# Patient Record
Sex: FEMALE | Race: WHITE
[De-identification: names, ages, dates, MRNs, and addresses within clinical notes are randomized per-mention and may not be internally consistent; named-entity substitution may affect disease eponyms.]

---

## 2019-01-08 ENCOUNTER — HOSPITAL ENCOUNTER (INPATIENT)
Dept: HOSPITAL 41 - JD.ED | Age: 84
LOS: 7 days | Discharge: HOME | DRG: 194 | End: 2019-01-15
Attending: INTERNAL MEDICINE | Admitting: INTERNAL MEDICINE
Payer: MEDICARE

## 2019-01-08 DIAGNOSIS — Z91.19: ICD-10-CM

## 2019-01-08 DIAGNOSIS — N18.9: ICD-10-CM

## 2019-01-08 DIAGNOSIS — Z86.73: ICD-10-CM

## 2019-01-08 DIAGNOSIS — I11.0: ICD-10-CM

## 2019-01-08 DIAGNOSIS — Z79.4: ICD-10-CM

## 2019-01-08 DIAGNOSIS — E87.6: ICD-10-CM

## 2019-01-08 DIAGNOSIS — E86.0: ICD-10-CM

## 2019-01-08 DIAGNOSIS — J10.00: Primary | ICD-10-CM

## 2019-01-08 DIAGNOSIS — Z91.14: ICD-10-CM

## 2019-01-08 DIAGNOSIS — K59.09: ICD-10-CM

## 2019-01-08 DIAGNOSIS — R09.02: ICD-10-CM

## 2019-01-08 DIAGNOSIS — I13.0: ICD-10-CM

## 2019-01-08 DIAGNOSIS — E78.5: ICD-10-CM

## 2019-01-08 DIAGNOSIS — R79.1: ICD-10-CM

## 2019-01-08 DIAGNOSIS — I50.9: ICD-10-CM

## 2019-01-08 DIAGNOSIS — I49.9: ICD-10-CM

## 2019-01-08 DIAGNOSIS — Z79.82: ICD-10-CM

## 2019-01-08 DIAGNOSIS — R13.10: ICD-10-CM

## 2019-01-08 DIAGNOSIS — F32.9: ICD-10-CM

## 2019-01-08 DIAGNOSIS — R50.9: ICD-10-CM

## 2019-01-08 DIAGNOSIS — D64.9: ICD-10-CM

## 2019-01-08 DIAGNOSIS — Z66: ICD-10-CM

## 2019-01-08 DIAGNOSIS — Z79.02: ICD-10-CM

## 2019-01-08 DIAGNOSIS — Z88.1: ICD-10-CM

## 2019-01-08 DIAGNOSIS — Z79.899: ICD-10-CM

## 2019-01-08 DIAGNOSIS — E11.65: ICD-10-CM

## 2019-01-08 DIAGNOSIS — H54.7: ICD-10-CM

## 2019-01-08 DIAGNOSIS — Z90.49: ICD-10-CM

## 2019-01-08 DIAGNOSIS — M54.9: ICD-10-CM

## 2019-01-08 DIAGNOSIS — E11.22: ICD-10-CM

## 2019-01-08 DIAGNOSIS — N17.9: ICD-10-CM

## 2019-01-08 DIAGNOSIS — R53.1: ICD-10-CM

## 2019-01-08 DIAGNOSIS — I48.91: ICD-10-CM

## 2019-01-08 DIAGNOSIS — Z85.828: ICD-10-CM

## 2019-01-08 DIAGNOSIS — R33.9: ICD-10-CM

## 2019-01-08 DIAGNOSIS — E83.42: ICD-10-CM

## 2019-01-08 DIAGNOSIS — G89.29: ICD-10-CM

## 2019-01-08 PROCEDURE — 71045 X-RAY EXAM CHEST 1 VIEW: CPT

## 2019-01-08 PROCEDURE — 96365 THER/PROPH/DIAG IV INF INIT: CPT

## 2019-01-08 PROCEDURE — 87081 CULTURE SCREEN ONLY: CPT

## 2019-01-08 PROCEDURE — 85025 COMPLETE CBC W/AUTO DIFF WBC: CPT

## 2019-01-08 PROCEDURE — 83605 ASSAY OF LACTIC ACID: CPT

## 2019-01-08 PROCEDURE — 87804 INFLUENZA ASSAY W/OPTIC: CPT

## 2019-01-08 PROCEDURE — 96361 HYDRATE IV INFUSION ADD-ON: CPT

## 2019-01-08 PROCEDURE — 99285 EMERGENCY DEPT VISIT HI MDM: CPT

## 2019-01-08 PROCEDURE — 87040 BLOOD CULTURE FOR BACTERIA: CPT

## 2019-01-08 PROCEDURE — 81001 URINALYSIS AUTO W/SCOPE: CPT

## 2019-01-08 PROCEDURE — 87899 AGENT NOS ASSAY W/OPTIC: CPT

## 2019-01-08 PROCEDURE — 87430 STREP A AG IA: CPT

## 2019-01-08 PROCEDURE — 86738 MYCOPLASMA ANTIBODY: CPT

## 2019-01-08 PROCEDURE — 80053 COMPREHEN METABOLIC PANEL: CPT

## 2019-01-08 PROCEDURE — 36415 COLL VENOUS BLD VENIPUNCTURE: CPT

## 2019-01-08 PROCEDURE — 86140 C-REACTIVE PROTEIN: CPT

## 2019-01-08 RX ADMIN — POTASSIUM CHLORIDE SCH: 1.5 SOLUTION ORAL at 14:39

## 2019-01-08 RX ADMIN — Medication SCH: at 21:52

## 2019-01-08 RX ADMIN — INSULIN LISPRO SCH UNITS: 100 INJECTION, SOLUTION INTRAVENOUS; SUBCUTANEOUS at 17:34

## 2019-01-08 RX ADMIN — POTASSIUM CHLORIDE SCH: 1.5 SOLUTION ORAL at 14:41

## 2019-01-08 RX ADMIN — SODIUM CHLORIDE AND POTASSIUM CHLORIDE SCH MLS/HR: .9; .15 SOLUTION INTRAVENOUS at 15:16

## 2019-01-08 RX ADMIN — INSULIN LISPRO SCH UNITS: 100 INJECTION, SOLUTION INTRAVENOUS; SUBCUTANEOUS at 14:34

## 2019-01-08 RX ADMIN — HEPARIN SODIUM SCH: 5000 INJECTION, SOLUTION INTRAVENOUS; SUBCUTANEOUS at 21:58

## 2019-01-08 RX ADMIN — INSULIN LISPRO SCH UNITS: 100 INJECTION, SOLUTION INTRAVENOUS; SUBCUTANEOUS at 21:58

## 2019-01-08 NOTE — PCM.HP
H&P History of Present Illness





- General


Date of Service: 01/08/19


Admit Problem/Dx: 


 Admission Diagnosis/Problem





Admission Diagnosis/Problem      Pneumonia








Source of Information: Family, Provider


History Limitations: Reports: No Limitations





- History of Present Illness


Initial Comments - Free Text/Narative: 











87 year old diabetic resident of [facility] presented febrile with flu like 

symptoms.  She has had the Flu vaccine but unfortunately has tested positive 

for Influenza B.  The patient also has bilateral infiltrates;  Documented 

elevated temperature was 102.0F taken in the ambulance.  She will be admitted 

to [facility] with telemetry.  Code status is DNR.


Onset of Symptoms: Reports: Unknown/Unsure


Symptom Onset Date: 01/08/19


Duration of Symptoms: Reports: Hour(s):, Getting Worse


Location: Reports: Chest, Generalized


Severity: Moderate


Improves with: Reports: Medication


Worsens with: Reports: None


Associated Symptoms: Reports: Cough, Loss of Appetite, Malaise, Nausea/Vomiting

, Shortness of Breath, Weakness





- Related Data


Allergies/Adverse Reactions: 


 Allergies











Allergy/AdvReac Type Severity Reaction Status Date / Time


 


levofloxacin [From Levaquin] Allergy  Itching Verified 11/10/18 16:04











Home Medications: 


 Home Meds





Aspirin [Halfprin] 81 mg PO DAILY 11/25/15 [History]


Clopidogrel [Plavix] 75 mg PO DAILY 11/25/15 [History]


Cyanocobalamin (Vitamin B12) [Vitamin B12] 1,000 mcg PO DAILY 11/25/15 [History]


Gabapentin [Neurontin] 300 mg PO TID 11/25/15 [History]


Insulin Aspart [Novolog Flexpen] 9 unit SQ QPM 11/25/15 [History]


Insulin Aspart [Novolog Flexpen] 11 unit SQ QAM 11/25/15 [History]


Insulin Detemir [Levemir Flextouch] 30 unit SQ DAILY 11/25/15 [History]


Furosemide [Lasix] 40 mg PO DAILY 04/10/16 [History]


Sennosides/Docusate Sodium [Senna-Docusate Sodium] 1 tab PO DAILY PRN 04/10/16 [

History]


Acetaminophen 500 mg PO BEDTIME 08/17/16 [History]


Acetaminophen [Tylenol Extra Strength] 1,000 mg PO Q6HR PRN 08/17/16 [History]


Calcium Carbonate [Tums] 400 mg PO Q4H PRN 08/17/16 [History]


Vitamin E 400 unit PO DAILY 08/17/16 [History]


Loratadine [Claritin] 10 mg PO DAILY PRN 11/10/18 [History]











Past Medical History


HEENT History: Reports: Impaired Vision


Cardiovascular History: Reports: Arrhythmia, Heart Failure, Hypertension


Respiratory History: Reports: Other (See Below)


Other Respiratory History: hypoxemia


Gastrointestinal History: Reports: Chronic Constipation


Other Gastrointestinal History: dysphagia


Genitourinary History: Reports: Chronic Renal Insuffiency, Retention, Urinary


OB/GYN History: Reports: Pregnancy


Musculoskeletal History: Reports: Back Pain, Chronic


Other Musculoskeletal History: muscle weakness


Neurological History: Reports: TIA, Other (See Below)


Other Neuro History: cerebral infarct; neuralgia


Psychiatric History: Reports: Depression


Endocrine/Metabolic History: Reports: Diabetes, Type II


Hematologic History: Reports: Anemia


Oncologic (Cancer) History: Reports: Other (See Below)


Other Oncologic History: unknown skin cancer


Dermatologic History: Reports: Other (See Below)


Other Dermatologic History: previous supspected skin cancer





- Past Surgical History


GI Surgical History: Reports: Cholecystectomy





Social & Family History





- Family History


Family Medical History: Noncontributory





- Tobacco Use


Smoking Status *Q: Never Smoker





- Caffeine Use


Caffeine Use: Reports: None





- Recreational Drug Use


Recreational Drug Use: No





H&P Review of Systems





- Review of Systems:


Review Of Systems: See Below


General: Reports: Fever, Chills, Malaise, Weakness, Fatigue, Decreased Appetite


HEENT: Reports: No Symptoms


Pulmonary: Reports: Shortness of Breath, Cough


Gastrointestinal: Reports: Decreased Appetite, Nausea


Genitourinary: Reports: No Symptoms


Musculoskeletal: Reports: No Symptoms


Skin: Reports: No Symptoms


Psychiatric: Reports: No Symptoms


Neurological: Reports: No Symptoms


Hematologic/Lymphatic: Reports: No Symptoms


Immunologic: Reports: No Symptoms





Exam





- Exam


Exam: See Below





- Vital Signs


Vital Signs: 


 Last Vital Signs











Temp  36.8 C   01/08/19 07:36


 


Pulse  105 H  01/08/19 07:36


 


Resp  22 H  01/08/19 07:36


 


BP  108/47 L  01/08/19 07:36


 


Pulse Ox  92 L  01/08/19 08:02











Weight: 65.771 kg





- Exam


Quality Assessment: Supplemental Oxygen, DVT Prophylaxis


General: Alert, Oriented, Cooperative


HEENT: Conjunctiva Clear, EOMI, Nares Patent, Normal Nasal Septum, Pupils Equal

, Pupils Reactive, PERRLA


Neck: Trachea Midline


Lungs: Normal Respiratory Effort, Decreased Breath Sounds, Rhonchi


Cardiovascular: Regular Rate


GI/Abdominal Exam: Normal Bowel Sounds, Soft, Non-Tender, No Organomegaly, No 

Distention


 (Female) Exam: Deferred


Rectal (Female) Exam: Deferred


Back Exam: Normal Inspection


Extremities: Normal Inspection, Non-Tender, Slow Capillary Refill


Skin: Warm


Neurological: Cranial Nerves Intact


Neuro Extensive - Mental Status: Alert, Normal Mood/Affect, Normal Cognition, 

Memory Intact


Neuro Extensive - Motor, Sensory, Reflexes: CN II-XII Intact


Psychiatric: Alert





- Patient Data


Lab Results Last 24 hrs: 


 Laboratory Results - last 24 hr











  01/08/19 01/08/19 01/08/19 Range/Units





  07:52 07:52 07:52 


 


WBC  17.99 H    (3.98-10.04)  K/mm3


 


RBC  4.33    (3.98-5.22)  M/mm3


 


Hgb  12.6    (11.2-15.7)  gm/L


 


Hct  37.9    (34.1-44.9)  %


 


MCV  87.5    (79.4-94.8)  fl


 


MCH  29.1    (25.6-32.2)  pg


 


MCHC  33.2    (32.2-35.5)  g/dl


 


RDW Std Deviation  46.2    (36.4-46.3)  fL


 


Plt Count  241    (182-369)  K/mm3


 


MPV  10.0    (9.4-12.3)  fl


 


Neut % (Auto)  89.4 H    (34.0-71.1)  %


 


Lymph % (Auto)  5.8 L    (19.3-51.7)  %


 


Mono % (Auto)  4.3 L    (4.7-12.5)  %


 


Eos % (Auto)  0 L    (0.7-5.8)  


 


Baso % (Auto)  0.2    (0.1-1.2)  %


 


Neut # (Auto)  16.09 H    (1.56-6.13)  K/mm3


 


Lymph # (Auto)  1.04 L    (1.18-3.74)  K/mm3


 


Mono # (Auto)  0.78 H    (0.24-0.36)  K/mm3


 


Eos # (Auto)  0.00 L    (0.04-0.36)  K/mm3


 


Baso # (Auto)  0.03    (0.01-0.08)  K/mm3


 


Manual Slide Review  Abnormal smear    


 


Sodium   139   (136-145)  mEq/L


 


Potassium   2.7 L   (3.5-5.1)  mEq/L


 


Chloride   101   ()  mEq/L


 


Carbon Dioxide   26   (21-32)  mEq/L


 


Anion Gap   14.7   (5-15)  


 


BUN   11   (7-18)  mg/dL


 


Creatinine   1.2 H   (0.55-1.02)  mg/dL


 


Est Cr Clr Drug Dosing   23.72   mL/min


 


Estimated GFR (MDRD)   42   (>60)  mL/min


 


BUN/Creatinine Ratio   9.2 L   (14-18)  


 


Glucose   181 H   ()  mg/dL


 


Lactic Acid    0.9  (0.4-2.0)  mmol/L


 


Calcium   8.9   (8.5-10.1)  mg/dL


 


Total Bilirubin   0.9   (0.2-1.0)  mg/dL


 


AST   13 L   (15-37)  U/L


 


ALT   15   (14-59)  U/L


 


Alkaline Phosphatase   66   ()  U/L


 


C-Reactive Protein     (<1.0)  mg/dL


 


Total Protein   6.8   (6.4-8.2)  g/dl


 


Albumin   2.9 L   (3.4-5.0)  g/dl


 


Globulin   3.9   gm/dL


 


Albumin/Globulin Ratio   0.7 L   (1-2)  


 


Urine Color     (Yellow)  


 


Urine Appearance     (Clear)  


 


Urine pH     (5.0-8.0)  


 


Ur Specific Gravity     (1.005-1.030)  


 


Urine Protein     (Negative)  


 


Urine Glucose (UA)     (Negative)  


 


Urine Ketones     (Negative)  


 


Urine Occult Blood     (Negative)  


 


Urine Nitrite     (Negative)  


 


Urine Bilirubin     (Negative)  


 


Urine Urobilinogen     (0.2-1.0)  


 


Ur Leukocyte Esterase     (Negative)  


 


Urine RBC     (0-5)  /hpf


 


Urine WBC     (0-5)  /hpf


 


Ur Epithelial Cells     (0-5)  /hpf


 


Urine Bacteria     (FEW)  /hpf


 


Urine Mucus     (FEW)  /hpf


 


Mycoplasma pneumon IgM     (NEGATIVE)  














  01/08/19 01/08/19 Range/Units





  07:52 08:24 


 


WBC    (3.98-10.04)  K/mm3


 


RBC    (3.98-5.22)  M/mm3


 


Hgb    (11.2-15.7)  gm/L


 


Hct    (34.1-44.9)  %


 


MCV    (79.4-94.8)  fl


 


MCH    (25.6-32.2)  pg


 


MCHC    (32.2-35.5)  g/dl


 


RDW Std Deviation    (36.4-46.3)  fL


 


Plt Count    (182-369)  K/mm3


 


MPV    (9.4-12.3)  fl


 


Neut % (Auto)    (34.0-71.1)  %


 


Lymph % (Auto)    (19.3-51.7)  %


 


Mono % (Auto)    (4.7-12.5)  %


 


Eos % (Auto)    (0.7-5.8)  


 


Baso % (Auto)    (0.1-1.2)  %


 


Neut # (Auto)    (1.56-6.13)  K/mm3


 


Lymph # (Auto)    (1.18-3.74)  K/mm3


 


Mono # (Auto)    (0.24-0.36)  K/mm3


 


Eos # (Auto)    (0.04-0.36)  K/mm3


 


Baso # (Auto)    (0.01-0.08)  K/mm3


 


Manual Slide Review    


 


Sodium    (136-145)  mEq/L


 


Potassium    (3.5-5.1)  mEq/L


 


Chloride    ()  mEq/L


 


Carbon Dioxide    (21-32)  mEq/L


 


Anion Gap    (5-15)  


 


BUN    (7-18)  mg/dL


 


Creatinine    (0.55-1.02)  mg/dL


 


Est Cr Clr Drug Dosing    mL/min


 


Estimated GFR (MDRD)    (>60)  mL/min


 


BUN/Creatinine Ratio    (14-18)  


 


Glucose    ()  mg/dL


 


Lactic Acid    (0.4-2.0)  mmol/L


 


Calcium    (8.5-10.1)  mg/dL


 


Total Bilirubin    (0.2-1.0)  mg/dL


 


AST    (15-37)  U/L


 


ALT    (14-59)  U/L


 


Alkaline Phosphatase    ()  U/L


 


C-Reactive Protein  24.8 H*   (<1.0)  mg/dL


 


Total Protein    (6.4-8.2)  g/dl


 


Albumin    (3.4-5.0)  g/dl


 


Globulin    gm/dL


 


Albumin/Globulin Ratio    (1-2)  


 


Urine Color   Dark yellow  (Yellow)  


 


Urine Appearance   Clear  (Clear)  


 


Urine pH   6.5  (5.0-8.0)  


 


Ur Specific Gravity   1.020  (1.005-1.030)  


 


Urine Protein   1+ H  (Negative)  


 


Urine Glucose (UA)   Negative  (Negative)  


 


Urine Ketones   1+ H  (Negative)  


 


Urine Occult Blood   Negative  (Negative)  


 


Urine Nitrite   Negative  (Negative)  


 


Urine Bilirubin   Negative  (Negative)  


 


Urine Urobilinogen   0.2  (0.2-1.0)  


 


Ur Leukocyte Esterase   Negative  (Negative)  


 


Urine RBC   Not seen  (0-5)  /hpf


 


Urine WBC   0-5  (0-5)  /hpf


 


Ur Epithelial Cells   0-5  (0-5)  /hpf


 


Urine Bacteria   Not seen  (FEW)  /hpf


 


Urine Mucus   Not seen  (FEW)  /hpf


 


Mycoplasma pneumon IgM  Negative   (NEGATIVE)  











Result Diagrams: 


 01/08/19 07:52





 01/08/19 07:52


[facility] Results Last 24 hrs: 


 Microbiology











 01/08/19 09:30 Group A Streptococcus Rapid Screen - Final





 Throat    NEGATIVE STREP A SCREEN


 


 01/08/19 08:36 Influenza Type A Antigen Screen - Final





 Nasopharyngeal Swab    NEGATIVE INFLUENZA A VIRUS AG





 Influenza Type B Antigen Screen - Final





    Positive Influenza B Ag














- Problem List


(1) Hypokalemia


SNOMED Code(s): 66722323


   ICD Code: E87.6 - HYPOKALEMIA   Status: Acute   Current Visit: Yes   





(2) Influenza B


SNOMED Code(s): 64240495


   ICD Code: J10.1 - FLU DUE TO OTH IDENT INFLUENZA VIRUS W OTH RESP MANIFEST   

Status: Acute   Current Visit: Yes   





(3) Pneumonia


SNOMED Code(s): 854498543


   ICD Code: J18.9 - PNEUMONIA, UNSPECIFIED ORGANISM   Status: Acute   Current 

Visit: Yes   


Qualifiers: 


   Pneumonia type: due to unspecified organism   Laterality: left   Lung 

location: unspecified part of lung   Qualified Code(s): J18.9 - Pneumonia, 

unspecified organism   





(4) Renal insufficiency


SNOMED Code(s): 810071752, 028747233


   ICD Code: N28.9 - DISORDER OF KIDNEY AND URETER, UNSPECIFIED   Status: Acute

   Current Visit: Yes   


Problem List Initiated/Reviewed/Updated: Yes


Orders Last 24hrs: 


 Active Orders 24 hr











 Category Date Time Status


 


 Admission Status [Patient Status] [ADT] Routine ADT  01/08/19 09:59 Active


 


 Cardiac Monitoring [RC] .AS DIRECTED Care  01/08/19 08:02 Active


 


 Oxygen Therapy [RC] PRN Care  01/08/19 08:02 Active


 


 Peripheral IV Care [RC] .AS DIRECTED Care  01/08/19 08:03 Active


 


 CULTURE BLOOD [BC] Stat Lab  01/08/19 07:52 Received


 


 CULTURE BLOOD [BC] Stat Lab  01/08/19 08:00 Received


 


 CULTURE STREP A CONFIRMATION [] Stat Lab  01/08/19 09:30 Results


 


 STREP SCRN A RAPID W CULT CONF [] Stat Lab  01/08/19 09:30 Results


 


 Sodium Chloride 0.9% [Normal Saline] 1,000 ml Med  01/08/19 08:15 Active





 IV .BOLUS   


 


 Sodium Chloride 0.9% [Saline Flush] Med  01/08/19 08:02 Active





 10 ml FLUSH ASDIRECTED PRN   


 


 Blood Culture x2 Reflex Set [OM.PC] Stat Oth  01/08/19 08:04 Ordered


 


 Peripheral IV Insertion Adult [OM.PC] Stat Oth  01/08/19 08:02 Ordered








 Medication Orders





Sodium Chloride (Normal Saline)  1,000 mls @ 1,000 mls/hr IV .BOLUS RYLEY


   Last Admin: 01/08/19 08:15  Dose: 1,000 mls/hr


Sodium Chloride (Saline Flush)  10 ml FLUSH ASDIRECTED PRN


   PRN Reason: Keep Vein Open


   Last Admin: 01/08/19 08:15  Dose: 10 ml








Assessment/Plan Comment:: 











Impression:





Bilateral PNA with hypoxia





Influenza B positive, Hx of vaccine





Dehydration





ARF





Hypokalemia














Chronic


DM type 2


TIA/CVA


CKD


HF


HTN


HLD


Depression














Plan:





IVF


Replace electrolytes;  refusing oral KCl


Sepsis protocol


Droplet precautions


Complete resp screen;  check PCR


Home meds


Daily labs

## 2019-01-08 NOTE — CR
Chest: Frontal view of the chest was obtained.

 

Comparison: Prior chest x-ray of 11/14/18.

 

Patchy areas of increased density are identified within both sides of 

the chest.  Heart size is slightly enlarged.  Mild tortuosity of the 

thoracic aorta is seen.  Scoliosis noted within the spine with 

scattered degenerative change.  Surgical clips are seen from prior 

cholecystectomy.  Bony structures are osteopenic.

 

Impression:

1.  Patchy areas of increased density on both sides of the chest.  

Please correlate if patient has infectious symptoms for findings to 

represent multifocal pneumonia.  Continued follow-up is recommended to

 evaluate for improvement.

2.  Other incidental findings as noted above.

 

Diagnostic code #3

## 2019-01-08 NOTE — EDM.PDOC
ED HPI GENERAL MEDICAL PROBLEM





- General


Chief Complaint: Possible Sepsis


Stated Complaint: [facility]


Time Seen by Provider: 01/08/19 07:46


Source of Information: Reports: Patient, EMS, Nursing Home Records


History Limitations: Reports: No Limitations





- History of Present Illness


INITIAL COMMENTS - FREE TEXT/NARRATIVE: 





The patient presents from [facility] for a 

fever of 102.  She also has been needed oxygen to keep oxygen saturations about 

90%.  She denies having a cough.  She has just been weak lately.  She has no 

chest pain, shortness of breath, abdominal pain, nausea or vomiting.  She has 

no dysuria.  She has no skin sores.  She has not been around anyone who is sick.


Onset: Gradual


Duration: Day(s):


Severity: Moderate


Improves with: Reports: None


Worsens with: Reports: None


Associated Symptoms: Reports: Fever/Chills.  Denies: Chest Pain, Cough, 

Headaches, Nausea/Vomiting, Shortness of Breath





- Related Data


 Allergies











Allergy/AdvReac Type Severity Reaction Status Date / Time


 


levofloxacin [From Levaquin] Allergy  Itching Verified 11/10/18 16:04











Home Meds: 


 Home Meds





Aspirin [Halfprin] 81 mg PO DAILY 11/25/15 [History]


Clopidogrel [Plavix] 75 mg PO DAILY 11/25/15 [History]


Cyanocobalamin (Vitamin B12) [Vitamin B12] 1,000 mcg PO DAILY 11/25/15 [History]


Gabapentin [Neurontin] 300 mg PO TID 11/25/15 [History]


Insulin Aspart [Novolog Flexpen] 9 unit SQ QPM 11/25/15 [History]


Insulin Aspart [Novolog Flexpen] 11 unit SQ DAILY 11/25/15 [History]


Insulin Detemir [Levemir Flextouch] 30 unit SQ DAILY 11/25/15 [History]


Furosemide [Lasix] 40 mg PO DAILY 04/10/16 [History]


Sennosides/Docusate Sodium [Senna-Docusate Sodium] 1 tab PO DAILY PRN 04/10/16 [

History]


Acetaminophen 500 mg PO BEDTIME 08/17/16 [History]


Acetaminophen [Tylenol Extra Strength] 1,000 mg PO Q6HR PRN 08/17/16 [History]


Calcium Carbonate [Tums] 400 mg PO Q4H PRN 08/17/16 [History]


Vitamin E 400 unit PO DAILY 08/17/16 [History]


Loratadine [Claritin] 10 mg PO DAILY PRN 11/10/18 [History]


Saccharomyces Boulardii [Florastor] 250 mg PO BID #9 cap 11/14/18 [Rx]











Past Medical History


HEENT History: Reports: Impaired Vision


Cardiovascular History: Reports: Arrhythmia, Heart Failure, Hypertension


Respiratory History: Reports: Other (See Below)


Other Respiratory History: hypoxemia


Gastrointestinal History: Reports: Chronic Constipation


Other Gastrointestinal History: dysphagia


Genitourinary History: Reports: Chronic Renal Insuffiency, Retention, Urinary


OB/GYN History: Reports: Pregnancy


Musculoskeletal History: Reports: Back Pain, Chronic


Other Musculoskeletal History: muscle weakness


Neurological History: Reports: TIA, Other (See Below)


Other Neuro History: cerebral infarct; neuralgia


Psychiatric History: Reports: Depression


Endocrine/Metabolic History: Reports: Diabetes, Type II


Hematologic History: Reports: Anemia


Oncologic (Cancer) History: Reports: Other (See Below)


Other Oncologic History: unknown skin cancer


Dermatologic History: Reports: Other (See Below)


Other Dermatologic History: previous supspected skin cancer





- Past Surgical History


GI Surgical History: Reports: Cholecystectomy





Social & Family History





- Family History


Family Medical History: Noncontributory





- Tobacco Use


Smoking Status *Q: Never Smoker





- Caffeine Use


Caffeine Use: Reports: None





- Recreational Drug Use


Recreational Drug Use: No





ED ROS GENERAL





- Review of Systems


Review Of Systems: See Below


Constitutional: Reports: Fever, Chills, Malaise, Weakness, Fatigue


HEENT: Reports: No Symptoms


Respiratory: Reports: No Symptoms


Cardiovascular: Reports: No Symptoms


Endocrine: Reports: No Symptoms


GI/Abdominal: Reports: No Symptoms


: Reports: No Symptoms


Musculoskeletal: Reports: No Symptoms





ED EXAM, SEPSIS





- Physical Exam


Exam: See Below


Exam Limited By: No Limitations


General Appearance: Alert, No Apparent Distress


Ears: Normal External Exam


Nose: Normal Inspection


Head: Atraumatic, Normocephalic


Neck: Normal Inspection


Respiratory/Chest: No Respiratory Distress, Decreased Breath Sounds


Cardiovascular: Regular Rate, Rhythm, No Edema, No Murmur


GI/Abdominal Exam: Soft, Non-Tender, No Organomegaly, No Mass


Back: Normal Inspection


Extremities: Normal Inspection


Neurological: Alert, Oriented, No Motor/Sensory Deficits





Course





- Vital Signs


Last Recorded V/S: 


 Last Vital Signs











Temp  98.2 F   01/08/19 07:36


 


Pulse  105 H  01/08/19 07:36


 


Resp  22 H  01/08/19 07:36


 


BP  108/47 L  01/08/19 07:36


 


Pulse Ox  92 L  01/08/19 08:02














- Orders/Labs/Meds


Orders: 


 Active Orders 24 hr











 Category Date Time Status


 


 Cardiac Monitoring [RC] .AS DIRECTED Care  01/08/19 08:02 Active


 


 Oxygen Therapy [RC] PRN Care  01/08/19 08:02 Active


 


 Peripheral IV Care [RC] .AS DIRECTED Care  01/08/19 08:03 Active


 


 CRP [C-REACTIVE PROTEIN] [CHEM] Stat Lab  01/08/19 07:52 Received


 


 CULTURE BLOOD [BC] Stat Lab  01/08/19 07:52 Received


 


 CULTURE BLOOD [BC] Stat Lab  01/08/19 08:00 Received


 


 CULTURE STREP A CONFIRMATION [] Stat Lab  01/08/19 09:30 Results


 


 MYCOPLASMA PNEUMONIAE IGM AB [CHEM] Stat Lab  01/08/19 07:52 Received


 


 STREP SCRN A RAPID W CULT CONF [RM] Stat Lab  01/08/19 09:30 Results


 


 Sodium Chloride 0.9% [Normal Saline] 1,000 ml Med  01/08/19 08:15 Active





 IV .BOLUS   


 


 Sodium Chloride 0.9% [Saline Flush] Med  01/08/19 08:02 Active





 10 ml FLUSH ASDIRECTED PRN   


 


 Blood Culture x2 Reflex Set [OM.PC] Stat Oth  01/08/19 08:04 Ordered


 


 Peripheral IV Insertion Adult [OM.PC] Stat Oth  01/08/19 08:02 Ordered








 Medication Orders





Sodium Chloride (Normal Saline)  1,000 mls @ 1,000 mls/hr IV .BOLUS RYLEY


   Last Admin: 01/08/19 08:15  Dose: 1,000 mls/hr


Sodium Chloride (Saline Flush)  10 ml FLUSH ASDIRECTED PRN


   PRN Reason: Keep Vein Open


   Last Admin: 01/08/19 08:15  Dose: 10 ml








Labs: 


 Laboratory Tests











  01/08/19 01/08/19 01/08/19 Range/Units





  07:52 07:52 07:52 


 


WBC  17.99 H    (3.98-10.04)  K/mm3


 


RBC  4.33    (3.98-5.22)  M/mm3


 


Hgb  12.6    (11.2-15.7)  gm/L


 


Hct  37.9    (34.1-44.9)  %


 


MCV  87.5    (79.4-94.8)  fl


 


MCH  29.1    (25.6-32.2)  pg


 


MCHC  33.2    (32.2-35.5)  g/dl


 


RDW Std Deviation  46.2    (36.4-46.3)  fL


 


Plt Count  241    (182-369)  K/mm3


 


MPV  10.0    (9.4-12.3)  fl


 


Neut % (Auto)  89.4 H    (34.0-71.1)  %


 


Lymph % (Auto)  5.8 L    (19.3-51.7)  %


 


Mono % (Auto)  4.3 L    (4.7-12.5)  %


 


Eos % (Auto)  0 L    (0.7-5.8)  


 


Baso % (Auto)  0.2    (0.1-1.2)  %


 


Neut # (Auto)  16.09 H    (1.56-6.13)  K/mm3


 


Lymph # (Auto)  1.04 L    (1.18-3.74)  K/mm3


 


Mono # (Auto)  0.78 H    (0.24-0.36)  K/mm3


 


Eos # (Auto)  0.00 L    (0.04-0.36)  K/mm3


 


Baso # (Auto)  0.03    (0.01-0.08)  K/mm3


 


Manual Slide Review  Abnormal smear    


 


Sodium   139   (136-145)  mEq/L


 


Potassium   2.7 L   (3.5-5.1)  mEq/L


 


Chloride   101   ()  mEq/L


 


Carbon Dioxide   26   (21-32)  mEq/L


 


Anion Gap   14.7   (5-15)  


 


BUN   11   (7-18)  mg/dL


 


Creatinine   1.2 H   (0.55-1.02)  mg/dL


 


Est Cr Clr Drug Dosing   23.72   mL/min


 


Estimated GFR (MDRD)   42   (>60)  mL/min


 


BUN/Creatinine Ratio   9.2 L   (14-18)  


 


Glucose   181 H   ()  mg/dL


 


Lactic Acid    0.9  (0.4-2.0)  mmol/L


 


Calcium   8.9   (8.5-10.1)  mg/dL


 


Total Bilirubin   0.9   (0.2-1.0)  mg/dL


 


AST   13 L   (15-37)  U/L


 


ALT   15   (14-59)  U/L


 


Alkaline Phosphatase   66   ()  U/L


 


Total Protein   6.8   (6.4-8.2)  g/dl


 


Albumin   2.9 L   (3.4-5.0)  g/dl


 


Globulin   3.9   gm/dL


 


Albumin/Globulin Ratio   0.7 L   (1-2)  


 


Urine Color     (Yellow)  


 


Urine Appearance     (Clear)  


 


Urine pH     (5.0-8.0)  


 


Ur Specific Gravity     (1.005-1.030)  


 


Urine Protein     (Negative)  


 


Urine Glucose (UA)     (Negative)  


 


Urine Ketones     (Negative)  


 


Urine Occult Blood     (Negative)  


 


Urine Nitrite     (Negative)  


 


Urine Bilirubin     (Negative)  


 


Urine Urobilinogen     (0.2-1.0)  


 


Ur Leukocyte Esterase     (Negative)  


 


Urine RBC     (0-5)  /hpf


 


Urine WBC     (0-5)  /hpf


 


Ur Epithelial Cells     (0-5)  /hpf


 


Urine Bacteria     (FEW)  /hpf


 


Urine Mucus     (FEW)  /hpf














  01/08/19 Range/Units





  08:24 


 


WBC   (3.98-10.04)  K/mm3


 


RBC   (3.98-5.22)  M/mm3


 


Hgb   (11.2-15.7)  gm/L


 


Hct   (34.1-44.9)  %


 


MCV   (79.4-94.8)  fl


 


MCH   (25.6-32.2)  pg


 


MCHC   (32.2-35.5)  g/dl


 


RDW Std Deviation   (36.4-46.3)  fL


 


Plt Count   (182-369)  K/mm3


 


MPV   (9.4-12.3)  fl


 


Neut % (Auto)   (34.0-71.1)  %


 


Lymph % (Auto)   (19.3-51.7)  %


 


Mono % (Auto)   (4.7-12.5)  %


 


Eos % (Auto)   (0.7-5.8)  


 


Baso % (Auto)   (0.1-1.2)  %


 


Neut # (Auto)   (1.56-6.13)  K/mm3


 


Lymph # (Auto)   (1.18-3.74)  K/mm3


 


Mono # (Auto)   (0.24-0.36)  K/mm3


 


Eos # (Auto)   (0.04-0.36)  K/mm3


 


Baso # (Auto)   (0.01-0.08)  K/mm3


 


Manual Slide Review   


 


Sodium   (136-145)  mEq/L


 


Potassium   (3.5-5.1)  mEq/L


 


Chloride   ()  mEq/L


 


Carbon Dioxide   (21-32)  mEq/L


 


Anion Gap   (5-15)  


 


BUN   (7-18)  mg/dL


 


Creatinine   (0.55-1.02)  mg/dL


 


Est Cr Clr Drug Dosing   mL/min


 


Estimated GFR (MDRD)   (>60)  mL/min


 


BUN/Creatinine Ratio   (14-18)  


 


Glucose   ()  mg/dL


 


Lactic Acid   (0.4-2.0)  mmol/L


 


Calcium   (8.5-10.1)  mg/dL


 


Total Bilirubin   (0.2-1.0)  mg/dL


 


AST   (15-37)  U/L


 


ALT   (14-59)  U/L


 


Alkaline Phosphatase   ()  U/L


 


Total Protein   (6.4-8.2)  g/dl


 


Albumin   (3.4-5.0)  g/dl


 


Globulin   gm/dL


 


Albumin/Globulin Ratio   (1-2)  


 


Urine Color  Dark yellow  (Yellow)  


 


Urine Appearance  Clear  (Clear)  


 


Urine pH  6.5  (5.0-8.0)  


 


Ur Specific Gravity  1.020  (1.005-1.030)  


 


Urine Protein  1+ H  (Negative)  


 


Urine Glucose (UA)  Negative  (Negative)  


 


Urine Ketones  1+ H  (Negative)  


 


Urine Occult Blood  Negative  (Negative)  


 


Urine Nitrite  Negative  (Negative)  


 


Urine Bilirubin  Negative  (Negative)  


 


Urine Urobilinogen  0.2  (0.2-1.0)  


 


Ur Leukocyte Esterase  Negative  (Negative)  


 


Urine RBC  Not seen  (0-5)  /hpf


 


Urine WBC  0-5  (0-5)  /hpf


 


Ur Epithelial Cells  0-5  (0-5)  /hpf


 


Urine Bacteria  Not seen  (FEW)  /hpf


 


Urine Mucus  Not seen  (FEW)  /hpf











Meds: 


Medications











Generic Name Dose Route Start Last Admin





  Trade Name Freq  PRN Reason Stop Dose Admin


 


Sodium Chloride  1,000 mls @ 1,000 mls/hr  01/08/19 08:15  01/08/19 08:15





  Normal Saline  IV   1,000 mls/hr





  .BOLUS RYLEY   Administration





     





     





     





     


 


Sodium Chloride  10 ml  01/08/19 08:02  01/08/19 08:15





  Saline Flush  FLUSH   10 ml





  ASDIRECTED PRN   Administration





  Keep Vein Open   





     





     





     














Discontinued Medications














Generic Name Dose Route Start Last Admin





  Trade Name Freq  PRN Reason Stop Dose Admin


 


Ceftriaxone Sodium 2 gm/  100 mls @ 200 mls/hr  01/08/19 08:04  01/08/19 08:37





  Sodium Chloride  IV  01/08/19 08:33  200 mls/hr





  ONETIME ONE   Administration





     





     





     





     


 


Oseltamivir Phosphate  75 mg  01/08/19 09:06  01/08/19 09:15





  Tamiflu  PO  01/08/19 09:07  75 mg





  ONETIME ONE   Administration





     





     





     





     


 


Potassium Chloride  40 meq  01/08/19 09:17  01/08/19 09:26





  Klor-Con M20  PO  01/08/19 09:18  40 meq





  ONETIME ONE   Administration





     





     





     





     














- Re-Assessments/Exams


Free Text/Narrative Re-Assessment/Exam: 





01/08/19 08:32


I ordered oxygen, IV NS 1L bolus, labs, UA, blood cultures and rocephin 2 grams 

IV.


01/08/19 10:12


Her WBC was elevated at 17.99.  Her K was low at 2.7.  I gave her a dose of 

40meq by mouth.  Her creatinine was slightly elevated at 1.2.  Her glucose was 

181.  Her lactic acid was normal at 0.9.  Her UA shows no UTI.  Her influenza B 

was positive.  Her CXR shows patchy areas of increased density on both sides of 

the chest.  The patient has bilateral pneumonia and influenza B.  I ordered 

tamiflu 75mg PO.


01/08/19 10:15


I talked to Dr Tesfaye and she agreed to the admission.  She wanted a mycoplasma 

and strep.  The strep was negative.





Departure





- Departure


Time of Disposition: 10:15


Disposition: Admitted As Inpatient 66


Condition: Poor


Clinical Impression: 


 Hypokalemia, Influenza B, Renal insufficiency





Pneumonia


Qualifiers:


 Pneumonia type: due to unspecified organism Laterality: left Lung location: 

unspecified part of lung Qualified Code(s): J18.9 - Pneumonia, unspecified 

organism








- Discharge Information





- My Orders


Last 24 Hours: 


My Active Orders





01/08/19 07:52


CRP [C-REACTIVE PROTEIN] [CHEM] Stat 


CULTURE BLOOD [BC] Stat 


MYCOPLASMA PNEUMONIAE IGM AB [CHEM] Stat 





01/08/19 08:00


CULTURE BLOOD [BC] Stat 





01/08/19 08:02


Cardiac Monitoring [RC] .AS DIRECTED 


Oxygen Therapy [RC] PRN 


Sodium Chloride 0.9% [Saline Flush]   10 ml FLUSH ASDIRECTED PRN 


Peripheral IV Insertion Adult [OM.PC] Stat 





01/08/19 08:03


Peripheral IV Care [RC] .AS DIRECTED 





01/08/19 08:04


Blood Culture x2 Reflex Set [OM.PC] Stat 





01/08/19 08:15


Sodium Chloride 0.9% [Normal Saline] 1,000 ml IV .BOLUS 





01/08/19 09:30


CULTURE STREP A CONFIRMATION [RM] Stat 


STREP SCRN A RAPID W CULT CONF [RM] Stat 














- Assessment/Plan


Last 24 Hours: 


My Active Orders





01/08/19 07:52


CRP [C-REACTIVE PROTEIN] [CHEM] Stat 


CULTURE BLOOD [BC] Stat 


MYCOPLASMA PNEUMONIAE IGM AB [CHEM] Stat 





01/08/19 08:00


CULTURE BLOOD [BC] Stat 





01/08/19 08:02


Cardiac Monitoring [RC] .AS DIRECTED 


Oxygen Therapy [RC] PRN 


Sodium Chloride 0.9% [Saline Flush]   10 ml FLUSH ASDIRECTED PRN 


Peripheral IV Insertion Adult [OM.PC] Stat 





01/08/19 08:03


Peripheral IV Care [RC] .AS DIRECTED 





01/08/19 08:04


Blood Culture x2 Reflex Set [OM.PC] Stat 





01/08/19 08:15


Sodium Chloride 0.9% [Normal Saline] 1,000 ml IV .BOLUS 





01/08/19 09:30


CULTURE STREP A CONFIRMATION [RM] Stat 


STREP SCRN A RAPID W CULT CONF [RM] Stat

## 2019-01-09 RX ADMIN — GUAIFENESIN AND DEXTROMETHORPHAN SCH ML: 100; 10 SYRUP ORAL at 21:04

## 2019-01-09 RX ADMIN — Medication SCH MG: at 08:39

## 2019-01-09 RX ADMIN — SODIUM CHLORIDE AND POTASSIUM CHLORIDE SCH MLS/HR: .9; .15 SOLUTION INTRAVENOUS at 02:26

## 2019-01-09 RX ADMIN — INSULIN LISPRO SCH: 100 INJECTION, SOLUTION INTRAVENOUS; SUBCUTANEOUS at 17:04

## 2019-01-09 RX ADMIN — HEPARIN SODIUM SCH: 5000 INJECTION, SOLUTION INTRAVENOUS; SUBCUTANEOUS at 08:05

## 2019-01-09 RX ADMIN — HEPARIN SODIUM SCH UNITS: 5000 INJECTION, SOLUTION INTRAVENOUS; SUBCUTANEOUS at 12:55

## 2019-01-09 RX ADMIN — SODIUM CHLORIDE AND POTASSIUM CHLORIDE SCH MLS/HR: .9; .15 SOLUTION INTRAVENOUS at 12:59

## 2019-01-09 RX ADMIN — POTASSIUM CHLORIDE SCH: 1500 TABLET, EXTENDED RELEASE ORAL at 21:09

## 2019-01-09 RX ADMIN — INSULIN LISPRO SCH UNITS: 100 INJECTION, SOLUTION INTRAVENOUS; SUBCUTANEOUS at 08:41

## 2019-01-09 RX ADMIN — INSULIN LISPRO SCH UNITS: 100 INJECTION, SOLUTION INTRAVENOUS; SUBCUTANEOUS at 21:02

## 2019-01-09 RX ADMIN — GUAIFENESIN AND DEXTROMETHORPHAN SCH: 100; 10 SYRUP ORAL at 21:13

## 2019-01-09 RX ADMIN — HEPARIN SODIUM SCH: 5000 INJECTION, SOLUTION INTRAVENOUS; SUBCUTANEOUS at 21:08

## 2019-01-09 RX ADMIN — INSULIN LISPRO SCH UNITS: 100 INJECTION, SOLUTION INTRAVENOUS; SUBCUTANEOUS at 12:56

## 2019-01-09 RX ADMIN — Medication SCH: at 21:09

## 2019-01-09 NOTE — PCM.PN
- General Info


Date of Service: 01/09/19


Admission Dx/Problem (Free Text): 


 Admission Diagnosis/Problem





Admission Diagnosis/Problem      Pneumonia








Subjective Update: 


Follow up





Functional Status: Reports: Pain Controlled, Tolerating Diet, Urinating.  Denies

: New Symptoms





- Review of Systems


General: Reports: Fatigue, Malaise.  Denies: Fever, Chills


HEENT: Reports: No Symptoms


Pulmonary: Reports: Shortness of Breath, Cough


Cardiovascular: Denies: Chest Pain


Gastrointestinal: Denies: Abdominal Pain, Decreased Appetite, Nausea, Vomiting


Genitourinary: Reports: No Symptoms


Musculoskeletal: Reports: No Symptoms


Skin: Denies: Cyanosis, Pallor, Diaphoresis, Bruising, Pruritis


Neurological: Reports: Weakness, Gait Disturbance.  Denies: Confusion, 

Difficulty Walking


Psychiatric: Denies: Depression, Anxiety, Agitation, Hallucinations


Systems Review Comment:: 


No significant overnight or acute issues. She feels crappy but slept good last 

night. She is afebrile with WBC down to 12.40. her CRP is 32 w/ a Mg level of 

1.7. Her BS are not well controlled.








- Patient Data


Vitals - Most Recent: 


 Last Vital Signs











Temp  37.0 C   01/09/19 08:33


 


Pulse  95   01/09/19 08:33


 


Resp  16   01/09/19 08:33


 


BP  139/47 L  01/09/19 08:33


 


Pulse Ox  90 L  01/09/19 09:53











Weight - Most Recent: 67.857 kg


I&O - Last 24 Hours: 


 Intake & Output











 01/08/19 01/09/19 01/09/19





 22:59 06:59 14:59


 


Intake Total 621 2185 200


 


Output Total 2  


 


Balance 619 2185 200











Lab Results Last 24 Hours: 


 Laboratory Results - last 24 hr











  01/08/19 01/08/19 01/08/19 Range/Units





  12:41 12:47 17:21 


 


WBC     (3.98-10.04)  K/mm3


 


RBC     (3.98-5.22)  M/mm3


 


Hgb     (11.2-15.7)  gm/L


 


Hct     (34.1-44.9)  %


 


MCV     (79.4-94.8)  fl


 


MCH     (25.6-32.2)  pg


 


MCHC     (32.2-35.5)  g/dl


 


RDW Std Deviation     (36.4-46.3)  fL


 


Plt Count     (182-369)  K/mm3


 


MPV     (9.4-12.3)  fl


 


Neut % (Auto)     (34.0-71.1)  %


 


Lymph % (Auto)     (19.3-51.7)  %


 


Mono % (Auto)     (4.7-12.5)  %


 


Eos % (Auto)     (0.7-5.8)  


 


Baso % (Auto)     (0.1-1.2)  %


 


Neut # (Auto)     (1.56-6.13)  K/mm3


 


Lymph # (Auto)     (1.18-3.74)  K/mm3


 


Mono # (Auto)     (0.24-0.36)  K/mm3


 


Eos # (Auto)     (0.04-0.36)  K/mm3


 


Baso # (Auto)     (0.01-0.08)  K/mm3


 


Sodium     (136-145)  mEq/L


 


Potassium     (3.5-5.1)  mEq/L


 


Chloride     ()  mEq/L


 


Carbon Dioxide     (21-32)  mEq/L


 


Anion Gap     (5-15)  


 


BUN     (7-18)  mg/dL


 


Creatinine     (0.55-1.02)  mg/dL


 


Est Cr Clr Drug Dosing     mL/min


 


Estimated GFR (MDRD)     (>60)  mL/min


 


BUN/Creatinine Ratio     (14-18)  


 


Glucose     ()  mg/dL


 


POC Glucose  248 H   265 H  ()  mg/dL


 


Lactic Acid     (0.4-2.0)  mmol/L


 


Calcium     (8.5-10.1)  mg/dL


 


Magnesium     (1.8-2.4)  mg/dl


 


C-Reactive Protein     (<1.0)  mg/dL


 


C.difficile 027-NAP1-B1     


 


C. difficile Tox (PCR)     


 


MRSA (PCR)   Negative   














  01/08/19 01/09/19 01/09/19 Range/Units





  21:41 05:23 05:23 


 


WBC   12.40 H   (3.98-10.04)  K/mm3


 


RBC   3.48 L   (3.98-5.22)  M/mm3


 


Hgb   10.1 L   (11.2-15.7)  gm/L


 


Hct   31.4 L   (34.1-44.9)  %


 


MCV   90.2   (79.4-94.8)  fl


 


MCH   29.0   (25.6-32.2)  pg


 


MCHC   32.2   (32.2-35.5)  g/dl


 


RDW Std Deviation   47.6 H   (36.4-46.3)  fL


 


Plt Count   209   (182-369)  K/mm3


 


MPV   10.3   (9.4-12.3)  fl


 


Neut % (Auto)   76.8 H   (34.0-71.1)  %


 


Lymph % (Auto)   16.3 L   (19.3-51.7)  %


 


Mono % (Auto)   5.9   (4.7-12.5)  %


 


Eos % (Auto)   0.5 L   (0.7-5.8)  


 


Baso % (Auto)   0.2   (0.1-1.2)  %


 


Neut # (Auto)   9.53 H   (1.56-6.13)  K/mm3


 


Lymph # (Auto)   2.02   (1.18-3.74)  K/mm3


 


Mono # (Auto)   0.73 H   (0.24-0.36)  K/mm3


 


Eos # (Auto)   0.06   (0.04-0.36)  K/mm3


 


Baso # (Auto)   0.02   (0.01-0.08)  K/mm3


 


Sodium    140  (136-145)  mEq/L


 


Potassium    3.8  (3.5-5.1)  mEq/L


 


Chloride    108 H  ()  mEq/L


 


Carbon Dioxide    22  (21-32)  mEq/L


 


Anion Gap    13.8  (5-15)  


 


BUN    10  (7-18)  mg/dL


 


Creatinine    1.0  (0.55-1.02)  mg/dL


 


Est Cr Clr Drug Dosing    28.47  mL/min


 


Estimated GFR (MDRD)    52  (>60)  mL/min


 


BUN/Creatinine Ratio    10.0 L  (14-18)  


 


Glucose    183 H  ()  mg/dL


 


POC Glucose  287 H    ()  mg/dL


 


Lactic Acid     (0.4-2.0)  mmol/L


 


Calcium    8.0 L  (8.5-10.1)  mg/dL


 


Magnesium    1.7 L  (1.8-2.4)  mg/dl


 


C-Reactive Protein    32.6 H*  (<1.0)  mg/dL


 


C.difficile 027-NAP1-B1     


 


C. difficile Tox (PCR)     


 


MRSA (PCR)     














  01/09/19 01/09/19 01/09/19 Range/Units





  05:23 07:30 09:25 


 


WBC     (3.98-10.04)  K/mm3


 


RBC     (3.98-5.22)  M/mm3


 


Hgb     (11.2-15.7)  gm/L


 


Hct     (34.1-44.9)  %


 


MCV     (79.4-94.8)  fl


 


MCH     (25.6-32.2)  pg


 


MCHC     (32.2-35.5)  g/dl


 


RDW Std Deviation     (36.4-46.3)  fL


 


Plt Count     (182-369)  K/mm3


 


MPV     (9.4-12.3)  fl


 


Neut % (Auto)     (34.0-71.1)  %


 


Lymph % (Auto)     (19.3-51.7)  %


 


Mono % (Auto)     (4.7-12.5)  %


 


Eos % (Auto)     (0.7-5.8)  


 


Baso % (Auto)     (0.1-1.2)  %


 


Neut # (Auto)     (1.56-6.13)  K/mm3


 


Lymph # (Auto)     (1.18-3.74)  K/mm3


 


Mono # (Auto)     (0.24-0.36)  K/mm3


 


Eos # (Auto)     (0.04-0.36)  K/mm3


 


Baso # (Auto)     (0.01-0.08)  K/mm3


 


Sodium     (136-145)  mEq/L


 


Potassium     (3.5-5.1)  mEq/L


 


Chloride     ()  mEq/L


 


Carbon Dioxide     (21-32)  mEq/L


 


Anion Gap     (5-15)  


 


BUN     (7-18)  mg/dL


 


Creatinine     (0.55-1.02)  mg/dL


 


Est Cr Clr Drug Dosing     mL/min


 


Estimated GFR (MDRD)     (>60)  mL/min


 


BUN/Creatinine Ratio     (14-18)  


 


Glucose     ()  mg/dL


 


POC Glucose   235 H   ()  mg/dL


 


Lactic Acid  0.8    (0.4-2.0)  mmol/L


 


Calcium     (8.5-10.1)  mg/dL


 


Magnesium     (1.8-2.4)  mg/dl


 


C-Reactive Protein     (<1.0)  mg/dL


 


C.difficile 027-NAP1-B1    Presumptive negative  


 


C. difficile Tox (PCR)    Negative  


 


MRSA (PCR)     














  01/09/19 Range/Units





  11:30 


 


WBC   (3.98-10.04)  K/mm3


 


RBC   (3.98-5.22)  M/mm3


 


Hgb   (11.2-15.7)  gm/L


 


Hct   (34.1-44.9)  %


 


MCV   (79.4-94.8)  fl


 


MCH   (25.6-32.2)  pg


 


MCHC   (32.2-35.5)  g/dl


 


RDW Std Deviation   (36.4-46.3)  fL


 


Plt Count   (182-369)  K/mm3


 


MPV   (9.4-12.3)  fl


 


Neut % (Auto)   (34.0-71.1)  %


 


Lymph % (Auto)   (19.3-51.7)  %


 


Mono % (Auto)   (4.7-12.5)  %


 


Eos % (Auto)   (0.7-5.8)  


 


Baso % (Auto)   (0.1-1.2)  %


 


Neut # (Auto)   (1.56-6.13)  K/mm3


 


Lymph # (Auto)   (1.18-3.74)  K/mm3


 


Mono # (Auto)   (0.24-0.36)  K/mm3


 


Eos # (Auto)   (0.04-0.36)  K/mm3


 


Baso # (Auto)   (0.01-0.08)  K/mm3


 


Sodium   (136-145)  mEq/L


 


Potassium   (3.5-5.1)  mEq/L


 


Chloride   ()  mEq/L


 


Carbon Dioxide   (21-32)  mEq/L


 


Anion Gap   (5-15)  


 


BUN   (7-18)  mg/dL


 


Creatinine   (0.55-1.02)  mg/dL


 


Est Cr Clr Drug Dosing   mL/min


 


Estimated GFR (MDRD)   (>60)  mL/min


 


BUN/Creatinine Ratio   (14-18)  


 


Glucose   ()  mg/dL


 


POC Glucose  301 H  ()  mg/dL


 


Lactic Acid   (0.4-2.0)  mmol/L


 


Calcium   (8.5-10.1)  mg/dL


 


Magnesium   (1.8-2.4)  mg/dl


 


C-Reactive Protein   (<1.0)  mg/dL


 


C.difficile 027-NAP1-B1   


 


C. difficile Tox (PCR)   


 


MRSA (PCR)   











[place] Results Last 24 Hours: 


 Microbiology











 01/08/19 09:30 Quick Strep Confirmation Culture - Preliminary





 Throat Group A Streptococcus Rapid Screen - Final





    NEGATIVE STREP A SCREEN


 


 01/08/19 08:00 Aerobic Blood Culture - Preliminary





 Blood - Venous - Lab Draw    NO GROWTH AFTER 1 DAY





 Anaerobic Blood Culture - Preliminary





    NO GROWTH AFTER 1 DAY


 


 01/08/19 07:52 Aerobic Blood Culture - Preliminary





 Blood - Venous    NO GROWTH AFTER 1 DAY





 Anaerobic Blood Culture - Preliminary





    NO GROWTH AFTER 1 DAY


 


 01/08/19 08:36 Influenza Type A Antigen Screen - Final





 Nasopharyngeal Swab    NEGATIVE INFLUENZA A VIRUS AG





 Influenza Type B Antigen Screen - Final





    Positive Influenza B Ag











Med Orders - Current: 


 Current Medications





Acetaminophen (Tylenol)  650 mg PO Q6H PRN


   PRN Reason: Pain/Fever


Albuterol (Proventil Neb Soln)  2.5 mg NEB Q4H PRN


   PRN Reason: Shortness of Breath


Albuterol/Ipratropium (Duoneb 3.0-0.5 Mg/3 Ml)  3 ml NEB QIDRT [facility]


   Last Admin: 01/09/19 09:52 Dose:  3 ml


Aspirin (Halfprin)  81 mg PO DAILY [facility]


   Last Admin: 01/09/19 08:40 Dose:  81 mg


Calcium Carbonate/Glycine (Tums)  500 mg PO Q4H PRN


   PRN Reason: Heartburn


Clopidogrel Bisulfate (Plavix)  75 mg PO DAILY [facility]


   Last Admin: 01/09/19 08:40 Dose:  75 mg


Dextrose/Water (Dextrose 50% In Water)  50 ml IVPUSH ASDIRECTED PRN


   PRN Reason: Hypoglycemia


Gabapentin (Neurontin)  300 mg PO TID [facility]


   Last Admin: 01/09/19 08:40 Dose:  300 mg


Heparin Sodium (Porcine) (Heparin Sodium)  5,000 units SUBCUT Q8H [facility]


   Last Admin: 01/09/19 08:05 Dose:  Not Given


Azithromycin 500 mg/ Sodium (Chloride)  250 mls @ 250 mls/hr IV Q24H [facility]


   Last Admin: 01/08/19 13:27 Dose:  250 mls/hr


Ceftriaxone Sodium 2 gm/ (Sodium Chloride)  100 mls @ 200 mls/hr IV Q24H [facility]


   Last Admin: 01/09/19 08:36 Dose:  200 mls/hr


Potassium Chloride/Sodium Chloride (Normal Saline With 20 Meq Kcl)  1,000 mls @ 

100 mls/hr IV ASDIRECTED [facility]


   Last Admin: 01/09/19 02:26 Dose:  100 mls/hr


Insulin Glargine (Lantus)  30 unit SUBCUT DAILY [facility]


Insulin Human Lispro (Humalog)  0 unit SUBCUT QIDACANDBED [facility]; Protocol


   Last Admin: 01/09/19 08:41 Dose:  2 units


Loratadine (Claritin)  10 mg PO DAILY PRN


   PRN Reason: Allergies


Ondansetron HCl (Zofran)  4 mg IVPUSH Q8H PRN


   PRN Reason: Nausea/Vomiting


Oseltamivir Phosphate (Tamiflu)  30 mg PO DAILY [facility]


   Last Admin: 01/09/19 08:40 Dose:  30 mg


Saccharomyces Boulardii (Florastor)  250 mg PO BID [facility]


   Last Admin: 01/09/19 08:39 Dose:  250 mg


Senna/Docusate Sodium (Senna Plus)  1 tab PO DAILY PRN


   PRN Reason: Constipation


Sodium Chloride (Saline Flush)  10 ml FLUSH ASDIRECTED PRN


   PRN Reason: Keep Vein Open


   Last Admin: 01/08/19 08:15 Dose:  10 ml





Discontinued Medications





Albuterol/Ipratropium (Duoneb 3.0-0.5 Mg/3 Ml)  3 ml NEB QID [facility]


   Last Admin: 01/08/19 21:30 Dose:  3 ml


Ceftriaxone Sodium 2 gm/ (Sodium Chloride)  100 mls @ 200 mls/hr IV ONETIME ONE


   Stop: 01/08/19 08:33


   Last Admin: 01/08/19 08:37 Dose:  200 mls/hr


Sodium Chloride (Normal Saline)  1,000 mls @ 1,000 mls/hr IV .BOLUS [facility]


   Last Admin: 01/08/19 08:15 Dose:  1,000 mls/hr


Potassium Chloride 10 meq/ (Premix)  100 mls @ 100 mls/hr IV Q1H [facility]


   Stop: 01/08/19 19:29


   Last Admin: 01/08/19 21:50 Dose:  100 mls/hr


Potassium Chloride 10 meq/ (Premix)  100 mls @ 100 mls/hr IV Q1H [facility]


   Stop: 01/08/19 22:59


   Last Admin: 01/08/19 22:02 Dose:  Not Given


Oseltamivir Phosphate (Tamiflu)  75 mg PO ONETIME ONE


   Stop: 01/08/19 09:07


   Last Admin: 01/08/19 09:15 Dose:  75 mg


Potassium Chloride (Klor-Con M20)  40 meq PO ONETIME ONE


   Stop: 01/08/19 09:18


   Last Admin: 01/08/19 09:26 Dose:  40 meq


Potassium Chloride (Klor-Con M20)  40 meq PO TID [facility]


   Stop: 01/08/19 21:01


   Last Admin: 01/08/19 13:26 Dose:  40 meq


Potassium Chloride (Potassium Chloride Solution)  40 meq PO TID [facility]


   Stop: 01/09/19 09:01


   Last Admin: 01/08/19 14:41 Dose:  Not Given











- Exam


Quality Assessment: Supplemental Oxygen


General: Alert, Oriented, Cooperative, No Acute Distress


HEENT: Pupils Equal, Pupils Reactive, Mucous Membr. Moist/Pink


Neck: Supple


Lungs: Normal Respiratory Effort, Decreased Breath Sounds, Rhonchi


Cardiovascular: Regular Rate, Regular Rhythm


GI/Abdominal Exam: Normal Bowel Sounds, Soft, Non-Tender, No Organomegaly, No 

Distention, No Abnormal Bruit


 (Female) Exam: Deferred


Back Exam: Normal Inspection, Decreased Range of Motion


Extremities: Normal Inspection, Normal Range of Motion, Non-Tender, No Pedal 

Edema, Normal Capillary Refill


Peripheral Pulses: 2+: Dorsalis Pedis (L), Dorsalis Pedis (R)


Skin: Warm, Dry, Intact


Neurological: No New Focal Deficit


Psy/Mental Status: Alert, Normal Affect, Normal Mood





- Problem List Review


Problem List Initiated/Reviewed/Updated: Yes





- My Orders


Last 24 Hours: 


My Active Orders





01/09/19 09:54


Incentive Spirometry [RT Incentive Spirometry] [RC] ASDIRECTED 


RT Chest Physiotherapy [RC] ASDIRECTED 





01/09/19 10:23


OT Evaluation and Treatment [CONS] Routine 


PT Evaluation and Treatment [CONS] Routine 





01/10/19 09:00


Insulin Glarg,Human.Rec.Analog [LantUS]   30 unit SUBCUT DAILY 














- Plan


Plan:: 


Impression/Plan:


Acute:


Bilateral PNA w/ Improved Hypoxia


   - CXR report reads patchy areas of increased density within both sides of 

the chest


   - Ordered Sputum Cx, RVP, and Strep pneumonia Ag all pending


   - Scheduled Decongestant and Expectorant


   - Continue IV Azithromycin and Rocephin


   - Encouraged to use IS/FV as directed


   - CXR repeat in AM





Influenza B positive, Hx of vaccine


   - Continue Tamiflu





Hypomagnesemia


   - 2/2 inadequate intake


   - Replete and monitor





Hyperglycemia with DM2


   - BS in the 200-300s


   - Continue home dose insulin


   - Accu-check AC/HS and Medium dose ISS





Resolved:


S/p Dehydration


   - No ARF; she was at baseline


S/p Hypokalemia


   - 2.7 now 3.8





Chronic:


TIA/CVA


CKD


HF


HTN


HLD


Depression





Plan:


She looks fairly stable


Sepsis protocol


Routine AM Labs


Droplet precautions


RT consult for pulmonary toilet


Advance diet as tolerated


PT/OT consult for deconditioning


SW/CM for d/c planning


Code status: DNR

## 2019-01-10 RX ADMIN — POTASSIUM CHLORIDE SCH: 1500 TABLET, EXTENDED RELEASE ORAL at 00:26

## 2019-01-10 RX ADMIN — IPRATROPIUM BROMIDE SCH MG: 0.5 SOLUTION RESPIRATORY (INHALATION) at 20:22

## 2019-01-10 RX ADMIN — GUAIFENESIN AND DEXTROMETHORPHAN SCH: 100; 10 SYRUP ORAL at 06:07

## 2019-01-10 RX ADMIN — INSULIN LISPRO SCH UNITS: 100 INJECTION, SOLUTION INTRAVENOUS; SUBCUTANEOUS at 06:00

## 2019-01-10 RX ADMIN — GUAIFENESIN AND DEXTROMETHORPHAN SCH: 100; 10 SYRUP ORAL at 14:03

## 2019-01-10 RX ADMIN — INSULIN LISPRO SCH UNITS: 100 INJECTION, SOLUTION INTRAVENOUS; SUBCUTANEOUS at 12:06

## 2019-01-10 RX ADMIN — INSULIN LISPRO SCH: 100 INJECTION, SOLUTION INTRAVENOUS; SUBCUTANEOUS at 12:12

## 2019-01-10 RX ADMIN — HEPARIN SODIUM SCH: 5000 INJECTION, SOLUTION INTRAVENOUS; SUBCUTANEOUS at 05:57

## 2019-01-10 RX ADMIN — INSULIN GLARGINE SCH UNIT: 100 INJECTION, SOLUTION SUBCUTANEOUS at 08:53

## 2019-01-10 RX ADMIN — INSULIN LISPRO SCH UNITS: 100 INJECTION, SOLUTION INTRAVENOUS; SUBCUTANEOUS at 17:04

## 2019-01-10 RX ADMIN — GUAIFENESIN AND DEXTROMETHORPHAN SCH: 100; 10 SYRUP ORAL at 21:09

## 2019-01-10 RX ADMIN — Medication SCH MG: at 21:08

## 2019-01-10 RX ADMIN — IPRATROPIUM BROMIDE SCH MG: 0.5 SOLUTION RESPIRATORY (INHALATION) at 15:06

## 2019-01-10 RX ADMIN — INSULIN LISPRO SCH UNITS: 100 INJECTION, SOLUTION INTRAVENOUS; SUBCUTANEOUS at 21:06

## 2019-01-10 RX ADMIN — Medication SCH MG: at 08:48

## 2019-01-10 NOTE — CR
Chest: Frontal view of the chest was obtained utilizing portable 

technique.

 

Comparison: Prior chest x-ray of 01/08/19.

 

Worsening areas of parenchymal density are seen within both lungs as 

an interval change from previous study.  Heart is mildly enlarged.  

Upper mediastinum is normal.  Scoliosis and degenerative change is 

noted within the spine.

 

Impression:

1.  Worsening parenchymal change within both lungs.  Please correlate 

if this represents worsening pneumonia.

 

Diagnostic code #3

## 2019-01-10 NOTE — PCM.PN
- General Info


Date of Service: 01/10/19


Admission Dx/Problem (Free Text): 


 Admission Diagnosis/Problem





Admission Diagnosis/Problem      Pneumonia








Subjective Update: 


In to see Liliana. We had a very stacey discussion on her progress and refusing of 

multiple therapies. Nursing reports she has been refusing insulin, has been 

refusing IS/Acapella, has been refusing multiple medications, and has been 

refusing to work with therapies. Liliana states she would like to get better but 

when attempt is made to explain why we are doing/prescribing various 

interventions she states she is just too weak and cannot do anything. Patients 

daughter called and talked with nursing. She was advised of the situation and 

reports this is not uncommon with her mother and will try to have a family 

member contact her. The daughter is reportedly sick and will avoid coming up 

here. Repeat CXR is worse today and this was communicated to the patient with 

no change in behavior. Viral panel returns negative for everything, including 

infuenza. Discussion was had about this as her influenza screening was positive 

for B. Our infection preventionist is looking into this. Her HR and BP have 

been elevated. Will switch for albuterol to xopenex and give x1 dose of 

hydralizine now. Liliana reports she feels worse today and nursing reports she 

has been requiring more assistance. Discussed findings thus far with pharmacy 

and recommendation was to keep current treatment plan pending results tomorrow. 

Lactic acids have been WNL. 





Functional Status: Reports: Pain Controlled, Tolerating Diet, Ambulating, 

Urinating, Incentive Spirometry, Other (acapella ).  Denies: New Symptoms





- Review of Systems


General: Reports: Fever (overnight ), Weakness, Fatigue, Malaise.  Denies: 

Chills


HEENT: Reports: No Symptoms.  Denies: Headaches, Sore Throat, Rhinitis


Pulmonary: Reports: Shortness of Breath, Cough, Sputum.  Denies: Pleuritic 

Chest Pain, Wheezing


Cardiovascular: Reports: Dyspnea on Exertion.  Denies: Chest Pain, Palpitations

, Edema, Lightheadedness


Gastrointestinal: Reports: No Symptoms.  Denies: Abdominal Pain, Constipation, 

Diarrhea, Nausea, Vomiting


Genitourinary: Reports: No Symptoms


Musculoskeletal: Reports: No Symptoms


Skin: Reports: No Symptoms


Neurological: Reports: No Symptoms, Difficulty Walking, Weakness, Gait 

Disturbance.  Denies: Confusion, Headache, Numbness, Seizure, Tingling, Trouble 

Speaking


Psychiatric: Reports: No Symptoms





- Patient Data


Vitals - Most Recent: 


 Last Vital Signs











Temp  98.7 F   01/10/19 10:10


 


Pulse  104 H  01/10/19 07:39


 


Resp  16   01/10/19 07:39


 


BP  141/57 H  01/10/19 07:39


 


Pulse Ox  91 L  01/10/19 09:15











Weight - Most Recent: 149 lb 9.6 oz


I&O - Last 24 Hours: 


 Intake & Output











 01/09/19 01/10/19 01/10/19





 22:59 06:59 14:59


 


Intake Total 2190 600 


 


Output Total 300  


 


Balance 1890 600 











Lab Results Last 24 Hours: 


 Laboratory Results - last 24 hr











  01/08/19 01/09/19 01/09/19 Range/Units





  13:41 09:25 11:30 


 


WBC     (3.98-10.04)  K/mm3


 


RBC     (3.98-5.22)  M/mm3


 


Hgb     (11.2-15.7)  gm/L


 


Hct     (34.1-44.9)  %


 


MCV     (79.4-94.8)  fl


 


MCH     (25.6-32.2)  pg


 


MCHC     (32.2-35.5)  g/dl


 


RDW Std Deviation     (36.4-46.3)  fL


 


Plt Count     (182-369)  K/mm3


 


MPV     (9.4-12.3)  fl


 


Neut % (Auto)     (34.0-71.1)  %


 


Lymph % (Auto)     (19.3-51.7)  %


 


Mono % (Auto)     (4.7-12.5)  %


 


Eos % (Auto)     (0.7-5.8)  


 


Baso % (Auto)     (0.1-1.2)  %


 


Neut # (Auto)     (1.56-6.13)  K/mm3


 


Lymph # (Auto)     (1.18-3.74)  K/mm3


 


Mono # (Auto)     (0.24-0.36)  K/mm3


 


Eos # (Auto)     (0.04-0.36)  K/mm3


 


Baso # (Auto)     (0.01-0.08)  K/mm3


 


Sodium     (136-145)  mEq/L


 


Potassium     (3.5-5.1)  mEq/L


 


Chloride     ()  mEq/L


 


Carbon Dioxide     (21-32)  mEq/L


 


Anion Gap     (5-15)  


 


BUN     (7-18)  mg/dL


 


Creatinine     (0.55-1.02)  mg/dL


 


Est Cr Clr Drug Dosing     mL/min


 


Estimated GFR (MDRD)     (>60)  mL/min


 


BUN/Creatinine Ratio     (14-18)  


 


Glucose     ()  mg/dL


 


POC Glucose    301 H  ()  mg/dL


 


Lactic Acid     (0.4-2.0)  mmol/L


 


Calcium     (8.5-10.1)  mg/dL


 


Magnesium     (1.8-2.4)  mg/dl


 


C-Reactive Protein     (<1.0)  mg/dL


 


Adenovirus (PCR)  Not detected    (Not Detected)  


 


B. pertussis DNA (PCR)  Not detected    (Not Detected)  


 


B.parapertussis DNA PCR  Not detected    (Not Detected)  


 


C. pneumoniae DNA (PCR)  Not detected    (Not Detected)  


 


C.difficile 027-NAP1-B1   Presumptive negative   


 


C. difficile Tox (PCR)   Negative   


 


Coronavirus (PCR)  Not detected    (Not Detected)  


 


Human Metapneumovir PCR  Not detected    (Not Detected)  


 


Influenza A (RT-PCR)  Not detected    (Not Detected)  


 


Influenza B (RT-PCR)  Not detected    (Not Detected)  


 


M. pneumoniae (PCR)  Not detected    (Not Detected)  


 


Parainfluen 1,2,3,4 PCR  Not detected    (Not Detected)  


 


RSV (PCR)  Not detected    (Not Detected)  


 


Entero/Rhino (PCR)  Not detected    (Not Detected)  














  01/09/19 01/09/19 01/10/19 Range/Units





  16:53 20:44 05:34 


 


WBC     (3.98-10.04)  K/mm3


 


RBC     (3.98-5.22)  M/mm3


 


Hgb     (11.2-15.7)  gm/L


 


Hct     (34.1-44.9)  %


 


MCV     (79.4-94.8)  fl


 


MCH     (25.6-32.2)  pg


 


MCHC     (32.2-35.5)  g/dl


 


RDW Std Deviation     (36.4-46.3)  fL


 


Plt Count     (182-369)  K/mm3


 


MPV     (9.4-12.3)  fl


 


Neut % (Auto)     (34.0-71.1)  %


 


Lymph % (Auto)     (19.3-51.7)  %


 


Mono % (Auto)     (4.7-12.5)  %


 


Eos % (Auto)     (0.7-5.8)  


 


Baso % (Auto)     (0.1-1.2)  %


 


Neut # (Auto)     (1.56-6.13)  K/mm3


 


Lymph # (Auto)     (1.18-3.74)  K/mm3


 


Mono # (Auto)     (0.24-0.36)  K/mm3


 


Eos # (Auto)     (0.04-0.36)  K/mm3


 


Baso # (Auto)     (0.01-0.08)  K/mm3


 


Sodium     (136-145)  mEq/L


 


Potassium     (3.5-5.1)  mEq/L


 


Chloride     ()  mEq/L


 


Carbon Dioxide     (21-32)  mEq/L


 


Anion Gap     (5-15)  


 


BUN     (7-18)  mg/dL


 


Creatinine     (0.55-1.02)  mg/dL


 


Est Cr Clr Drug Dosing     mL/min


 


Estimated GFR (MDRD)     (>60)  mL/min


 


BUN/Creatinine Ratio     (14-18)  


 


Glucose     ()  mg/dL


 


POC Glucose  138 H  237 H  211 H  ()  mg/dL


 


Lactic Acid     (0.4-2.0)  mmol/L


 


Calcium     (8.5-10.1)  mg/dL


 


Magnesium     (1.8-2.4)  mg/dl


 


C-Reactive Protein     (<1.0)  mg/dL


 


Adenovirus (PCR)     (Not Detected)  


 


B. pertussis DNA (PCR)     (Not Detected)  


 


B.parapertussis DNA PCR     (Not Detected)  


 


C. pneumoniae DNA (PCR)     (Not Detected)  


 


C.difficile 027-NAP1-B1     


 


C. difficile Tox (PCR)     


 


Coronavirus (PCR)     (Not Detected)  


 


Human Metapneumovir PCR     (Not Detected)  


 


Influenza A (RT-PCR)     (Not Detected)  


 


Influenza B (RT-PCR)     (Not Detected)  


 


M. pneumoniae (PCR)     (Not Detected)  


 


Parainfluen 1,2,3,4 PCR     (Not Detected)  


 


RSV (PCR)     (Not Detected)  


 


Entero/Rhino (PCR)     (Not Detected)  














  01/10/19 01/10/19 01/10/19 Range/Units





  05:35 05:35 05:35 


 


WBC  13.76 H    (3.98-10.04)  K/mm3


 


RBC  3.63 L    (3.98-5.22)  M/mm3


 


Hgb  10.7 L    (11.2-15.7)  gm/L


 


Hct  33.0 L    (34.1-44.9)  %


 


MCV  90.9    (79.4-94.8)  fl


 


MCH  29.5    (25.6-32.2)  pg


 


MCHC  32.4    (32.2-35.5)  g/dl


 


RDW Std Deviation  48.7 H    (36.4-46.3)  fL


 


Plt Count  229    (182-369)  K/mm3


 


MPV  10.3    (9.4-12.3)  fl


 


Neut % (Auto)  80.5 H    (34.0-71.1)  %


 


Lymph % (Auto)  13.4 L    (19.3-51.7)  %


 


Mono % (Auto)  5.1    (4.7-12.5)  %


 


Eos % (Auto)  0.4 L    (0.7-5.8)  


 


Baso % (Auto)  0.2    (0.1-1.2)  %


 


Neut # (Auto)  11.08 H    (1.56-6.13)  K/mm3


 


Lymph # (Auto)  1.85    (1.18-3.74)  K/mm3


 


Mono # (Auto)  0.70 H    (0.24-0.36)  K/mm3


 


Eos # (Auto)  0.05    (0.04-0.36)  K/mm3


 


Baso # (Auto)  0.03    (0.01-0.08)  K/mm3


 


Sodium   141   (136-145)  mEq/L


 


Potassium   3.7   (3.5-5.1)  mEq/L


 


Chloride   107   ()  mEq/L


 


Carbon Dioxide   21   (21-32)  mEq/L


 


Anion Gap   16.7 H   (5-15)  


 


BUN   10   (7-18)  mg/dL


 


Creatinine   1.0   (0.55-1.02)  mg/dL


 


Est Cr Clr Drug Dosing   28.47   mL/min


 


Estimated GFR (MDRD)   52   (>60)  mL/min


 


BUN/Creatinine Ratio   10.0 L   (14-18)  


 


Glucose   183 H   ()  mg/dL


 


POC Glucose     ()  mg/dL


 


Lactic Acid    1.0  (0.4-2.0)  mmol/L


 


Calcium   8.5   (8.5-10.1)  mg/dL


 


Magnesium   1.9   (1.8-2.4)  mg/dl


 


C-Reactive Protein   23.6 H*   (<1.0)  mg/dL


 


Adenovirus (PCR)     (Not Detected)  


 


B. pertussis DNA (PCR)     (Not Detected)  


 


B.parapertussis DNA PCR     (Not Detected)  


 


C. pneumoniae DNA (PCR)     (Not Detected)  


 


C.difficile 027-NAP1-B1     


 


C. difficile Tox (PCR)     


 


Coronavirus (PCR)     (Not Detected)  


 


Human Metapneumovir PCR     (Not Detected)  


 


Influenza A (RT-PCR)     (Not Detected)  


 


Influenza B (RT-PCR)     (Not Detected)  


 


M. pneumoniae (PCR)     (Not Detected)  


 


Parainfluen 1,2,3,4 PCR     (Not Detected)  


 


RSV (PCR)     (Not Detected)  


 


Entero/Rhino (PCR)     (Not Detected)  











[place] Results Last 24 Hours: 


 Microbiology











 01/08/19 08:00 Aerobic Blood Culture - Preliminary





 Blood - Venous - Lab Draw    NO GROWTH AFTER 2 DAYS





 Anaerobic Blood Culture - Preliminary





    NO GROWTH AFTER 2 DAYS


 


 01/08/19 07:52 Aerobic Blood Culture - Preliminary





 Blood - Venous    NO GROWTH AFTER 2 DAYS





 Anaerobic Blood Culture - Preliminary





    NO GROWTH AFTER 2 DAYS


 


 01/08/19 08:50 Streptococcus pneumoniae Antigen (M - Final





 Urine 


 


 01/08/19 09:30 Quick Strep Confirmation Culture - Preliminary





 Throat Group A Streptococcus Rapid Screen - Final





    NEGATIVE STREP A SCREEN











Med Orders - Current: 


 Current Medications





Acetaminophen (Tylenol)  650 mg PO Q6H PRN


   PRN Reason: Pain/Fever


   Last Admin: 01/09/19 21:27 Dose:  650 mg


Albuterol (Proventil Neb Soln)  2.5 mg NEB Q4H PRN


   PRN Reason: Shortness of Breath


Albuterol/Ipratropium (Duoneb 3.0-0.5 Mg/3 Ml)  3 ml NEB QIDRT [facility]


   Last Admin: 01/10/19 09:14 Dose:  3 ml


Aspirin (Halfprin)  81 mg PO DAILY [facility]


   Last Admin: 01/10/19 08:49 Dose:  81 mg


Calcium Carbonate/Glycine (Tums)  500 mg PO Q4H PRN


   PRN Reason: Heartburn


Clopidogrel Bisulfate (Plavix)  75 mg PO DAILY [facility]


   Last Admin: 01/10/19 08:49 Dose:  75 mg


Dextrose/Water (Dextrose 50% In Water)  50 ml IVPUSH ASDIRECTED PRN


   PRN Reason: Hypoglycemia


Gabapentin (Neurontin)  300 mg PO TID [facility]


   Last Admin: 01/10/19 08:48 Dose:  300 mg


Guaifenesin/Phenylephrine HCl (Robitussin Dm)  5 ml PO TID@0700,1400,2100 [facility]


   Last Admin: 01/10/19 06:07 Dose:  Not Given


Heparin Sodium (Porcine) (Heparin Sodium)  5,000 units SUBCUT Q8H [facility]


   Last Admin: 01/10/19 05:57 Dose:  Not Given


Hydralazine HCl (Apresoline)  10 mg IVPUSH Q4H PRN


   PRN Reason: Hypertension


Azithromycin 500 mg/ Sodium (Chloride)  250 mls @ 250 mls/hr IV Q24H [facility]


   Last Admin: 01/09/19 12:55 Dose:  250 mls/hr


Ceftriaxone Sodium 2 gm/ (Sodium Chloride)  100 mls @ 200 mls/hr IV Q24H [facility]


   Last Admin: 01/10/19 08:46 Dose:  200 mls/hr


Insulin Glargine (Lantus)  30 unit SUBCUT DAILY [facility]


   Last Admin: 01/10/19 08:53 Dose:  30 unit


Insulin Human Lispro (Humalog)  0 unit SUBCUT QIDACANDBED [facility]; Protocol


   Last Admin: 01/10/19 06:00 Dose:  4 units


Loratadine (Claritin)  10 mg PO DAILY PRN


   PRN Reason: Allergies


Magnesium Sulfate (Pharmacy To Dose - Magnesium Replacement)  0 dose .XX 

ASDIRECTED PRN


   PRN Reason: RX TO WATCH MAG


Metoprolol Tartrate (Lopressor)  5 mg IVPUSH Q4H PRN


   PRN Reason: Tachycardia


Ondansetron HCl (Zofran)  4 mg IVPUSH Q8H PRN


   PRN Reason: Nausea/Vomiting


Oseltamivir Phosphate (Tamiflu)  30 mg PO DAILY [facility]


   Last Admin: 01/10/19 08:49 Dose:  30 mg


Potassium Chloride (Pharmacy To Dose - Potassium Replacement)  0 dose .XX 

ASDIRECTED PRN


   PRN Reason: RX TO WATCH K


Saccharomyces Boulardii (Florastor)  250 mg PO BID [facility]


   Last Admin: 01/10/19 08:48 Dose:  250 mg


Senna/Docusate Sodium (Senna Plus)  1 tab PO DAILY PRN


   PRN Reason: Constipation


Sodium Chloride (Saline Flush)  10 ml FLUSH ASDIRECTED PRN


   PRN Reason: Keep Vein Open


   Last Admin: 01/08/19 08:15 Dose:  10 ml





Discontinued Medications





Albuterol/Ipratropium (Duoneb 3.0-0.5 Mg/3 Ml)  3 ml NEB QID [facility]


   Last Admin: 01/08/19 21:30 Dose:  3 ml


Ceftriaxone Sodium 2 gm/ (Sodium Chloride)  100 mls @ 200 mls/hr IV ONETIME ONE


   Stop: 01/08/19 08:33


   Last Admin: 01/08/19 08:37 Dose:  200 mls/hr


Sodium Chloride (Normal Saline)  1,000 mls @ 1,000 mls/hr IV .BOLUS [facility]


   Last Admin: 01/08/19 08:15 Dose:  1,000 mls/hr


Potassium Chloride/Sodium Chloride (Normal Saline With 20 Meq Kcl)  1,000 mls @ 

100 mls/hr IV ASDIRECTED [facility]


   Last Admin: 01/09/19 12:59 Dose:  100 mls/hr


Potassium Chloride 10 meq/ (Premix)  100 mls @ 100 mls/hr IV Q1H [facility]


   Stop: 01/08/19 19:29


   Last Admin: 01/08/19 21:50 Dose:  100 mls/hr


Potassium Chloride 10 meq/ (Premix)  100 mls @ 100 mls/hr IV Q1H [facility]


   Stop: 01/08/19 22:59


   Last Admin: 01/08/19 22:02 Dose:  Not Given


Magnesium Oxide (Magnesium Oxide)  400 mg PO ONETIME ONE


   Stop: 01/09/19 20:31


   Last Admin: 01/09/19 21:09 Dose:  Not Given


Oseltamivir Phosphate (Tamiflu)  75 mg PO ONETIME ONE


   Stop: 01/08/19 09:07


   Last Admin: 01/08/19 09:15 Dose:  75 mg


Potassium Chloride (Klor-Con M20)  40 meq PO ONETIME ONE


   Stop: 01/08/19 09:18


   Last Admin: 01/08/19 09:26 Dose:  40 meq


Potassium Chloride (Klor-Con M20)  40 meq PO TID [facility]


   Stop: 01/08/19 21:01


   Last Admin: 01/08/19 13:26 Dose:  40 meq


Potassium Chloride (Potassium Chloride Solution)  40 meq PO TID [facility]


   Stop: 01/09/19 09:01


   Last Admin: 01/08/19 14:41 Dose:  Not Given


Potassium Chloride (Klor-Con M20)  40 meq PO Q4H [facility]


   Stop: 01/10/19 00:31


   Last Admin: 01/10/19 00:26 Dose:  Not Given











- Exam


Quality Assessment: Supplemental Oxygen, DVT Prophylaxis


General: Alert, Oriented, Cooperative, No Acute Distress


HEENT: Pupils Equal, Pupils Reactive, EOMI, Mucous Membr. Moist/Pink


Neck: Supple, Trachea Midline, No JVD


Lungs: Normal Respiratory Effort, Decreased Breath Sounds, Rhonchi


Cardiovascular: Regular Rate, Regular Rhythm


GI/Abdominal Exam: Normal Bowel Sounds, Soft, Non-Tender, No Distention, No 

Abnormal Bruit


 (Female) Exam: Deferred


Back Exam: Normal Inspection, Decreased Range of Motion


Extremities: Normal Inspection, Normal Range of Motion, Non-Tender, No Pedal 

Edema, Normal Capillary Refill


Peripheral Pulses: 2+: Radial (L), Radial (R), Dorsalis Pedis (L), Dorsalis 

Pedis (R)


Skin: Warm, Dry, Intact


Neurological: No New Focal Deficit


Psy/Mental Status: Alert, Normal Affect, Normal Mood





- Problem List & Annotations


(1) Influenza B


SNOMED Code(s): 35739486


   Code(s): J10.1 - FLU DUE TO OTH IDENT INFLUENZA VIRUS W OTH RESP MANIFEST   

Status: Acute   Priority: High   Current Visit: Yes   





(2) Pneumonia


SNOMED Code(s): 311414426


   Code(s): J18.9 - PNEUMONIA, UNSPECIFIED ORGANISM   Status: Acute   Priority: 

High   Current Visit: Yes   


Qualifiers: 


   Pneumonia type: due to unspecified organism   Laterality: left   Lung 

location: unspecified part of lung   Qualified Code(s): J18.9 - Pneumonia, 

unspecified organism   





(3) Generalized weakness


SNOMED Code(s): 97000205


   Code(s): R53.1 - WEAKNESS   Status: Acute   Priority: High   Current Visit: 

Yes   





- Problem List Review


Problem List Initiated/Reviewed/Updated: Yes





- Plan


Plan:: 


Impression/Plan:


Acute:


Bilateral PNA w/Hypoxia


   - CXR report reads patchy areas of increased density within both sides of 

the chest


   - Ordered Sputum Cx ordered


   - RVP and Strep pneumonia Ag - negative


   - Scheduled Decongestant and Expectorant


   - Continue IV Azithromycin and Rocephin 


   - Encouraged to use IS/FV as directed


   - CXR repeat today: Worsening parenchymal change within both lungs 


   - Has been non-compliant with our treatment plan 


   - O2 as needed


   - RT consult


   - Scheduled xopenex and ipratropium - switched from albuterol to xopenex due 

to tachycardia





Influenza B positive, Hx of vaccine


   - Continue Tamiflu


   - RVP negative for influenza but screening positive 


   - Infection prevention specialist researching this 





Hyperglycemia with DM2, improved 


   - BS in the 200-300s; Today 180-200s


   - Continue home dose insulin


   - Accu-check AC/HS and Medium dose ISS   


   - Has been refusing sliding scale insulin today





Resolved:


S/p Dehydration


   - No ARF; she was at baseline





S/p Hypokalemia


   - 2.7 now 3.8





S/P Hypomagnesemia


   - 2/2 inadequate intake


   - Replete and monitor





Chronic:


TIA/CVA


CKD


HF


HTN


HLD


Depression





Plan:


She looks somewhat worse today but has been refusing much of our treatment plan.


Sepsis protocol - lactic acids have been WNL


Routine AM Labs


Droplet precautions


RT consult for pulmonary toilet


Advance diet as tolerated


PT/OT consult for deconditioning


DVT/PE prophylaxis: Refusing Heparin so will order SCDs


SW/CM for d/c planning


Code status: DNR; PCP: Dr. Vaughan

## 2019-01-11 RX ADMIN — GUAIFENESIN AND DEXTROMETHORPHAN SCH: 100; 10 SYRUP ORAL at 13:39

## 2019-01-11 RX ADMIN — Medication SCH MG: at 09:15

## 2019-01-11 RX ADMIN — INSULIN LISPRO SCH UNITS: 100 INJECTION, SOLUTION INTRAVENOUS; SUBCUTANEOUS at 12:32

## 2019-01-11 RX ADMIN — IPRATROPIUM BROMIDE SCH MG: 0.5 SOLUTION RESPIRATORY (INHALATION) at 05:31

## 2019-01-11 RX ADMIN — INSULIN LISPRO SCH UNITS: 100 INJECTION, SOLUTION INTRAVENOUS; SUBCUTANEOUS at 17:43

## 2019-01-11 RX ADMIN — IPRATROPIUM BROMIDE SCH MG: 0.5 SOLUTION RESPIRATORY (INHALATION) at 16:27

## 2019-01-11 RX ADMIN — GUAIFENESIN AND DEXTROMETHORPHAN SCH: 100; 10 SYRUP ORAL at 06:22

## 2019-01-11 RX ADMIN — INSULIN LISPRO SCH: 100 INJECTION, SOLUTION INTRAVENOUS; SUBCUTANEOUS at 21:25

## 2019-01-11 RX ADMIN — INSULIN GLARGINE SCH UNIT: 100 INJECTION, SOLUTION SUBCUTANEOUS at 09:53

## 2019-01-11 RX ADMIN — IPRATROPIUM BROMIDE SCH MG: 0.5 SOLUTION RESPIRATORY (INHALATION) at 20:48

## 2019-01-11 RX ADMIN — INSULIN LISPRO SCH: 100 INJECTION, SOLUTION INTRAVENOUS; SUBCUTANEOUS at 06:22

## 2019-01-11 RX ADMIN — Medication SCH MG: at 20:13

## 2019-01-11 RX ADMIN — IPRATROPIUM BROMIDE SCH MG: 0.5 SOLUTION RESPIRATORY (INHALATION) at 11:43

## 2019-01-11 RX ADMIN — GUAIFENESIN AND DEXTROMETHORPHAN SCH: 100; 10 SYRUP ORAL at 20:12

## 2019-01-11 NOTE — PCM.PN
- General Info


Date of Service: 01/11/19


Admission Dx/Problem (Free Text): 


 Admission Diagnosis/Problem





Admission Diagnosis/Problem      Pneumonia








Subjective Update: 


In to see Liliana. This AM she developed into A-fib with RVR and was moved to the 

ICU. She converted with cardizem and will be started on 25mg BID metoprolol. 

Her WBC remained virtually the same and CRP worsened. She is still requiring 4L 

O2. After discussion with Dr. Peters and pharmacy antibiotics were switched. 

She is more agreeable to treatment today as her daughter and sister are in the 

room. Encouraged to use IS/Acapella more. D-dimer was noted to be elevated so 

will order CTA to r/o PE and further delineate PNA. 





Functional Status: Reports: Pain Controlled, Tolerating Diet, Ambulating, 

Urinating, New Symptoms (A-fib with RVR ), Incentive Spirometry, Other (

acapella )





- Review of Systems


General: Reports: Weakness (improved ), Fatigue, Malaise.  Denies: Fever, Chills


HEENT: Reports: No Symptoms.  Denies: Headaches, Post Nasal Drip, Sore Throat


Pulmonary: Reports: Shortness of Breath, Cough (improved ).  Denies: Pleuritic 

Chest Pain, Sputum


Cardiovascular: Reports: Dyspnea on Exertion, Edema (R>L chronic ).  Denies: 

Chest Pain, Palpitations, Lightheadedness


Gastrointestinal: Reports: No Symptoms.  Denies: Abdominal Pain, Constipation, 

Diarrhea, Nausea, Vomiting


Genitourinary: Reports: No Symptoms.  Denies: Pain


Musculoskeletal: Reports: No Symptoms


Skin: Reports: No Symptoms


Neurological: Reports: Difficulty Walking, Weakness, Gait Disturbance.  Denies: 

Confusion


Psychiatric: Reports: No Symptoms





- Patient Data


Vitals - Most Recent: 


 Last Vital Signs











Temp  98.4 F   01/11/19 05:52


 


Pulse  93   01/11/19 05:52


 


Resp  20   01/11/19 05:52


 


BP  138/56 L  01/11/19 05:52


 


Pulse Ox  92 L  01/11/19 05:52











Weight - Most Recent: 148 lb 9 oz


I&O - Last 24 Hours: 


 Intake & Output











 01/10/19 01/11/19 01/11/19





 22:59 06:59 14:59


 


Intake Total 260 150 


 


Balance 260 150 











Lab Results Last 24 Hours: 


 Laboratory Results - last 24 hr











  01/10/19 01/10/19 01/10/19 Range/Units





  10:37 17:03 21:06 


 


WBC     (3.98-10.04)  K/mm3


 


RBC     (3.98-5.22)  M/mm3


 


Hgb     (11.2-15.7)  gm/L


 


Hct     (34.1-44.9)  %


 


MCV     (79.4-94.8)  fl


 


MCH     (25.6-32.2)  pg


 


MCHC     (32.2-35.5)  g/dl


 


RDW Std Deviation     (36.4-46.3)  fL


 


Plt Count     (182-369)  K/mm3


 


MPV     (9.4-12.3)  fl


 


Neut % (Auto)     (34.0-71.1)  %


 


Lymph % (Auto)     (19.3-51.7)  %


 


Mono % (Auto)     (4.7-12.5)  %


 


Eos % (Auto)     (0.7-5.8)  


 


Baso % (Auto)     (0.1-1.2)  %


 


Neut # (Auto)     (1.56-6.13)  K/mm3


 


Lymph # (Auto)     (1.18-3.74)  K/mm3


 


Mono # (Auto)     (0.24-0.36)  K/mm3


 


Eos # (Auto)     (0.04-0.36)  K/mm3


 


Baso # (Auto)     (0.01-0.08)  K/mm3


 


Sodium     (136-145)  mEq/L


 


Potassium     (3.5-5.1)  mEq/L


 


Chloride     ()  mEq/L


 


Carbon Dioxide     (21-32)  mEq/L


 


Anion Gap     (5-15)  


 


BUN     (7-18)  mg/dL


 


Creatinine     (0.55-1.02)  mg/dL


 


Est Cr Clr Drug Dosing     mL/min


 


Estimated GFR (MDRD)     (>60)  mL/min


 


BUN/Creatinine Ratio     (14-18)  


 


Glucose     ()  mg/dL


 


POC Glucose  212 H  228 H  257 H  ()  mg/dL


 


Lactic Acid     (0.4-2.0)  mmol/L


 


Calcium     (8.5-10.1)  mg/dL


 


Magnesium     (1.8-2.4)  mg/dl


 


C-Reactive Protein     (<1.0)  mg/dL














  01/11/19 01/11/19 01/11/19 Range/Units





  05:45 05:45 05:45 


 


WBC  13.71 H    (3.98-10.04)  K/mm3


 


RBC  3.48 L    (3.98-5.22)  M/mm3


 


Hgb  10.1 L    (11.2-15.7)  gm/L


 


Hct  31.4 L    (34.1-44.9)  %


 


MCV  90.2    (79.4-94.8)  fl


 


MCH  29.0    (25.6-32.2)  pg


 


MCHC  32.2    (32.2-35.5)  g/dl


 


RDW Std Deviation  48.3 H    (36.4-46.3)  fL


 


Plt Count  258    (182-369)  K/mm3


 


MPV  10.2    (9.4-12.3)  fl


 


Neut % (Auto)  79.7 H    (34.0-71.1)  %


 


Lymph % (Auto)  12.2 L    (19.3-51.7)  %


 


Mono % (Auto)  6.6    (4.7-12.5)  %


 


Eos % (Auto)  0.8    (0.7-5.8)  


 


Baso % (Auto)  0.1    (0.1-1.2)  %


 


Neut # (Auto)  10.93 H    (1.56-6.13)  K/mm3


 


Lymph # (Auto)  1.67    (1.18-3.74)  K/mm3


 


Mono # (Auto)  0.90 H    (0.24-0.36)  K/mm3


 


Eos # (Auto)  0.11    (0.04-0.36)  K/mm3


 


Baso # (Auto)  0.02    (0.01-0.08)  K/mm3


 


Sodium   141   (136-145)  mEq/L


 


Potassium   3.2 L   (3.5-5.1)  mEq/L


 


Chloride   108 H   ()  mEq/L


 


Carbon Dioxide   22   (21-32)  mEq/L


 


Anion Gap   14.2   (5-15)  


 


BUN   9   (7-18)  mg/dL


 


Creatinine   1.0   (0.55-1.02)  mg/dL


 


Est Cr Clr Drug Dosing   28.47   mL/min


 


Estimated GFR (MDRD)   52   (>60)  mL/min


 


BUN/Creatinine Ratio   9.0 L   (14-18)  


 


Glucose   124 H   ()  mg/dL


 


POC Glucose     ()  mg/dL


 


Lactic Acid    0.8  (0.4-2.0)  mmol/L


 


Calcium   9.0   (8.5-10.1)  mg/dL


 


Magnesium   2.0   (1.8-2.4)  mg/dl


 


C-Reactive Protein   28.5 H*   (<1.0)  mg/dL














  01/11/19 Range/Units





  05:48 


 


WBC   (3.98-10.04)  K/mm3


 


RBC   (3.98-5.22)  M/mm3


 


Hgb   (11.2-15.7)  gm/L


 


Hct   (34.1-44.9)  %


 


MCV   (79.4-94.8)  fl


 


MCH   (25.6-32.2)  pg


 


MCHC   (32.2-35.5)  g/dl


 


RDW Std Deviation   (36.4-46.3)  fL


 


Plt Count   (182-369)  K/mm3


 


MPV   (9.4-12.3)  fl


 


Neut % (Auto)   (34.0-71.1)  %


 


Lymph % (Auto)   (19.3-51.7)  %


 


Mono % (Auto)   (4.7-12.5)  %


 


Eos % (Auto)   (0.7-5.8)  


 


Baso % (Auto)   (0.1-1.2)  %


 


Neut # (Auto)   (1.56-6.13)  K/mm3


 


Lymph # (Auto)   (1.18-3.74)  K/mm3


 


Mono # (Auto)   (0.24-0.36)  K/mm3


 


Eos # (Auto)   (0.04-0.36)  K/mm3


 


Baso # (Auto)   (0.01-0.08)  K/mm3


 


Sodium   (136-145)  mEq/L


 


Potassium   (3.5-5.1)  mEq/L


 


Chloride   ()  mEq/L


 


Carbon Dioxide   (21-32)  mEq/L


 


Anion Gap   (5-15)  


 


BUN   (7-18)  mg/dL


 


Creatinine   (0.55-1.02)  mg/dL


 


Est Cr Clr Drug Dosing   mL/min


 


Estimated GFR (MDRD)   (>60)  mL/min


 


BUN/Creatinine Ratio   (14-18)  


 


Glucose   ()  mg/dL


 


POC Glucose  141 H  ()  mg/dL


 


Lactic Acid   (0.4-2.0)  mmol/L


 


Calcium   (8.5-10.1)  mg/dL


 


Magnesium   (1.8-2.4)  mg/dl


 


C-Reactive Protein   (<1.0)  mg/dL











[place] Results Last 24 Hours: 


 Microbiology











 01/08/19 09:30 Quick Strep Confirmation Culture - Final





 Throat    NEGATIVE FOR BETA STREP





 Group A Streptococcus Rapid Screen - Final





    NEGATIVE STREP A SCREEN


 


 01/08/19 08:00 Aerobic Blood Culture - Preliminary





 Blood - Venous - Lab Draw    NO GROWTH AFTER 2 DAYS





 Anaerobic Blood Culture - Preliminary





    NO GROWTH AFTER 2 DAYS


 


 01/08/19 07:52 Aerobic Blood Culture - Preliminary





 Blood - Venous    NO GROWTH AFTER 2 DAYS





 Anaerobic Blood Culture - Preliminary





    NO GROWTH AFTER 2 DAYS











Med Orders - Current: 


 Current Medications





Acetaminophen (Tylenol)  650 mg PO Q6H PRN


   PRN Reason: Pain/Fever


   Last Admin: 01/10/19 21:08 Dose:  650 mg


Albuterol (Proventil Neb Soln)  2.5 mg NEB Q4H PRN


   PRN Reason: Shortness of Breath


Aspirin (Halfprin)  81 mg PO DAILY [facility]


   Last Admin: 01/10/19 08:49 Dose:  81 mg


Calcium Carbonate/Glycine (Tums)  500 mg PO Q4H PRN


   PRN Reason: Heartburn


Clopidogrel Bisulfate (Plavix)  75 mg PO DAILY [facility]


   Last Admin: 01/10/19 08:49 Dose:  75 mg


Dextrose/Water (Dextrose 50% In Water)  50 ml IVPUSH ASDIRECTED PRN


   PRN Reason: Hypoglycemia


Diltiazem HCl (Cardizem)  10 mg IVPUSH ONETIME ONE


   Stop: 01/11/19 07:21


Furosemide (Lasix)  40 mg PO DAILY [facility]


Gabapentin (Neurontin)  300 mg PO TID [facility]


   Last Admin: 01/10/19 21:08 Dose:  300 mg


Guaifenesin/Phenylephrine HCl (Robitussin Dm)  5 ml PO TID@0700,1400,2100 [facility]


   Last Admin: 01/11/19 06:22 Dose:  Not Given


Hydralazine HCl (Apresoline)  10 mg IVPUSH Q4H PRN


   PRN Reason: Hypertension


   Last Admin: 01/10/19 10:58 Dose:  10 mg


Azithromycin 500 mg/ Sodium (Chloride)  250 mls @ 250 mls/hr IV Q24H [facility]


   Last Admin: 01/10/19 12:06 Dose:  250 mls/hr


Ceftriaxone Sodium 2 gm/ (Sodium Chloride)  100 mls @ 200 mls/hr IV Q24H [facility]


   Last Admin: 01/10/19 08:46 Dose:  200 mls/hr


Diltiazem HCl 125 mg/ Sodium (Chloride)  125 mls @ 5 mls/hr IV TITRATE [facility]; 

Protocol


Insulin Glargine (Lantus)  30 unit SUBCUT DAILY [facility]


   Last Admin: 01/10/19 08:53 Dose:  30 unit


Insulin Human Lispro (Humalog)  0 unit SUBCUT QIDACANDBED [facility]; Protocol


   Last Admin: 01/11/19 06:22 Dose:  Not Given


Ipratropium Bromide (Atrovent)  0.5 mg NEB QIDRT [facility]


   Last Admin: 01/11/19 05:31 Dose:  0.5 mg


Levalbuterol HCl (Xopenex)  1.25 mg NEB QIDRT [facility]


   Last Admin: 01/11/19 05:31 Dose:  1.25 mg


Loratadine (Claritin)  10 mg PO DAILY PRN


   PRN Reason: Allergies


Magnesium Sulfate (Pharmacy To Dose - Magnesium Replacement)  0 dose .XX 

ASDIRECTED PRN


   PRN Reason: RX TO WATCH MAG


Metoprolol Tartrate (Lopressor)  5 mg IVPUSH Q4H PRN


   PRN Reason: Tachycardia


Ondansetron HCl (Zofran)  4 mg IVPUSH Q8H PRN


   PRN Reason: Nausea/Vomiting


Oseltamivir Phosphate (Tamiflu)  30 mg PO DAILY [facility]


   Last Admin: 01/10/19 08:49 Dose:  30 mg


Potassium Chloride (Pharmacy To Dose - Potassium Replacement)  0 dose .XX 

ASDIRECTED PRN


   PRN Reason: RX TO WATCH K


Saccharomyces Boulardii (Florastor)  250 mg PO BID [facility]


   Last Admin: 01/10/19 21:08 Dose:  250 mg


Senna/Docusate Sodium (Senna Plus)  1 tab PO DAILY PRN


   PRN Reason: Constipation


Sodium Chloride (Saline Flush)  10 ml FLUSH ASDIRECTED PRN


   PRN Reason: Keep Vein Open


   Last Admin: 01/08/19 08:15 Dose:  10 ml





Discontinued Medications





Albuterol/Ipratropium (Duoneb 3.0-0.5 Mg/3 Ml)  3 ml NEB QID [facility]


   Last Admin: 01/08/19 21:30 Dose:  3 ml


Albuterol/Ipratropium (Duoneb 3.0-0.5 Mg/3 Ml)  3 ml NEB QIDRT [facility]


   Last Admin: 01/10/19 09:14 Dose:  3 ml


Heparin Sodium (Porcine) (Heparin Sodium)  5,000 units SUBCUT Q8H [facility]


   Last Admin: 01/10/19 05:57 Dose:  Not Given


Ceftriaxone Sodium 2 gm/ (Sodium Chloride)  100 mls @ 200 mls/hr IV ONETIME ONE


   Stop: 01/08/19 08:33


   Last Admin: 01/08/19 08:37 Dose:  200 mls/hr


Sodium Chloride (Normal Saline)  1,000 mls @ 1,000 mls/hr IV .BOLUS [facility]


   Last Admin: 01/08/19 08:15 Dose:  1,000 mls/hr


Potassium Chloride/Sodium Chloride (Normal Saline With 20 Meq Kcl)  1,000 mls @ 

100 mls/hr IV ASDIRECTED [facility]


   Last Admin: 01/09/19 12:59 Dose:  100 mls/hr


Potassium Chloride 10 meq/ (Premix)  100 mls @ 100 mls/hr IV Q1H [facility]


   Stop: 01/08/19 19:29


   Last Admin: 01/08/19 21:50 Dose:  100 mls/hr


Potassium Chloride 10 meq/ (Premix)  100 mls @ 100 mls/hr IV Q1H [facility]


   Stop: 01/08/19 22:59


   Last Admin: 01/08/19 22:02 Dose:  Not Given


Magnesium Oxide (Magnesium Oxide)  400 mg PO ONETIME ONE


   Stop: 01/09/19 20:31


   Last Admin: 01/09/19 21:09 Dose:  Not Given


Oseltamivir Phosphate (Tamiflu)  75 mg PO ONETIME ONE


   Stop: 01/08/19 09:07


   Last Admin: 01/08/19 09:15 Dose:  75 mg


Potassium Chloride (Klor-Con M20)  40 meq PO ONETIME ONE


   Stop: 01/08/19 09:18


   Last Admin: 01/08/19 09:26 Dose:  40 meq


Potassium Chloride (Klor-Con M20)  40 meq PO TID [facility]


   Stop: 01/08/19 21:01


   Last Admin: 01/08/19 13:26 Dose:  40 meq


Potassium Chloride (Potassium Chloride Solution)  40 meq PO TID [facility]


   Stop: 01/09/19 09:01


   Last Admin: 01/08/19 14:41 Dose:  Not Given


Potassium Chloride (Klor-Con M20)  40 meq PO Q4H [facility]


   Stop: 01/10/19 00:31


   Last Admin: 01/10/19 00:26 Dose:  Not Given











- Exam


Quality Assessment: Supplemental Oxygen (4L), DVT Prophylaxis


General: Alert, Oriented, Cooperative, No Acute Distress


HEENT: Pupils Equal, Pupils Reactive, EOMI, Mucous Membr. Moist/Pink


Neck: Supple, Trachea Midline, No JVD


Lungs: Normal Respiratory Effort, Decreased Breath Sounds, Rhonchi


Cardiovascular: Regular Rate, Regular Rhythm, Other (A-Fib with RVR earlier in 

day )


GI/Abdominal Exam: Normal Bowel Sounds, Soft, Non-Tender, No Distention, No 

Abnormal Bruit


 (Female) Exam: Deferred


Back Exam: Normal Inspection, Full Range of Motion


Extremities: Normal Inspection, Normal Range of Motion, Non-Tender, Normal 

Capillary Refill, Pedal Edema (trace to 1+ with R>L )


Peripheral Pulses: 1+: Dorsalis Pedis (L), Dorsalis Pedis (R), 2+: Radial (L), 

Radial (R)


Skin: Warm, Dry, Intact


Neurological: No New Focal Deficit


Psy/Mental Status: Alert, Normal Affect, Normal Mood





- Problem List & Annotations


(1) Influenza B


SNOMED Code(s): 88523049


   Code(s): J10.1 - FLU DUE TO OTH IDENT INFLUENZA VIRUS W OTH RESP MANIFEST   

Status: Acute   Priority: High   Current Visit: Yes   





(2) Pneumonia


SNOMED Code(s): 635788033


   Code(s): J18.9 - PNEUMONIA, UNSPECIFIED ORGANISM   Status: Acute   Priority: 

High   Current Visit: Yes   


Qualifiers: 


   Pneumonia type: due to unspecified organism   Laterality: left   Lung 

location: unspecified part of lung   Qualified Code(s): J18.9 - Pneumonia, 

unspecified organism   





(3) Generalized weakness


SNOMED Code(s): 91335232


   Code(s): R53.1 - WEAKNESS   Status: Acute   Priority: High   Current Visit: 

Yes   





(4) Atrial fibrillation with RVR


SNOMED Code(s): 856310463266034


   Code(s): I48.91 - UNSPECIFIED ATRIAL FIBRILLATION   Status: Acute   Priority

: High   Current Visit: Yes   





- Problem List Review


Problem List Initiated/Reviewed/Updated: Yes





- My Orders


Last 24 Hours: 


My Active Orders





01/10/19 10:54


SCD [Sequential Compression Device] [OM.PC] Routine 





01/10/19 10:56


RT Aerosol Therapy [RC] ASDIRECTED 





01/10/19 16:00


Ipratropium [Atrovent]   0.5 mg NEB QIDRT 


Levalbuterol HCl [Xopenex]   1.25 mg NEB QIDRT 





01/11/19 07:19


Patient Status [ADT] Routine 





01/11/19 07:20


Diltiazem [Cardizem]   10 mg IVPUSH ONETIME ONE 





01/11/19 07:30


Diltiazem 125 MG in Normal Saline @ 5 MG/HR(100ml) Diltiazem 125 mg   Sodium 

Chloride 0.9% [Normal Saline] 100 ml IV TITRATE 





01/11/19 08:00


CXR [Chest 2V] [CR] Routine 





01/11/19 09:00


Furosemide [Lasix]   40 mg PO DAILY 














- Plan


Plan:: 


Impression/Plan:


Acute:





A-fib with RVR, Resolved in ICU 


   - A-fib with HR in 120-140s


   - Onset while sleeping 


   - Some runs of v-tac


   - Cardizem IVP given with excellent response


   - Upgrade to ICU


   - Start xarelto for stroke prophylaxis - pharmacy to dose


   - Troponin/CKMB: Negative 


   - D-Dimer: 1.52 - CTA ordered


   - Echo ordered


   - CXR as below today





Elevated D-Dimer


   - D-dimer 1.52


   - CTA ordered


   - 250mL fluid bolus before and after scan





Bilateral PNA w/Hypoxia


   - CXR report reads patchy areas of increased density within both sides of 

the chest


   - Ordered Sputum Cx ordered


   - RVP and Strep pneumonia Ag - negative


   - Scheduled Decongestant and Expectorant


   - IV Azithromycin and Rocephin -> stop 


   - Switched to zosyn and vancomycin on 1/11/19


   - Encouraged to use IS/FV as directed


   - CXR repeat 1/10/18: Worsening parenchymal change within both lungs 


   - CXR repeat today: Stable 


   - Has been non-compliant with our treatment plan 


   - O2 as needed


   - RT consult


   - Scheduled xopenex and ipratropium - switched from albuterol to xopenex due 

to tachycardia


   - WBC has been steady at 12-13, worsening CRP 





Influenza B positive, Hx of vaccine


   - Continue Tamiflu


   - RVP negative for influenza but screening positive 


   - Infection prevention specialist researching this 





Hyperglycemia with DM2, improved 


   - BS in the 200-300s; Today 180-200s


   - Continue home dose insulin


   - Accu-check AC/HS and Medium dose ISS   


   - Has been refusing sliding scale insulin today





Hypokalemia


   - 2.7 ->3.8-->3.7-->3.2


   - Pharmacy to monitor and replete 





Resolved:


S/p Dehydration


   - No ARF; she was at baseline











S/P Hypomagnesemia


   - 2/2 inadequate intake


   - Replete and monitor





Chronic:


TIA/CVA


CKD


HF


HTN


HLD


Depression





Plan:


Upgraded from floor to ICU status 


Sepsis protocol - lactic acids have been WNL


Routine AM Labs


Droplet precautions


RT consult for pulmonary toilet


Advance diet as tolerated


PT/OT consult for deconditioning


DVT/PE prophylaxis: Refusing Heparin so will order SCDs--> started on xarelto 

today 


SW/CM for d/c planning


Code status: DNR; PCP: Dr. Vaughan

## 2019-01-11 NOTE — CR
Chest:  Two views of the chest were obtained.

 

Comparison: Previous chest x-ray of 01/10/19.

 

Diffuse increased density noted within both sides of the chest.  

Findings are stable from previous exam.  Blunting of the posterior 

costophrenic angles is noted on the lateral view presumably 

representing minimal pleural effusions.  Bony structures are 

osteopenic.  Scoliosis is noted within the spine.

 

Impression:

1.  Findings as described above.  No change from previous chest x-ray.

 

Diagnostic code #3

## 2019-01-11 NOTE — CT
CT chest

 

Technique: Multiple axial sections through the chest were obtained.  

Intravenous contrast was utilized.

 

Comparison: Prior noncontrast chest CT study of 12/1/15.

 

Findings: Pulmonary arteries are well opacified.  No filling defects 

are seen to indicate pulmonary embolism.

 

Mediastinum and hilar region show no adenopathy or mass.  Small to 

moderate sized bilateral pleural effusions are seen.  Patchy areas of 

airspace disease seen throughout both sides of the chest fairly 

similar findings are noted on previous CT exam.

 

Bone window settings were reviewed which show scattered degenerative 

change and scoliosis within the spine.

 

Impression:

1.  No findings of pulmonary embolism.

2.  Small to moderate sized bilateral pleural effusions.  Diffuse air 

space disease on both sides of the chest.

 

Note: Interesting to note that these findings are fairly similar to 

prior chest CT although when comparing to subsequent chest x-rays the 

findings on 2015 did significantly improve on follow-up chest x-rays.

 

Diagnostic code #3

## 2019-01-12 RX ADMIN — Medication SCH MG: at 08:39

## 2019-01-12 RX ADMIN — INSULIN GLARGINE SCH UNIT: 100 INJECTION, SOLUTION SUBCUTANEOUS at 08:46

## 2019-01-12 RX ADMIN — INSULIN LISPRO SCH UNITS: 100 INJECTION, SOLUTION INTRAVENOUS; SUBCUTANEOUS at 11:26

## 2019-01-12 RX ADMIN — GUAIFENESIN AND DEXTROMETHORPHAN SCH ML: 100; 10 SYRUP ORAL at 20:10

## 2019-01-12 RX ADMIN — IPRATROPIUM BROMIDE SCH MG: 0.5 SOLUTION RESPIRATORY (INHALATION) at 15:37

## 2019-01-12 RX ADMIN — IPRATROPIUM BROMIDE SCH MG: 0.5 SOLUTION RESPIRATORY (INHALATION) at 06:32

## 2019-01-12 RX ADMIN — IPRATROPIUM BROMIDE SCH MG: 0.5 SOLUTION RESPIRATORY (INHALATION) at 20:29

## 2019-01-12 RX ADMIN — GUAIFENESIN AND DEXTROMETHORPHAN SCH ML: 100; 10 SYRUP ORAL at 14:49

## 2019-01-12 RX ADMIN — IPRATROPIUM BROMIDE SCH MG: 0.5 SOLUTION RESPIRATORY (INHALATION) at 09:13

## 2019-01-12 RX ADMIN — GUAIFENESIN AND DEXTROMETHORPHAN SCH: 100; 10 SYRUP ORAL at 06:47

## 2019-01-12 RX ADMIN — INSULIN LISPRO SCH UNITS: 100 INJECTION, SOLUTION INTRAVENOUS; SUBCUTANEOUS at 21:33

## 2019-01-12 RX ADMIN — Medication SCH MG: at 20:10

## 2019-01-12 RX ADMIN — INSULIN LISPRO SCH: 100 INJECTION, SOLUTION INTRAVENOUS; SUBCUTANEOUS at 06:46

## 2019-01-12 RX ADMIN — INSULIN LISPRO SCH UNITS: 100 INJECTION, SOLUTION INTRAVENOUS; SUBCUTANEOUS at 17:36

## 2019-01-12 NOTE — PCM.PN
- General Info


Date of Service: 01/12/19


Admission Dx/Problem (Free Text): 


 Admission Diagnosis/Problem





Admission Diagnosis/Problem      Pneumonia








Subjective Update: 


In to see Liliana. This AM she developed into A-fib with RVR and was moved to the 

ICU. She converted with cardizem and will be started on 25mg BID metoprolol. 

Her WBC remained virtually the same and CRP worsened. She is still requiring 4L 

O2. After discussion with Dr. Peters and pharmacy antibiotics were switched. 

She is more agreeable to treatment today as her daughter and sister are in the 

room. Encouraged to use IS/Acapella more. D-dimer was noted to be elevated so 

will order CTA to r/o PE and further delineate PNA. 





Functional Status: Reports: Pain Controlled, Tolerating Diet, Urinating.  Denies

: New Symptoms





- Review of Systems


General: Reports: Weakness, Malaise.  Denies: Fever, Chills


HEENT: Reports: No Symptoms


Pulmonary: Reports: Cough.  Denies: Shortness of Breath, Sputum, Wheezing


Cardiovascular: Denies: Chest Pain, Dyspnea on Exertion, Edema, Lightheadedness


Gastrointestinal: Denies: Abdominal Pain, Decreased Appetite, Nausea, Vomiting


Genitourinary: Reports: No Symptoms


Musculoskeletal: Reports: No Symptoms


Skin: Denies: Cyanosis


Neurological: Reports: Weakness, Gait Disturbance.  Denies: Confusion


Psychiatric: Denies: Depression, Anxiety, Agitation, Hallucinations


Systems Review Comment:: 


No overnight or acute issues. She rested well and she looks much better than 

yesterday. She has been compliant and not refusing oral pills. She is low on K 

and Mg levels this AM.








- Patient Data


Vitals - Most Recent: 


 Last Vital Signs











Temp  36.6 C   01/12/19 08:00


 


Pulse  98   01/12/19 08:40


 


Resp  20   01/12/19 08:00


 


BP  149/64 H  01/12/19 08:40


 


Pulse Ox  90 L  01/12/19 08:00











Weight - Most Recent: 69.4 kg


I&O - Last 24 Hours: 


 Intake & Output











 01/11/19 01/12/19 01/12/19





 22:59 06:59 14:59


 


Intake Total 2650 800 


 


Output Total 450  


 


Balance 2200 800 











Lab Results Last 24 Hours: 


 Laboratory Results - last 24 hr











  01/11/19 01/11/19 01/11/19 Range/Units





  12:25 17:42 21:03 


 


WBC     (3.98-10.04)  K/mm3


 


RBC     (3.98-5.22)  M/mm3


 


Hgb     (11.2-15.7)  gm/L


 


Hct     (34.1-44.9)  %


 


MCV     (79.4-94.8)  fl


 


MCH     (25.6-32.2)  pg


 


MCHC     (32.2-35.5)  g/dl


 


RDW Std Deviation     (36.4-46.3)  fL


 


Plt Count     (182-369)  K/mm3


 


MPV     (9.4-12.3)  fl


 


Neut % (Auto)     (34.0-71.1)  %


 


Lymph % (Auto)     (19.3-51.7)  %


 


Mono % (Auto)     (4.7-12.5)  %


 


Eos % (Auto)     (0.7-5.8)  


 


Baso % (Auto)     (0.1-1.2)  %


 


Neut # (Auto)     (1.56-6.13)  K/mm3


 


Lymph # (Auto)     (1.18-3.74)  K/mm3


 


Mono # (Auto)     (0.24-0.36)  K/mm3


 


Eos # (Auto)     (0.04-0.36)  K/mm3


 


Baso # (Auto)     (0.01-0.08)  K/mm3


 


Sodium     (136-145)  mEq/L


 


Potassium     (3.5-5.1)  mEq/L


 


Chloride     ()  mEq/L


 


Carbon Dioxide     (21-32)  mEq/L


 


Anion Gap     (5-15)  


 


BUN     (7-18)  mg/dL


 


Creatinine     (0.55-1.02)  mg/dL


 


Est Cr Clr Drug Dosing     mL/min


 


Estimated GFR (MDRD)     (>60)  mL/min


 


BUN/Creatinine Ratio     (14-18)  


 


Glucose     ()  mg/dL


 


POC Glucose  279 H  153 H  139 H  ()  mg/dL


 


Calcium     (8.5-10.1)  mg/dL


 


Magnesium     (1.8-2.4)  mg/dl


 


C-Reactive Protein     (<1.0)  mg/dL














  01/12/19 01/12/19 01/12/19 Range/Units





  05:46 05:46 06:39 


 


WBC  13.17 H    (3.98-10.04)  K/mm3


 


RBC  3.44 L    (3.98-5.22)  M/mm3


 


Hgb  9.9 L    (11.2-15.7)  gm/L


 


Hct  30.8 L    (34.1-44.9)  %


 


MCV  89.5    (79.4-94.8)  fl


 


MCH  28.8    (25.6-32.2)  pg


 


MCHC  32.1 L    (32.2-35.5)  g/dl


 


RDW Std Deviation  49.2 H    (36.4-46.3)  fL


 


Plt Count  272    (182-369)  K/mm3


 


MPV  10.2    (9.4-12.3)  fl


 


Neut % (Auto)  78.2 H    (34.0-71.1)  %


 


Lymph % (Auto)  12.9 L    (19.3-51.7)  %


 


Mono % (Auto)  6.5    (4.7-12.5)  %


 


Eos % (Auto)  1.6    (0.7-5.8)  


 


Baso % (Auto)  0.2    (0.1-1.2)  %


 


Neut # (Auto)  10.29 H    (1.56-6.13)  K/mm3


 


Lymph # (Auto)  1.70    (1.18-3.74)  K/mm3


 


Mono # (Auto)  0.86 H    (0.24-0.36)  K/mm3


 


Eos # (Auto)  0.21    (0.04-0.36)  K/mm3


 


Baso # (Auto)  0.03    (0.01-0.08)  K/mm3


 


Sodium   139   (136-145)  mEq/L


 


Potassium   3.1 L   (3.5-5.1)  mEq/L


 


Chloride   105   ()  mEq/L


 


Carbon Dioxide   22   (21-32)  mEq/L


 


Anion Gap   15.1 H   (5-15)  


 


BUN   8   (7-18)  mg/dL


 


Creatinine   1.1 H   (0.55-1.02)  mg/dL


 


Est Cr Clr Drug Dosing   25.88   mL/min


 


Estimated GFR (MDRD)   47   (>60)  mL/min


 


BUN/Creatinine Ratio   7.3 L   (14-18)  


 


Glucose   85   ()  mg/dL


 


POC Glucose    95  ()  mg/dL


 


Calcium   8.3 L   (8.5-10.1)  mg/dL


 


Magnesium   1.7 L   (1.8-2.4)  mg/dl


 


C-Reactive Protein   24.1 H*   (<1.0)  mg/dL














  01/12/19 Range/Units





  08:46 


 


WBC   (3.98-10.04)  K/mm3


 


RBC   (3.98-5.22)  M/mm3


 


Hgb   (11.2-15.7)  gm/L


 


Hct   (34.1-44.9)  %


 


MCV   (79.4-94.8)  fl


 


MCH   (25.6-32.2)  pg


 


MCHC   (32.2-35.5)  g/dl


 


RDW Std Deviation   (36.4-46.3)  fL


 


Plt Count   (182-369)  K/mm3


 


MPV   (9.4-12.3)  fl


 


Neut % (Auto)   (34.0-71.1)  %


 


Lymph % (Auto)   (19.3-51.7)  %


 


Mono % (Auto)   (4.7-12.5)  %


 


Eos % (Auto)   (0.7-5.8)  


 


Baso % (Auto)   (0.1-1.2)  %


 


Neut # (Auto)   (1.56-6.13)  K/mm3


 


Lymph # (Auto)   (1.18-3.74)  K/mm3


 


Mono # (Auto)   (0.24-0.36)  K/mm3


 


Eos # (Auto)   (0.04-0.36)  K/mm3


 


Baso # (Auto)   (0.01-0.08)  K/mm3


 


Sodium   (136-145)  mEq/L


 


Potassium   (3.5-5.1)  mEq/L


 


Chloride   ()  mEq/L


 


Carbon Dioxide   (21-32)  mEq/L


 


Anion Gap   (5-15)  


 


BUN   (7-18)  mg/dL


 


Creatinine   (0.55-1.02)  mg/dL


 


Est Cr Clr Drug Dosing   mL/min


 


Estimated GFR (MDRD)   (>60)  mL/min


 


BUN/Creatinine Ratio   (14-18)  


 


Glucose   ()  mg/dL


 


POC Glucose  157 H  ()  mg/dL


 


Calcium   (8.5-10.1)  mg/dL


 


Magnesium   (1.8-2.4)  mg/dl


 


C-Reactive Protein   (<1.0)  mg/dL











[place] Results Last 24 Hours: 


 Microbiology











 01/08/19 08:00 Aerobic Blood Culture - Preliminary





 Blood - Venous - Lab Draw    NO GROWTH AFTER 4 DAYS





 Anaerobic Blood Culture - Preliminary





    NO GROWTH AFTER 4 DAYS


 


 01/08/19 07:52 Aerobic Blood Culture - Preliminary





 Blood - Venous    NO GROWTH AFTER 4 DAYS





 Anaerobic Blood Culture - Preliminary





    NO GROWTH AFTER 4 DAYS











Med Orders - Current: 


 Current Medications





Acetaminophen (Tylenol)  650 mg PO Q6H PRN


   PRN Reason: Pain/Fever


   Last Admin: 01/11/19 19:50 Dose:  650 mg


Albuterol (Proventil Neb Soln)  2.5 mg NEB Q4H PRN


   PRN Reason: Shortness of Breath


Aspirin (Halfprin)  81 mg PO DAILY [facility]


   Last Admin: 01/12/19 08:40 Dose:  81 mg


Calcium Carbonate/Glycine (Tums)  500 mg PO Q4H PRN


   PRN Reason: Heartburn


Clopidogrel Bisulfate (Plavix)  75 mg PO DAILY [facility]


   Last Admin: 01/12/19 08:40 Dose:  75 mg


Dextrose/Water (Dextrose 50% In Water)  50 ml IVPUSH ASDIRECTED PRN


   PRN Reason: Hypoglycemia


Furosemide (Lasix)  40 mg PO DAILY [facility]


   Last Admin: 01/12/19 08:40 Dose:  40 mg


Gabapentin (Neurontin)  300 mg PO TID [facility]


   Last Admin: 01/12/19 08:41 Dose:  300 mg


Guaifenesin/Phenylephrine HCl (Robitussin Dm)  5 ml PO TID@0700,1400,2100 [facility]


   Last Admin: 01/12/19 06:47 Dose:  Not Given


Hydralazine HCl (Apresoline)  10 mg IVPUSH Q4H PRN


   PRN Reason: Hypertension


   Last Admin: 01/10/19 10:58 Dose:  10 mg


Diltiazem HCl 125 mg/ Sodium (Chloride)  125 mls @ 5 mls/hr IV TITRATE [facility]; 

Protocol


Piperacillin Sod/Tazobactam (Sod 4.5 gm/ Sodium Chloride)  100 mls @ 25 mls/hr 

IV Q8H [facility]


   Last Admin: 01/12/19 08:35 Dose:  25 mls/hr


Vancomycin HCl 1 gm/ Sodium (Chloride)  250 mls @ 250 mls/hr IV Q24H [facility]


   Last Admin: 01/11/19 12:27 Dose:  250 mls/hr


Sodium Chloride (Normal Saline)  1,000 mls @ 50 mls/hr IV ASDIRECTED [facility]


   Last Admin: 01/12/19 03:52 Dose:  50 mls/hr


Potassium Chloride 10 meq/ (Premix)  100 mls @ 100 mls/hr IV Q1H [facility]


   Stop: 01/12/19 14:29


   Last Admin: 01/12/19 08:35 Dose:  100 mls/hr


Magnesium Sulfate 2 gm/ Premix  50 mls @ 25 mls/hr IV ONETIME ONE


   Stop: 01/12/19 10:29


   Last Admin: 01/12/19 08:36 Dose:  25 mls/hr


Insulin Glargine (Lantus)  30 unit SUBCUT DAILY [facility]


   Last Admin: 01/12/19 08:46 Dose:  30 unit


Insulin Human Lispro (Humalog)  0 unit SUBCUT QIDACANDBED [facility]; Protocol


   Last Admin: 01/12/19 06:46 Dose:  Not Given


Ipratropium Bromide (Atrovent)  0.5 mg NEB QIDRT [facility]


   Last Admin: 01/12/19 09:13 Dose:  0.5 mg


Levalbuterol HCl (Xopenex)  1.25 mg NEB QIDRT [facility]


   Last Admin: 01/12/19 09:13 Dose:  1.25 mg


Loratadine (Claritin)  10 mg PO DAILY PRN


   PRN Reason: Allergies


Magnesium Sulfate (Pharmacy To Dose - Magnesium Replacement)  0 dose .XX 

ASDIRECTED PRN


   PRN Reason: RX TO WATCH MAG


Metoprolol Tartrate (Lopressor)  5 mg IVPUSH Q4H PRN


   PRN Reason: Tachycardia


Metoprolol Tartrate (Lopressor)  25 mg PO Q12H [facility]


   Last Admin: 01/12/19 08:40 Dose:  25 mg


Ondansetron HCl (Zofran)  4 mg IVPUSH Q8H PRN


   PRN Reason: Nausea/Vomiting


Potassium Chloride (Pharmacy To Dose - Potassium Replacement)  0 dose .XX 

ASDIRECTED PRN


   PRN Reason: RX TO WATCH K


Rivaroxaban (Xarelto)  15 mg PO DAILY [facility]


   Last Admin: 01/12/19 08:41 Dose:  15 mg


Saccharomyces Boulardii (Florastor)  250 mg PO BID [facility]


   Last Admin: 01/12/19 08:39 Dose:  250 mg


Senna/Docusate Sodium (Senna Plus)  1 tab PO DAILY PRN


   PRN Reason: Constipation


Sodium Chloride (Saline Flush)  10 ml FLUSH ASDIRECTED PRN


   PRN Reason: Keep Vein Open


   Last Admin: 01/08/19 08:15 Dose:  10 ml


Sodium Chloride (Saline Flush)  10 ml FLUSH ONETIME PRN


   PRN Reason: IV FLUSH


   Last Admin: 01/11/19 14:08 Dose:  10 ml


Vancomycin HCl (Pharmacy To Dose - Vancomycin)  0 dose .XX DAILY PRN


   PRN Reason: RX TO DOSE VANCO





Discontinued Medications





Albuterol/Ipratropium (Duoneb 3.0-0.5 Mg/3 Ml)  3 ml NEB QID [facility]


   Last Admin: 01/08/19 21:30 Dose:  3 ml


Albuterol/Ipratropium (Duoneb 3.0-0.5 Mg/3 Ml)  3 ml NEB QIDRT [facility]


   Last Admin: 01/10/19 09:14 Dose:  3 ml


Diltiazem HCl (Cardizem)  10 mg IVPUSH ONETIME ONE


   Stop: 01/11/19 07:21


   Last Admin: 01/11/19 07:42 Dose:  10 mg


Diltiazem HCl (Cardizem)  5 mg IVPUSH ONETIME ONE


   Stop: 01/11/19 09:41


   Last Admin: 01/11/19 09:53 Dose:  5 mg


Heparin Sodium (Porcine) (Heparin Sodium)  5,000 units SUBCUT Q8H [facility]


   Last Admin: 01/10/19 05:57 Dose:  Not Given


Ceftriaxone Sodium 2 gm/ (Sodium Chloride)  100 mls @ 200 mls/hr IV ONETIME ONE


   Stop: 01/08/19 08:33


   Last Admin: 01/08/19 08:37 Dose:  200 mls/hr


Sodium Chloride (Normal Saline)  1,000 mls @ 1,000 mls/hr IV .BOLUS [facility]


   Last Admin: 01/08/19 08:15 Dose:  1,000 mls/hr


Azithromycin 500 mg/ Sodium (Chloride)  250 mls @ 250 mls/hr IV Q24H [facility]


   Last Admin: 01/10/19 12:06 Dose:  250 mls/hr


Ceftriaxone Sodium 2 gm/ (Sodium Chloride)  100 mls @ 200 mls/hr IV Q24H [facility]


   Last Admin: 01/10/19 08:46 Dose:  200 mls/hr


Potassium Chloride/Sodium Chloride (Normal Saline With 20 Meq Kcl)  1,000 mls @ 

100 mls/hr IV ASDIRECTED [facility]


   Last Admin: 01/09/19 12:59 Dose:  100 mls/hr


Potassium Chloride 10 meq/ (Premix)  100 mls @ 100 mls/hr IV Q1H [facility]


   Stop: 01/08/19 19:29


   Last Admin: 01/08/19 21:50 Dose:  100 mls/hr


Potassium Chloride 10 meq/ (Premix)  100 mls @ 100 mls/hr IV Q1H [facility]


   Stop: 01/08/19 22:59


   Last Admin: 01/08/19 22:02 Dose:  Not Given


Piperacillin Sod/Tazobactam (Sod 4.5 gm/ Sodium Chloride)  100 mls @ 200 mls/hr 

IV ONETIME ONE


   Stop: 01/11/19 08:29


   Last Admin: 01/11/19 09:15 Dose:  200 mls/hr


Potassium Chloride 10 meq/ (Premix)  100 mls @ 100 mls/hr IV Q1H [facility]


   Stop: 01/11/19 14:59


   Last Admin: 01/11/19 17:41 Dose:  100 mls/hr


Sodium Chloride (Normal Saline)  500 mls @ 999 mls/hr IV .BOLUS ONE


   Stop: 01/11/19 11:29


   Last Admin: 01/11/19 14:30 Dose:  999 mls/hr


Sodium Chloride (Normal Saline)  100 mls @ 75 mls/hr IV ASDIRECTED [facility]


   Last Admin: 01/11/19 14:09 Dose:  75 mls/hr


Iopamidol (Isovue-370 (76%))  100 ml IVPUSH ONETIME ONE


   Stop: 01/11/19 11:38


   Last Admin: 01/11/19 14:08 Dose:  100 ml


Magnesium Oxide (Magnesium Oxide)  400 mg PO ONETIME ONE


   Stop: 01/09/19 20:31


   Last Admin: 01/09/19 21:09 Dose:  Not Given


Metoprolol Tartrate (Lopressor)  25 mg PO Q12H [facility]


   Last Admin: 01/11/19 12:32 Dose:  25 mg


Oseltamivir Phosphate (Tamiflu)  75 mg PO ONETIME ONE


   Stop: 01/08/19 09:07


   Last Admin: 01/08/19 09:15 Dose:  75 mg


Oseltamivir Phosphate (Tamiflu)  30 mg PO DAILY [facility]


   Stop: 01/12/19 09:01


   Last Admin: 01/12/19 08:40 Dose:  30 mg


Potassium Chloride (Klor-Con M20)  40 meq PO ONETIME ONE


   Stop: 01/08/19 09:18


   Last Admin: 01/08/19 09:26 Dose:  40 meq


Potassium Chloride (Klor-Con M20)  40 meq PO TID [facility]


   Stop: 01/08/19 21:01


   Last Admin: 01/08/19 13:26 Dose:  40 meq


Potassium Chloride (Potassium Chloride Solution)  40 meq PO TID [facility]


   Stop: 01/09/19 09:01


   Last Admin: 01/08/19 14:41 Dose:  Not Given


Potassium Chloride (Klor-Con M20)  40 meq PO Q4H [facility]


   Stop: 01/10/19 00:31


   Last Admin: 01/10/19 00:26 Dose:  Not Given


Potassium Chloride (Klor-Con M20)  40 meq PO ONETIME ONE


   Stop: 01/11/19 09:16


   Last Admin: 01/11/19 09:29 Dose:  Not Given











- Exam


General: Alert, Oriented, Cooperative, No Acute Distress


HEENT: Pupils Equal, Pupils Reactive, EOMI, Mucous Membr. Moist/Pink


Neck: Supple


Lungs: Normal Respiratory Effort, Decreased Breath Sounds, Crackles


Cardiovascular: Regular Rate, Regular Rhythm


GI/Abdominal Exam: Normal Bowel Sounds, Soft, Non-Tender, No Organomegaly, No 

Distention, No Abnormal Bruit


 (Female) Exam: Deferred


Back Exam: Normal Inspection, Decreased Range of Motion


Extremities: Normal Inspection, Normal Range of Motion, Non-Tender, No Pedal 

Edema, Normal Capillary Refill


Peripheral Pulses: 2+: Dorsalis Pedis (L), Dorsalis Pedis (R)


Skin: Warm, Dry, Intact


Neurological: No New Focal Deficit


Psy/Mental Status: Alert, Normal Affect, Normal Mood





- Problem List Review


Problem List Initiated/Reviewed/Updated: Yes





- My Orders


Last 24 Hours: 


My Active Orders





01/11/19 10:47


Pharmacy to Dose - Vancomycin   0 dose .XX DAILY PRN 





01/11/19 11:00


Vancomycin [Vancocin] 1 gm   Sodium Chloride 0.9% [Normal Saline] 250 ml IV 

Q24H 





01/12/19 08:30


Magnesium Sulfate/Water [Magnesium Sulfate 2 GM in Water 50 ML] 2 gm   Premix 

Bag 1 bag IV ONETIME 


Potassium Chloride [KCl 10 MEQ in Water 100 ML] 10 meq   Premix Bag 1 bag IV 

Q1H 





01/13/19 10:30


VANCOMYCIN TROUGH [CHEM] Timed 














- Plan


Plan:: 


Impression/Plan:


Acute:


Bilateral PNA Improving Hypoxia


   - CXR report reads patchy areas of increased density within both sides of 

the chest


   - Ordered Sputum Cx ordered


   - RVP and Strep pneumonia Ag - negative


   - Scheduled Decongestant and Expectorant; added Tessalon Perles 100 mg po BID


   - IV Azithromycin and Rocephin -> stop 


   - Continue IV zosyn and vancomycin on 1/11/19


   - Encouraged to use IS/FV as directed


   - CXR repeat 1/10/18: Worsening parenchymal change within both lungs 


   - CXR repeat today: essentially the same 


   - She is now more compliant with our treatment plan 


   - O2 now down to 3L; Continue RT to assess and treat 


   - Scheduled xopenex and ipratropium - switched from albuterol to xopenex due 

to tachycardia


   - WBC has been steady at 12-13, CRP starting to improve 





Influenza B positive, Hx of vaccine


   - Continue Tamiflu


   - RVP negative for influenza but screening positive 


   - Infection prevention specialist researching this 





Hyperglycemia with DM2, improved 


   - BS in the 200-300s; improved


   - Continue home dose insulin


   - Accu-check AC/HS and Medium dose ISS   


   - She has been compliant with ISS





B/L Pleural Effusion


   - R>>L


   - Small-Moderate per chest CTA


   - Invasive procedure is not indicated at this time


   - We'll stay with medical management





Hypokalemia and Hypomagnesemia


   - Mg 1.7 


   - K 2.7 ->3.8-->3.7-->3.2 --> 3.1


   - 2/2 inadequate intake and diuresis


   - Pharmacy to monitor and replete 





Generalized Weakness


   - 2/2 CAP, Bilateral Pleural Effusion and Influenza Infection


   - Stressed the importance of medical compliance





Resolved:


S/p Dehydration


   - No ARF; she was at baseline





S/P Hypomagnesemia


   - 2/2 inadequate intake


   - Replete and monitor





S/p A-fib with RVR, Resolved in ICU 


   - A-fib with HR in 120-140s


   - Onset while sleeping 


   - Some runs of v-tac


   - Cardizem IVP given with excellent response


   - Upgrade to ICU


   - Start xarelto for stroke prophylaxis - pharmacy to dose


   - Troponin/CKMB: Negative 


   - D-Dimer: 1.52 - CTA ordered


   - Echo completed pending report


   - CXR no change from previous study 





S/p Elevated D-Dimer


   - D-dimer 1.52


   - CTA showed no PE 


   - 250mL fluid bolus before and after scan








Chronic:


TIA/CVA


CKD


HF


HTN


HLD


Depression





Plan:


She is much better clinically


Downgrade to [facility] with Tele 


Routine AM Labs


Droplet precautions


IVF to KVO at 15 cc/hr then d/c once current bag is done


RT consult for pulmonary toilet


HCTZ 12.5 mg po BID x 4 only


Advance diet as tolerated


PT/OT treatment for deconditioning if services is available


DVT/PE prophylaxis: Refusing Heparin so will order SCDs--> continued xarelto 15 

mg po daily 


SW/CM for d/c planning


Code status: DNR; PCP: Dr. Vaughan 





LOS 96 hrs due to slow response to treatment. Updated sister and patient 

regarding morning lab results, imaging report, and treatment plan at bedside.

## 2019-01-13 RX ADMIN — INSULIN LISPRO SCH UNITS: 100 INJECTION, SOLUTION INTRAVENOUS; SUBCUTANEOUS at 21:26

## 2019-01-13 RX ADMIN — GUAIFENESIN AND DEXTROMETHORPHAN SCH ML: 100; 10 SYRUP ORAL at 20:19

## 2019-01-13 RX ADMIN — IPRATROPIUM BROMIDE SCH MG: 0.5 SOLUTION RESPIRATORY (INHALATION) at 16:50

## 2019-01-13 RX ADMIN — IPRATROPIUM BROMIDE SCH MG: 0.5 SOLUTION RESPIRATORY (INHALATION) at 06:28

## 2019-01-13 RX ADMIN — IPRATROPIUM BROMIDE SCH MG: 0.5 SOLUTION RESPIRATORY (INHALATION) at 20:31

## 2019-01-13 RX ADMIN — IPRATROPIUM BROMIDE SCH MG: 0.5 SOLUTION RESPIRATORY (INHALATION) at 09:58

## 2019-01-13 RX ADMIN — GUAIFENESIN AND DEXTROMETHORPHAN SCH ML: 100; 10 SYRUP ORAL at 15:59

## 2019-01-13 RX ADMIN — INSULIN LISPRO SCH UNITS: 100 INJECTION, SOLUTION INTRAVENOUS; SUBCUTANEOUS at 18:04

## 2019-01-13 RX ADMIN — INSULIN LISPRO SCH: 100 INJECTION, SOLUTION INTRAVENOUS; SUBCUTANEOUS at 06:30

## 2019-01-13 RX ADMIN — Medication SCH MG: at 20:19

## 2019-01-13 RX ADMIN — GUAIFENESIN AND DEXTROMETHORPHAN SCH ML: 100; 10 SYRUP ORAL at 06:30

## 2019-01-13 RX ADMIN — INSULIN LISPRO SCH UNITS: 100 INJECTION, SOLUTION INTRAVENOUS; SUBCUTANEOUS at 11:41

## 2019-01-13 RX ADMIN — INSULIN GLARGINE SCH UNIT: 100 INJECTION, SOLUTION SUBCUTANEOUS at 08:52

## 2019-01-13 RX ADMIN — Medication SCH MG: at 08:50

## 2019-01-13 NOTE — PCM.PN
- General Info


Date of Service: 01/13/19


Admission Dx/Problem (Free Text): 


 Admission Diagnosis/Problem





Admission Diagnosis/Problem      Pneumonia








Subjective Update: 


Follow Up 





Functional Status: Reports: Pain Controlled, Tolerating Diet, Ambulating, 

Urinating.  Denies: New Symptoms





- Review of Systems


General: Reports: Weakness (but better).  Denies: Fever, Fatigue, Malaise, 

Chills


HEENT: Reports: No Symptoms.  Denies: Sore Throat


Pulmonary: Reports: Cough (but better).  Denies: Shortness of Breath


Cardiovascular: Denies: Chest Pain, Palpitations, Dyspnea on Exertion, Edema, 

Lightheadedness


Gastrointestinal: Denies: Abdominal Pain, Decreased Appetite, Nausea, Vomiting


Genitourinary: Reports: No Symptoms


Musculoskeletal: Reports: No Symptoms


Skin: Reports: No Symptoms


Neurological: Reports: Difficulty Walking, Gait Disturbance.  Denies: Confusion


Psychiatric: Denies: Depression, Anxiety, Agitation, Hallucinations


Systems Review Comment:: 


She had an uneventful night. She rested well. She has been going to the 

bathroom more than usual. Her coughing has improved considerably. She feels 

slowly getting better. 








- Patient Data


Vitals - Most Recent: 


 Last Vital Signs











Temp  37.3 C   01/13/19 02:19


 


Pulse  87   01/13/19 02:19


 


Resp  22 H  01/13/19 02:19


 


BP  142/56 H  01/13/19 02:19


 


Pulse Ox  93 L  01/13/19 06:29











Weight - Most Recent: 71.441 kg


I&O - Last 24 Hours: 


 Intake & Output











 01/12/19 01/13/19 01/13/19





 22:59 06:59 14:59


 


Intake Total 1973 1405 


 


Output Total 300  


 


Balance 1673 1405 











Lab Results Last 24 Hours: 


 Laboratory Results - last 24 hr











  01/12/19 01/12/19 01/12/19 Range/Units





  08:46 11:26 17:34 


 


WBC     (3.98-10.04)  K/mm3


 


RBC     (3.98-5.22)  M/mm3


 


Hgb     (11.2-15.7)  gm/L


 


Hct     (34.1-44.9)  %


 


MCV     (79.4-94.8)  fl


 


MCH     (25.6-32.2)  pg


 


MCHC     (32.2-35.5)  g/dl


 


RDW Std Deviation     (36.4-46.3)  fL


 


Plt Count     (182-369)  K/mm3


 


MPV     (9.4-12.3)  fl


 


Neut % (Auto)     (34.0-71.1)  %


 


Lymph % (Auto)     (19.3-51.7)  %


 


Mono % (Auto)     (4.7-12.5)  %


 


Eos % (Auto)     (0.7-5.8)  


 


Baso % (Auto)     (0.1-1.2)  %


 


Neut # (Auto)     (1.56-6.13)  K/mm3


 


Lymph # (Auto)     (1.18-3.74)  K/mm3


 


Mono # (Auto)     (0.24-0.36)  K/mm3


 


Eos # (Auto)     (0.04-0.36)  K/mm3


 


Baso # (Auto)     (0.01-0.08)  K/mm3


 


Sodium     (136-145)  mEq/L


 


Potassium     (3.5-5.1)  mEq/L


 


Chloride     ()  mEq/L


 


Carbon Dioxide     (21-32)  mEq/L


 


Anion Gap     (5-15)  


 


BUN     (7-18)  mg/dL


 


Creatinine     (0.55-1.02)  mg/dL


 


Est Cr Clr Drug Dosing     mL/min


 


Estimated GFR (MDRD)     (>60)  mL/min


 


BUN/Creatinine Ratio     (14-18)  


 


Glucose     ()  mg/dL


 


POC Glucose  157 H  199 H  161 H  ()  mg/dL


 


Calcium     (8.5-10.1)  mg/dL


 


Total Bilirubin     (0.2-1.0)  mg/dL


 


AST     (15-37)  U/L


 


ALT     (14-59)  U/L


 


Alkaline Phosphatase     ()  U/L


 


Total Protein     (6.4-8.2)  g/dl


 


Albumin     (3.4-5.0)  g/dl


 


Globulin     gm/dL


 


Albumin/Globulin Ratio     (1-2)  














  01/12/19 01/13/19 01/13/19 Range/Units





  20:25 06:16 06:17 


 


WBC     (3.98-10.04)  K/mm3


 


RBC     (3.98-5.22)  M/mm3


 


Hgb     (11.2-15.7)  gm/L


 


Hct     (34.1-44.9)  %


 


MCV     (79.4-94.8)  fl


 


MCH     (25.6-32.2)  pg


 


MCHC     (32.2-35.5)  g/dl


 


RDW Std Deviation     (36.4-46.3)  fL


 


Plt Count     (182-369)  K/mm3


 


MPV     (9.4-12.3)  fl


 


Neut % (Auto)     (34.0-71.1)  %


 


Lymph % (Auto)     (19.3-51.7)  %


 


Mono % (Auto)     (4.7-12.5)  %


 


Eos % (Auto)     (0.7-5.8)  


 


Baso % (Auto)     (0.1-1.2)  %


 


Neut # (Auto)     (1.56-6.13)  K/mm3


 


Lymph # (Auto)     (1.18-3.74)  K/mm3


 


Mono # (Auto)     (0.24-0.36)  K/mm3


 


Eos # (Auto)     (0.04-0.36)  K/mm3


 


Baso # (Auto)     (0.01-0.08)  K/mm3


 


Sodium    138  (136-145)  mEq/L


 


Potassium    3.5  (3.5-5.1)  mEq/L


 


Chloride    105  ()  mEq/L


 


Carbon Dioxide    24  (21-32)  mEq/L


 


Anion Gap    12.5  (5-15)  


 


BUN    8  (7-18)  mg/dL


 


Creatinine    1.2 H  (0.55-1.02)  mg/dL


 


Est Cr Clr Drug Dosing    23.72  mL/min


 


Estimated GFR (MDRD)    42  (>60)  mL/min


 


BUN/Creatinine Ratio    6.7 L  (14-18)  


 


Glucose    98  ()  mg/dL


 


POC Glucose  219 H  102   ()  mg/dL


 


Calcium    8.4 L  (8.5-10.1)  mg/dL


 


Total Bilirubin    0.6  (0.2-1.0)  mg/dL


 


AST    19  (15-37)  U/L


 


ALT    14  (14-59)  U/L


 


Alkaline Phosphatase    62  ()  U/L


 


Total Protein    6.0 L  (6.4-8.2)  g/dl


 


Albumin    2.1 L  (3.4-5.0)  g/dl


 


Globulin    3.9  gm/dL


 


Albumin/Globulin Ratio    0.5 L  (1-2)  














  01/13/19 Range/Units





  06:17 


 


WBC  12.53 H  (3.98-10.04)  K/mm3


 


RBC  3.34 L  (3.98-5.22)  M/mm3


 


Hgb  9.7 L  (11.2-15.7)  gm/L


 


Hct  29.9 L  (34.1-44.9)  %


 


MCV  89.5  (79.4-94.8)  fl


 


MCH  29.0  (25.6-32.2)  pg


 


MCHC  32.4  (32.2-35.5)  g/dl


 


RDW Std Deviation  49.1 H  (36.4-46.3)  fL


 


Plt Count  278  (182-369)  K/mm3


 


MPV  9.7  (9.4-12.3)  fl


 


Neut % (Auto)  77.9 H  (34.0-71.1)  %


 


Lymph % (Auto)  12.2 L  (19.3-51.7)  %


 


Mono % (Auto)  6.4  (4.7-12.5)  %


 


Eos % (Auto)  2.7  (0.7-5.8)  


 


Baso % (Auto)  0.2  (0.1-1.2)  %


 


Neut # (Auto)  9.75 H  (1.56-6.13)  K/mm3


 


Lymph # (Auto)  1.53  (1.18-3.74)  K/mm3


 


Mono # (Auto)  0.80 H  (0.24-0.36)  K/mm3


 


Eos # (Auto)  0.34  (0.04-0.36)  K/mm3


 


Baso # (Auto)  0.03  (0.01-0.08)  K/mm3


 


Sodium   (136-145)  mEq/L


 


Potassium   (3.5-5.1)  mEq/L


 


Chloride   ()  mEq/L


 


Carbon Dioxide   (21-32)  mEq/L


 


Anion Gap   (5-15)  


 


BUN   (7-18)  mg/dL


 


Creatinine   (0.55-1.02)  mg/dL


 


Est Cr Clr Drug Dosing   mL/min


 


Estimated GFR (MDRD)   (>60)  mL/min


 


BUN/Creatinine Ratio   (14-18)  


 


Glucose   ()  mg/dL


 


POC Glucose   ()  mg/dL


 


Calcium   (8.5-10.1)  mg/dL


 


Total Bilirubin   (0.2-1.0)  mg/dL


 


AST   (15-37)  U/L


 


ALT   (14-59)  U/L


 


Alkaline Phosphatase   ()  U/L


 


Total Protein   (6.4-8.2)  g/dl


 


Albumin   (3.4-5.0)  g/dl


 


Globulin   gm/dL


 


Albumin/Globulin Ratio   (1-2)  











[place] Results Last 24 Hours: 


 Microbiology











 01/08/19 08:00 Aerobic Blood Culture - Preliminary





 Blood - Venous - Lab Draw    NO GROWTH AFTER 5 DAYS





 Anaerobic Blood Culture - Preliminary





    NO GROWTH AFTER 5 DAYS


 


 01/08/19 07:52 Aerobic Blood Culture - Preliminary





 Blood - Venous    NO GROWTH AFTER 5 DAYS





 Anaerobic Blood Culture - Preliminary





    NO GROWTH AFTER 5 DAYS











Med Orders - Current: 


 Current Medications





Acetaminophen (Tylenol)  650 mg PO Q6H PRN


   PRN Reason: Pain/Fever


   Last Admin: 01/11/19 19:50 Dose:  650 mg


Albuterol (Proventil Neb Soln)  2.5 mg NEB Q4H PRN


   PRN Reason: Shortness of Breath


   Last Admin: 01/12/19 12:33 Dose:  2.5 mg


Aspirin (Halfprin)  81 mg PO DAILY [facility]


   Last Admin: 01/12/19 08:40 Dose:  81 mg


Benzonatate (Tessalon Perles)  100 mg PO BID [facility]


   Last Admin: 01/12/19 20:10 Dose:  100 mg


Calcium Carbonate/Glycine (Tums)  500 mg PO Q4H PRN


   PRN Reason: Heartburn


Clopidogrel Bisulfate (Plavix)  75 mg PO DAILY [facility]


   Last Admin: 01/12/19 08:40 Dose:  75 mg


Dextrose/Water (Dextrose 50% In Water)  50 ml IVPUSH ASDIRECTED PRN


   PRN Reason: Hypoglycemia


Furosemide (Lasix)  40 mg PO DAILY [facility]


   Last Admin: 01/12/19 08:40 Dose:  40 mg


Gabapentin (Neurontin)  300 mg PO TID [facility]


   Last Admin: 01/12/19 20:11 Dose:  300 mg


Guaifenesin/Phenylephrine HCl (Robitussin Dm)  5 ml PO TID@0700,1400,2100 [facility]


   Last Admin: 01/13/19 06:30 Dose:  5 ml


Hydralazine HCl (Apresoline)  10 mg IVPUSH Q4H PRN


   PRN Reason: Hypertension


   Last Admin: 01/10/19 10:58 Dose:  10 mg


Hydrochlorothiazide (Hydrochlorothiazide)  12.5 mg PO BIDDIURETIC [facility]


   Stop: 01/14/19 06:01


   Last Admin: 01/13/19 06:30 Dose:  12.5 mg


Diltiazem HCl 125 mg/ Sodium (Chloride)  125 mls @ 5 mls/hr IV TITRATE [facility]; 

Protocol


Piperacillin Sod/Tazobactam (Sod 4.5 gm/ Sodium Chloride)  100 mls @ 25 mls/hr 

IV Q8H [facility]


   Last Admin: 01/12/19 23:01 Dose:  25 mls/hr


Vancomycin HCl 1 gm/ Sodium (Chloride)  250 mls @ 250 mls/hr IV Q24H [facility]


   Last Admin: 01/12/19 11:25 Dose:  250 mls/hr


Sodium Chloride (Normal Saline)  1,000 mls @ 50 mls/hr IV ASDIRECTED [facility]


   Last Admin: 01/12/19 03:52 Dose:  50 mls/hr


Insulin Glargine (Lantus)  30 unit SUBCUT DAILY [facility]


   Last Admin: 01/12/19 08:46 Dose:  30 unit


Insulin Human Lispro (Humalog)  0 unit SUBCUT QIDACANDBED [facility]; Protocol


   Last Admin: 01/13/19 06:30 Dose:  Not Given


Ipratropium Bromide (Atrovent)  0.5 mg NEB QIDRT [facility]


   Last Admin: 01/13/19 06:28 Dose:  0.5 mg


Levalbuterol HCl (Xopenex)  1.25 mg NEB QIDRT [facility]


   Last Admin: 01/13/19 06:28 Dose:  1.25 mg


Loratadine (Claritin)  10 mg PO DAILY PRN


   PRN Reason: Allergies


Magnesium Sulfate (Pharmacy To Dose - Magnesium Replacement)  0 dose .XX 

ASDIRECTED PRN


   PRN Reason: RX TO WATCH MAG


Metoprolol Tartrate (Lopressor)  5 mg IVPUSH Q4H PRN


   PRN Reason: Tachycardia


Metoprolol Tartrate (Lopressor)  25 mg PO Q12H [facility]


   Last Admin: 01/12/19 20:10 Dose:  25 mg


Ondansetron HCl (Zofran)  4 mg IVPUSH Q8H PRN


   PRN Reason: Nausea/Vomiting


Potassium Chloride (Pharmacy To Dose - Potassium Replacement)  0 dose .XX 

ASDIRECTED PRN


   PRN Reason: RX TO WATCH K


Rivaroxaban (Xarelto)  15 mg PO DAILY [facility]


   Last Admin: 01/12/19 08:41 Dose:  15 mg


Saccharomyces Boulardii (Florastor)  250 mg PO BID [facility]


   Last Admin: 01/12/19 20:10 Dose:  250 mg


Senna/Docusate Sodium (Senna Plus)  1 tab PO DAILY PRN


   PRN Reason: Constipation


Sodium Chloride (Saline Flush)  10 ml FLUSH ASDIRECTED PRN


   PRN Reason: Keep Vein Open


   Last Admin: 01/08/19 08:15 Dose:  10 ml


Sodium Chloride (Saline Flush)  10 ml FLUSH ONETIME PRN


   PRN Reason: IV FLUSH


   Last Admin: 01/11/19 14:08 Dose:  10 ml


Vancomycin HCl (Pharmacy To Dose - Vancomycin)  0 dose .XX DAILY PRN


   PRN Reason: RX TO DOSE VANCO





Discontinued Medications





Albuterol/Ipratropium (Duoneb 3.0-0.5 Mg/3 Ml)  3 ml NEB QID [facility]


   Last Admin: 01/08/19 21:30 Dose:  3 ml


Albuterol/Ipratropium (Duoneb 3.0-0.5 Mg/3 Ml)  3 ml NEB QIDRT [facility]


   Last Admin: 01/10/19 09:14 Dose:  3 ml


Diltiazem HCl (Cardizem)  10 mg IVPUSH ONETIME ONE


   Stop: 01/11/19 07:21


   Last Admin: 01/11/19 07:42 Dose:  10 mg


Diltiazem HCl (Cardizem)  5 mg IVPUSH ONETIME ONE


   Stop: 01/11/19 09:41


   Last Admin: 01/11/19 09:53 Dose:  5 mg


Heparin Sodium (Porcine) (Heparin Sodium)  5,000 units SUBCUT Q8H [facility]


   Last Admin: 01/10/19 05:57 Dose:  Not Given


Ceftriaxone Sodium 2 gm/ (Sodium Chloride)  100 mls @ 200 mls/hr IV ONETIME ONE


   Stop: 01/08/19 08:33


   Last Admin: 01/08/19 08:37 Dose:  200 mls/hr


Sodium Chloride (Normal Saline)  1,000 mls @ 1,000 mls/hr IV .BOLUS [facility]


   Last Admin: 01/08/19 08:15 Dose:  1,000 mls/hr


Azithromycin 500 mg/ Sodium (Chloride)  250 mls @ 250 mls/hr IV Q24H [facility]


   Last Admin: 01/10/19 12:06 Dose:  250 mls/hr


Ceftriaxone Sodium 2 gm/ (Sodium Chloride)  100 mls @ 200 mls/hr IV Q24H [facility]


   Last Admin: 01/10/19 08:46 Dose:  200 mls/hr


Potassium Chloride/Sodium Chloride (Normal Saline With 20 Meq Kcl)  1,000 mls @ 

100 mls/hr IV ASDIRECTED [facility]


   Last Admin: 01/09/19 12:59 Dose:  100 mls/hr


Potassium Chloride 10 meq/ (Premix)  100 mls @ 100 mls/hr IV Q1H [facility]


   Stop: 01/08/19 19:29


   Last Admin: 01/08/19 21:50 Dose:  100 mls/hr


Potassium Chloride 10 meq/ (Premix)  100 mls @ 100 mls/hr IV Q1H [facility]


   Stop: 01/08/19 22:59


   Last Admin: 01/08/19 22:02 Dose:  Not Given


Piperacillin Sod/Tazobactam (Sod 4.5 gm/ Sodium Chloride)  100 mls @ 200 mls/hr 

IV ONETIME ONE


   Stop: 01/11/19 08:29


   Last Admin: 01/11/19 09:15 Dose:  200 mls/hr


Potassium Chloride 10 meq/ (Premix)  100 mls @ 100 mls/hr IV Q1H [facility]


   Stop: 01/11/19 14:59


   Last Admin: 01/11/19 17:41 Dose:  100 mls/hr


Sodium Chloride (Normal Saline)  500 mls @ 999 mls/hr IV .BOLUS ONE


   Stop: 01/11/19 11:29


   Last Admin: 01/11/19 14:30 Dose:  999 mls/hr


Sodium Chloride (Normal Saline)  100 mls @ 75 mls/hr IV ASDIRECTED [facility]


   Last Admin: 01/11/19 14:09 Dose:  75 mls/hr


Potassium Chloride 10 meq/ (Premix)  100 mls @ 100 mls/hr IV Q1H [facility]


   Stop: 01/12/19 14:29


   Last Admin: 01/12/19 14:12 Dose:  100 mls/hr


Magnesium Sulfate 2 gm/ Premix  50 mls @ 25 mls/hr IV ONETIME ONE


   Stop: 01/12/19 10:29


   Last Admin: 01/12/19 08:36 Dose:  25 mls/hr


Iopamidol (Isovue-370 (76%))  100 ml IVPUSH ONETIME ONE


   Stop: 01/11/19 11:38


   Last Admin: 01/11/19 14:08 Dose:  100 ml


Magnesium Oxide (Magnesium Oxide)  400 mg PO ONETIME ONE


   Stop: 01/09/19 20:31


   Last Admin: 01/09/19 21:09 Dose:  Not Given


Metoprolol Tartrate (Lopressor)  25 mg PO Q12H [facility]


   Last Admin: 01/11/19 12:32 Dose:  25 mg


Oseltamivir Phosphate (Tamiflu)  75 mg PO ONETIME ONE


   Stop: 01/08/19 09:07


   Last Admin: 01/08/19 09:15 Dose:  75 mg


Oseltamivir Phosphate (Tamiflu)  30 mg PO DAILY [facility]


   Stop: 01/12/19 09:01


   Last Admin: 01/12/19 08:40 Dose:  30 mg


Potassium Chloride (Klor-Con M20)  40 meq PO ONETIME ONE


   Stop: 01/08/19 09:18


   Last Admin: 01/08/19 09:26 Dose:  40 meq


Potassium Chloride (Klor-Con M20)  40 meq PO TID [facility]


   Stop: 01/08/19 21:01


   Last Admin: 01/08/19 13:26 Dose:  40 meq


Potassium Chloride (Potassium Chloride Solution)  40 meq PO TID [facility]


   Stop: 01/09/19 09:01


   Last Admin: 01/08/19 14:41 Dose:  Not Given


Potassium Chloride (Klor-Con M20)  40 meq PO Q4H [facility]


   Stop: 01/10/19 00:31


   Last Admin: 01/10/19 00:26 Dose:  Not Given


Potassium Chloride (Klor-Con M20)  40 meq PO ONETIME ONE


   Stop: 01/11/19 09:16


   Last Admin: 01/11/19 09:29 Dose:  Not Given











- Exam


Quality Assessment: Supplemental Oxygen


General: Alert, Oriented, Cooperative, No Acute Distress


HEENT: Pupils Equal, Pupils Reactive, EOMI, Mucous Membr. Moist/Pink


Neck: Supple, Trachea Midline


Lungs: Normal Respiratory Effort, Decreased Breath Sounds, Crackles


Cardiovascular: Regular Rate, Regular Rhythm


GI/Abdominal Exam: Normal Bowel Sounds, Soft, Non-Tender, No Organomegaly, No 

Distention, No Abnormal Bruit, No Mass


 (Female) Exam: Deferred


Back Exam: Normal Inspection, Decreased Range of Motion


Extremities: Normal Inspection, Normal Range of Motion, Non-Tender, No Pedal 

Edema, Normal Capillary Refill


Peripheral Pulses: 2+: Dorsalis Pedis (L), Dorsalis Pedis (R)


Skin: Warm, Dry, Intact


Neurological: No New Focal Deficit.  No: Normal Gait


Psy/Mental Status: Alert, Normal Affect, Normal Mood





- Problem List Review


Problem List Initiated/Reviewed/Updated: Yes





- My Orders


Last 24 Hours: 


My Active Orders





01/12/19 09:58


Patient Status [ADT] Routine 





01/12/19 14:00


hydroCHLOROthiazide   12.5 mg PO BIDDIURETIC 





01/12/19 21:00


Benzonatate [Tessalon Perles]   100 mg PO BID 





01/13/19 08:06


CRP [C-REACTIVE PROTEIN] [CHEM] Urgent 





01/13/19 10:30


VANCOMYCIN TROUGH [CHEM] Timed 





01/14/19 05:11


BMP [BASIC METABOLIC PANEL,BMP] [CHEM] AM 


CBC WITH AUTO DIFF [HEME] AM 


CRP [C-REACTIVE PROTEIN] [CHEM] AM 





01/15/19 05:11


CBC WITH AUTO DIFF [HEME] AM 


CRP [C-REACTIVE PROTEIN] [CHEM] AM 





01/16/19 05:11


CBC WITH AUTO DIFF [HEME] AM 


CRP [C-REACTIVE PROTEIN] [CHEM] AM 














- Plan


Plan:: 


Impression/Plan:


Acute:


Bilateral PNA Improving Hypoxia


   - CXR report reads patchy areas of increased density within both sides of 

the chest


   - Ordered Sputum Cx ordered


   - RVP and Strep pneumonia Ag - negative


   - Scheduled Decongestant and Expectorant; added Tessalon Perles 100 mg po BID


   - IV Azithromycin and Rocephin -> stop 


   - Continue IV zosyn and vancomycin on 1/11/19


   - Encouraged to use IS/FV as directed


   - CXR repeat 1/10/18: Worsening parenchymal change within both lungs 


   - CXR repeat today: essentially the same 


   - She is now more compliant with our treatment plan 


   - O2 now down to 3L; Continue RT to assess and treat 


   - Scheduled xopenex and ipratropium - switched from albuterol to xopenex due 

to tachycardia


   - WBC has been steady at 12-13, CRP starting to improve 





Influenza B positive, Hx of vaccine


   - Continue Tamiflu


   - RVP negative for influenza but screening positive 


   - Infection prevention specialist researching this 





Hyperglycemia with DM2, improved 


   - BS in the 200-300s; improved


   - Continue home dose insulin


   - Accu-check AC/HS and Medium dose ISS   


   - She has been compliant with ISS





B/L Pleural Effusion


   - R>>L


   - Small-Moderate per chest CTA


   - Invasive procedure is not indicated at this time


   - Continue with medical management





Hypokalemia and Hypomagnesemia


   - Mg 1.7; pending mg level


   - K 2.7 ->3.8-->3.7-->3.2 --> 3.1 --> 3.5


   - 2/2 inadequate intake and diuresis


   - Pharmacy to monitor and replete 





Generalized Weakness, Improved


   - 2/2 CAP, Bilateral Pleural Effusion and Influenza Infection


   - Stressed the importance of medical compliance





Resolved:


S/p Dehydration


   - No ARF; she was at baseline





S/P Hypomagnesemia


   - 2/2 inadequate intake


   - Replete and monitor





S/p A-fib with RVR, Resolved in ICU 


   - A-fib with HR in 120-140s


   - Onset while sleeping 


   - Some runs of v-tac


   - Cardizem IVP given with excellent response


   - Upgrade to ICU


   - Start xarelto for stroke prophylaxis - pharmacy to dose


   - Troponin/CKMB: Negative 


   - D-Dimer: 1.52 - CTA ordered


   - Echo completed pending report


   - CXR no change from previous study 





S/p Elevated D-Dimer


   - D-dimer 1.52


   - CTA showed no PE 


   - 250mL fluid bolus before and after scan





Chronic:


TIA/CVA


CKD


HF


HTN


HLD


Depression





Plan:


She remains clinically stable


Routine AM Labs


Droplet precautions


Titrate to come off supplemental O2


RT consult for pulmonary toilet


PT/OT treatment for deconditioning if services is available


DVT/PE prophylaxis: Discontinue SCDs and continue xarelto 15 mg po daily 


SW/CM for d/c planning


Ambulate as tolerated


Code status: DNR; PCP: Dr. Vaughan 





Possible discharge in 1-2 days. Updated daughter about mom's morning lab results

, treatment and discharge care plan.

## 2019-01-14 RX ADMIN — IPRATROPIUM BROMIDE SCH MG: 0.5 SOLUTION RESPIRATORY (INHALATION) at 06:45

## 2019-01-14 RX ADMIN — IPRATROPIUM BROMIDE SCH MG: 0.5 SOLUTION RESPIRATORY (INHALATION) at 15:36

## 2019-01-14 RX ADMIN — INSULIN LISPRO SCH UNITS: 100 INJECTION, SOLUTION INTRAVENOUS; SUBCUTANEOUS at 17:35

## 2019-01-14 RX ADMIN — IPRATROPIUM BROMIDE SCH MG: 0.5 SOLUTION RESPIRATORY (INHALATION) at 20:33

## 2019-01-14 RX ADMIN — POTASSIUM CHLORIDE SCH: 1500 TABLET, EXTENDED RELEASE ORAL at 23:02

## 2019-01-14 RX ADMIN — INSULIN GLARGINE SCH UNIT: 100 INJECTION, SOLUTION SUBCUTANEOUS at 09:11

## 2019-01-14 RX ADMIN — INSULIN LISPRO SCH UNITS: 100 INJECTION, SOLUTION INTRAVENOUS; SUBCUTANEOUS at 21:24

## 2019-01-14 RX ADMIN — Medication SCH MG: at 09:07

## 2019-01-14 RX ADMIN — GUAIFENESIN AND DEXTROMETHORPHAN SCH: 100; 10 SYRUP ORAL at 21:21

## 2019-01-14 RX ADMIN — INSULIN LISPRO SCH: 100 INJECTION, SOLUTION INTRAVENOUS; SUBCUTANEOUS at 06:28

## 2019-01-14 RX ADMIN — GUAIFENESIN AND DEXTROMETHORPHAN SCH ML: 100; 10 SYRUP ORAL at 15:50

## 2019-01-14 RX ADMIN — IPRATROPIUM BROMIDE SCH MG: 0.5 SOLUTION RESPIRATORY (INHALATION) at 09:56

## 2019-01-14 RX ADMIN — GUAIFENESIN AND DEXTROMETHORPHAN SCH ML: 100; 10 SYRUP ORAL at 06:20

## 2019-01-14 RX ADMIN — Medication SCH: at 21:22

## 2019-01-14 RX ADMIN — POTASSIUM CHLORIDE SCH: 1500 TABLET, EXTENDED RELEASE ORAL at 21:21

## 2019-01-14 RX ADMIN — INSULIN LISPRO SCH UNITS: 100 INJECTION, SOLUTION INTRAVENOUS; SUBCUTANEOUS at 12:13

## 2019-01-14 NOTE — CR
Chest: Portable view of the chest was obtained.

 

Comparison: Prior chest x-ray of 01/11/19.

 

Stable parenchymal densities are seen throughout both lungs.  Heart is

 slightly enlarged.  Tortuous thoracic aorta is noted.  Scoliosis is 

noted within the spine.  Surgical clips are seen from prior 

cholecystectomy.

 

Impression:

1.  Findings as noted above.  No significant change is seen from 

previous chest x-ray.

 

Diagnostic code #3

## 2019-01-14 NOTE — PCM.PN
- General Info


Date of Service: 01/14/19


Admission Dx/Problem (Free Text): 


 Admission Diagnosis/Problem





Admission Diagnosis/Problem      Pneumonia








Subjective Update: 


Follow Up 





Functional Status: Reports: Pain Controlled, Tolerating Diet, Ambulating, 

Urinating





- Review of Systems


General: Reports: Weakness.  Denies: Fever, Chills


HEENT: Reports: No Symptoms


Pulmonary: Reports: Shortness of Breath, Cough.  Denies: Pleuritic Chest Pain, 

Sputum, Hemoptysis, Wheezing


Cardiovascular: Denies: Chest Pain, Palpitations, Dyspnea on Exertion, Edema, 

Lightheadedness


Gastrointestinal: Denies: Abdominal Pain, Nausea, Vomiting


Genitourinary: Reports: No Symptoms


Musculoskeletal: Reports: No Symptoms


Skin: Denies: Cyanosis, Pallor, Diaphoresis


Neurological: Reports: Difficulty Walking, Weakness, Gait Disturbance.  Denies: 

Confusion


Psychiatric: Denies: Depression, Anxiety, Agitation, Hallucinations





- Patient Data


Vitals - Most Recent: 


 Last Vital Signs











Temp  37.1 C   01/14/19 08:01


 


Pulse  99   01/14/19 09:07


 


Resp  20   01/14/19 08:01


 


BP  134/49 L  01/14/19 09:07


 


Pulse Ox  91 L  01/14/19 10:15











Weight - Most Recent: 71.758 kg


I&O - Last 24 Hours: 


 Intake & Output











 01/13/19 01/14/19 01/14/19





 22:59 06:59 14:59


 


Intake Total 980 710 


 


Output Total 250 150 


 


Balance 730 560 











Lab Results Last 24 Hours: 


 Laboratory Results - last 24 hr











  01/13/19 01/13/19 01/13/19 Range/Units





  10:38 11:39 18:03 


 


WBC     (3.98-10.04)  K/mm3


 


RBC     (3.98-5.22)  M/mm3


 


Hgb     (11.2-15.7)  gm/L


 


Hct     (34.1-44.9)  %


 


MCV     (79.4-94.8)  fl


 


MCH     (25.6-32.2)  pg


 


MCHC     (32.2-35.5)  g/dl


 


RDW Std Deviation     (36.4-46.3)  fL


 


Plt Count     (182-369)  K/mm3


 


MPV     (9.4-12.3)  fl


 


Neut % (Auto)     (34.0-71.1)  %


 


Lymph % (Auto)     (19.3-51.7)  %


 


Mono % (Auto)     (4.7-12.5)  %


 


Eos % (Auto)     (0.7-5.8)  


 


Baso % (Auto)     (0.1-1.2)  %


 


Neut # (Auto)     (1.56-6.13)  K/mm3


 


Lymph # (Auto)     (1.18-3.74)  K/mm3


 


Mono # (Auto)     (0.24-0.36)  K/mm3


 


Eos # (Auto)     (0.04-0.36)  K/mm3


 


Baso # (Auto)     (0.01-0.08)  K/mm3


 


Sodium     (136-145)  mEq/L


 


Potassium     (3.5-5.1)  mEq/L


 


Chloride     ()  mEq/L


 


Carbon Dioxide     (21-32)  mEq/L


 


Anion Gap     (5-15)  


 


BUN     (7-18)  mg/dL


 


Creatinine     (0.55-1.02)  mg/dL


 


Est Cr Clr Drug Dosing     mL/min


 


Estimated GFR (MDRD)     (>60)  mL/min


 


BUN/Creatinine Ratio     (14-18)  


 


Glucose     ()  mg/dL


 


POC Glucose   338 H  163 H  ()  mg/dL


 


Calcium     (8.5-10.1)  mg/dL


 


C-Reactive Protein     (<1.0)  mg/dL


 


Vancomycin Trough  8.6 L    (10.0-20.0)  














  01/13/19 01/14/19 01/14/19 Range/Units





  21:23 05:21 05:21 


 


WBC   13.82 H   (3.98-10.04)  K/mm3


 


RBC   3.49 L   (3.98-5.22)  M/mm3


 


Hgb   9.9 L   (11.2-15.7)  gm/L


 


Hct   31.4 L   (34.1-44.9)  %


 


MCV   90.0   (79.4-94.8)  fl


 


MCH   28.4   (25.6-32.2)  pg


 


MCHC   31.5 L   (32.2-35.5)  g/dl


 


RDW Std Deviation   49.9 H   (36.4-46.3)  fL


 


Plt Count   299   (182-369)  K/mm3


 


MPV   9.8   (9.4-12.3)  fl


 


Neut % (Auto)   76.7 H   (34.0-71.1)  %


 


Lymph % (Auto)   13.5 L   (19.3-51.7)  %


 


Mono % (Auto)   6.6   (4.7-12.5)  %


 


Eos % (Auto)   2.5   (0.7-5.8)  


 


Baso % (Auto)   0.1   (0.1-1.2)  %


 


Neut # (Auto)   10.61 H   (1.56-6.13)  K/mm3


 


Lymph # (Auto)   1.86   (1.18-3.74)  K/mm3


 


Mono # (Auto)   0.91 H   (0.24-0.36)  K/mm3


 


Eos # (Auto)   0.34   (0.04-0.36)  K/mm3


 


Baso # (Auto)   0.02   (0.01-0.08)  K/mm3


 


Sodium    139  (136-145)  mEq/L


 


Potassium    3.4 L  (3.5-5.1)  mEq/L


 


Chloride    104  ()  mEq/L


 


Carbon Dioxide    26  (21-32)  mEq/L


 


Anion Gap    12.4  (5-15)  


 


BUN    8  (7-18)  mg/dL


 


Creatinine    1.1 H  (0.55-1.02)  mg/dL


 


Est Cr Clr Drug Dosing    25.88  mL/min


 


Estimated GFR (MDRD)    47  (>60)  mL/min


 


BUN/Creatinine Ratio    7.3 L  (14-18)  


 


Glucose    81 L  ()  mg/dL


 


POC Glucose  181 H    ()  mg/dL


 


Calcium    8.5  (8.5-10.1)  mg/dL


 


C-Reactive Protein    27.9 H*  (<1.0)  mg/dL


 


Vancomycin Trough     (10.0-20.0)  














  01/14/19 Range/Units





  06:26 


 


WBC   (3.98-10.04)  K/mm3


 


RBC   (3.98-5.22)  M/mm3


 


Hgb   (11.2-15.7)  gm/L


 


Hct   (34.1-44.9)  %


 


MCV   (79.4-94.8)  fl


 


MCH   (25.6-32.2)  pg


 


MCHC   (32.2-35.5)  g/dl


 


RDW Std Deviation   (36.4-46.3)  fL


 


Plt Count   (182-369)  K/mm3


 


MPV   (9.4-12.3)  fl


 


Neut % (Auto)   (34.0-71.1)  %


 


Lymph % (Auto)   (19.3-51.7)  %


 


Mono % (Auto)   (4.7-12.5)  %


 


Eos % (Auto)   (0.7-5.8)  


 


Baso % (Auto)   (0.1-1.2)  %


 


Neut # (Auto)   (1.56-6.13)  K/mm3


 


Lymph # (Auto)   (1.18-3.74)  K/mm3


 


Mono # (Auto)   (0.24-0.36)  K/mm3


 


Eos # (Auto)   (0.04-0.36)  K/mm3


 


Baso # (Auto)   (0.01-0.08)  K/mm3


 


Sodium   (136-145)  mEq/L


 


Potassium   (3.5-5.1)  mEq/L


 


Chloride   ()  mEq/L


 


Carbon Dioxide   (21-32)  mEq/L


 


Anion Gap   (5-15)  


 


BUN   (7-18)  mg/dL


 


Creatinine   (0.55-1.02)  mg/dL


 


Est Cr Clr Drug Dosing   mL/min


 


Estimated GFR (MDRD)   (>60)  mL/min


 


BUN/Creatinine Ratio   (14-18)  


 


Glucose   ()  mg/dL


 


POC Glucose  93  ()  mg/dL


 


Calcium   (8.5-10.1)  mg/dL


 


C-Reactive Protein   (<1.0)  mg/dL


 


Vancomycin Trough   (10.0-20.0)  











[place] Results Last 24 Hours: 


 Microbiology











 01/08/19 08:00 Aerobic Blood Culture - Preliminary





 Blood - Venous - Lab Draw    NO GROWTH AFTER 6 DAYS





 Anaerobic Blood Culture - Preliminary





    NO GROWTH AFTER 6 DAYS


 


 01/08/19 07:52 Aerobic Blood Culture - Preliminary





 Blood - Venous    NO GROWTH AFTER 6 DAYS





 Anaerobic Blood Culture - Preliminary





    NO GROWTH AFTER 6 DAYS











Med Orders - Current: 


 Current Medications





Acetaminophen (Tylenol)  650 mg PO Q6H PRN


   PRN Reason: Pain/Fever


   Last Admin: 01/11/19 19:50 Dose:  650 mg


Albuterol (Proventil Neb Soln)  2.5 mg NEB Q4H PRN


   PRN Reason: Shortness of Breath


   Last Admin: 01/12/19 12:33 Dose:  2.5 mg


Aspirin (Halfprin)  81 mg PO DAILY [facility]


   Last Admin: 01/14/19 09:07 Dose:  81 mg


Benzonatate (Tessalon Perles)  100 mg PO BID [facility]


   Last Admin: 01/14/19 09:08 Dose:  100 mg


Calcium Carbonate/Glycine (Tums)  500 mg PO Q4H PRN


   PRN Reason: Heartburn


Clopidogrel Bisulfate (Plavix)  75 mg PO DAILY [facility]


   Last Admin: 01/14/19 09:07 Dose:  75 mg


Dextrose/Water (Dextrose 50% In Water)  50 ml IVPUSH ASDIRECTED PRN


   PRN Reason: Hypoglycemia


Furosemide (Lasix)  40 mg PO DAILY [facility]


   Last Admin: 01/14/19 09:06 Dose:  40 mg


Gabapentin (Neurontin)  300 mg PO TID [facility]


   Last Admin: 01/14/19 09:07 Dose:  300 mg


Guaifenesin/Phenylephrine HCl (Robitussin Dm)  5 ml PO TID@0700,1400,2100 [facility]


   Last Admin: 01/14/19 06:20 Dose:  5 ml


Hydralazine HCl (Apresoline)  10 mg IVPUSH Q4H PRN


   PRN Reason: Hypertension


   Last Admin: 01/10/19 10:58 Dose:  10 mg


Diltiazem HCl 125 mg/ Sodium (Chloride)  125 mls @ 5 mls/hr IV TITRATE [facility]; 

Protocol


Piperacillin Sod/Tazobactam (Sod 4.5 gm/ Sodium Chloride)  100 mls @ 25 mls/hr 

IV Q8H [facility]


   Last Admin: 01/14/19 07:39 Dose:  25 mls/hr


Vancomycin HCl 1 gm/ Sodium (Chloride)  250 mls @ 250 mls/hr IV Q12H [facility]


   Last Admin: 01/13/19 23:17 Dose:  250 mls/hr


Insulin Glargine (Lantus)  30 unit SUBCUT DAILY [facility]


   Last Admin: 01/14/19 09:11 Dose:  30 unit


Insulin Human Lispro (Humalog)  0 unit SUBCUT QIDACANDBED [facility]; Protocol


   Last Admin: 01/14/19 06:28 Dose:  Not Given


Ipratropium Bromide (Atrovent)  0.5 mg NEB QIDRT [facility]


   Last Admin: 01/14/19 09:56 Dose:  0.5 mg


Levalbuterol HCl (Xopenex)  1.25 mg NEB QIDRT [facility]


   Last Admin: 01/14/19 09:56 Dose:  1.25 mg


Loratadine (Claritin)  10 mg PO DAILY PRN


   PRN Reason: Allergies


Magnesium Sulfate (Pharmacy To Dose - Magnesium Replacement)  0 dose .XX 

ASDIRECTED PRN


   PRN Reason: RX TO WATCH MAG


Metoprolol Tartrate (Lopressor)  5 mg IVPUSH Q4H PRN


   PRN Reason: Tachycardia


Metoprolol Tartrate (Lopressor)  25 mg PO Q12H [facility]


   Last Admin: 01/14/19 09:07 Dose:  25 mg


Ondansetron HCl (Zofran)  4 mg IVPUSH Q8H PRN


   PRN Reason: Nausea/Vomiting


Potassium Chloride (Pharmacy To Dose - Potassium Replacement)  0 dose .XX 

ASDIRECTED PRN


   PRN Reason: RX TO WATCH K


Rivaroxaban (Xarelto)  15 mg PO DAILY [facility]


   Last Admin: 01/14/19 09:06 Dose:  15 mg


Saccharomyces Boulardii (Florastor)  250 mg PO BID [facility]


   Last Admin: 01/14/19 09:07 Dose:  250 mg


Senna/Docusate Sodium (Senna Plus)  1 tab PO DAILY PRN


   PRN Reason: Constipation


Sodium Chloride (Saline Flush)  10 ml FLUSH ASDIRECTED PRN


   PRN Reason: Keep Vein Open


   Last Admin: 01/08/19 08:15 Dose:  10 ml


Sodium Chloride (Saline Flush)  10 ml FLUSH ONETIME PRN


   PRN Reason: IV FLUSH


   Last Admin: 01/11/19 14:08 Dose:  10 ml


Vancomycin HCl (Pharmacy To Dose - Vancomycin)  0 dose .XX DAILY PRN


   PRN Reason: RX TO DOSE VANCO





Discontinued Medications





Albuterol/Ipratropium (Duoneb 3.0-0.5 Mg/3 Ml)  3 ml NEB QID [facility]


   Last Admin: 01/08/19 21:30 Dose:  3 ml


Albuterol/Ipratropium (Duoneb 3.0-0.5 Mg/3 Ml)  3 ml NEB QIDRT [facility]


   Last Admin: 01/10/19 09:14 Dose:  3 ml


Diltiazem HCl (Cardizem)  10 mg IVPUSH ONETIME ONE


   Stop: 01/11/19 07:21


   Last Admin: 01/11/19 07:42 Dose:  10 mg


Diltiazem HCl (Cardizem)  5 mg IVPUSH ONETIME ONE


   Stop: 01/11/19 09:41


   Last Admin: 01/11/19 09:53 Dose:  5 mg


Heparin Sodium (Porcine) (Heparin Sodium)  5,000 units SUBCUT Q8H [facility]


   Last Admin: 01/10/19 05:57 Dose:  Not Given


Hydrochlorothiazide (Hydrochlorothiazide)  12.5 mg PO BIDDIURETIC [facility]


   Stop: 01/14/19 06:01


   Last Admin: 01/14/19 06:25 Dose:  12.5 mg


Ceftriaxone Sodium 2 gm/ (Sodium Chloride)  100 mls @ 200 mls/hr IV ONETIME ONE


   Stop: 01/08/19 08:33


   Last Admin: 01/08/19 08:37 Dose:  200 mls/hr


Sodium Chloride (Normal Saline)  1,000 mls @ 1,000 mls/hr IV .BOLUS [facility]


   Last Admin: 01/08/19 08:15 Dose:  1,000 mls/hr


Azithromycin 500 mg/ Sodium (Chloride)  250 mls @ 250 mls/hr IV Q24H [facility]


   Last Admin: 01/10/19 12:06 Dose:  250 mls/hr


Ceftriaxone Sodium 2 gm/ (Sodium Chloride)  100 mls @ 200 mls/hr IV Q24H [facility]


   Last Admin: 01/10/19 08:46 Dose:  200 mls/hr


Potassium Chloride/Sodium Chloride (Normal Saline With 20 Meq Kcl)  1,000 mls @ 

100 mls/hr IV ASDIRECTED [facility]


   Last Admin: 01/09/19 12:59 Dose:  100 mls/hr


Potassium Chloride 10 meq/ (Premix)  100 mls @ 100 mls/hr IV Q1H [facility]


   Stop: 01/08/19 19:29


   Last Admin: 01/08/19 21:50 Dose:  100 mls/hr


Potassium Chloride 10 meq/ (Premix)  100 mls @ 100 mls/hr IV Q1H [facility]


   Stop: 01/08/19 22:59


   Last Admin: 01/08/19 22:02 Dose:  Not Given


Piperacillin Sod/Tazobactam (Sod 4.5 gm/ Sodium Chloride)  100 mls @ 200 mls/hr 

IV ONETIME ONE


   Stop: 01/11/19 08:29


   Last Admin: 01/11/19 09:15 Dose:  200 mls/hr


Vancomycin HCl 1 gm/ Sodium (Chloride)  250 mls @ 250 mls/hr IV Q24H [facility]


   Last Admin: 01/13/19 12:19 Dose:  Not Given


Potassium Chloride 10 meq/ (Premix)  100 mls @ 100 mls/hr IV Q1H [facility]


   Stop: 01/11/19 14:59


   Last Admin: 01/11/19 17:41 Dose:  100 mls/hr


Sodium Chloride (Normal Saline)  500 mls @ 999 mls/hr IV .BOLUS ONE


   Stop: 01/11/19 11:29


   Last Admin: 01/11/19 14:30 Dose:  999 mls/hr


Sodium Chloride (Normal Saline)  100 mls @ 75 mls/hr IV ASDIRECTED [facility]


   Last Admin: 01/11/19 14:09 Dose:  75 mls/hr


Sodium Chloride (Normal Saline)  1,000 mls @ 50 mls/hr IV ASDIRECTED [facility]


   Last Admin: 01/12/19 03:52 Dose:  50 mls/hr


Potassium Chloride 10 meq/ (Premix)  100 mls @ 100 mls/hr IV Q1H [facility]


   Stop: 01/12/19 14:29


   Last Admin: 01/12/19 14:12 Dose:  100 mls/hr


Magnesium Sulfate 2 gm/ Premix  50 mls @ 25 mls/hr IV ONETIME ONE


   Stop: 01/12/19 10:29


   Last Admin: 01/12/19 08:36 Dose:  25 mls/hr


Iopamidol (Isovue-370 (76%))  100 ml IVPUSH ONETIME ONE


   Stop: 01/11/19 11:38


   Last Admin: 01/11/19 14:08 Dose:  100 ml


Magnesium Oxide (Magnesium Oxide)  400 mg PO ONETIME ONE


   Stop: 01/09/19 20:31


   Last Admin: 01/09/19 21:09 Dose:  Not Given


Metoprolol Tartrate (Lopressor)  25 mg PO Q12H [facility]


   Last Admin: 01/11/19 12:32 Dose:  25 mg


Oseltamivir Phosphate (Tamiflu)  75 mg PO ONETIME ONE


   Stop: 01/08/19 09:07


   Last Admin: 01/08/19 09:15 Dose:  75 mg


Oseltamivir Phosphate (Tamiflu)  30 mg PO DAILY [facility]


   Stop: 01/12/19 09:01


   Last Admin: 01/12/19 08:40 Dose:  30 mg


Potassium Chloride (Klor-Con M20)  40 meq PO ONETIME ONE


   Stop: 01/08/19 09:18


   Last Admin: 01/08/19 09:26 Dose:  40 meq


Potassium Chloride (Klor-Con M20)  40 meq PO TID [facility]


   Stop: 01/08/19 21:01


   Last Admin: 01/08/19 13:26 Dose:  40 meq


Potassium Chloride (Potassium Chloride Solution)  40 meq PO TID [facility]


   Stop: 01/09/19 09:01


   Last Admin: 01/08/19 14:41 Dose:  Not Given


Potassium Chloride (Klor-Con M20)  40 meq PO Q4H [facility]


   Stop: 01/10/19 00:31


   Last Admin: 01/10/19 00:26 Dose:  Not Given


Potassium Chloride (Klor-Con M20)  40 meq PO ONETIME ONE


   Stop: 01/11/19 09:16


   Last Admin: 01/11/19 09:29 Dose:  Not Given


Potassium Chloride (Klor-Con M20)  40 meq PO ONETIME ONE


   Stop: 01/13/19 09:31


   Last Admin: 01/13/19 10:44 Dose:  Not Given











- Exam


Quality Assessment: Supplemental Oxygen


General: Alert, Oriented, Cooperative, No Acute Distress


HEENT: Pupils Equal, Pupils Reactive, EOMI, Mucous Membr. Moist/Pink


Neck: Supple, No JVD, No Thyromegaly


Lungs: Normal Respiratory Effort, Decreased Breath Sounds, Crackles, Rhonchi


Cardiovascular: Regular Rate, Regular Rhythm


GI/Abdominal Exam: Normal Bowel Sounds, Soft, Non-Tender, No Organomegaly, No 

Distention, No Abnormal Bruit, No Mass


 (Female) Exam: Deferred


Back Exam: Normal Inspection, Decreased Range of Motion


Extremities: Normal Inspection, Normal Range of Motion, Non-Tender, Normal 

Capillary Refill


Skin: Warm, Dry, Intact


Neurological: No New Focal Deficit.  No: Normal Gait


Psy/Mental Status: Alert, Normal Affect, Normal Mood





- Problem List Review


Problem List Initiated/Reviewed/Updated: Yes





- My Orders


Last 24 Hours: 


My Active Orders





01/13/19 12:15


Vancomycin [Vancocin] 1 gm   Sodium Chloride 0.9% [Normal Saline] 250 ml IV 

Q12H 





01/14/19 09:28


Chest 1V Frontal [CR] Routine 





01/14/19 23:30


VANCOMYCIN TROUGH [CHEM] Timed 





01/15/19 05:11


CBC WITH AUTO DIFF [HEME] AM 


CRP [C-REACTIVE PROTEIN] [CHEM] AM 





01/16/19 05:11


CBC WITH AUTO DIFF [HEME] AM 


CRP [C-REACTIVE PROTEIN] [CHEM] AM 














- Plan


Plan:: 


Impression/Plan:


Acute:


Bilateral PNA Improving Hypoxia


   - CXR report reads patchy areas of increased density within both sides of 

the chest


   - Ordered Sputum Cx ordered


   - RVP and Strep pneumonia Ag - negative


   - Continue scheduled Decongestant, Expectorant and Tessalon Perles 100 mg po 

BID


   - IV Azithromycin and Rocephin -> stop 


   - Continue IV zosyn and vancomycin on 1/11/19; d/c it and start oral 

Levaquin 750 mg po Daily


   - Encouraged to use IS/FV as directed


   - CXR repeat 1/10/18: Worsening parenchymal change within both lungs 


   - CXR repeat today 01/14/2019: essentially the same 


   - She is now more compliant with our treatment plan 


   - O2 was 1L yesterday but went up to 3L due to hypoxemia; Continue RT to 

assess and treat 


   - Scheduled xopenex and ipratropium - switched from albuterol to xopenex due 

to tachycardia


   - WBC has been steady at 12-13, CRP worse today





Influenza B positive, Hx of vaccine


   - Continue Tamiflu


   - RVP negative for influenza but screening positive 


   - Infection prevention specialist researching this 





Hyperglycemia with DM2, Stable 


   - BS in the 200-300s; improved


   - Continue home dose insulin


   - Accu-check AC/HS and Medium dose ISS   


   - She has been compliant with ISS





B/L Pleural Effusion


   - R>>L


   - Small-Moderate per chest CTA


   - Invasive procedure is not indicated at this time


   - Continue with medical management


   - Repeat CXR shows no changes from previous study 





Hypokalemia and Hypomagnesemia


   - Mg 1.7; pending mg level


   - K 2.7 ->3.8-->3.7-->3.2 --> 3.1 --> 3.5 --> 3.4


   - 2/2 inadequate intake and diuresis


   - Pharmacy to monitor and replete 





Generalized Weakness, Improved


   - 2/2 CAP, Bilateral Pleural Effusion and Influenza Infection


   - Stressed the importance of medical compliance





Hx/o Medical Non-compliance


   - She refused her oral K pill this AM





Resolved:


S/p Dehydration


   - No ARF; she was at baseline





S/P Hypomagnesemia


   - 2/2 inadequate intake


   - Replete and monitor





S/p A-fib with RVR, Resolved in ICU 


   - A-fib with HR in 120-140s


   - Onset while sleeping 


   - Some runs of v-tac


   - Cardizem IVP given with excellent response


   - Upgrade to ICU


   - Start xarelto for stroke prophylaxis - pharmacy to dose


   - Troponin/CKMB: Negative 


   - D-Dimer: 1.52 - CTA ordered


   - Echo completed pending report


   - CXR no change from previous study 





S/p Elevated D-Dimer


   - D-dimer 1.52


   - CTA showed no PE 


   - 250mL fluid bolus before and after scan





Chronic:


TIA/CVA


CKD


HF


HTN


HLD


Depression





Plan:


She remains clinically stable


Routine AM Labs


Droplet precautions


Titrate to come off supplemental O2


RT consult for pulmonary toilet


PT/OT treatment for deconditioning if services is available


DVT/PE prophylaxis: Discontinue SCDs and continue xarelto 15 mg po daily 


SW/CM for d/c planning


Ambulate as tolerated


Encouraged patient to use IS as directed


Code status: DNR; PCP: Dr. Vaughan 





Possible discharge in 1-2 days. Updated patient about her morning lab results, 

chest x-ray, treatment and discharge care plan.

## 2019-01-15 VITALS — DIASTOLIC BLOOD PRESSURE: 54 MMHG | SYSTOLIC BLOOD PRESSURE: 137 MMHG

## 2019-01-15 RX ADMIN — IPRATROPIUM BROMIDE SCH MG: 0.5 SOLUTION RESPIRATORY (INHALATION) at 05:39

## 2019-01-15 RX ADMIN — INSULIN LISPRO SCH UNITS: 100 INJECTION, SOLUTION INTRAVENOUS; SUBCUTANEOUS at 11:03

## 2019-01-15 RX ADMIN — IPRATROPIUM BROMIDE SCH MG: 0.5 SOLUTION RESPIRATORY (INHALATION) at 10:16

## 2019-01-15 RX ADMIN — INSULIN LISPRO SCH UNITS: 100 INJECTION, SOLUTION INTRAVENOUS; SUBCUTANEOUS at 08:39

## 2019-01-15 RX ADMIN — INSULIN GLARGINE SCH UNIT: 100 INJECTION, SOLUTION SUBCUTANEOUS at 08:39

## 2019-01-15 RX ADMIN — Medication SCH MG: at 08:38

## 2019-01-15 RX ADMIN — GUAIFENESIN AND DEXTROMETHORPHAN SCH: 100; 10 SYRUP ORAL at 06:15

## 2019-01-15 NOTE — [id number]
**Discharge Summary





- Hospital Course


Free Text/Narrative:: 


Patient was admitted for medical management of influenza infection but she was 

also diagnosed with community acquired pneumonia along with bilateral pleural 

effusion. All her basic work up showed no organismal growth on blood and sputum 

cultures. However her influenza screening confirmed influenza B. She received 

appropriate antiviral agent along with intravenous antibiotic treatment.





Her hospital course was mildly complicated by new onset of atrial fibrillation 

but was quickly resolved with rate control agent and she was put on xarelto due 

to her high [id number] score of 6. Her hospital stay was somewhat prolonged 

due to her non-compliance with medications and routine activities. Her family 

was made aware about it and they came in to help us out encouraged and re-

assured her.





Overall, she improved clinically and she was stable upon discharge. She was 

advised to follow discharge instructions and we stressed the importance of 

medical compliance. She further advised to follow up with her PCP after 

discharge. 





The patient expressed understanding and in agreement with plans as discussed 

above. All questions were answered.    





  





Brief History: 87 year old diabetic resident of [facility] presented febrile 

with flu like symptoms.  She has had the Flu vaccine but unfortunately has 

tested positive for Influenza B.  The patient also has bilateral infiltrates;  

Documented elevated temperature was 102.0F taken in the ambulance.  She will be 

admitted to [facility] with telemetry.


Diagnosis: Stroke: No





- Discharge Data


Discharge Date: 01/15/19


Discharge Disposition: Home, Self-Care 01


Condition: Good





- Discharge Diagnosis/Problem(s)


(1) Pneumonia


SNOMED Code(s): 066207518


   ICD Code: J18.9 - PNEUMONIA, UNSPECIFIED ORGANISM   Status: Acute   Priority

: High   


Qualifiers: 


   Pneumonia type: due to unspecified organism   Laterality: left   Lung 

location: unspecified part of lung   Qualified Code(s): J18.9 - Pneumonia, 

unspecified organism   





(2) Influenza B


SNOMED Code(s): 00219947


   ICD Code: J10.1 - FLU DUE TO OTH IDENT INFLUENZA VIRUS W OTH RESP MANIFEST   

Status: Acute   Priority: High   





(3) Atrial fibrillation with RVR


SNOMED Code(s): 574435048620539


   ICD Code: I48.91 - UNSPECIFIED ATRIAL FIBRILLATION   Status: Resolved   

Priority: High   





(4) Pleural effusion


SNOMED Code(s): 16158426


   ICD Code: J90 - PLEURAL EFFUSION, NOT ELSEWHERE CLASSIFIED   Status: Acute   

Problem Details: - B/L R>>L   





(5) Medical non-compliance


SNOMED Code(s): 699530924


   ICD Code: Z91.19 - PATIENT'S NONCOMPLIANCE W OTH MEDICAL TREATMENT AND 

REGIMEN   Status: Acute   





(6) Generalized weakness


SNOMED Code(s): 97107875


   ICD Code: R53.1 - WEAKNESS   Status: Resolved   Priority: High   





(7) Hypokalemia


SNOMED Code(s): 60266804


   ICD Code: E87.6 - HYPOKALEMIA   Status: Acute   





(8) Dehydration


SNOMED Code(s): 17592949


   ICD Code: E86.0 - DEHYDRATION   Status: Resolved   





(9) Elevated d-dimer


SNOMED Code(s): 918675049


   ICD Code: R79.89 - OTHER SPECIFIED ABNORMAL FINDINGS OF BLOOD CHEMISTRY   

Status: Inactive   





(10) Hypomagnesemia


SNOMED Code(s): 077189703


   ICD Code: E83.42 - HYPOMAGNESEMIA   Status: Acute   





(11) DM2 (diabetes mellitus, type 2)


SNOMED Code(s): 01855461


   ICD Code: E11.9 - TYPE 2 DIABETES MELLITUS WITHOUT COMPLICATIONS   Status: 

Chronic   


Qualifiers: 


   Diabetes mellitus long term insulin use: unspecified long term insulin use 

status   Diabetes mellitus complication status: with unspecified complications 

  Qualified Code(s): E11.8 - Type 2 diabetes mellitus with unspecified 

complications   





- Patient Summary/Data


Operative Procedure(s) Performed: None


Complications: None


Consults: 


 Consultations





01/11/19 13:46


Consult to Physical Therapy [PT Evaluation and Treatment] [CONS] Routine 


OT Evaluation and Treatment [CONS] Routine 











Labs Pending at D/C: 


None





Recommended Follow-up Testing/Procedures: 


chest x-ray





Planned Operative Procedure(s) after DC: None





- Patient Instructions


Diet: Heart Healthy Diet, Usual Diet as Tolerated, Low Sodium, Fluid Restriction

, Diabetic Diet


Fluid Restriction: 2000 mL


Activity: As Tolerated


Driving: Do Not Drive


Showering/Bathing: May Shower


Notify Provider of: Fever, Increased Pain, Swelling and Redness, Nausea and/or 

Vomiting


Other/Special Instructions: - Please take all new medications as directed.  - 

Resume all home medications and continue routine home activities as tolerated.  

- Make sure use facial mask as needed to protect your self.  - Use IS as 

directed.  - Medical compliance is very important.  - Call or follow up with 

your PCP after discharge.  - Follow with your PCP in 1 week w/ repeat chest x-

ray.  - Come back or seek immediate care should your symptoms persist or get 

worse.  





- Discharge Plan


*PRESCRIPTION DRUG MONITORING PROGRAM REVIEWED*: Not Applicable


*COPY OF PRESCRIPTION DRUG MONITORING REPORT IN PATIENT FLAVIO: Not Applicable


Prescriptions/Med Rec: 


Dextromethorphan/guaiFENesin [Robitussin DM] 5 ml PO TID@0700,1400,2100 #15 cup


Furosemide [Lasix] 40 mg PO ASDIRECTED #60 tablet


Levofloxacin [Levaquin] 750 mg PO Q48H #3 tablet


metOLazone [Metolazone] 2.5 mg PO ASDIRECTED #8 tablet


Metoprolol Tartrate [Lopressor] 25 mg PO Q12H #60 tablet


Rivaroxaban [Xarelto] 15 mg PO DAILY #30 tablet


Saccharomyces Boulardii [Florastor] 250 mg PO Q48H #3 capsule


Home Medications: 


 Home Meds





Aspirin [Halfprin] 81 mg PO DAILY 11/25/15 [History]


Clopidogrel [Plavix] 75 mg PO DAILY 11/25/15 [History]


Cyanocobalamin (Vitamin B12) [Vitamin B12] 1,000 mcg PO DAILY 11/25/15 [History]


Gabapentin [Neurontin] 300 mg PO TID 11/25/15 [History]


Insulin Aspart [Novolog Flexpen] 9 unit SQ QPM 11/25/15 [History]


Insulin Aspart [Novolog Flexpen] 11 unit SQ QAM 11/25/15 [History]


Insulin Detemir [Levemir Flextouch] 30 unit SQ DAILY 11/25/15 [History]


Sennosides/Docusate Sodium [Senna-Docusate Sodium] 1 tab PO DAILY PRN 04/10/16 [

History]


Acetaminophen 500 mg PO BEDTIME 08/17/16 [History]


Acetaminophen [Tylenol Extra Strength] 1,000 mg PO Q6HR PRN 08/17/16 [History]


Calcium Carbonate [Tums] 400 mg PO Q4H PRN 08/17/16 [History]


Vitamin E 400 unit PO DAILY 08/17/16 [History]


Loratadine [Claritin] 10 mg PO DAILY PRN 11/10/18 [History]


Dextromethorphan/guaiFENesin [Robitussin DM] 5 ml PO TID@0700,1400,2100 #15 cup 

01/15/19 [Rx]


Furosemide [Lasix] 40 mg PO ASDIRECTED #60 tablet 01/15/19 [Rx]


Levofloxacin [Levaquin] 750 mg PO Q48H #3 tablet 01/15/19 [Rx]


Metoprolol Tartrate [Lopressor] 25 mg PO Q12H #60 tablet 01/15/19 [Rx]


Rivaroxaban [Xarelto] 15 mg PO DAILY #30 tablet 01/15/19 [Rx]


Saccharomyces Boulardii [Florastor] 250 mg PO Q48H #3 capsule 01/15/19 [Rx]


metOLazone [Metolazone] 2.5 mg PO ASDIRECTED #8 tablet 01/15/19 [Rx]








Patient Handouts:  Influenza, Adult, Easy-to-Read, Home Oxygen Use, Adult, 

Pleural Effusion, Community-Acquired Pneumonia, Adult, Easy-to-Read


Referrals: 


Ru Vaughan MD [Primary Care Provider] - 





- Discharge Summary/Plan Comment


DC Time >30 min.: No


Discharge Summary/Plan Comment: 


Discharge to [facility]








- General Info


Date of Service: 01/15/19


Admission Dx/Problem (Free Text: 


 Admission Diagnosis/Problem





Admission Diagnosis/Problem      Pneumonia








Subjective Update: 


Follow Up 





Functional Status: Reports: Pain Controlled, Tolerating Diet, Ambulating, 

Urinating.  Denies: New Symptoms





- Review of Systems


General: Reports: Night Sweats.  Denies: Fever, Weakness, Fatigue, Malaise, 

Chills


Pulmonary: Reports: Cough.  Denies: Shortness of Breath


Cardiovascular: Denies: Chest Pain, Dyspnea on Exertion, Lightheadedness


Gastrointestinal: Denies: Abdominal Pain, Nausea, Vomiting


Genitourinary: Reports: No Symptoms


Musculoskeletal: Reports: No Symptoms


Skin: Denies: Cyanosis, Pallor


Neurological: Reports: Difficulty Walking, Gait Disturbance.  Denies: Confusion

, Weakness


Psychiatric: Denies: Depression, Anxiety, Agitation, Hallucinations


Systems Review Comment: 


No overnight or acute issues. She rested well. She feels good this AM and has 

no complaints. Again, she refused her diuretic this AM.








- Patient Data


Vitals - Most Recent: 


 Last Vital Signs











Temp  36.7 C   01/15/19 07:29


 


Pulse  89   01/15/19 08:34


 


Resp  20   01/15/19 07:29


 


BP  137/54 L  01/15/19 08:34


 


Pulse Ox  92 L  01/15/19 10:19











Weight - Most Recent: 66.877 kg


I&O - Last 24 hours: 


 Intake & Output











 01/14/19 01/15/19 01/15/19





 22:59 06:59 14:59


 


Intake Total 550 100 120


 


Output Total 540 400 


 


Balance 10 -300 120











Lab Results - Last 24 hrs: 


 Laboratory Results - last 24 hr











  01/14/19 01/14/19 01/14/19 Range/Units





  12:01 17:30 20:38 


 


WBC     (3.98-10.04)  K/mm3


 


RBC     (3.98-5.22)  M/mm3


 


Hgb     (11.2-15.7)  gm/L


 


Hct     (34.1-44.9)  %


 


MCV     (79.4-94.8)  fl


 


MCH     (25.6-32.2)  pg


 


MCHC     (32.2-35.5)  g/dl


 


RDW Std Deviation     (36.4-46.3)  fL


 


Plt Count     (182-369)  K/mm3


 


MPV     (9.4-12.3)  fl


 


Neut % (Auto)     (34.0-71.1)  %


 


Lymph % (Auto)     (19.3-51.7)  %


 


Mono % (Auto)     (4.7-12.5)  %


 


Eos % (Auto)     (0.7-5.8)  


 


Baso % (Auto)     (0.1-1.2)  %


 


Neut # (Auto)     (1.56-6.13)  K/mm3


 


Lymph # (Auto)     (1.18-3.74)  K/mm3


 


Mono # (Auto)     (0.24-0.36)  K/mm3


 


Eos # (Auto)     (0.04-0.36)  K/mm3


 


Baso # (Auto)     (0.01-0.08)  K/mm3


 


Manual Slide Review     


 


Sodium     (136-145)  mEq/L


 


Potassium     (3.5-5.1)  mEq/L


 


Chloride     ()  mEq/L


 


Carbon Dioxide     (21-32)  mEq/L


 


Anion Gap     (5-15)  


 


BUN     (7-18)  mg/dL


 


Creatinine     (0.55-1.02)  mg/dL


 


Est Cr Clr Drug Dosing     mL/min


 


Estimated GFR (MDRD)     (>60)  mL/min


 


BUN/Creatinine Ratio     (14-18)  


 


Glucose     ()  mg/dL


 


POC Glucose  205 H  208 H  215 H  ()  mg/dL


 


Calcium     (8.5-10.1)  mg/dL


 


C-Reactive Protein     (<1.0)  mg/dL


 


NT-Pro-B Natriuret Pep     (0-450)  pg/mL














  01/15/19 01/15/19 01/15/19 Range/Units





  06:30 06:30 06:30 


 


WBC  15.65 H    (3.98-10.04)  K/mm3


 


RBC  3.66 L    (3.98-5.22)  M/mm3


 


Hgb  10.6 L    (11.2-15.7)  gm/L


 


Hct  32.8 L    (34.1-44.9)  %


 


MCV  89.6    (79.4-94.8)  fl


 


MCH  29.0    (25.6-32.2)  pg


 


MCHC  32.3    (32.2-35.5)  g/dl


 


RDW Std Deviation  48.2 H    (36.4-46.3)  fL


 


Plt Count  391 H    (182-369)  K/mm3


 


MPV  10.1    (9.4-12.3)  fl


 


Neut % (Auto)  72.5 H    (34.0-71.1)  %


 


Lymph % (Auto)  17.6 L    (19.3-51.7)  %


 


Mono % (Auto)  7.1    (4.7-12.5)  %


 


Eos % (Auto)  2.0    (0.7-5.8)  


 


Baso % (Auto)  0.1    (0.1-1.2)  %


 


Neut # (Auto)  11.35 H    (1.56-6.13)  K/mm3


 


Lymph # (Auto)  2.75    (1.18-3.74)  K/mm3


 


Mono # (Auto)  1.11 H    (0.24-0.36)  K/mm3


 


Eos # (Auto)  0.31    (0.04-0.36)  K/mm3


 


Baso # (Auto)  0.02    (0.01-0.08)  K/mm3


 


Manual Slide Review  Abnormal smear    


 


Sodium   137   (136-145)  mEq/L


 


Potassium   3.3 L   (3.5-5.1)  mEq/L


 


Chloride   99   ()  mEq/L


 


Carbon Dioxide   27   (21-32)  mEq/L


 


Anion Gap   14.3   (5-15)  


 


BUN   10   (7-18)  mg/dL


 


Creatinine   1.3 H   (0.55-1.02)  mg/dL


 


Est Cr Clr Drug Dosing   21.90   mL/min


 


Estimated GFR (MDRD)   39   (>60)  mL/min


 


BUN/Creatinine Ratio   7.7 L   (14-18)  


 


Glucose   139 H   ()  mg/dL


 


POC Glucose     ()  mg/dL


 


Calcium   9.2   (8.5-10.1)  mg/dL


 


C-Reactive Protein   25.6 H*   (<1.0)  mg/dL


 


NT-Pro-B Natriuret Pep    1232 H  (0-450)  pg/mL














  01/15/19 Range/Units





  06:35 


 


WBC   (3.98-10.04)  K/mm3


 


RBC   (3.98-5.22)  M/mm3


 


Hgb   (11.2-15.7)  gm/L


 


Hct   (34.1-44.9)  %


 


MCV   (79.4-94.8)  fl


 


MCH   (25.6-32.2)  pg


 


MCHC   (32.2-35.5)  g/dl


 


RDW Std Deviation   (36.4-46.3)  fL


 


Plt Count   (182-369)  K/mm3


 


MPV   (9.4-12.3)  fl


 


Neut % (Auto)   (34.0-71.1)  %


 


Lymph % (Auto)   (19.3-51.7)  %


 


Mono % (Auto)   (4.7-12.5)  %


 


Eos % (Auto)   (0.7-5.8)  


 


Baso % (Auto)   (0.1-1.2)  %


 


Neut # (Auto)   (1.56-6.13)  K/mm3


 


Lymph # (Auto)   (1.18-3.74)  K/mm3


 


Mono # (Auto)   (0.24-0.36)  K/mm3


 


Eos # (Auto)   (0.04-0.36)  K/mm3


 


Baso # (Auto)   (0.01-0.08)  K/mm3


 


Manual Slide Review   


 


Sodium   (136-145)  mEq/L


 


Potassium   (3.5-5.1)  mEq/L


 


Chloride   ()  mEq/L


 


Carbon Dioxide   (21-32)  mEq/L


 


Anion Gap   (5-15)  


 


BUN   (7-18)  mg/dL


 


Creatinine   (0.55-1.02)  mg/dL


 


Est Cr Clr Drug Dosing   mL/min


 


Estimated GFR (MDRD)   (>60)  mL/min


 


BUN/Creatinine Ratio   (14-18)  


 


Glucose   ()  mg/dL


 


POC Glucose  150 H  ()  mg/dL


 


Calcium   (8.5-10.1)  mg/dL


 


C-Reactive Protein   (<1.0)  mg/dL


 


NT-Pro-B Natriuret Pep   (0-450)  pg/mL











TYRA Results - Last 24 hrs: 


 Microbiology











 01/08/19 08:00 Aerobic Blood Culture - Final





 Blood - Venous - Lab Draw    NO GROWTH AFTER 7 DAYS





 Anaerobic Blood Culture - Final





    NO GROWTH AFTER 7 DAYS


 


 01/08/19 07:52 Aerobic Blood Culture - Final





 Blood - Venous    NO GROWTH AFTER 7 DAYS





 Anaerobic Blood Culture - Final





    NO GROWTH AFTER 7 DAYS











Med Orders - Current: 


 Current Medications





Acetaminophen (Tylenol)  650 mg PO Q6H PRN


   PRN Reason: Pain/Fever


   Last Admin: 01/11/19 19:50 Dose:  650 mg


Albuterol (Proventil Neb Soln)  2.5 mg NEB Q4H PRN


   PRN Reason: Shortness of Breath


   Last Admin: 01/12/19 12:33 Dose:  2.5 mg


Aspirin (Halfprin)  81 mg PO DAILY [facility]


   Last Admin: 01/15/19 08:32 Dose:  81 mg


Benzonatate (Tessalon Perles)  100 mg PO BID [facility]


   Last Admin: 01/15/19 08:38 Dose:  100 mg


Bumetanide (Bumex)  0.5 mg IVPUSH BIDDIURETIC [facility]


   Last Admin: 01/15/19 06:25 Dose:  Not Given


Calcium Carbonate/Glycine (Tums)  500 mg PO Q4H PRN


   PRN Reason: Heartburn


Clopidogrel Bisulfate (Plavix)  75 mg PO DAILY [facility]


   Last Admin: 01/15/19 08:38 Dose:  75 mg


Dextrose/Water (Dextrose 50% In Water)  50 ml IVPUSH ASDIRECTED PRN


   PRN Reason: Hypoglycemia


Furosemide (Lasix)  40 mg PO DAILY [facility]


   Last Admin: 01/14/19 09:06 Dose:  40 mg


Gabapentin (Neurontin)  300 mg PO TID [facility]


   Last Admin: 01/15/19 08:34 Dose:  300 mg


Guaifenesin/Phenylephrine HCl (Robitussin Dm)  5 ml PO TID@0700,1400,2100 [facility]


   Last Admin: 01/15/19 06:15 Dose:  Not Given


Hydralazine HCl (Apresoline)  10 mg IVPUSH Q4H PRN


   PRN Reason: Hypertension


   Last Admin: 01/10/19 10:58 Dose:  10 mg


Hydrochlorothiazide (Hydrochlorothiazide)  12.5 mg PO BIDDIURETIC [facility]


   Last Admin: 01/15/19 06:25 Dose:  Not Given


Diltiazem HCl 125 mg/ Sodium (Chloride)  125 mls @ 5 mls/hr IV TITRATE [facility]; 

Protocol


Insulin Glargine (Lantus)  30 unit SUBCUT DAILY [facility]


   Last Admin: 01/15/19 08:39 Dose:  30 unit


Insulin Human Lispro (Humalog)  0 unit SUBCUT QIDACANDBED [facility]; Protocol


   Last Admin: 01/15/19 08:39 Dose:  2 units


Ipratropium Bromide (Atrovent)  0.5 mg NEB QIDRT [facility]


   Last Admin: 01/15/19 10:16 Dose:  0.5 mg


Levalbuterol HCl (Xopenex)  1.25 mg INH QIDRT [facility]


   Last Admin: 01/15/19 10:16 Dose:  1.25 mg


Levofloxacin (Levaquin)  750 mg PO Q48H [facility]


Loratadine (Claritin)  10 mg PO DAILY PRN


   PRN Reason: Allergies


Magnesium Sulfate (Pharmacy To Dose - Magnesium Replacement)  0 dose .XX 

ASDIRECTED PRN


   PRN Reason: RX TO WATCH MAG


Metoprolol Tartrate (Lopressor)  5 mg IVPUSH Q4H PRN


   PRN Reason: Tachycardia


Metoprolol Tartrate (Lopressor)  25 mg PO Q12H [facility]


   Last Admin: 01/15/19 08:34 Dose:  25 mg


Ondansetron HCl (Zofran)  4 mg IVPUSH Q8H PRN


   PRN Reason: Nausea/Vomiting


Potassium Chloride (Pharmacy To Dose - Potassium Replacement)  0 dose .XX 

ASDIRECTED PRN


   PRN Reason: RX TO WATCH K


Rivaroxaban (Xarelto)  15 mg PO DAILY [facility]


   Last Admin: 01/15/19 08:32 Dose:  15 mg


Saccharomyces Boulardii (Florastor)  250 mg PO BID [facility]


   Last Admin: 01/15/19 08:38 Dose:  250 mg


Senna/Docusate Sodium (Senna Plus)  1 tab PO DAILY PRN


   PRN Reason: Constipation


Sodium Chloride (Saline Flush)  10 ml FLUSH ASDIRECTED PRN


   PRN Reason: Keep Vein Open


   Last Admin: 01/08/19 08:15 Dose:  10 ml





Discontinued Medications





Albuterol/Ipratropium (Duoneb 3.0-0.5 Mg/3 Ml)  3 ml NEB QID [facility]


   Last Admin: 01/08/19 21:30 Dose:  3 ml


Albuterol/Ipratropium (Duoneb 3.0-0.5 Mg/3 Ml)  3 ml NEB QIDRT [facility]


   Last Admin: 01/10/19 09:14 Dose:  3 ml


Bumetanide (Bumex)  0.5 mg IVPUSH ONETIME ONE


   Stop: 01/14/19 18:01


   Last Admin: 01/14/19 18:38 Dose:  0.5 mg


Diltiazem HCl (Cardizem)  10 mg IVPUSH ONETIME ONE


   Stop: 01/11/19 07:21


   Last Admin: 01/11/19 07:42 Dose:  10 mg


Diltiazem HCl (Cardizem)  5 mg IVPUSH ONETIME ONE


   Stop: 01/11/19 09:41


   Last Admin: 01/11/19 09:53 Dose:  5 mg


Heparin Sodium (Porcine) (Heparin Sodium)  5,000 units SUBCUT Q8H [facility]


   Last Admin: 01/10/19 05:57 Dose:  Not Given


Hydrochlorothiazide (Hydrochlorothiazide)  12.5 mg PO BIDDIURETIC [facility]


   Stop: 01/14/19 06:01


   Last Admin: 01/14/19 06:25 Dose:  12.5 mg


Ceftriaxone Sodium 2 gm/ (Sodium Chloride)  100 mls @ 200 mls/hr IV ONETIME ONE


   Stop: 01/08/19 08:33


   Last Admin: 01/08/19 08:37 Dose:  200 mls/hr


Sodium Chloride (Normal Saline)  1,000 mls @ 1,000 mls/hr IV .BOLUS [facility]


   Last Admin: 01/08/19 08:15 Dose:  1,000 mls/hr


Azithromycin 500 mg/ Sodium (Chloride)  250 mls @ 250 mls/hr IV Q24H [facility]


   Last Admin: 01/10/19 12:06 Dose:  250 mls/hr


Ceftriaxone Sodium 2 gm/ (Sodium Chloride)  100 mls @ 200 mls/hr IV Q24H [facility]


   Last Admin: 01/10/19 08:46 Dose:  200 mls/hr


Potassium Chloride/Sodium Chloride (Normal Saline With 20 Meq Kcl)  1,000 mls @ 

100 mls/hr IV ASDIRECTED [facility]


   Last Admin: 01/09/19 12:59 Dose:  100 mls/hr


Potassium Chloride 10 meq/ (Premix)  100 mls @ 100 mls/hr IV Q1H [facility]


   Stop: 01/08/19 19:29


   Last Admin: 01/08/19 21:50 Dose:  100 mls/hr


Potassium Chloride 10 meq/ (Premix)  100 mls @ 100 mls/hr IV Q1H [facility]


   Stop: 01/08/19 22:59


   Last Admin: 01/08/19 22:02 Dose:  Not Given


Piperacillin Sod/Tazobactam (Sod 4.5 gm/ Sodium Chloride)  100 mls @ 200 mls/hr 

IV ONETIME ONE


   Stop: 01/11/19 08:29


   Last Admin: 01/11/19 09:15 Dose:  200 mls/hr


Piperacillin Sod/Tazobactam (Sod 4.5 gm/ Sodium Chloride)  100 mls @ 25 mls/hr 

IV Q8H [facility]


   Last Admin: 01/14/19 07:39 Dose:  25 mls/hr


Vancomycin HCl 1 gm/ Sodium (Chloride)  250 mls @ 250 mls/hr IV Q24H [facility]


   Last Admin: 01/13/19 12:19 Dose:  Not Given


Potassium Chloride 10 meq/ (Premix)  100 mls @ 100 mls/hr IV Q1H [facility]


   Stop: 01/11/19 14:59


   Last Admin: 01/11/19 17:41 Dose:  100 mls/hr


Sodium Chloride (Normal Saline)  500 mls @ 999 mls/hr IV .BOLUS ONE


   Stop: 01/11/19 11:29


   Last Admin: 01/11/19 14:30 Dose:  999 mls/hr


Sodium Chloride (Normal Saline)  [facility]


   Last Admin: 01/11/19 14:09 Dose:  75 mls/hr


Sodium Chloride (Normal Saline)  1,000 mls [facility]


   Last Admin: 01/12/19 03:52 Dose:  50 mls/hr


Potassium Chloride 10 meq/ (Premix)  100 mls @ 100 mls/hr IV Q1H [facility]


   Stop: 01/12/19 14:29


   Last Admin: 01/12/19 14:12 Dose:  100 mls/hr


Magnesium Sulfate 2 gm/ Premix  50 mls @ 25 mls/hr IV ONETIME ONE


   Stop: 01/12/19 10:29


   Last Admin: 01/12/19 08:36 Dose:  25 mls/hr


Vancomycin HCl 1 gm/ Sodium (Chloride)  250 mls @ 250 mls/hr IV Q12H [facility]


   Last Admin: 01/13/19 23:17 Dose:  250 mls/hr


Iopamidol (Isovue-370 (76%))  100 ml IVPUSH ONETIME ONE


   Stop: 01/11/19 11:38


   Last Admin: 01/11/19 14:08 Dose:  100 ml


Levalbuterol HCl (Xopenex)  1.25 mg NEB QIDRT [facility]


   Stop: 01/14/19 18:00


   Last Admin: 01/14/19 15:36 Dose:  1.25 mg


Levofloxacin (Levaquin)  750 mg PO Q24H [facility]


   Last Admin: 01/14/19 12:16 Dose:  750 mg


Magnesium Oxide (Magnesium Oxide)  400 mg PO ONETIME ONE


   Stop: 01/09/19 20:31


   Last Admin: 01/09/19 21:09 Dose:  Not Given


Metoprolol Tartrate (Lopressor)  25 mg PO Q12H [facility]


   Last Admin: 01/11/19 12:32 Dose:  25 mg


Oseltamivir Phosphate (Tamiflu)  75 mg PO ONETIME ONE


   Stop: 01/08/19 09:07


   Last Admin: 01/08/19 09:15 Dose:  75 mg


Oseltamivir Phosphate (Tamiflu)  30 mg PO DAILY [facility]


   Stop: 01/12/19 09:01


   Last Admin: 01/12/19 08:40 Dose:  30 mg


Potassium Chloride (Klor-Con M20)  40 meq PO ONETIME ONE


   Stop: 01/08/19 09:18


   Last Admin: 01/08/19 09:26 Dose:  40 meq


Potassium Chloride (Klor-Con M20)  40 meq PO TID [facility]


   Stop: 01/08/19 21:01


   Last Admin: 01/08/19 13:26 Dose:  40 meq


Potassium Chloride (Potassium Chloride Solution)  40 meq PO TID [facility]


   Stop: 01/09/19 09:01


   Last Admin: 01/08/19 14:41 Dose:  Not Given


Potassium Chloride (Klor-Con M20)  40 meq PO Q4H [facility]


   Stop: 01/10/19 00:31


   Last Admin: 01/10/19 00:26 Dose:  Not Given


Potassium Chloride (Klor-Con M20)  40 meq PO ONETIME ONE


   Stop: 01/11/19 09:16


   Last Admin: 01/11/19 09:29 Dose:  Not Given


Potassium Chloride (Klor-Con M20)  40 meq PO ONETIME ONE


   Stop: 01/13/19 09:31


   Last Admin: 01/13/19 10:44 Dose:  Not Given


Potassium Chloride (Klor-Con M20)  40 meq PO Q4H [facility]


   Stop: 01/14/19 23:31


   Last Admin: 01/14/19 23:02 Dose:  Not Given


Sodium Chloride (Saline Flush)  10 ml FLUSH ONETIME PRN


   PRN Reason: IV FLUSH


   Last Admin: 01/11/19 14:08 Dose:  10 ml


Vancomycin HCl (Pharmacy To Dose - Vancomycin)  0 dose .XX DAILY PRN


   PRN Reason: RX TO DOSE VANCO











- Exam


Quality Assessment: Denies: Supplemental Oxygen


General: Reports: Alert, Oriented, Cooperative, No Acute Distress


HEENT: Reports: Pupils Equal, Pupils Reactive, EOMI, Mucous Membr. Moist/Pink


Neck: Reports: Supple


Lungs: Reports: Normal Respiratory Effort, Decreased Breath Sounds, Crackles


Cardiovascular: Reports: Regular Rate, Regular Rhythm


GI/Abdominal Exam: Normal Bowel Sounds, Soft, Non-Tender, No Organomegaly, No 

Distention, No Abnormal Bruit


 (Female) Exam: Deferred


Rectal (Female) Exam: Deferred


Back Exam: Reports: Normal Inspection, Decreased Range of Motion


Extremities: Normal Inspection, Normal Range of Motion, Non-Tender, Normal 

Capillary Refill, Pedal Edema, Other (trace bilateral lower etxremity edema)


Skin: Reports: Dry, Intact


Neurological: Reports: No New Focal Deficit


Psy/Mental Status: Reports: Alert, Normal Affect, Normal Mood

## 2019-01-15 NOTE — CR
Chest: Frontal view of the chest is obtained.

 

Comparison: Prior chest x-ray of 01/14/19.

 

Continuing increased density within both sides of the chest is seen.  

No significant change from previous study is seen.  Heart size, 

mediastinum and bony structures are also unchanged.

 

Impression:

1.  Stable chest x-ray.  No significant change from previous chest 

x-ray is seen.

 

Diagnostic code #3

## 2019-06-04 ENCOUNTER — HOSPITAL ENCOUNTER (INPATIENT)
Dept: HOSPITAL 41 - JD.ED | Age: 84
LOS: 7 days | Discharge: NURSING, REHAB OR LONG-TERM CARE | DRG: 193 | End: 2019-06-11
Attending: INTERNAL MEDICINE | Admitting: INTERNAL MEDICINE
Payer: MEDICARE

## 2019-06-04 DIAGNOSIS — M54.9: ICD-10-CM

## 2019-06-04 DIAGNOSIS — R53.83: Primary | ICD-10-CM

## 2019-06-04 DIAGNOSIS — Z86.73: ICD-10-CM

## 2019-06-04 DIAGNOSIS — E87.6: ICD-10-CM

## 2019-06-04 DIAGNOSIS — N18.3: ICD-10-CM

## 2019-06-04 DIAGNOSIS — E83.42: ICD-10-CM

## 2019-06-04 DIAGNOSIS — Z90.49: ICD-10-CM

## 2019-06-04 DIAGNOSIS — E11.22: ICD-10-CM

## 2019-06-04 DIAGNOSIS — Z79.01: ICD-10-CM

## 2019-06-04 DIAGNOSIS — S20.229A: ICD-10-CM

## 2019-06-04 DIAGNOSIS — I48.91: ICD-10-CM

## 2019-06-04 DIAGNOSIS — D63.1: ICD-10-CM

## 2019-06-04 DIAGNOSIS — K59.00: ICD-10-CM

## 2019-06-04 DIAGNOSIS — F03.90: ICD-10-CM

## 2019-06-04 DIAGNOSIS — Z88.1: ICD-10-CM

## 2019-06-04 DIAGNOSIS — I50.43: ICD-10-CM

## 2019-06-04 DIAGNOSIS — I13.0: ICD-10-CM

## 2019-06-04 DIAGNOSIS — Z79.82: ICD-10-CM

## 2019-06-04 DIAGNOSIS — F01.50: ICD-10-CM

## 2019-06-04 DIAGNOSIS — N18.9: ICD-10-CM

## 2019-06-04 DIAGNOSIS — E86.0: ICD-10-CM

## 2019-06-04 DIAGNOSIS — H54.7: ICD-10-CM

## 2019-06-04 DIAGNOSIS — Z66: ICD-10-CM

## 2019-06-04 DIAGNOSIS — Z79.4: ICD-10-CM

## 2019-06-04 DIAGNOSIS — M54.30: ICD-10-CM

## 2019-06-04 DIAGNOSIS — Z79.899: ICD-10-CM

## 2019-06-04 DIAGNOSIS — E11.649: ICD-10-CM

## 2019-06-04 DIAGNOSIS — G89.29: ICD-10-CM

## 2019-06-04 DIAGNOSIS — J18.9: ICD-10-CM

## 2019-06-04 DIAGNOSIS — F32.9: ICD-10-CM

## 2019-06-04 PROCEDURE — 83605 ASSAY OF LACTIC ACID: CPT

## 2019-06-04 PROCEDURE — 87899 AGENT NOS ASSAY W/OPTIC: CPT

## 2019-06-04 PROCEDURE — 36600 WITHDRAWAL OF ARTERIAL BLOOD: CPT

## 2019-06-04 PROCEDURE — 96365 THER/PROPH/DIAG IV INF INIT: CPT

## 2019-06-04 PROCEDURE — 85007 BL SMEAR W/DIFF WBC COUNT: CPT

## 2019-06-04 PROCEDURE — 80053 COMPREHEN METABOLIC PANEL: CPT

## 2019-06-04 PROCEDURE — 73090 X-RAY EXAM OF FOREARM: CPT

## 2019-06-04 PROCEDURE — 71046 X-RAY EXAM CHEST 2 VIEWS: CPT

## 2019-06-04 PROCEDURE — 81001 URINALYSIS AUTO W/SCOPE: CPT

## 2019-06-04 PROCEDURE — 82803 BLOOD GASES ANY COMBINATION: CPT

## 2019-06-04 PROCEDURE — 86738 MYCOPLASMA ANTIBODY: CPT

## 2019-06-04 PROCEDURE — 86140 C-REACTIVE PROTEIN: CPT

## 2019-06-04 PROCEDURE — 73080 X-RAY EXAM OF ELBOW: CPT

## 2019-06-04 PROCEDURE — 99285 EMERGENCY DEPT VISIT HI MDM: CPT

## 2019-06-04 PROCEDURE — 83735 ASSAY OF MAGNESIUM: CPT

## 2019-06-04 PROCEDURE — 93005 ELECTROCARDIOGRAM TRACING: CPT

## 2019-06-04 PROCEDURE — 87040 BLOOD CULTURE FOR BACTERIA: CPT

## 2019-06-04 PROCEDURE — 85027 COMPLETE CBC AUTOMATED: CPT

## 2019-06-04 PROCEDURE — 36415 COLL VENOUS BLD VENIPUNCTURE: CPT

## 2019-06-04 RX ADMIN — VITAMIN D, TAB 1000IU (100/BT) SCH UNITS: 25 TAB at 21:14

## 2019-06-04 NOTE — EDM.PDOC
ED HPI GENERAL MEDICAL PROBLEM





- General


Chief Complaint: Fever


Stated Complaint: [facility]


Time Seen by Provider: 06/04/19 14:02


Source of Information: Reports: Patient, EMS, Nursing Home Records


History Limitations: Reports: No Limitations





- History of Present Illness


INITIAL COMMENTS - FREE TEXT/NARRATIVE: 





87-year-old female is brought in via [facility] from [facility] for evaluation and treatment of low oxygen sats, lethargy and 

fever. Reportedly O2 reading was 60% on room air. On 2 L via nasal cannula 

oxygen sats improved to 90%.





Patient has a documented diagnosis of dementia, unsure how reliable a historian 

she is. Patient denies any sore throat, cough, abdominal pain, vomiting or 

diarrhea.





Patient is on xarelto.





Patient is a DNR/DNI. 





- Related Data


 Allergies











Allergy/AdvReac Type Severity Reaction Status Date / Time


 


levofloxacin [From Levaquin] Allergy  Itching Verified 06/04/19 13:44











Home Meds: 


 Home Meds





Aspirin [Halfprin] 81 mg PO DAILY 11/25/15 [History]


Clopidogrel [Plavix] 75 mg PO BEDTIME 11/25/15 [History]


Cyanocobalamin (Vitamin B12) [Vitamin B12] 1,000 mcg PO DAILY 11/25/15 [History]


Gabapentin [Neurontin] 600 mg PO TID 11/25/15 [History]


Insulin Aspart [Novolog Flexpen] 11 unit SQ TID 11/25/15 [History]


Sennosides/Docusate Sodium [Senna-Docusate Sodium] 1 tab PO DAILY 04/10/16 [

History]


Acetaminophen 500 mg PO BEDTIME 08/17/16 [History]


Acetaminophen [Tylenol Extra Strength] 1,000 mg PO Q6HR PRN 08/17/16 [History]


Calcium Carbonate [Tums] 400 mg PO Q4H PRN 08/17/16 [History]


Vitamin E 400 unit PO DAILY 08/17/16 [History]


Loratadine [Claritin] 10 mg PO DAILY PRN 11/10/18 [History]


Rivaroxaban [Xarelto] 15 mg PO DAILY #30 tablet 01/15/19 [Rx]


Furosemide [Lasix] 20 mg PO DAILY 06/04/19 [History]


Furosemide [Lasix] 20 mg PO DAILY PRN 06/04/19 [History]


Insulin Degludec [Tresiba] 30 unit SQ DAILY 06/04/19 [History]


MO/Pet,Wh/Phenylephrine/Shk Lv [Preparation H Oint] 1 appful RECTAL BID PRN 06/ 04/19 [History]


Metoprolol Succinate 50 mg PO DAILY 06/04/19 [History]


Polyethylene Glycol 3350 [MiraLAX] 17 gm PO DAILY PRN 06/04/19 [History]


Sennosides [Senna] 8.6 mg PO DAILY PRN 06/04/19 [History]


Sennosides/Docusate Sodium [Senexon-S Tablet] 1 tab PO DAILY PRN 06/04/19 [

History]


metOLazone [Metolazone] 2.5 mg PO TUTH 06/04/19 [History]











Past Medical History


HEENT History: Reports: Impaired Vision


Cardiovascular History: Reports: Arrhythmia, Heart Failure, Hypertension


Other Cardiovascular History: Afib, anemia


Respiratory History: Reports: Other (See Below)


Other Respiratory History: hypoxemia


Gastrointestinal History: Reports: Chronic Constipation


Other Gastrointestinal History: dysphagia


Genitourinary History: Reports: Chronic Renal Insuffiency, Retention, Urinary


OB/GYN History: Reports: Pregnancy


Musculoskeletal History: Reports: Back Pain, Chronic


Other Musculoskeletal History: muscle weakness


Neurological History: Reports: TIA, Other (See Below)


Other Neuro History: cerebral infarct; neuralgia


Psychiatric History: Reports: Depression


Other Psychiatric History: vascular dementia


Endocrine/Metabolic History: Reports: Diabetes, Type II


Hematologic History: Reports: Anemia


Oncologic (Cancer) History: Reports: Other (See Below)


Other Oncologic History: unknown skin cancer


Dermatologic History: Reports: Other (See Below)


Other Dermatologic History: previous supspected skin cancer





- Past Surgical History


GI Surgical History: Reports: Cholecystectomy





Social & Family History





- Family History


Family Medical History: Noncontributory





- Caffeine Use


Caffeine Use: Reports: None





ED ROS GENERAL





- Review of Systems


Review Of Systems: See Below


Constitutional: Reports: Fever, Fatigue, Other (lethargy)


HEENT: Denies: Ear Pain, Throat Pain


Respiratory: Reports: Other (oxygen saturation was reportedly 60% on RA).  

Denies: Shortness of Breath, Cough


Cardiovascular: Denies: Chest Pain


GI/Abdominal: Denies: Abdominal Pain, Vomiting





ED EXAM, SEPSIS





- Physical Exam


Exam: See Below


Exam Limited By: No Limitations


General Appearance: Alert, WD/WN, No Apparent Distress


Throat/Mouth: Normal Voice, No Airway Compromise, Other (lips, tongue and mucus 

membranes are very dry)


Respiratory/Chest: No Respiratory Distress, Decreased Breath Sounds


Cardiovascular: Normal Peripheral Pulses, Regular Rate, Rhythm, No Murmur


GI/Abdominal Exam: Soft, Non-Tender


Neurological: Alert, Slow to Respond


Skin: Dry, Ecchymosis (left forearm and elbow; muscle atrophy to the left 

forearm; per the patient's daughter this is from an old injury approximately 1 

month ago), Increased Warmth





EKG INTERPRETATION


EKG Date: 06/04/19


Time: 14:34


Rhythm: NSR


Rate (Beats/Min): 81


Axis: Normal


P-Wave: Present


QRS: Normal


ST-T: Normal


QT: Normal


EKG Interpretation Comments: 





NSR at 81 bpm. PAC. No ischemic changes. Reviewd by myself and Dr. Moreno. 





Course





- Vital Signs


Last Recorded V/S: 


 Last Vital Signs











Temp  99.6 F   06/04/19 13:41


 


Pulse  78   06/04/19 13:41


 


Resp  16   06/04/19 13:41


 


BP  119/44 L  06/04/19 13:41


 


Pulse Ox  60 L  06/04/19 13:41














- Orders/Labs/Meds


Orders: 


 Active Orders 24 hr











 Category Date Time Status


 


 Patient Status [ADT] Routine ADT  06/04/19 16:29 Active


 


 EKG Documentation Completion [RC] ASDIRECTED Care  06/04/19 13:45 Active


 


 Oxygen Therapy [RC] ASDIRECTED Care  06/04/19 14:18 Active


 


 Peripheral IV Care [RC] Q2HR Care  06/04/19 14:10 Active


 


 CULTURE BLOOD [BC] Stat Lab  06/04/19 14:25 Received


 


 CULTURE BLOOD [BC] Stat Lab  06/04/19 14:30 Received


 


 Sodium Chloride 0.9% [Saline Flush] Med  06/04/19 14:10 Active





 10 ml FLUSH ASDIRECTED PRN   


 


 Peripheral IV Insertion Adult [OM.PC] Routine Oth  06/04/19 14:10 Ordered


 


 EKG 12 Lead [EK] Stat Ther  06/04/19 13:44 Ordered








 Medication Orders





Aspirin (Halfprin)  81 mg PO DAILY [facility]


Cholecalciferol (Vitamin D3)  1,000 units PO DAILY [facility]


Clopidogrel Bisulfate (Plavix)  75 mg PO BEDTIME [facility]


Gabapentin (Neurontin)  300 mg PO TID [facility]


Azithromycin 500 mg/ Sodium (Chloride)  250 mls @ 250 mls/hr IV Q24H [facility]


Sodium Chloride (Normal Saline)  1,000 mls @ 100 mls/hr IV ASDIRECTED [facility]


Vancomycin HCl 1 gm/Vancomycin HCl 250 mg/ Sodium Chloride  250 mls @ 100 mls/

hr IV ONETIME ONE


   Stop: 06/04/19 20:26


Piperacillin Sod/Tazobactam (Sod 4.5 gm/ Sodium Chloride)  100 mls @ 25 mls/hr 

IV Q12H [facility]


Piperacillin Sod/Tazobactam (Sod 4.5 gm/ Sodium Chloride)  100 mls @ 200 mls/hr 

IV ONETIME ONE


   Stop: 06/04/19 19:44


   Last Admin: 06/04/19 19:19  Dose: 200 mls/hr


Insulin Human Lispro (Humalog)  0 unit SUBCUT QIDACANDBED [facility]; Protocol


Metoprolol Succinate (Toprol Xl)  50 mg PO DAILY [facility]


Non-Formulary Medication (Acetaminophen)  1,000 mg PO Q6HR PRN


   PRN Reason: Pain


Rivaroxaban (Xarelto)  15 mg PO DAILY [facility]


Senna/Docusate Sodium (Senna Plus)  1 tab PO DAILY PRN


   PRN Reason: Constipation


Sodium Chloride (Saline Flush)  10 ml FLUSH ASDIRECTED PRN


   PRN Reason: Keep Vein Open


   Last Admin: 06/04/19 14:46  Dose: 10 ml








Labs: 


 Laboratory Tests











  06/04/19 06/04/19 06/04/19 Range/Units





  14:15 14:25 14:25 


 


WBC   20.60 H   (3.98-10.04)  K/mm3


 


RBC   3.72 L   (3.98-5.22)  M/mm3


 


Hgb   10.6 L   (11.2-15.7)  gm/L


 


Hct   33.1 L   (34.1-44.9)  %


 


MCV   89.0   (79.4-94.8)  fl


 


MCH   28.5   (25.6-32.2)  pg


 


MCHC   32.0 L   (32.2-35.5)  g/dl


 


RDW Std Deviation   46.5 H   (36.4-46.3)  fL


 


Plt Count   297  D   (182-369)  K/mm3


 


MPV   9.5   (9.4-12.3)  fl


 


Neutrophils % (Manual)   84 H   (40-60)  %


 


Band Neutrophils %   1   (0-10)  %


 


Lymphocytes % (Manual)   12 L   (20-40)  %


 


Atypical Lymphs %   0   %


 


Monocytes % (Manual)   3   (2-10)  %


 


Eosinophils % (Manual)   0 L   (0.7-5.8)  %


 


Basophils % (Manual)   0 L   (0.1-1.2)  


 


Toxic Granulation   1+ slight   


 


Dohle Bodies   Few   


 


Platelet Estimate   Adequate   


 


Plt Morphology Comment   Normal   


 


Hypochromasia   1+ slight   


 


Anisocytosis   1+ slight   


 


Stomatocytes   1+ slight   


 


RBC Morph Comment   Not Reportable   


 


Puncture Site  Lt radial    


 


ABG pH  7.54 H    (7.35-7.45)  


 


ABG pCO2  39.2    (35.0-45.0)  mmHg


 


ABG pO2  50.0 L    (80.0-100.0)  mmHg


 


ABG HCO3  33.5 H    (22.0-26.0)  meq/L


 


ABG O2 Saturation  87.1 L    (96.0-97.0)  %


 


ABG Base Excess  10.2 H    (-2-2.0)  


 


Jose Rafael Test  Positive    


 


O2 Delivery Device  Nasal cannula    


 


Oxygen Flow Rate  2.0    


 


FiO2  0.00 L    (21..00)  %


 


Sodium    137  (136-145)  mEq/L


 


Potassium    2.7 L  (3.5-5.1)  mEq/L


 


Chloride    95 L  ()  mEq/L


 


Carbon Dioxide    33 H  (21-32)  mEq/L


 


Anion Gap    11.7  (5-15)  


 


BUN    28 H  (7-18)  mg/dL


 


Creatinine    1.7 H  (0.55-1.02)  mg/dL


 


Est Cr Clr Drug Dosing    16.75  mL/min


 


Estimated GFR (MDRD)    28  (>60)  mL/min


 


BUN/Creatinine Ratio    16.5  (14-18)  


 


Glucose    109  ()  mg/dL


 


Lactic Acid     (0.4-2.0)  mmol/L


 


Calcium    9.4  (8.5-10.1)  mg/dL


 


Magnesium     (1.8-2.4)  mg/dl


 


Total Bilirubin    0.6  (0.2-1.0)  mg/dL


 


AST    13 L  (15-37)  U/L


 


ALT    15  (14-59)  U/L


 


Alkaline Phosphatase    76  ()  U/L


 


C-Reactive Protein    30.1 H*  (<1.0)  mg/dL


 


Total Protein    7.6  (6.4-8.2)  g/dl


 


Albumin    3.1 L  (3.4-5.0)  g/dl


 


Globulin    4.5  gm/dL


 


Albumin/Globulin Ratio    0.7 L  (1-2)  


 


Urine Color     (Yellow)  


 


Urine Appearance     (Clear)  


 


Urine pH     (5.0-8.0)  


 


Ur Specific Gravity     (1.005-1.030)  


 


Urine Protein     (Negative)  


 


Urine Glucose (UA)     (Negative)  


 


Urine Ketones     (Negative)  


 


Urine Occult Blood     (Negative)  


 


Urine Nitrite     (Negative)  


 


Urine Bilirubin     (Negative)  


 


Urine Urobilinogen     (0.2-1.0)  


 


Ur Leukocyte Esterase     (Negative)  


 


Urine RBC     (0-5)  /hpf


 


Urine WBC     (0-5)  /hpf


 


Ur Squamous Epith Cells     (0-5)  /hpf


 


Urine Bacteria     (FEW)  /hpf


 


Hyaline Casts     (0-5)  /lpf


 


Urine Mucus     (FEW)  /hpf


 


Mycoplasma pneumon IgM     (NEGATIVE)  














  06/04/19 06/04/19 06/04/19 Range/Units





  14:25 14:25 16:00 


 


WBC     (3.98-10.04)  K/mm3


 


RBC     (3.98-5.22)  M/mm3


 


Hgb     (11.2-15.7)  gm/L


 


Hct     (34.1-44.9)  %


 


MCV     (79.4-94.8)  fl


 


MCH     (25.6-32.2)  pg


 


MCHC     (32.2-35.5)  g/dl


 


RDW Std Deviation     (36.4-46.3)  fL


 


Plt Count     (182-369)  K/mm3


 


MPV     (9.4-12.3)  fl


 


Neutrophils % (Manual)     (40-60)  %


 


Band Neutrophils %     (0-10)  %


 


Lymphocytes % (Manual)     (20-40)  %


 


Atypical Lymphs %     %


 


Monocytes % (Manual)     (2-10)  %


 


Eosinophils % (Manual)     (0.7-5.8)  %


 


Basophils % (Manual)     (0.1-1.2)  


 


Toxic Granulation     


 


Dohle Bodies     


 


Platelet Estimate     


 


Plt Morphology Comment     


 


Hypochromasia     


 


Anisocytosis     


 


Stomatocytes     


 


RBC Morph Comment     


 


Puncture Site     


 


ABG pH     (7.35-7.45)  


 


ABG pCO2     (35.0-45.0)  mmHg


 


ABG pO2     (80.0-100.0)  mmHg


 


ABG HCO3     (22.0-26.0)  meq/L


 


ABG O2 Saturation     (96.0-97.0)  %


 


ABG Base Excess     (-2-2.0)  


 


Jose Rafael Test     


 


O2 Delivery Device     


 


Oxygen Flow Rate     


 


FiO2     (21..00)  %


 


Sodium     (136-145)  mEq/L


 


Potassium     (3.5-5.1)  mEq/L


 


Chloride     ()  mEq/L


 


Carbon Dioxide     (21-32)  mEq/L


 


Anion Gap     (5-15)  


 


BUN     (7-18)  mg/dL


 


Creatinine     (0.55-1.02)  mg/dL


 


Est Cr Clr Drug Dosing     mL/min


 


Estimated GFR (MDRD)     (>60)  mL/min


 


BUN/Creatinine Ratio     (14-18)  


 


Glucose     ()  mg/dL


 


Lactic Acid  1.2    (0.4-2.0)  mmol/L


 


Calcium     (8.5-10.1)  mg/dL


 


Magnesium   2.3   (1.8-2.4)  mg/dl


 


Total Bilirubin     (0.2-1.0)  mg/dL


 


AST     (15-37)  U/L


 


ALT     (14-59)  U/L


 


Alkaline Phosphatase     ()  U/L


 


C-Reactive Protein     (<1.0)  mg/dL


 


Total Protein     (6.4-8.2)  g/dl


 


Albumin     (3.4-5.0)  g/dl


 


Globulin     gm/dL


 


Albumin/Globulin Ratio     (1-2)  


 


Urine Color    Yellow  (Yellow)  


 


Urine Appearance    Clear  (Clear)  


 


Urine pH    6.5  (5.0-8.0)  


 


Ur Specific Gravity    1.020  (1.005-1.030)  


 


Urine Protein    Negative  (Negative)  


 


Urine Glucose (UA)    Negative  (Negative)  


 


Urine Ketones    Negative  (Negative)  


 


Urine Occult Blood    Negative  (Negative)  


 


Urine Nitrite    Negative  (Negative)  


 


Urine Bilirubin    Negative  (Negative)  


 


Urine Urobilinogen    0.2  (0.2-1.0)  


 


Ur Leukocyte Esterase    Negative  (Negative)  


 


Urine RBC    Not seen  (0-5)  /hpf


 


Urine WBC    0-5  (0-5)  /hpf


 


Ur Squamous Epith Cells    0-5  (0-5)  /hpf


 


Urine Bacteria    Few  (FEW)  /hpf


 


Hyaline Casts    5-10 H  (0-5)  /lpf


 


Urine Mucus    Not seen  (FEW)  /hpf


 


Mycoplasma pneumon IgM   Negative   (NEGATIVE)  











Meds: 


Medications











Generic Name Dose Route Start Last Admin





  Trade Name Freq  PRN Reason Stop Dose Admin


 


Aspirin  81 mg  06/05/19 09:00  





  Halfprin  PO   





  DAILY [facility]   





     





     





     





     


 


Cholecalciferol  1,000 units  06/04/19 18:15  





  Vitamin D3  PO   





  DAILY [facility]   





     





     





     





     


 


Clopidogrel Bisulfate  75 mg  06/04/19 21:00  





  Plavix  PO   





  BEDTIME [facility]   





     





     





     





     


 


Gabapentin  300 mg  06/04/19 21:00  





  Neurontin  PO   





  TID [facility]   





     





     





     





     


 


Azithromycin 500 mg/ Sodium  250 mls @ 250 mls/hr  06/04/19 18:00  





  Chloride  IV   





  Q24H [facility]   





     





     





     





     


 


Sodium Chloride  1,000 mls @ 100 mls/hr  06/04/19 18:00  





  Normal Saline  IV   





  ASDIRECTED [facility]   





     





     





     





     


 


Vancomycin HCl 1 gm/  250 mls @ 100 mls/hr  06/04/19 17:57  





Vancomycin HCl 250 mg/ Sodium  IV  06/04/19 20:26  





Chloride  ONETIME ONE   





     





     





     





     


 


Piperacillin Sod/Tazobactam  100 mls @ 25 mls/hr  06/05/19 09:00  





  Sod 4.5 gm/ Sodium Chloride  IV   





  Q12H [facility]   





     





     





     





     


 


Piperacillin Sod/Tazobactam  100 mls @ 200 mls/hr  06/04/19 19:15  06/04/19 19:

19





  Sod 4.5 gm/ Sodium Chloride  IV  06/04/19 19:44  200 mls/hr





  ONETIME ONE   Administration





     





     





     





     


 


Insulin Human Lispro  0 unit  06/04/19 22:00  





  Humalog  SUBCUT   





  QIDACANDBED [facility]   





     





     





  Protocol   





     


 


Metoprolol Succinate  50 mg  06/05/19 09:00  





  Toprol Xl  PO   





  DAILY [facility]   





     





     





     





     


 


Non-Formulary Medication  1,000 mg  06/04/19 17:53  





  Acetaminophen  PO   





  Q6HR PRN   





  Pain   





     





     





     


 


Rivaroxaban  15 mg  06/05/19 09:00  





  Xarelto  PO   





  DAILY [facility]   





     





     





     





     


 


Senna/Docusate Sodium  1 tab  06/04/19 17:53  





  Senna Plus  PO   





  DAILY PRN   





  Constipation   





     





     





     


 


Sodium Chloride  10 ml  06/04/19 14:10  06/04/19 14:46





  Saline Flush  FLUSH   10 ml





  ASDIRECTED PRN   Administration





  Keep Vein Open   





     





     





     














Discontinued Medications














Generic Name Dose Route Start Last Admin





  Trade Name Samuelq  PRN Reason Stop Dose Admin


 


Ceftriaxone Sodium 2 gm/  100 mls @ 200 mls/hr  06/04/19 14:46  06/04/19 16:10





  Sodium Chloride  IV  06/04/19 15:15  200 mls/hr





  NOW STA   Administration





     





     





     





     


 


Sodium Chloride  1,000 mls @ 500 mls/hr  06/04/19 14:47  06/04/19 16:09





  Normal Saline  IV  06/04/19 16:46  500 mls/hr





  ONETIME ONE   Administration





     





     





     





     


 


Sodium Chloride  Confirm  06/04/19 18:54  





  Normal Saline  Administered  06/04/19 18:55  





  Dose   





  100 mls @ as directed   





  .ROUTE   





  .STK-MED ONE   





     





     





     





     


 


Potassium Chloride  40 meq  06/04/19 15:37  06/04/19 16:11





  Klor-Con M20  PO  06/04/19 15:38  40 meq





  ONETIME ONE   Administration





     





     





     





     














- Radiology Interpretation


Free Text/Narrative:: 





Chest: Two views of the chest were obtained.


Comparison: Prior chest x-ray of 01/15/19.


Patchy areas of increased density seen on both sides of chest.  Findings do not 

appear as severe on current exam although findings on the right side had a more 

nodular appearance on current study.  Heart size appears at the upper limits of 

normal.  Tortuous thoracic aorta is seen.  Bony structures are unremarkable.


Impression:


1.  Diffuse increased density within both sides suggest improving from prior 

study but difficult to exclude the appearance of pneumonia.


2.  Other incidental findings.





Left forearm: Two views of the left forearm were obtained.


Comparison: Previous left forearm study of 11/26/15.


Degenerative change is noted at the radiocarpal joint and within the midcarpal 

joint with joint space narrowing.  Osteopenia is present.  No discrete fracture 

or other bony abnormality is seen.


Impression:


1.  Osteopenia and degenerative change.


2.  No acute abnormality is appreciated.





Left elbow:  Four views of the left elbow were obtained.


Small exostosis is seen off the posterior olecranon process which is felt to be 

due to stress reaction from the attachment of the triceps tendon.  Joint space 

narrowing is noted within the elbow.  No joint effusion is seen.  Osteopenia is 

present.  No acute abnormality is appreciated.


Impression:


1.  Degenerative change and other incidental findings.


2.  Nothing acute is appreciated.





- Re-Assessments/Exams


Free Text/Narrative Re-Assessment/Exam: 





06/04/19 16:14


Case discussed with Dr. CLINE, hospitalist on call. Agrees to the admission. Will 

admit to med/surg for pneumonia. 





Departure





- Departure


Time of Disposition: 16:30


Disposition: Admitted As Inpatient 66


Condition: Poor


Clinical Impression: 


 Hypokalemia





Pneumonia


Qualifiers:


 Pneumonia type: due to unspecified organism Laterality: left Lung location: 

unspecified part of lung Qualified Code(s): J18.9 - Pneumonia, unspecified 

organism








- Discharge Information


*PRESCRIPTION DRUG MONITORING PROGRAM REVIEWED*: No


*COPY OF PRESCRIPTION DRUG MONITORING REPORT IN PATIENT FLAVIO: No





- My Orders


Last 24 Hours: 


My Active Orders





06/04/19 13:44


EKG 12 Lead [EK] Stat 





06/04/19 13:45


EKG Documentation Completion [RC] ASDIRECTED 





06/04/19 14:10


Peripheral IV Care [RC] Q2HR 


Sodium Chloride 0.9% [Saline Flush]   10 ml FLUSH ASDIRECTED PRN 


Peripheral IV Insertion Adult [OM.PC] Routine 





06/04/19 14:18


Oxygen Therapy [RC] ASDIRECTED 





06/04/19 14:25


CULTURE BLOOD [BC] Stat 





06/04/19 14:30


CULTURE BLOOD [BC] Stat 





06/04/19 16:29


Patient Status [ADT] Routine 














- Assessment/Plan


Last 24 Hours: 


My Active Orders





06/04/19 13:44


EKG 12 Lead [EK] Stat 





06/04/19 13:45


EKG Documentation Completion [RC] ASDIRECTED 





06/04/19 14:10


Peripheral IV Care [RC] Q2HR 


Sodium Chloride 0.9% [Saline Flush]   10 ml FLUSH ASDIRECTED PRN 


Peripheral IV Insertion Adult [OM.PC] Routine 





06/04/19 14:18


Oxygen Therapy [RC] ASDIRECTED 





06/04/19 14:25


CULTURE BLOOD [BC] Stat 





06/04/19 14:30


CULTURE BLOOD [BC] Stat 





06/04/19 16:29


Patient Status [ADT] Routine

## 2019-06-04 NOTE — CR
Chest: Two views of the chest were obtained.

 

Comparison: Prior chest x-ray of 01/15/19.

 

Patchy areas of increased density seen on both sides of chest.  

Findings do not appear as severe on current exam although findings on 

the right side had a more nodular appearance on current study.  Heart 

size appears at the upper limits of normal.  Tortuous thoracic aorta 

is seen.  Bony structures are unremarkable.

 

Impression:

1.  Diffuse increased density within both sides suggest improving from

 prior study but difficult to exclude the appearance of pneumonia.

2.  Other incidental findings.

 

Diagnostic code #3

## 2019-06-04 NOTE — PCM.HP
H&P History of Present Illness





- General


Date of Service: 06/04/19


Admit Problem/Dx: 


 Admission Diagnosis/Problem





Admission Diagnosis/Problem      Pneumonia





86 yo with chronic back pain, DMII, CHF admitted from ED after several days of 

fatigue, cough, hypoxemia, fever. CXR reveals bilteral interstitial infiltrates

, somewhat improved since previous admission. Had several admissions with 

similar presentation. Admitted as IP for further treatment.





- Related Data


Allergies/Adverse Reactions: 


 Allergies











Allergy/AdvReac Type Severity Reaction Status Date / Time


 


levofloxacin [From Levaquin] Allergy  Itching Verified 06/04/19 13:44











Home Medications: 


 Home Meds





Aspirin [Halfprin] 81 mg PO DAILY 11/25/15 [History]


Clopidogrel [Plavix] 75 mg PO BEDTIME 11/25/15 [History]


Cyanocobalamin (Vitamin B12) [Vitamin B12] 1,000 mcg PO DAILY 11/25/15 [History]


Gabapentin [Neurontin] 600 mg PO TID 11/25/15 [History]


Insulin Aspart [Novolog Flexpen] 11 unit SQ TID 11/25/15 [History]


Sennosides/Docusate Sodium [Senna-Docusate Sodium] 1 tab PO DAILY 04/10/16 [

History]


Acetaminophen 500 mg PO BEDTIME 08/17/16 [History]


Acetaminophen [Tylenol Extra Strength] 1,000 mg PO Q6HR PRN 08/17/16 [History]


Calcium Carbonate [Tums] 400 mg PO Q4H PRN 08/17/16 [History]


Vitamin E 400 unit PO DAILY 08/17/16 [History]


Loratadine [Claritin] 10 mg PO DAILY PRN 11/10/18 [History]


Rivaroxaban [Xarelto] 15 mg PO DAILY #30 tablet 01/15/19 [Rx]


Furosemide [Lasix] 20 mg PO DAILY 06/04/19 [History]


Furosemide [Lasix] 20 mg PO DAILY PRN 06/04/19 [History]


Insulin Degludec [Tresiba] 30 unit SQ DAILY 06/04/19 [History]


MO/Pet,Wh/Phenylephrine/Shk Lv [Preparation H Oint] 1 appful RECTAL BID PRN 06/ 04/19 [History]


Metoprolol Succinate 50 mg PO DAILY 06/04/19 [History]


Polyethylene Glycol 3350 [MiraLAX] 17 gm PO DAILY PRN 06/04/19 [History]


Sennosides [Senna] 8.6 mg PO DAILY PRN 06/04/19 [History]


Sennosides/Docusate Sodium [Senexon-S Tablet] 1 tab PO DAILY PRN 06/04/19 [

History]


metOLazone [Metolazone] 2.5 mg PO TUTH 06/04/19 [History]











Past Medical History


HEENT History: Reports: Impaired Vision


Cardiovascular History: Reports: Arrhythmia, Heart Failure, Hypertension


Other Cardiovascular History: Afib, anemia


Respiratory History: Reports: Other (See Below)


Other Respiratory History: hypoxemia


Gastrointestinal History: Reports: Chronic Constipation


Other Gastrointestinal History: dysphagia


Genitourinary History: Reports: Chronic Renal Insuffiency, Retention, Urinary


OB/GYN History: Reports: Pregnancy


Musculoskeletal History: Reports: Back Pain, Chronic


Other Musculoskeletal History: muscle weakness


Neurological History: Reports: TIA, Other (See Below)


Other Neuro History: cerebral infarct; neuralgia


Psychiatric History: Reports: Depression


Other Psychiatric History: vascular dementia


Endocrine/Metabolic History: Reports: Diabetes, Type II


Hematologic History: Reports: Anemia


Oncologic (Cancer) History: Reports: Other (See Below)


Other Oncologic History: unknown skin cancer


Dermatologic History: Reports: Other (See Below)


Other Dermatologic History: previous supspected skin cancer





- Past Surgical History


GI Surgical History: Reports: Cholecystectomy





Social & Family History





- Family History


Family Medical History: Noncontributory





- Tobacco Use


Smoking Status *Q: Never Smoker





- Caffeine Use


Caffeine Use: Reports: None





- Recreational Drug Use


Recreational Drug Use: No





H&P Review of Systems





- Review of Systems:


Review Of Systems: See Below


General: Reports: Fever, Chills, Weakness, Fatigue


HEENT: Denies: Dysphasia, Rhinitis, Post Nasal Drip


Pulmonary: Reports: Shortness of Breath, Cough.  Denies: Wheezing, Hemoptysis


Cardiovascular: Reports: Dyspnea on Exertion.  Denies: Chest Pain, Palpitations


Gastrointestinal: Denies: Abdominal Pain, Anorexia, Hematemesis, Hematochezia


Genitourinary: Reports: Incontinence.  Denies: Dysuria, Frequency


Musculoskeletal: Reports: Back Pain


Skin: Denies: Cyanosis, Jaundice


Psychiatric: Denies: Suicidal Ideation


Neurological: Denies: Seizure, Syncope


Hematologic/Lymphatic: Denies: Easy Bleeding, Easy Bruising





Exam





- Exam


Exam: See Below





- Vital Signs


Vital Signs: 


 Last Vital Signs











Temp  99.6 F   06/04/19 13:41


 


Pulse  78   06/04/19 13:41


 


Resp  16   06/04/19 13:41


 


BP  119/44 L  06/04/19 13:41


 


Pulse Ox  60 L  06/04/19 13:41











Weight: 144 lb





- Exam


General: Alert, Oriented, Cooperative


HEENT: Conjunctiva Clear, Nares Patent


Neck: Supple, Trachea Midline


Lungs: Decreased Breath Sounds, Crackles


Cardiovascular: Regular Rate, Normal S1, Normal S2


GI/Abdominal Exam: Soft, Non-Tender, No Distention


Extremities: No Pedal Edema


Peripheral Pulses: 2+: Dorsalis Pedis (L), Dorsalis Pedis (R)


Skin: Warm, Dry


Neuro Extensive - Motor, Sensory, Reflexes: CN II-XII Intact.  No: Motor/

Sensory Deficits





- Patient Data


Lab Results Last 24 hrs: 


 Laboratory Results - last 24 hr











  06/04/19 06/04/19 06/04/19 Range/Units





  14:15 14:25 14:25 


 


WBC   20.60 H   (3.98-10.04)  K/mm3


 


RBC   3.72 L   (3.98-5.22)  M/mm3


 


Hgb   10.6 L   (11.2-15.7)  gm/L


 


Hct   33.1 L   (34.1-44.9)  %


 


MCV   89.0   (79.4-94.8)  fl


 


MCH   28.5   (25.6-32.2)  pg


 


MCHC   32.0 L   (32.2-35.5)  g/dl


 


RDW Std Deviation   46.5 H   (36.4-46.3)  fL


 


Plt Count   297  D   (182-369)  K/mm3


 


MPV   9.5   (9.4-12.3)  fl


 


Neutrophils % (Manual)   84 H   (40-60)  %


 


Band Neutrophils %   1   (0-10)  %


 


Lymphocytes % (Manual)   12 L   (20-40)  %


 


Atypical Lymphs %   0   %


 


Monocytes % (Manual)   3   (2-10)  %


 


Eosinophils % (Manual)   0 L   (0.7-5.8)  %


 


Basophils % (Manual)   0 L   (0.1-1.2)  


 


Toxic Granulation   1+ slight   


 


Dohle Bodies   Few   


 


Platelet Estimate   Adequate   


 


Plt Morphology Comment   Normal   


 


Hypochromasia   1+ slight   


 


Anisocytosis   1+ slight   


 


Stomatocytes   1+ slight   


 


RBC Morph Comment   Not Reportable   


 


Puncture Site  Lt radial    


 


ABG pH  7.54 H    (7.35-7.45)  


 


ABG pCO2  39.2    (35.0-45.0)  mmHg


 


ABG pO2  50.0 L    (80.0-100.0)  mmHg


 


ABG HCO3  33.5 H    (22.0-26.0)  meq/L


 


ABG O2 Saturation  87.1 L    (96.0-97.0)  %


 


ABG Base Excess  10.2 H    (-2-2.0)  


 


Jose Rafael Test  Positive    


 


O2 Delivery Device  Nasal cannula    


 


Oxygen Flow Rate  2.0    


 


FiO2  0.00 L    (21..00)  %


 


Sodium    137  (136-145)  mEq/L


 


Potassium    2.7 L  (3.5-5.1)  mEq/L


 


Chloride    95 L  ()  mEq/L


 


Carbon Dioxide    33 H  (21-32)  mEq/L


 


Anion Gap    11.7  (5-15)  


 


BUN    28 H  (7-18)  mg/dL


 


Creatinine    1.7 H  (0.55-1.02)  mg/dL


 


Est Cr Clr Drug Dosing    16.75  mL/min


 


Estimated GFR (MDRD)    28  (>60)  mL/min


 


BUN/Creatinine Ratio    16.5  (14-18)  


 


Glucose    109  ()  mg/dL


 


Lactic Acid     (0.4-2.0)  mmol/L


 


Calcium    9.4  (8.5-10.1)  mg/dL


 


Magnesium     (1.8-2.4)  mg/dl


 


Total Bilirubin    0.6  (0.2-1.0)  mg/dL


 


AST    13 L  (15-37)  U/L


 


ALT    15  (14-59)  U/L


 


Alkaline Phosphatase    76  ()  U/L


 


C-Reactive Protein    30.1 H*  (<1.0)  mg/dL


 


Total Protein    7.6  (6.4-8.2)  g/dl


 


Albumin    3.1 L  (3.4-5.0)  g/dl


 


Globulin    4.5  gm/dL


 


Albumin/Globulin Ratio    0.7 L  (1-2)  


 


Urine Color     (Yellow)  


 


Urine Appearance     (Clear)  


 


Urine pH     (5.0-8.0)  


 


Ur Specific Gravity     (1.005-1.030)  


 


Urine Protein     (Negative)  


 


Urine Glucose (UA)     (Negative)  


 


Urine Ketones     (Negative)  


 


Urine Occult Blood     (Negative)  


 


Urine Nitrite     (Negative)  


 


Urine Bilirubin     (Negative)  


 


Urine Urobilinogen     (0.2-1.0)  


 


Ur Leukocyte Esterase     (Negative)  


 


Urine RBC     (0-5)  /hpf


 


Urine WBC     (0-5)  /hpf


 


Ur Squamous Epith Cells     (0-5)  /hpf


 


Urine Bacteria     (FEW)  /hpf


 


Hyaline Casts     (0-5)  /lpf


 


Urine Mucus     (FEW)  /hpf


 


Mycoplasma pneumon IgM     (NEGATIVE)  














  06/04/19 06/04/19 06/04/19 Range/Units





  14:25 14:25 16:00 


 


WBC     (3.98-10.04)  K/mm3


 


RBC     (3.98-5.22)  M/mm3


 


Hgb     (11.2-15.7)  gm/L


 


Hct     (34.1-44.9)  %


 


MCV     (79.4-94.8)  fl


 


MCH     (25.6-32.2)  pg


 


MCHC     (32.2-35.5)  g/dl


 


RDW Std Deviation     (36.4-46.3)  fL


 


Plt Count     (182-369)  K/mm3


 


MPV     (9.4-12.3)  fl


 


Neutrophils % (Manual)     (40-60)  %


 


Band Neutrophils %     (0-10)  %


 


Lymphocytes % (Manual)     (20-40)  %


 


Atypical Lymphs %     %


 


Monocytes % (Manual)     (2-10)  %


 


Eosinophils % (Manual)     (0.7-5.8)  %


 


Basophils % (Manual)     (0.1-1.2)  


 


Toxic Granulation     


 


Dohle Bodies     


 


Platelet Estimate     


 


Plt Morphology Comment     


 


Hypochromasia     


 


Anisocytosis     


 


Stomatocytes     


 


RBC Morph Comment     


 


Puncture Site     


 


ABG pH     (7.35-7.45)  


 


ABG pCO2     (35.0-45.0)  mmHg


 


ABG pO2     (80.0-100.0)  mmHg


 


ABG HCO3     (22.0-26.0)  meq/L


 


ABG O2 Saturation     (96.0-97.0)  %


 


ABG Base Excess     (-2-2.0)  


 


Jose Rafael Test     


 


O2 Delivery Device     


 


Oxygen Flow Rate     


 


FiO2     (21..00)  %


 


Sodium     (136-145)  mEq/L


 


Potassium     (3.5-5.1)  mEq/L


 


Chloride     ()  mEq/L


 


Carbon Dioxide     (21-32)  mEq/L


 


Anion Gap     (5-15)  


 


BUN     (7-18)  mg/dL


 


Creatinine     (0.55-1.02)  mg/dL


 


Est Cr Clr Drug Dosing     mL/min


 


Estimated GFR (MDRD)     (>60)  mL/min


 


BUN/Creatinine Ratio     (14-18)  


 


Glucose     ()  mg/dL


 


Lactic Acid  1.2    (0.4-2.0)  mmol/L


 


Calcium     (8.5-10.1)  mg/dL


 


Magnesium   2.3   (1.8-2.4)  mg/dl


 


Total Bilirubin     (0.2-1.0)  mg/dL


 


AST     (15-37)  U/L


 


ALT     (14-59)  U/L


 


Alkaline Phosphatase     ()  U/L


 


C-Reactive Protein     (<1.0)  mg/dL


 


Total Protein     (6.4-8.2)  g/dl


 


Albumin     (3.4-5.0)  g/dl


 


Globulin     gm/dL


 


Albumin/Globulin Ratio     (1-2)  


 


Urine Color    Yellow  (Yellow)  


 


Urine Appearance    Clear  (Clear)  


 


Urine pH    6.5  (5.0-8.0)  


 


Ur Specific Gravity    1.020  (1.005-1.030)  


 


Urine Protein    Negative  (Negative)  


 


Urine Glucose (UA)    Negative  (Negative)  


 


Urine Ketones    Negative  (Negative)  


 


Urine Occult Blood    Negative  (Negative)  


 


Urine Nitrite    Negative  (Negative)  


 


Urine Bilirubin    Negative  (Negative)  


 


Urine Urobilinogen    0.2  (0.2-1.0)  


 


Ur Leukocyte Esterase    Negative  (Negative)  


 


Urine RBC    Not seen  (0-5)  /hpf


 


Urine WBC    0-5  (0-5)  /hpf


 


Ur Squamous Epith Cells    0-5  (0-5)  /hpf


 


Urine Bacteria    Few  (FEW)  /hpf


 


Hyaline Casts    5-10 H  (0-5)  /lpf


 


Urine Mucus    Not seen  (FEW)  /hpf


 


Mycoplasma pneumon IgM   Negative   (NEGATIVE)  











Result Diagrams: 


 06/04/19 14:25





 06/04/19 14:25





- Problem List


(1) Pneumonia


SNOMED Code(s): 104639351


   ICD Code: J18.9 - PNEUMONIA, UNSPECIFIED ORGANISM   Status: Acute   Priority

: High   Current Visit: No   


Qualifiers: 


   Pneumonia type: due to unspecified organism   Laterality: left   Lung 

location: unspecified part of lung   Qualified Code(s): J18.9 - Pneumonia, 

unspecified organism   


Problem List Initiated/Reviewed/Updated: Yes


Orders Last 24hrs: 


 Active Orders 24 hr











 Category Date Time Status


 


 Patient Status [ADT] Routine ADT  06/04/19 16:29 Active


 


 Blood Glucose Check, Bedside [RC] WITHMEALSANDBED Care  06/04/19 17:57 Ordered


 


 Cardiac Monitoring [RC] CONTINUOUS Care  06/04/19 18:04 Ordered


 


 EKG Documentation Completion [RC] ASDIRECTED Care  06/04/19 13:45 Active


 


 Height and Weight [RC] DAILY Care  06/04/19 18:03 Ordered


 


 Insert Page Catheter [Insert Urinary Catheter] [OM.PC] Care  06/04/19 16:00 

Ordered





 Stat   


 


 Intake and Output [RC] QSHIFT Care  06/04/19 18:04 Ordered


 


 Oxygen Therapy [RC] ASDIRECTED Care  06/04/19 14:18 Active


 


 Oxygen Therapy [RC] PRN Care  06/04/19 17:57 Ordered


 


 Oxygen Therapy [RC] PRN Care  06/04/19 18:03 Ordered


 


 Peripheral IV Care [RC] Q2HR Care  06/04/19 14:10 Active


 


 Pulse Oximetry [RC] CONTINUOUS Care  06/04/19 17:57 Ordered


 


 VTE/DVT Education [RC] PER UNIT ROUTINE Care  06/04/19 18:03 Ordered


 


 Vital Signs [RC] Q4H Care  06/04/19 18:03 Ordered


 


 Chest 1V Frontal [CR] AM Exams  06/05/19 05:11 Ordered


 


 CULTURE BLOOD [BC] Stat Lab  06/04/19 14:25 Received


 


 CULTURE BLOOD [BC] Stat Lab  06/04/19 14:30 Received


 


 LEGIONELLA ANTIGEN [MREF] Urgent Lab  06/04/19 18:07 Ordered


 


 LEGIONELLA PNEUMOPHILA 1-6,IGM [REF] Urgent Lab  06/04/19 18:07 Ordered


 


 METH-RESIST S.AUR,MRSA BY PCR [MOLEC] Routine Lab  06/04/19 17:15 Received


 


 Acetaminophen Med  06/04/19 17:53 Ordered





 1,000 mg PO Q6HR PRN   


 


 Aspirin [Halfprin] Med  06/05/19 09:00 Ordered





 81 mg PO DAILY   


 


 Azithromycin [Zithromax] 500 mg Med  06/04/19 17:58 Ordered





 Sodium Chloride 0.9% [Normal Saline] 250 ml   





 IV Q24H   


 


 Cholecalciferol (Vitamin D3) [Vitamin D3] Med  06/04/19 18:15 Ordered





 1,000 units PO DAILY   


 


 Clopidogrel [Plavix] Med  06/04/19 21:00 Ordered





 75 mg PO BEDTIME   


 


 Docusate Sodium/Sennosides [Senna Plus] Med  06/04/19 17:53 Ordered





 1 tab PO DAILY PRN   


 


 Gabapentin [Neurontin] Med  06/04/19 21:00 Ordered





 300 mg PO TID   


 


 Insulin Lispro [HumaLOG] Med  06/04/19 22:00 Ordered





 See Protocol  SUBCUT QIDACANDBED   


 


 Metoprolol Succinate [Toprol XL] Med  06/05/19 09:00 Ordered





 50 mg PO DAILY   


 


 Piperacillin/Tazobactam [Piperacil-Tazobact] 3.275 gm Med  06/04/19 18:00 

Ordered





 Sodium Chloride 0.9% [Normal Saline] 100 ml   





 IV Q6H   


 


 Rivaroxaban [Xarelto] Med  06/05/19 09:00 Ordered





 15 mg PO DAILY   


 


 Sodium Chloride 0.9% [Normal Saline] 1,000 ml Med  06/04/19 18:00 Ordered





 IV ASDIRECTED   


 


 Sodium Chloride 0.9% [Saline Flush] Med  06/04/19 14:10 Active





 10 ml FLUSH ASDIRECTED PRN   


 


 Vancomycin 1.25 gm Med  06/04/19 17:57 Ordered





 Sodium Chloride 0.9% [Normal Saline] 250 ml   





 IV ONETIME   


 


 Peripheral IV Insertion Adult [OM.PC] Routine Oth  06/04/19 14:10 Ordered


 


 Resuscitation Status Routine Resus Stat  06/04/19 18:03 Ordered


 


 EKG 12 Lead [EK] Stat Ther  06/04/19 13:44 Ordered








 Medication Orders





Aspirin (Halfprin)  81 mg PO DAILY RYLEY


Clopidogrel Bisulfate (Plavix)  75 mg PO BEDTIME RYLEY


Gabapentin (Neurontin)  300 mg PO TID RYLEY


Azithromycin 500 mg/ Sodium (Chloride)  250 mls @ 250 mls/hr IV Q24H RYLEY


Piperacillin Sod/Tazobactam (Sod 3.275 gm/ Sodium Chloride)  100 mls @ 200 mls/

hr IV Q6H RYLEY


Sodium Chloride (Normal Saline)  1,000 mls @ 100 mls/hr IV ASDIRECTED RYLEY


Vancomycin HCl 1.25 gm/ Sodium (Chloride)  250 mls @ 100 mls/hr IV ONETIME ONE


   Stop: 06/04/19 20:26


Metoprolol Succinate (Toprol Xl)  50 mg PO DAILY RYLEY


Non-Formulary Medication (Acetaminophen)  1,000 mg PO Q6HR PRN


   PRN Reason: Pain


Rivaroxaban (Xarelto)  15 mg PO DAILY RYLEY


Senna/Docusate Sodium (Senna Plus)  1 tab PO DAILY PRN


   PRN Reason: Constipation


Sodium Chloride (Saline Flush)  10 ml FLUSH ASDIRECTED PRN


   PRN Reason: Keep Vein Open


   Last Admin: 06/04/19 14:46  Dose: 10 ml








Assessment/Plan Comment:: 





1. Empiric azithromycin, vancomycin and Zosyn, has h/o MRSA.


2. 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

## 2019-06-04 NOTE — CR
Left elbow:  Four views of the left elbow were obtained.

 

Small exostosis is seen off the posterior olecranon process which is 

felt to be due to stress reaction from the attachment of the triceps 

tendon.  Joint space narrowing is noted within the elbow.  No joint 

effusion is seen.  Osteopenia is present.  No acute abnormality is 

appreciated.

 

Impression:

1.  Degenerative change and other incidental findings.

2.  Nothing acute is appreciated.

 

Diagnostic code #2

## 2019-06-04 NOTE — CR
Left forearm: Two views of the left forearm were obtained.

 

Comparison: Previous left forearm study of 11/26/15.

 

Degenerative change is noted at the radiocarpal joint and within the 

midcarpal joint with joint space narrowing.  Osteopenia is present.  

No discrete fracture or other bony abnormality is seen.

 

Impression:

1.  Osteopenia and degenerative change.

2.  No acute abnormality is appreciated.

 

Diagnostic code #2

## 2019-06-05 RX ADMIN — Medication SCH MG: at 12:13

## 2019-06-05 RX ADMIN — VITAMIN D, TAB 1000IU (100/BT) SCH UNITS: 25 TAB at 21:30

## 2019-06-05 RX ADMIN — Medication SCH MG: at 21:30

## 2019-06-05 NOTE — PCM.PN
- General Info


Date of Service: 06/05/19


Admission Dx/Problem (Free Text): 





86 yo with chronic back pain, DMII, CHF admitted from ED after several days of 

fatigue, cough, hypoxemia, fever. CXR reveals bilteral interstitial infiltrates

, somewhat improved since previous admission. Had several admissions with 

similar presentation. Admitted as IP for further treatment.


6/5/2019, clinically better, has severe debilitating sciatica pain, will start 

trial of low dose fentanyl patch.





- Review of Systems


Systems Review Comment:: 





General: Reports: Fever, Chills, less Weakness, Fatigue


HEENT: Denies: Dysphasia, Rhinitis, Post Nasal Drip


Pulmonary: Reports: Shortness of Breath, Cough.  Denies: Wheezing, Hemoptysis


Cardiovascular: Reports: Dyspnea on Exertion.  Denies: Chest Pain, Palpitations


Gastrointestinal: Denies: Abdominal Pain, Anorexia, Hematemesis, Hematochezia


Genitourinary: Reports: Incontinence.  Denies: Dysuria, Frequency


Musculoskeletal: Reports: Back Pain


Skin: Denies: Cyanosis, Jaundice


Psychiatric: Denies: Suicidal Ideation


Neurological: Denies: Seizure, Syncope


Hematologic/Lymphatic: Denies: Easy Bleeding, Easy Bruising





- Patient Data


Vitals - Most Recent: 


 Last Vital Signs











Temp  98.8 F   06/05/19 03:09


 


Pulse  99   06/05/19 09:55


 


Resp  16   06/05/19 03:09


 


BP  133/48 L  06/05/19 09:55


 


Pulse Ox  90 L  06/05/19 10:36











Weight - Most Recent: 144 lb 8 oz


I&O - Last 24 Hours: 


 Intake & Output











 06/04/19 06/05/19 06/05/19





 19:59 03:59 11:59


 


Intake Total   1996


 


Balance   1996











Lab Results Last 24 Hours: 


 Laboratory Results - last 24 hr











  06/04/19 06/04/19 06/04/19 Range/Units





  14:15 14:25 14:25 


 


WBC   20.60 H   (3.98-10.04)  K/mm3


 


RBC   3.72 L   (3.98-5.22)  M/mm3


 


Hgb   10.6 L   (11.2-15.7)  gm/L


 


Hct   33.1 L   (34.1-44.9)  %


 


MCV   89.0   (79.4-94.8)  fl


 


MCH   28.5   (25.6-32.2)  pg


 


MCHC   32.0 L   (32.2-35.5)  g/dl


 


RDW Std Deviation   46.5 H   (36.4-46.3)  fL


 


Plt Count   297  D   (182-369)  K/mm3


 


MPV   9.5   (9.4-12.3)  fl


 


Neut % (Auto)     (34.0-71.1)  %


 


Lymph % (Auto)     (19.3-51.7)  %


 


Mono % (Auto)     (4.7-12.5)  %


 


Eos % (Auto)     (0.7-5.8)  


 


Baso % (Auto)     (0.1-1.2)  %


 


Neut # (Auto)     (1.56-6.13)  K/mm3


 


Lymph # (Auto)     (1.18-3.74)  K/mm3


 


Mono # (Auto)     (0.24-0.36)  K/mm3


 


Eos # (Auto)     (0.04-0.36)  K/mm3


 


Baso # (Auto)     (0.01-0.08)  K/mm3


 


Neutrophils % (Manual)   84 H   (40-60)  %


 


Band Neutrophils %   1   (0-10)  %


 


Lymphocytes % (Manual)   12 L   (20-40)  %


 


Atypical Lymphs %   0   %


 


Monocytes % (Manual)   3   (2-10)  %


 


Eosinophils % (Manual)   0 L   (0.7-5.8)  %


 


Basophils % (Manual)   0 L   (0.1-1.2)  


 


Manual Slide Review     


 


Toxic Granulation   1+ slight   


 


Dohle Bodies   Few   


 


Platelet Estimate   Adequate   


 


Plt Morphology Comment   Normal   


 


Hypochromasia   1+ slight   


 


Anisocytosis   1+ slight   


 


Stomatocytes   1+ slight   


 


RBC Morph Comment   Not Reportable   


 


Puncture Site  Lt radial    


 


ABG pH  7.54 H    (7.35-7.45)  


 


ABG pCO2  39.2    (35.0-45.0)  mmHg


 


ABG pO2  50.0 L    (80.0-100.0)  mmHg


 


ABG HCO3  33.5 H    (22.0-26.0)  meq/L


 


ABG O2 Saturation  87.1 L    (96.0-97.0)  %


 


ABG Base Excess  10.2 H    (-2-2.0)  


 


Jose Rafael Test  Positive    


 


O2 Delivery Device  Nasal cannula    


 


Oxygen Flow Rate  2.0    


 


FiO2  0.00 L    (21..00)  %


 


Sodium    137  (136-145)  mEq/L


 


Potassium    2.7 L  (3.5-5.1)  mEq/L


 


Chloride    95 L  ()  mEq/L


 


Carbon Dioxide    33 H  (21-32)  mEq/L


 


Anion Gap    11.7  (5-15)  


 


BUN    28 H  (7-18)  mg/dL


 


Creatinine    1.7 H  (0.55-1.02)  mg/dL


 


Est Cr Clr Drug Dosing    16.75  mL/min


 


Estimated GFR (MDRD)    28  (>60)  mL/min


 


BUN/Creatinine Ratio    16.5  (14-18)  


 


Glucose    109  ()  mg/dL


 


POC Glucose     ()  mg/dL


 


Lactic Acid     (0.4-2.0)  mmol/L


 


Calcium    9.4  (8.5-10.1)  mg/dL


 


Phosphorus     (2.6-4.7)  mg/dL


 


Magnesium     (1.8-2.4)  mg/dl


 


Total Bilirubin    0.6  (0.2-1.0)  mg/dL


 


AST    13 L  (15-37)  U/L


 


ALT    15  (14-59)  U/L


 


Alkaline Phosphatase    76  ()  U/L


 


C-Reactive Protein    30.1 H*  (<1.0)  mg/dL


 


Total Protein    7.6  (6.4-8.2)  g/dl


 


Albumin    3.1 L  (3.4-5.0)  g/dl


 


Globulin    4.5  gm/dL


 


Albumin/Globulin Ratio    0.7 L  (1-2)  


 


Urine Color     (Yellow)  


 


Urine Appearance     (Clear)  


 


Urine pH     (5.0-8.0)  


 


Ur Specific Gravity     (1.005-1.030)  


 


Urine Protein     (Negative)  


 


Urine Glucose (UA)     (Negative)  


 


Urine Ketones     (Negative)  


 


Urine Occult Blood     (Negative)  


 


Urine Nitrite     (Negative)  


 


Urine Bilirubin     (Negative)  


 


Urine Urobilinogen     (0.2-1.0)  


 


Ur Leukocyte Esterase     (Negative)  


 


Urine RBC     (0-5)  /hpf


 


Urine WBC     (0-5)  /hpf


 


Ur Squamous Epith Cells     (0-5)  /hpf


 


Urine Bacteria     (FEW)  /hpf


 


Hyaline Casts     (0-5)  /lpf


 


Urine Mucus     (FEW)  /hpf


 


Mycoplasma pneumon IgM     (NEGATIVE)  


 


MRSA (PCR)     














  06/04/19 06/04/19 06/04/19 Range/Units





  14:25 14:25 16:00 


 


WBC     (3.98-10.04)  K/mm3


 


RBC     (3.98-5.22)  M/mm3


 


Hgb     (11.2-15.7)  gm/L


 


Hct     (34.1-44.9)  %


 


MCV     (79.4-94.8)  fl


 


MCH     (25.6-32.2)  pg


 


MCHC     (32.2-35.5)  g/dl


 


RDW Std Deviation     (36.4-46.3)  fL


 


Plt Count     (182-369)  K/mm3


 


MPV     (9.4-12.3)  fl


 


Neut % (Auto)     (34.0-71.1)  %


 


Lymph % (Auto)     (19.3-51.7)  %


 


Mono % (Auto)     (4.7-12.5)  %


 


Eos % (Auto)     (0.7-5.8)  


 


Baso % (Auto)     (0.1-1.2)  %


 


Neut # (Auto)     (1.56-6.13)  K/mm3


 


Lymph # (Auto)     (1.18-3.74)  K/mm3


 


Mono # (Auto)     (0.24-0.36)  K/mm3


 


Eos # (Auto)     (0.04-0.36)  K/mm3


 


Baso # (Auto)     (0.01-0.08)  K/mm3


 


Neutrophils % (Manual)     (40-60)  %


 


Band Neutrophils %     (0-10)  %


 


Lymphocytes % (Manual)     (20-40)  %


 


Atypical Lymphs %     %


 


Monocytes % (Manual)     (2-10)  %


 


Eosinophils % (Manual)     (0.7-5.8)  %


 


Basophils % (Manual)     (0.1-1.2)  


 


Manual Slide Review     


 


Toxic Granulation     


 


Dohle Bodies     


 


Platelet Estimate     


 


Plt Morphology Comment     


 


Hypochromasia     


 


Anisocytosis     


 


Stomatocytes     


 


RBC Morph Comment     


 


Puncture Site     


 


ABG pH     (7.35-7.45)  


 


ABG pCO2     (35.0-45.0)  mmHg


 


ABG pO2     (80.0-100.0)  mmHg


 


ABG HCO3     (22.0-26.0)  meq/L


 


ABG O2 Saturation     (96.0-97.0)  %


 


ABG Base Excess     (-2-2.0)  


 


Jose Rafael Test     


 


O2 Delivery Device     


 


Oxygen Flow Rate     


 


FiO2     (21..00)  %


 


Sodium     (136-145)  mEq/L


 


Potassium     (3.5-5.1)  mEq/L


 


Chloride     ()  mEq/L


 


Carbon Dioxide     (21-32)  mEq/L


 


Anion Gap     (5-15)  


 


BUN     (7-18)  mg/dL


 


Creatinine     (0.55-1.02)  mg/dL


 


Est Cr Clr Drug Dosing     mL/min


 


Estimated GFR (MDRD)     (>60)  mL/min


 


BUN/Creatinine Ratio     (14-18)  


 


Glucose     ()  mg/dL


 


POC Glucose     ()  mg/dL


 


Lactic Acid  1.2    (0.4-2.0)  mmol/L


 


Calcium     (8.5-10.1)  mg/dL


 


Phosphorus     (2.6-4.7)  mg/dL


 


Magnesium   2.3   (1.8-2.4)  mg/dl


 


Total Bilirubin     (0.2-1.0)  mg/dL


 


AST     (15-37)  U/L


 


ALT     (14-59)  U/L


 


Alkaline Phosphatase     ()  U/L


 


C-Reactive Protein     (<1.0)  mg/dL


 


Total Protein     (6.4-8.2)  g/dl


 


Albumin     (3.4-5.0)  g/dl


 


Globulin     gm/dL


 


Albumin/Globulin Ratio     (1-2)  


 


Urine Color    Yellow  (Yellow)  


 


Urine Appearance    Clear  (Clear)  


 


Urine pH    6.5  (5.0-8.0)  


 


Ur Specific Gravity    1.020  (1.005-1.030)  


 


Urine Protein    Negative  (Negative)  


 


Urine Glucose (UA)    Negative  (Negative)  


 


Urine Ketones    Negative  (Negative)  


 


Urine Occult Blood    Negative  (Negative)  


 


Urine Nitrite    Negative  (Negative)  


 


Urine Bilirubin    Negative  (Negative)  


 


Urine Urobilinogen    0.2  (0.2-1.0)  


 


Ur Leukocyte Esterase    Negative  (Negative)  


 


Urine RBC    Not seen  (0-5)  /hpf


 


Urine WBC    0-5  (0-5)  /hpf


 


Ur Squamous Epith Cells    0-5  (0-5)  /hpf


 


Urine Bacteria    Few  (FEW)  /hpf


 


Hyaline Casts    5-10 H  (0-5)  /lpf


 


Urine Mucus    Not seen  (FEW)  /hpf


 


Mycoplasma pneumon IgM   Negative   (NEGATIVE)  


 


MRSA (PCR)     














  06/04/19 06/04/19 06/04/19 Range/Units





  17:15 18:42 21:19 


 


WBC     (3.98-10.04)  K/mm3


 


RBC     (3.98-5.22)  M/mm3


 


Hgb     (11.2-15.7)  gm/L


 


Hct     (34.1-44.9)  %


 


MCV     (79.4-94.8)  fl


 


MCH     (25.6-32.2)  pg


 


MCHC     (32.2-35.5)  g/dl


 


RDW Std Deviation     (36.4-46.3)  fL


 


Plt Count     (182-369)  K/mm3


 


MPV     (9.4-12.3)  fl


 


Neut % (Auto)     (34.0-71.1)  %


 


Lymph % (Auto)     (19.3-51.7)  %


 


Mono % (Auto)     (4.7-12.5)  %


 


Eos % (Auto)     (0.7-5.8)  


 


Baso % (Auto)     (0.1-1.2)  %


 


Neut # (Auto)     (1.56-6.13)  K/mm3


 


Lymph # (Auto)     (1.18-3.74)  K/mm3


 


Mono # (Auto)     (0.24-0.36)  K/mm3


 


Eos # (Auto)     (0.04-0.36)  K/mm3


 


Baso # (Auto)     (0.01-0.08)  K/mm3


 


Neutrophils % (Manual)     (40-60)  %


 


Band Neutrophils %     (0-10)  %


 


Lymphocytes % (Manual)     (20-40)  %


 


Atypical Lymphs %     %


 


Monocytes % (Manual)     (2-10)  %


 


Eosinophils % (Manual)     (0.7-5.8)  %


 


Basophils % (Manual)     (0.1-1.2)  


 


Manual Slide Review     


 


Toxic Granulation     


 


Dohle Bodies     


 


Platelet Estimate     


 


Plt Morphology Comment     


 


Hypochromasia     


 


Anisocytosis     


 


Stomatocytes     


 


RBC Morph Comment     


 


Puncture Site     


 


ABG pH     (7.35-7.45)  


 


ABG pCO2     (35.0-45.0)  mmHg


 


ABG pO2     (80.0-100.0)  mmHg


 


ABG HCO3     (22.0-26.0)  meq/L


 


ABG O2 Saturation     (96.0-97.0)  %


 


ABG Base Excess     (-2-2.0)  


 


Jose Rafael Test     


 


O2 Delivery Device     


 


Oxygen Flow Rate     


 


FiO2     (21..00)  %


 


Sodium     (136-145)  mEq/L


 


Potassium     (3.5-5.1)  mEq/L


 


Chloride     ()  mEq/L


 


Carbon Dioxide     (21-32)  mEq/L


 


Anion Gap     (5-15)  


 


BUN     (7-18)  mg/dL


 


Creatinine     (0.55-1.02)  mg/dL


 


Est Cr Clr Drug Dosing     mL/min


 


Estimated GFR (MDRD)     (>60)  mL/min


 


BUN/Creatinine Ratio     (14-18)  


 


Glucose     ()  mg/dL


 


POC Glucose   74 L  245 H  ()  mg/dL


 


Lactic Acid     (0.4-2.0)  mmol/L


 


Calcium     (8.5-10.1)  mg/dL


 


Phosphorus     (2.6-4.7)  mg/dL


 


Magnesium     (1.8-2.4)  mg/dl


 


Total Bilirubin     (0.2-1.0)  mg/dL


 


AST     (15-37)  U/L


 


ALT     (14-59)  U/L


 


Alkaline Phosphatase     ()  U/L


 


C-Reactive Protein     (<1.0)  mg/dL


 


Total Protein     (6.4-8.2)  g/dl


 


Albumin     (3.4-5.0)  g/dl


 


Globulin     gm/dL


 


Albumin/Globulin Ratio     (1-2)  


 


Urine Color     (Yellow)  


 


Urine Appearance     (Clear)  


 


Urine pH     (5.0-8.0)  


 


Ur Specific Gravity     (1.005-1.030)  


 


Urine Protein     (Negative)  


 


Urine Glucose (UA)     (Negative)  


 


Urine Ketones     (Negative)  


 


Urine Occult Blood     (Negative)  


 


Urine Nitrite     (Negative)  


 


Urine Bilirubin     (Negative)  


 


Urine Urobilinogen     (0.2-1.0)  


 


Ur Leukocyte Esterase     (Negative)  


 


Urine RBC     (0-5)  /hpf


 


Urine WBC     (0-5)  /hpf


 


Ur Squamous Epith Cells     (0-5)  /hpf


 


Urine Bacteria     (FEW)  /hpf


 


Hyaline Casts     (0-5)  /lpf


 


Urine Mucus     (FEW)  /hpf


 


Mycoplasma pneumon IgM     (NEGATIVE)  


 


MRSA (PCR)  Negative    














  06/05/19 06/05/19 06/05/19 Range/Units





  05:50 06:45 09:41 


 


WBC    18.93 H  (3.98-10.04)  K/mm3


 


RBC    3.48 L  (3.98-5.22)  M/mm3


 


Hgb    9.9 L  (11.2-15.7)  gm/L


 


Hct    31.4 L  (34.1-44.9)  %


 


MCV    90.2  (79.4-94.8)  fl


 


MCH    28.4  (25.6-32.2)  pg


 


MCHC    31.5 L  (32.2-35.5)  g/dl


 


RDW Std Deviation    46.1  (36.4-46.3)  fL


 


Plt Count    289  (182-369)  K/mm3


 


MPV    9.3 L  (9.4-12.3)  fl


 


Neut % (Auto)    87.5 H  (34.0-71.1)  %


 


Lymph % (Auto)    7.0 L  (19.3-51.7)  %


 


Mono % (Auto)    4.9  (4.7-12.5)  %


 


Eos % (Auto)    0.2 L  (0.7-5.8)  


 


Baso % (Auto)    0.1  (0.1-1.2)  %


 


Neut # (Auto)    16.57 H  (1.56-6.13)  K/mm3


 


Lymph # (Auto)    1.32  (1.18-3.74)  K/mm3


 


Mono # (Auto)    0.93 H  (0.24-0.36)  K/mm3


 


Eos # (Auto)    0.03 L  (0.04-0.36)  K/mm3


 


Baso # (Auto)    0.02  (0.01-0.08)  K/mm3


 


Neutrophils % (Manual)     (40-60)  %


 


Band Neutrophils %     (0-10)  %


 


Lymphocytes % (Manual)     (20-40)  %


 


Atypical Lymphs %     %


 


Monocytes % (Manual)     (2-10)  %


 


Eosinophils % (Manual)     (0.7-5.8)  %


 


Basophils % (Manual)     (0.1-1.2)  


 


Manual Slide Review    Normal smear  


 


Toxic Granulation     


 


Dohle Bodies     


 


Platelet Estimate     


 


Plt Morphology Comment     


 


Hypochromasia     


 


Anisocytosis     


 


Stomatocytes     


 


RBC Morph Comment     


 


Puncture Site     


 


ABG pH     (7.35-7.45)  


 


ABG pCO2     (35.0-45.0)  mmHg


 


ABG pO2     (80.0-100.0)  mmHg


 


ABG HCO3     (22.0-26.0)  meq/L


 


ABG O2 Saturation     (96.0-97.0)  %


 


ABG Base Excess     (-2-2.0)  


 


Jose Rafael Test     


 


O2 Delivery Device     


 


Oxygen Flow Rate     


 


FiO2     (21..00)  %


 


Sodium     (136-145)  mEq/L


 


Potassium     (3.5-5.1)  mEq/L


 


Chloride     ()  mEq/L


 


Carbon Dioxide     (21-32)  mEq/L


 


Anion Gap     (5-15)  


 


BUN     (7-18)  mg/dL


 


Creatinine     (0.55-1.02)  mg/dL


 


Est Cr Clr Drug Dosing     mL/min


 


Estimated GFR (MDRD)     (>60)  mL/min


 


BUN/Creatinine Ratio     (14-18)  


 


Glucose     ()  mg/dL


 


POC Glucose  64 L  115 H   ()  mg/dL


 


Lactic Acid     (0.4-2.0)  mmol/L


 


Calcium     (8.5-10.1)  mg/dL


 


Phosphorus     (2.6-4.7)  mg/dL


 


Magnesium     (1.8-2.4)  mg/dl


 


Total Bilirubin     (0.2-1.0)  mg/dL


 


AST     (15-37)  U/L


 


ALT     (14-59)  U/L


 


Alkaline Phosphatase     ()  U/L


 


C-Reactive Protein     (<1.0)  mg/dL


 


Total Protein     (6.4-8.2)  g/dl


 


Albumin     (3.4-5.0)  g/dl


 


Globulin     gm/dL


 


Albumin/Globulin Ratio     (1-2)  


 


Urine Color     (Yellow)  


 


Urine Appearance     (Clear)  


 


Urine pH     (5.0-8.0)  


 


Ur Specific Gravity     (1.005-1.030)  


 


Urine Protein     (Negative)  


 


Urine Glucose (UA)     (Negative)  


 


Urine Ketones     (Negative)  


 


Urine Occult Blood     (Negative)  


 


Urine Nitrite     (Negative)  


 


Urine Bilirubin     (Negative)  


 


Urine Urobilinogen     (0.2-1.0)  


 


Ur Leukocyte Esterase     (Negative)  


 


Urine RBC     (0-5)  /hpf


 


Urine WBC     (0-5)  /hpf


 


Ur Squamous Epith Cells     (0-5)  /hpf


 


Urine Bacteria     (FEW)  /hpf


 


Hyaline Casts     (0-5)  /lpf


 


Urine Mucus     (FEW)  /hpf


 


Mycoplasma pneumon IgM     (NEGATIVE)  


 


MRSA (PCR)     














  06/05/19 06/05/19 Range/Units





  09:41 09:41 


 


WBC    (3.98-10.04)  K/mm3


 


RBC    (3.98-5.22)  M/mm3


 


Hgb    (11.2-15.7)  gm/L


 


Hct    (34.1-44.9)  %


 


MCV    (79.4-94.8)  fl


 


MCH    (25.6-32.2)  pg


 


MCHC    (32.2-35.5)  g/dl


 


RDW Std Deviation    (36.4-46.3)  fL


 


Plt Count    (182-369)  K/mm3


 


MPV    (9.4-12.3)  fl


 


Neut % (Auto)    (34.0-71.1)  %


 


Lymph % (Auto)    (19.3-51.7)  %


 


Mono % (Auto)    (4.7-12.5)  %


 


Eos % (Auto)    (0.7-5.8)  


 


Baso % (Auto)    (0.1-1.2)  %


 


Neut # (Auto)    (1.56-6.13)  K/mm3


 


Lymph # (Auto)    (1.18-3.74)  K/mm3


 


Mono # (Auto)    (0.24-0.36)  K/mm3


 


Eos # (Auto)    (0.04-0.36)  K/mm3


 


Baso # (Auto)    (0.01-0.08)  K/mm3


 


Neutrophils % (Manual)    (40-60)  %


 


Band Neutrophils %    (0-10)  %


 


Lymphocytes % (Manual)    (20-40)  %


 


Atypical Lymphs %    %


 


Monocytes % (Manual)    (2-10)  %


 


Eosinophils % (Manual)    (0.7-5.8)  %


 


Basophils % (Manual)    (0.1-1.2)  


 


Manual Slide Review    


 


Toxic Granulation    


 


Dohle Bodies    


 


Platelet Estimate    


 


Plt Morphology Comment    


 


Hypochromasia    


 


Anisocytosis    


 


Stomatocytes    


 


RBC Morph Comment    


 


Puncture Site    


 


ABG pH    (7.35-7.45)  


 


ABG pCO2    (35.0-45.0)  mmHg


 


ABG pO2    (80.0-100.0)  mmHg


 


ABG HCO3    (22.0-26.0)  meq/L


 


ABG O2 Saturation    (96.0-97.0)  %


 


ABG Base Excess    (-2-2.0)  


 


Jose Rafael Test    


 


O2 Delivery Device    


 


Oxygen Flow Rate    


 


FiO2    (21..00)  %


 


Sodium  139   (136-145)  mEq/L


 


Potassium  2.9 L   (3.5-5.1)  mEq/L


 


Chloride  101   ()  mEq/L


 


Carbon Dioxide  30   (21-32)  mEq/L


 


Anion Gap  10.9   (5-15)  


 


BUN  22 H   (7-18)  mg/dL


 


Creatinine  1.6 H   (0.55-1.02)  mg/dL


 


Est Cr Clr Drug Dosing  17.79   mL/min


 


Estimated GFR (MDRD)  30   (>60)  mL/min


 


BUN/Creatinine Ratio  13.8 L   (14-18)  


 


Glucose  131 H   ()  mg/dL


 


POC Glucose    ()  mg/dL


 


Lactic Acid   1.2  (0.4-2.0)  mmol/L


 


Calcium  8.6   (8.5-10.1)  mg/dL


 


Phosphorus  2.3 L   (2.6-4.7)  mg/dL


 


Magnesium  2.1   (1.8-2.4)  mg/dl


 


Total Bilirubin    (0.2-1.0)  mg/dL


 


AST    (15-37)  U/L


 


ALT    (14-59)  U/L


 


Alkaline Phosphatase    ()  U/L


 


C-Reactive Protein    (<1.0)  mg/dL


 


Total Protein    (6.4-8.2)  g/dl


 


Albumin    (3.4-5.0)  g/dl


 


Globulin    gm/dL


 


Albumin/Globulin Ratio    (1-2)  


 


Urine Color    (Yellow)  


 


Urine Appearance    (Clear)  


 


Urine pH    (5.0-8.0)  


 


Ur Specific Gravity    (1.005-1.030)  


 


Urine Protein    (Negative)  


 


Urine Glucose (UA)    (Negative)  


 


Urine Ketones    (Negative)  


 


Urine Occult Blood    (Negative)  


 


Urine Nitrite    (Negative)  


 


Urine Bilirubin    (Negative)  


 


Urine Urobilinogen    (0.2-1.0)  


 


Ur Leukocyte Esterase    (Negative)  


 


Urine RBC    (0-5)  /hpf


 


Urine WBC    (0-5)  /hpf


 


Ur Squamous Epith Cells    (0-5)  /hpf


 


Urine Bacteria    (FEW)  /hpf


 


Hyaline Casts    (0-5)  /lpf


 


Urine Mucus    (FEW)  /hpf


 


Mycoplasma pneumon IgM    (NEGATIVE)  


 


MRSA (PCR)    











Med Orders - Current: 


 Current Medications





Acetaminophen (Tylenol)  975 mg PO Q6H PRN


   PRN Reason: Pain


Aspirin (Halfprin)  81 mg PO DAILY [facility]


   Last Admin: 06/05/19 10:10 Dose:  81 mg


Cholecalciferol (Vitamin D3)  1,000 units PO BEDTIME [facility]


   Last Admin: 06/04/19 21:14 Dose:  1,000 units


Clopidogrel Bisulfate (Plavix)  75 mg PO BEDTIME [facility]


   Last Admin: 06/04/19 21:15 Dose:  75 mg


Fentanyl (Duragesic)  12 mcg TRDERM Q72H [facility]


Gabapentin (Neurontin)  300 mg PO TID [facility]


   Last Admin: 06/05/19 10:10 Dose:  300 mg


Azithromycin 500 mg/ Sodium (Chloride)  250 mls @ 250 mls/hr IV Q24H [facility]


   Last Admin: 06/04/19 19:55 Dose:  250 mls/hr


Sodium Chloride (Normal Saline)  1,000 mls @ 100 mls/hr IV ASDIRECTED [facility]


   Last Admin: 06/05/19 00:08 Dose:  100 mls/hr


Piperacillin Sod/Tazobactam (Sod 4.5 gm/ Sodium Chloride)  100 mls @ 25 mls/hr 

IV Q12H [facility]


   Last Admin: 06/05/19 09:51 Dose:  25 mls/hr


Insulin Human Lispro (Humalog)  0 unit SUBCUT QIDACANDBED [facility]; Protocol


   Last Admin: 06/05/19 07:07 Dose:  Not Given


Metoprolol Succinate (Toprol Xl)  50 mg PO DAILY [facility]


   Last Admin: 06/05/19 09:52 Dose:  50 mg


Rivaroxaban (Xarelto)  15 mg PO DAILY [facility]


   Last Admin: 06/05/19 10:10 Dose:  15 mg


Saccharomyces Boulardii (Florastor)  250 mg PO BID [facility]


Senna/Docusate Sodium (Senna Plus)  1 tab PO DAILY PRN


   PRN Reason: Constipation


Sodium Chloride (Saline Flush)  10 ml FLUSH ASDIRECTED PRN


   PRN Reason: Keep Vein Open


   Last Admin: 06/04/19 14:46 Dose:  10 ml


Vancomycin HCl (Pharmacy To Dose - Vancomycin)  0 dose .XX DAILY PRN


   PRN Reason: RX TO DOSE VANCO





Discontinued Medications





Cholecalciferol (Vitamin D3)  1,000 units PO DAILY [facility]


   Last Admin: 06/04/19 21:18 Dose:  Not Given


Ceftriaxone Sodium 2 gm/ (Sodium Chloride)  100 mls @ 200 mls/hr IV NOW STA


   Stop: 06/04/19 15:15


   Last Admin: 06/04/19 16:10 Dose:  200 mls/hr


Sodium Chloride (Normal Saline)  1,000 mls @ 500 mls/hr IV ONETIME ONE


   Stop: 06/04/19 16:46


   Last Admin: 06/04/19 16:09 Dose:  500 mls/hr


Vancomycin HCl 1 gm/Vancomycin HCl 250 mg/ Sodium Chloride  250 mls @ 100 mls/

hr IV ONETIME ONE


   Stop: 06/04/19 20:26


   Last Admin: 06/04/19 21:16 Dose:  100 mls/hr


Sodium Chloride (Normal Saline) Confirm Administered Dose 100 mls @ as directed 

.ROUTE .STK-MED ONE


   Stop: 06/04/19 18:55


   Last Admin: 06/04/19 19:59 Dose:  Not Given


Piperacillin Sod/Tazobactam (Sod 4.5 gm/ Sodium Chloride)  100 mls @ 200 mls/hr 

IV ONETIME ONE


   Stop: 06/04/19 19:44


   Last Admin: 06/04/19 19:19 Dose:  200 mls/hr


Piperacillin Sod/Tazobactam Sod (Piperacil-Tazobact) Confirm Administered Dose 

4.5 gm .ROUTE .STK-MED ONE


   Stop: 06/05/19 09:30


Potassium Chloride (Klor-Con M20)  40 meq PO ONETIME ONE


   Stop: 06/04/19 15:38


   Last Admin: 06/04/19 16:11 Dose:  40 meq


Potassium Chloride (Klor-Con M20)  60 meq PO ONETIME ONE


   Stop: 06/05/19 10:35











- Exam


Physical Findings Comments:: 





General: Alert, Oriented, Cooperative


HEENT: Conjunctiva Clear, Nares Patent


Neck: Supple, Trachea Midline


Lungs: Decreased Breath Sounds, Crackles


Cardiovascular: Regular Rate, Normal S1, Normal S2


GI/Abdominal Exam: Soft, Non-Tender, No Distention


Extremities: No Pedal Edema


Peripheral Pulses: 2+: Dorsalis Pedis (L), Dorsalis Pedis (R)


Skin: Warm, Dry


Neuro Extensive - Motor, Sensory, Reflexes: CN II-XII Intact.  No: Motor/

Sensory Deficits








- Problem List & Annotations


(1) Pneumonia


SNOMED Code(s): 253195710


   Code(s): J18.9 - PNEUMONIA, UNSPECIFIED ORGANISM   Status: Acute   Priority: 

High   Current Visit: Yes   


Qualifiers: 


   Pneumonia type: due to unspecified organism   Laterality: left   Lung 

location: unspecified part of lung   Qualified Code(s): J18.9 - Pneumonia, 

unspecified organism   





(2) Sciatica neuralgia


SNOMED Code(s): 90301591


   Code(s): M54.30 - SCIATICA, UNSPECIFIED SIDE   Status: Acute   Current Visit

: Yes   





- Problem List Review


Problem List Initiated/Reviewed/Updated: Yes





- My Orders


Last 24 Hours: 


My Active Orders





06/04/19 16:00


LEGIONELLA ANTIGEN [MREF] Urgent 





06/04/19 17:53


Docusate Sodium/Sennosides [Senna Plus]   1 tab PO DAILY PRN 





06/04/19 17:57


Blood Glucose Check, Bedside [RC] WITHMEALSANDBED 


Pulse Oximetry [RC] .PRN 





06/04/19 18:00


Azithromycin [Zithromax] 500 mg   Sodium Chloride 0.9% [Normal Saline] 250 ml 

IV Q24H 


Sodium Chloride 0.9% [Normal Saline] 1,000 ml IV ASDIRECTED 





06/04/19 18:03


Height and Weight [RC] 04 


Vital Signs [RC] Q4HR 





06/04/19 18:04


Intake and Output [RC] 04,16 





06/04/19 18:11


Resuscitation Status Routine 





06/04/19 18:55


LEGIONELLA PNEUMOPHILA 1-6,IGM [REF] Urgent 





06/04/19 19:52


Acetaminophen [Tylenol]   975 mg PO Q6H PRN 





06/04/19 21:00


Cholecalciferol (Vitamin D3) [Vitamin D3]   1,000 units PO BEDTIME 


Clopidogrel [Plavix]   75 mg PO BEDTIME 


Gabapentin [Neurontin]   300 mg PO TID 





06/04/19 22:00


Insulin Lispro [HumaLOG]   See Protocol  SUBCUT QIDACANDBED 





06/04/19 Dinner


Consistent Carbohydrate Diet [DIET] 





06/05/19 09:00


Aspirin [Halfprin]   81 mg PO DAILY 


Metoprolol Succinate [Toprol XL]   50 mg PO DAILY 


Piperacillin/Tazobactam [Piperacil-Tazobact] 4.5 gm   Sodium Chloride 0.9% [

Normal Saline] 100 ml IV Q12H 


Rivaroxaban [Xarelto]   15 mg PO DAILY 





06/05/19 10:27


PT Evaluation and Treatment [CONS] Routine 





06/05/19 10:28


C DIFFICILE BY PCR W/NAP1 [MOLEC] Routine 


Isolation [COMM] Stat 





06/05/19 10:30


Saccharomyces Boulardii [Florastor]   250 mg PO BID 





06/05/19 10:35


Up With Assistance [RC] BID 





06/05/19 10:48


Pharmacy to Dose - Vancomycin   0 dose .XX DAILY PRN 





06/05/19 11:09


fentaNYL [Duragesic]   12 mcg TRDERM Q72H 





06/06/19 05:00


MAGNESIUM [CHEM] AM 


PHOSPHORUS [CHEM] AM 





06/06/19 05:11


BMP [BASIC METABOLIC PANEL,BMP] [CHEM] AM 


CBC WITH AUTO DIFF [HEME] AM 














- Plan


Plan:: 





1. Empiric azithromycin, vancomycin and Zosyn, has h/o MRSA.


2. 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


3. Added fentanyl patch.

## 2019-06-05 NOTE — CR
Chest: Portable view of the chest was obtained.

 

Comparison: Prior chest x-ray of 06/04/19 and 01/15/19.

 

Increased density is seen within both sides of the chest, worse on the

 left side.  Findings are felt to be fairly stable from most recent 

study when allowing for differences in inspiratory effort.  Heart size

 and mediastinum are unchanged.  Bony structures are also unchanged.

 

Impression:

1.  Increased parenchymal density within both sides of the chest worse

 on the left side.  No appreciable change is seen from most recent 

chest x-ray.

 

Diagnostic code #3

## 2019-06-06 RX ADMIN — Medication SCH MG: at 20:20

## 2019-06-06 RX ADMIN — Medication SCH: at 10:28

## 2019-06-06 RX ADMIN — VITAMIN D, TAB 1000IU (100/BT) SCH UNITS: 25 TAB at 20:19

## 2019-06-06 RX ADMIN — Medication SCH MG: at 09:20

## 2019-06-06 NOTE — PCM.PN
- General Info


Date of Service: 06/06/19


Admission Dx/Problem (Free Text): 





88 yo with chronic back pain, DMII, CHF admitted from ED after several days of 

fatigue, cough, hypoxemia, fever. CXR reveals bilteral interstitial infiltrates

, somewhat improved since previous admission. Had several admissions with 

similar presentation. Admitted as IP for further treatment.


6/5/2019, clinically better, has severe debilitating sciatica pain, will start 

trial of low dose fentanyl patch.


6/6/2019, pain is much better controlled with Duragesic, clinically better, 

very difficult to communicate, responds with delay and much reluctance, FiO2 

requirements increased, will continue judicial diuresis, O2, IS.





- Review of Systems


Systems Review Comment:: 





General: Reports: no Fever, Chills, less Weakness, Fatigue


HEENT: Denies: Dysphasia, Rhinitis, Post Nasal Drip


Pulmonary: Reports: Shortness of Breath, Cough.  Denies: Wheezing, Hemoptysis


Cardiovascular: Reports: Dyspnea on Exertion.  Denies: Chest Pain, Palpitations


Gastrointestinal: Denies: Abdominal Pain, Anorexia, Hematemesis, Hematochezia


Genitourinary: Reports: Incontinence.  Denies: Dysuria, Frequency


Musculoskeletal: Reports: Back Pain


Skin: Denies: Cyanosis, Jaundice


Psychiatric: Denies: Suicidal Ideation


Neurological: Denies: Seizure, Syncope


Hematologic/Lymphatic: Denies: Easy Bleeding, Easy Bruising








- Patient Data


Vitals - Most Recent: 


 Last Vital Signs











Temp  97.7 F   06/06/19 04:32


 


Pulse  79   06/06/19 09:18


 


Resp  20   06/06/19 04:32


 


BP  107/79   06/06/19 09:18


 


Pulse Ox  90 L  06/06/19 11:43











Weight - Most Recent: 150 lb 6.4 oz


I&O - Last 24 Hours: 


 Intake & Output











 06/06/19 06/06/19 06/06/19





 03:59 11:59 19:59


 


Intake Total  570 


 


Balance  570 











Lab Results Last 24 Hours: 


 Laboratory Results - last 24 hr











  06/05/19 06/05/19 06/06/19 Range/Units





  17:39 21:40 04:51 


 


WBC     (3.98-10.04)  K/mm3


 


RBC     (3.98-5.22)  M/mm3


 


Hgb     (11.2-15.7)  gm/L


 


Hct     (34.1-44.9)  %


 


MCV     (79.4-94.8)  fl


 


MCH     (25.6-32.2)  pg


 


MCHC     (32.2-35.5)  g/dl


 


RDW Std Deviation     (36.4-46.3)  fL


 


Plt Count     (182-369)  K/mm3


 


MPV     (9.4-12.3)  fl


 


Neut % (Auto)     (34.0-71.1)  %


 


Lymph % (Auto)     (19.3-51.7)  %


 


Mono % (Auto)     (4.7-12.5)  %


 


Eos % (Auto)     (0.7-5.8)  


 


Baso % (Auto)     (0.1-1.2)  %


 


Neut # (Auto)     (1.56-6.13)  K/mm3


 


Lymph # (Auto)     (1.18-3.74)  K/mm3


 


Mono # (Auto)     (0.24-0.36)  K/mm3


 


Eos # (Auto)     (0.04-0.36)  K/mm3


 


Baso # (Auto)     (0.01-0.08)  K/mm3


 


Manual Slide Review     


 


Sodium     (136-145)  mEq/L


 


Potassium     (3.5-5.1)  mEq/L


 


Chloride     ()  mEq/L


 


Carbon Dioxide     (21-32)  mEq/L


 


Anion Gap     (5-15)  


 


BUN     (7-18)  mg/dL


 


Creatinine     (0.55-1.02)  mg/dL


 


Est Cr Clr Drug Dosing     mL/min


 


Estimated GFR (MDRD)     (>60)  mL/min


 


BUN/Creatinine Ratio     (14-18)  


 


Glucose     ()  mg/dL


 


POC Glucose  239 H  226 H   ()  mg/dL


 


Calcium     (8.5-10.1)  mg/dL


 


Phosphorus    3.0  (2.6-4.7)  mg/dL


 


Magnesium    2.0  (1.8-2.4)  mg/dl


 


TSH 3rd Generation     (0.358-3.74)  uIU/mL














  06/06/19 06/06/19 06/06/19 Range/Units





  04:51 04:51 06:20 


 


WBC  14.90 H    (3.98-10.04)  K/mm3


 


RBC  2.93 L    (3.98-5.22)  M/mm3


 


Hgb  8.1 L D    (11.2-15.7)  gm/L


 


Hct  26.7 L    (34.1-44.9)  %


 


MCV  91.1    (79.4-94.8)  fl


 


MCH  27.6    (25.6-32.2)  pg


 


MCHC  30.3 L    (32.2-35.5)  g/dl


 


RDW Std Deviation  45.9    (36.4-46.3)  fL


 


Plt Count  241    (182-369)  K/mm3


 


MPV  10.2    (9.4-12.3)  fl


 


Neut % (Auto)  84.0 H    (34.0-71.1)  %


 


Lymph % (Auto)  9.3 L    (19.3-51.7)  %


 


Mono % (Auto)  6.0    (4.7-12.5)  %


 


Eos % (Auto)  0.3 L    (0.7-5.8)  


 


Baso % (Auto)  0.1    (0.1-1.2)  %


 


Neut # (Auto)  12.51 H    (1.56-6.13)  K/mm3


 


Lymph # (Auto)  1.39    (1.18-3.74)  K/mm3


 


Mono # (Auto)  0.90 H    (0.24-0.36)  K/mm3


 


Eos # (Auto)  0.04    (0.04-0.36)  K/mm3


 


Baso # (Auto)  0.02    (0.01-0.08)  K/mm3


 


Manual Slide Review  Normal smear    


 


Sodium   140   (136-145)  mEq/L


 


Potassium   3.0 L   (3.5-5.1)  mEq/L


 


Chloride   102   ()  mEq/L


 


Carbon Dioxide   28   (21-32)  mEq/L


 


Anion Gap   13.0   (5-15)  


 


BUN   21 H   (7-18)  mg/dL


 


Creatinine   1.8 H   (0.55-1.02)  mg/dL


 


Est Cr Clr Drug Dosing   15.82   mL/min


 


Estimated GFR (MDRD)   27   (>60)  mL/min


 


BUN/Creatinine Ratio   11.7 L   (14-18)  


 


Glucose   206 H   ()  mg/dL


 


POC Glucose    235 H  ()  mg/dL


 


Calcium   8.2 L   (8.5-10.1)  mg/dL


 


Phosphorus     (2.6-4.7)  mg/dL


 


Magnesium     (1.8-2.4)  mg/dl


 


TSH 3rd Generation   1.800   (0.358-3.74)  uIU/mL














  06/06/19 Range/Units





  11:22 


 


WBC   (3.98-10.04)  K/mm3


 


RBC   (3.98-5.22)  M/mm3


 


Hgb   (11.2-15.7)  gm/L


 


Hct   (34.1-44.9)  %


 


MCV   (79.4-94.8)  fl


 


MCH   (25.6-32.2)  pg


 


MCHC   (32.2-35.5)  g/dl


 


RDW Std Deviation   (36.4-46.3)  fL


 


Plt Count   (182-369)  K/mm3


 


MPV   (9.4-12.3)  fl


 


Neut % (Auto)   (34.0-71.1)  %


 


Lymph % (Auto)   (19.3-51.7)  %


 


Mono % (Auto)   (4.7-12.5)  %


 


Eos % (Auto)   (0.7-5.8)  


 


Baso % (Auto)   (0.1-1.2)  %


 


Neut # (Auto)   (1.56-6.13)  K/mm3


 


Lymph # (Auto)   (1.18-3.74)  K/mm3


 


Mono # (Auto)   (0.24-0.36)  K/mm3


 


Eos # (Auto)   (0.04-0.36)  K/mm3


 


Baso # (Auto)   (0.01-0.08)  K/mm3


 


Manual Slide Review   


 


Sodium   (136-145)  mEq/L


 


Potassium   (3.5-5.1)  mEq/L


 


Chloride   ()  mEq/L


 


Carbon Dioxide   (21-32)  mEq/L


 


Anion Gap   (5-15)  


 


BUN   (7-18)  mg/dL


 


Creatinine   (0.55-1.02)  mg/dL


 


Est Cr Clr Drug Dosing   mL/min


 


Estimated GFR (MDRD)   (>60)  mL/min


 


BUN/Creatinine Ratio   (14-18)  


 


Glucose   ()  mg/dL


 


POC Glucose  309 H  ()  mg/dL


 


Calcium   (8.5-10.1)  mg/dL


 


Phosphorus   (2.6-4.7)  mg/dL


 


Magnesium   (1.8-2.4)  mg/dl


 


TSH 3rd Generation   (0.358-3.74)  uIU/mL











[place] Results Last 24 Hours: 


 Microbiology











 06/04/19 16:00 Legionella Urinary Antigen - Final





 Urine 


 


 06/04/19 14:30 Aerobic Blood Culture - Preliminary





 Blood    NO GROWTH AFTER 1 DAY





 Anaerobic Blood Culture - Preliminary





    NO GROWTH AFTER 1 DAY


 


 06/04/19 14:25 Aerobic Blood Culture - Preliminary





 Blood    NO GROWTH AFTER 1 DAY





 Anaerobic Blood Culture - Preliminary





    NO GROWTH AFTER 1 DAY











Med Orders - Current: 


 Current Medications





Acetaminophen (Tylenol)  975 mg PO Q6H PRN


   PRN Reason: Pain


   Last Admin: 06/05/19 21:51 Dose:  975 mg


Albuterol/Ipratropium (Duoneb 3.0-0.5 Mg/3 Ml)  3 ml NEB Q4HRRT PRN


   PRN Reason: sob/wheezing


   Last Admin: 06/06/19 11:43 Dose:  3 ml


Aspirin (Halfprin)  81 mg PO DAILY [facility]


   Last Admin: 06/06/19 09:19 Dose:  81 mg


Cholecalciferol (Vitamin D3)  1,000 units PO BEDTIME [facility]


   Last Admin: 06/05/19 21:30 Dose:  1,000 units


Clopidogrel Bisulfate (Plavix)  75 mg PO BEDTIME [facility]


   Last Admin: 06/05/19 21:30 Dose:  75 mg


Fentanyl (Duragesic)  12 mcg TRDERM Q72H [facility]


   Last Admin: 06/05/19 12:55 Dose:  12 mcg


Gabapentin (Neurontin)  300 mg PO TID [facility]


   Last Admin: 06/06/19 11:28 Dose:  Not Given


Azithromycin 500 mg/ Sodium (Chloride)  250 mls @ 250 mls/hr IV Q24H [facility]


   Last Admin: 06/05/19 17:44 Dose:  250 mls/hr


Vancomycin HCl 1 gm/ Sodium (Chloride)  250 mls @ 250 mls/hr IV Q24H [facility]


   Last Admin: 06/05/19 21:31 Dose:  250 mls/hr


Piperacillin Sod/Tazobactam (Sod 4.5 gm/ Sodium Chloride)  100 mls @ 25 mls/hr 

IV Q12H [facility]


   Last Admin: 06/06/19 09:00 Dose:  25 mls/hr


Insulin Human Lispro (Humalog)  0 unit SUBCUT QIDACANDBED [facility]; Protocol


   Last Admin: 06/06/19 11:25 Dose:  12 unit


Metoprolol Succinate (Toprol Xl)  50 mg PO DAILY [facility]


   Last Admin: 06/06/19 09:18 Dose:  50 mg


Miscellaneous Information (Remove Patch)  1 ea TRDERM Q72H [facility]


Rivaroxaban (Xarelto)  15 mg PO DAILY [facility]


   Last Admin: 06/06/19 09:13 Dose:  15 mg


Saccharomyces Boulardii (Florastor)  250 mg PO BID [facility]


   Last Admin: 06/06/19 10:28 Dose:  Not Given


Senna/Docusate Sodium (Senna Plus)  1 tab PO DAILY PRN


   PRN Reason: Constipation


Sodium Chloride (Saline Flush)  10 ml FLUSH ASDIRECTED PRN


   PRN Reason: Keep Vein Open


   Last Admin: 06/04/19 14:46 Dose:  10 ml


Vancomycin HCl (Pharmacy To Dose - Vancomycin)  0 dose .XX DAILY PRN


   PRN Reason: RX TO DOSE VANCO





Discontinued Medications





Cholecalciferol (Vitamin D3)  1,000 units PO DAILY [facility]


   Last Admin: 06/04/19 21:18 Dose:  Not Given


Furosemide (Lasix)  40 mg IVPUSH NOW ONE


   Stop: 06/05/19 18:42


   Last Admin: 06/05/19 19:24 Dose:  40 mg


Ceftriaxone Sodium 2 gm/ (Sodium Chloride)  100 mls @ 200 mls/hr IV NOW STA


   Stop: 06/04/19 15:15


   Last Admin: 06/04/19 16:10 Dose:  200 mls/hr


Sodium Chloride (Normal Saline)  1,000 mls @ 500 mls/hr IV ONETIME ONE


   Stop: 06/04/19 16:46


   Last Admin: 06/04/19 16:09 Dose:  500 mls/hr


Sodium Chloride (Normal Saline)  1,000 mls @ 100 mls/hr IV ASDIRECTED [facility]


   Last Admin: 06/05/19 10:09 Dose:  100 mls/hr


Vancomycin HCl 1 gm/Vancomycin HCl 250 mg/ Sodium Chloride  250 mls @ 100 mls/

hr IV ONETIME ONE


   Stop: 06/04/19 20:26


   Last Admin: 06/04/19 21:16 Dose:  100 mls/hr


Piperacillin Sod/Tazobactam (Sod 4.5 gm/ Sodium Chloride)  100 mls @ 25 mls/hr 

IV Q12H [facility]


   Last Admin: 06/05/19 09:51 Dose:  25 mls/hr


Sodium Chloride (Normal Saline) Confirm Administered Dose 100 mls @ as directed 

.ROUTE .STK-MED ONE


   Stop: 06/04/19 18:55


   Last Admin: 06/04/19 19:59 Dose:  Not Given


Piperacillin Sod/Tazobactam (Sod 4.5 gm/ Sodium Chloride)  100 mls @ 200 mls/hr 

IV ONETIME ONE


   Stop: 06/04/19 19:44


   Last Admin: 06/04/19 19:19 Dose:  200 mls/hr


Potassium Chloride 10 meq/ (Premix)  100 mls @ 100 mls/hr IV Q1H [facility]


   Stop: 06/06/19 11:59


   Last Admin: 06/06/19 13:20 Dose:  Not Given


Piperacillin Sod/Tazobactam Sod (Piperacil-Tazobact) Confirm Administered Dose 

4.5 gm .ROUTE .STK-MED ONE


   Stop: 06/05/19 09:30


   Last Admin: 06/05/19 12:13 Dose:  Not Given


Potassium Chloride (Klor-Con M20)  40 meq PO ONETIME ONE


   Stop: 06/04/19 15:38


   Last Admin: 06/04/19 16:11 Dose:  40 meq


Potassium Chloride (Klor-Con M20)  60 meq PO ONETIME ONE


   Stop: 06/05/19 10:35


   Last Admin: 06/05/19 12:13 Dose:  60 meq


Potassium Chloride (Potassium Chloride Solution)  60 meq PO ONETIME ONE


   Stop: 06/06/19 12:01


   Last Admin: 06/06/19 11:27 Dose:  60 meq











- Exam


Physical Findings Comments:: 





General: Alert, Oriented, Cooperative


HEENT: Conjunctiva Clear, Nares Patent


Neck: Supple, Trachea Midline


Lungs: Decreased Breath Sounds, Crackles


Cardiovascular: Regular Rate, Normal S1, Normal S2


GI/Abdominal Exam: Soft, Non-Tender, No Distention


Extremities: No Pedal Edema


Peripheral Pulses: 2+: Dorsalis Pedis (L), Dorsalis Pedis (R)


Skin: Warm, Dry


Neuro Extensive - Motor, Sensory, Reflexes: CN II-XII Intact.  No: Motor/

Sensory Deficits





- Problem List & Annotations


(1) Pneumonia


SNOMED Code(s): 827726538


   Code(s): J18.9 - PNEUMONIA, UNSPECIFIED ORGANISM   Status: Acute   Priority: 

High   Current Visit: Yes   


Qualifiers: 


   Pneumonia type: due to unspecified organism   Laterality: left   Lung 

location: unspecified part of lung   Qualified Code(s): J18.9 - Pneumonia, 

unspecified organism   





(2) Sciatica neuralgia


SNOMED Code(s): 44814351


   Code(s): M54.30 - SCIATICA, UNSPECIFIED SIDE   Status: Acute   Current Visit

: Yes   





- Problem List Review


Problem List Initiated/Reviewed/Updated: Yes





- My Orders


Last 24 Hours: 


My Active Orders





06/05/19 21:00


Vancomycin 1 gm   Sodium Chloride 0.9% [Normal Saline] 250 ml IV Q24H 





06/05/19 22:00


Piperacillin/Tazobactam [Piperacil-Tazobact] 4.5 gm   Sodium Chloride 0.9% [

Normal Saline] 100 ml IV Q12H 





06/06/19 10:42


Albuterol/Ipratropium [DuoNeb 3.0-0.5 MG/3 ML]   3 ml NEB Q4HRRT PRN 





06/06/19 10:43


Acapella [RT Chest Physiotherapy] [RC] ASDIRECTED 


Incentive Spirometry [RT Incentive Spirometry] [RC] ASDIRECTED 


RT Aerosol Therapy [RC] ASDIRECTED 





06/06/19 13:25


CULTURE SPUTUM + SMEAR [RM] Routine 





06/06/19 20:30


VANCOMYCIN TROUGH [CHEM] Timed 





06/08/19 12:00


Remove Patch   1 ea TRDERM Q72H 














- Plan


Plan:: 





1. Empiric azithromycin, vancomycin and Zosyn, has h/o MRSA.


2. 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


3. Added fentanyl patch.


4. Increasing O2 requirements and WOB, judicial diuresis.

## 2019-06-07 RX ADMIN — POTASSIUM CHLORIDE SCH MEQ: 1500 TABLET, EXTENDED RELEASE ORAL at 16:04

## 2019-06-07 RX ADMIN — Medication SCH MG: at 08:29

## 2019-06-07 RX ADMIN — POTASSIUM CHLORIDE SCH MEQ: 1500 TABLET, EXTENDED RELEASE ORAL at 21:46

## 2019-06-07 RX ADMIN — Medication SCH MG: at 21:45

## 2019-06-07 RX ADMIN — VITAMIN D, TAB 1000IU (100/BT) SCH UNITS: 25 TAB at 21:45

## 2019-06-07 NOTE — PCM.PN
- General Info


Date of Service: 06/07/19


Admission Dx/Problem (Free Text): 





88 yo with chronic back pain, DMII, CHF admitted from ED


 after several days of fatigue, cough, hypoxemia, fever.


 CXR reveals bilteral interstitial infiltrates, 


 i/os  -800


o2  5  liters  n.c 


  


 feels  better 


 eating  better /  less  sob but  slept  only fair 


 pain  left  arm  better


  iv  site  okay 


 lungs   decreased  bs  lft  base / few  crackles  


cor  rrrn 


abd  benign


 ext. gross  edema  2 plus 


  loss  muscle  


 neuro  grossly intact and weak 


  ms  arthritis  extensive


  osteoporosis  posture in bed 


 lab  reviewed   hgn  9.6


  bnp  2345


  k  3.0


  mg  1.8 


  na 135 


creat 1.6 


gfr 40 


 xray   infiltrates   little  change and  left  pleural effusion  unchanged /  

mild  vasc  congestion 


 


  


Functional Status: Reports: Pain Controlled, Tolerating Diet





- Review of Systems


General: Reports: No Symptoms, Weakness, Fatigue


HEENT: Reports: No Symptoms


Pulmonary: Reports: Shortness of Breath


Cardiovascular: Reports: No Symptoms, Dyspnea on Exertion


Gastrointestinal: Reports: No Symptoms


Genitourinary: Reports: No Symptoms


Musculoskeletal: Reports: No Symptoms


Skin: Reports: No Symptoms, Pallor, Bruising


Neurological: Reports: No Symptoms


Psychiatric: Reports: No Symptoms





- Patient Data


Vitals - Most Recent: 


 Last Vital Signs











Temp  36.7 C   06/07/19 16:01


 


Pulse  78   06/07/19 16:01


 


Resp  22 H  06/07/19 16:01


 


BP  110/44 L  06/07/19 16:01


 


Pulse Ox  91 L  06/07/19 20:09











Weight - Most Recent: 67.676 kg


I&O - Last 24 Hours: 


 Intake & Output











 06/07/19 06/07/19 06/07/19





 06:59 14:59 22:59


 


Intake Total 1239  920


 


Output Total 550  50


 


Balance 689  870











Lab Results Last 24 Hours: 


 Laboratory Results - last 24 hr











  06/06/19 06/07/19 06/07/19 Range/Units





  21:20 05:00 05:30 


 


WBC     (3.98-10.04)  K/mm3


 


RBC     (3.98-5.22)  M/mm3


 


Hgb     (11.2-15.7)  gm/L


 


Hct     (34.1-44.9)  %


 


MCV     (79.4-94.8)  fl


 


MCH     (25.6-32.2)  pg


 


MCHC     (32.2-35.5)  g/dl


 


RDW Std Deviation     (36.4-46.3)  fL


 


Plt Count     (182-369)  K/mm3


 


MPV     (9.4-12.3)  fl


 


Neut % (Auto)     (34.0-71.1)  %


 


Lymph % (Auto)     (19.3-51.7)  %


 


Mono % (Auto)     (4.7-12.5)  %


 


Eos % (Auto)     (0.7-5.8)  


 


Baso % (Auto)     (0.1-1.2)  %


 


Neut # (Auto)     (1.56-6.13)  K/mm3


 


Lymph # (Auto)     (1.18-3.74)  K/mm3


 


Mono # (Auto)     (0.24-0.36)  K/mm3


 


Eos # (Auto)     (0.04-0.36)  K/mm3


 


Baso # (Auto)     (0.01-0.08)  K/mm3


 


Puncture Site   Rt radial   


 


ABG pH   7.49 H   (7.35-7.45)  


 


ABG pCO2   38.5   (35.0-45.0)  mmHg


 


ABG pO2   60.0 L   (80.0-100.0)  mmHg


 


ABG HCO3   29.2 H   (22.0-26.0)  meq/L


 


ABG O2 Saturation   91.8 L   (96.0-97.0)  %


 


ABG Base Excess   5.8 H   (-2-2.0)  


 


Jose Rafael Test   Positive   


 


A-a Gradient   148   mmHg


 


O2 Delivery Device   Nasal cannula   


 


Oxygen Flow Rate   5.0   


 


FiO2   40.00   (21..00)  %


 


Sodium     (136-145)  mEq/L


 


Potassium     (3.5-5.1)  mEq/L


 


Chloride     ()  mEq/L


 


Carbon Dioxide     (21-32)  mEq/L


 


Anion Gap     (5-15)  


 


BUN     (7-18)  mg/dL


 


Creatinine     (0.55-1.02)  mg/dL


 


Est Cr Clr Drug Dosing     mL/min


 


Estimated GFR (MDRD)     (>60)  mL/min


 


BUN/Creatinine Ratio     (14-18)  


 


Glucose     ()  mg/dL


 


POC Glucose     ()  mg/dL


 


Lactic Acid     (0.4-2.0)  mmol/L


 


Calcium     (8.5-10.1)  mg/dL


 


Phosphorus    2.8  (2.6-4.7)  mg/dL


 


Magnesium    1.8  (1.8-2.4)  mg/dl


 


Iron     ()  ug/dL


 


TIBC     (100-400)  ug/dL


 


% Saturation     (20-55)  %


 


Transferrin     (202-364)  mg/dL


 


Troponin I     (0.00-0.056)  ng/mL


 


C-Reactive Protein     (<1.0)  mg/dL


 


NT-Pro-B Natriuret Pep     (0-450)  pg/mL


 


Vancomycin Trough  14.5    (10.0-20.0)  














  06/07/19 06/07/19 06/07/19 Range/Units





  05:30 05:30 05:30 


 


WBC     (3.98-10.04)  K/mm3


 


RBC     (3.98-5.22)  M/mm3


 


Hgb     (11.2-15.7)  gm/L


 


Hct     (34.1-44.9)  %


 


MCV     (79.4-94.8)  fl


 


MCH     (25.6-32.2)  pg


 


MCHC     (32.2-35.5)  g/dl


 


RDW Std Deviation     (36.4-46.3)  fL


 


Plt Count     (182-369)  K/mm3


 


MPV     (9.4-12.3)  fl


 


Neut % (Auto)     (34.0-71.1)  %


 


Lymph % (Auto)     (19.3-51.7)  %


 


Mono % (Auto)     (4.7-12.5)  %


 


Eos % (Auto)     (0.7-5.8)  


 


Baso % (Auto)     (0.1-1.2)  %


 


Neut # (Auto)     (1.56-6.13)  K/mm3


 


Lymph # (Auto)     (1.18-3.74)  K/mm3


 


Mono # (Auto)     (0.24-0.36)  K/mm3


 


Eos # (Auto)     (0.04-0.36)  K/mm3


 


Baso # (Auto)     (0.01-0.08)  K/mm3


 


Puncture Site     


 


ABG pH     (7.35-7.45)  


 


ABG pCO2     (35.0-45.0)  mmHg


 


ABG pO2     (80.0-100.0)  mmHg


 


ABG HCO3     (22.0-26.0)  meq/L


 


ABG O2 Saturation     (96.0-97.0)  %


 


ABG Base Excess     (-2-2.0)  


 


Jose Rafael Test     


 


A-a Gradient     mmHg


 


O2 Delivery Device     


 


Oxygen Flow Rate     


 


FiO2     (21..00)  %


 


Sodium  138    (136-145)  mEq/L


 


Potassium  3.0 L    (3.5-5.1)  mEq/L


 


Chloride  99    ()  mEq/L


 


Carbon Dioxide  29    (21-32)  mEq/L


 


Anion Gap  13.0    (5-15)  


 


BUN  19 H    (7-18)  mg/dL


 


Creatinine  1.6 H    (0.55-1.02)  mg/dL


 


Est Cr Clr Drug Dosing  17.79    mL/min


 


Estimated GFR (MDRD)  30    (>60)  mL/min


 


BUN/Creatinine Ratio  11.9 L    (14-18)  


 


Glucose  202 H    ()  mg/dL


 


POC Glucose     ()  mg/dL


 


Lactic Acid    0.8  (0.4-2.0)  mmol/L


 


Calcium  8.6    (8.5-10.1)  mg/dL


 


Phosphorus     (2.6-4.7)  mg/dL


 


Magnesium     (1.8-2.4)  mg/dl


 


Iron     ()  ug/dL


 


TIBC     (100-400)  ug/dL


 


% Saturation     (20-55)  %


 


Transferrin     (202-364)  mg/dL


 


Troponin I  < 0.017    (0.00-0.056)  ng/mL


 


C-Reactive Protein     (<1.0)  mg/dL


 


NT-Pro-B Natriuret Pep   2345 H   (0-450)  pg/mL


 


Vancomycin Trough     (10.0-20.0)  














  06/07/19 06/07/19 06/07/19 Range/Units





  05:30 05:30 05:30 


 


WBC  15.07 H    (3.98-10.04)  K/mm3


 


RBC  2.89 L    (3.98-5.22)  M/mm3


 


Hgb  8.2 L    (11.2-15.7)  gm/L


 


Hct  26.1 L    (34.1-44.9)  %


 


MCV  90.3    (79.4-94.8)  fl


 


MCH  28.4    (25.6-32.2)  pg


 


MCHC  31.4 L    (32.2-35.5)  g/dl


 


RDW Std Deviation  45.3    (36.4-46.3)  fL


 


Plt Count  274    (182-369)  K/mm3


 


MPV  10.2    (9.4-12.3)  fl


 


Neut % (Auto)  82.6 H    (34.0-71.1)  %


 


Lymph % (Auto)  10.2 L    (19.3-51.7)  %


 


Mono % (Auto)  5.2    (4.7-12.5)  %


 


Eos % (Auto)  1.5    (0.7-5.8)  


 


Baso % (Auto)  0.1    (0.1-1.2)  %


 


Neut # (Auto)  12.46 H    (1.56-6.13)  K/mm3


 


Lymph # (Auto)  1.53    (1.18-3.74)  K/mm3


 


Mono # (Auto)  0.78 H    (0.24-0.36)  K/mm3


 


Eos # (Auto)  0.22    (0.04-0.36)  K/mm3


 


Baso # (Auto)  0.02    (0.01-0.08)  K/mm3


 


Puncture Site     


 


ABG pH     (7.35-7.45)  


 


ABG pCO2     (35.0-45.0)  mmHg


 


ABG pO2     (80.0-100.0)  mmHg


 


ABG HCO3     (22.0-26.0)  meq/L


 


ABG O2 Saturation     (96.0-97.0)  %


 


ABG Base Excess     (-2-2.0)  


 


Jose Rafael Test     


 


A-a Gradient     mmHg


 


O2 Delivery Device     


 


Oxygen Flow Rate     


 


FiO2     (21..00)  %


 


Sodium     (136-145)  mEq/L


 


Potassium     (3.5-5.1)  mEq/L


 


Chloride     ()  mEq/L


 


Carbon Dioxide     (21-32)  mEq/L


 


Anion Gap     (5-15)  


 


BUN     (7-18)  mg/dL


 


Creatinine     (0.55-1.02)  mg/dL


 


Est Cr Clr Drug Dosing     mL/min


 


Estimated GFR (MDRD)     (>60)  mL/min


 


BUN/Creatinine Ratio     (14-18)  


 


Glucose     ()  mg/dL


 


POC Glucose     ()  mg/dL


 


Lactic Acid     (0.4-2.0)  mmol/L


 


Calcium     (8.5-10.1)  mg/dL


 


Phosphorus     (2.6-4.7)  mg/dL


 


Magnesium     (1.8-2.4)  mg/dl


 


Iron   18 L   ()  ug/dL


 


TIBC   183   (100-400)  ug/dL


 


% Saturation   10 L   (20-55)  %


 


Transferrin   146 L   (202-364)  mg/dL


 


Troponin I     (0.00-0.056)  ng/mL


 


C-Reactive Protein    19.5 H*  (<1.0)  mg/dL


 


NT-Pro-B Natriuret Pep     (0-450)  pg/mL


 


Vancomycin Trough     (10.0-20.0)  














  06/07/19 06/07/19 06/07/19 Range/Units





  06:26 12:11 17:17 


 


WBC     (3.98-10.04)  K/mm3


 


RBC     (3.98-5.22)  M/mm3


 


Hgb     (11.2-15.7)  gm/L


 


Hct     (34.1-44.9)  %


 


MCV     (79.4-94.8)  fl


 


MCH     (25.6-32.2)  pg


 


MCHC     (32.2-35.5)  g/dl


 


RDW Std Deviation     (36.4-46.3)  fL


 


Plt Count     (182-369)  K/mm3


 


MPV     (9.4-12.3)  fl


 


Neut % (Auto)     (34.0-71.1)  %


 


Lymph % (Auto)     (19.3-51.7)  %


 


Mono % (Auto)     (4.7-12.5)  %


 


Eos % (Auto)     (0.7-5.8)  


 


Baso % (Auto)     (0.1-1.2)  %


 


Neut # (Auto)     (1.56-6.13)  K/mm3


 


Lymph # (Auto)     (1.18-3.74)  K/mm3


 


Mono # (Auto)     (0.24-0.36)  K/mm3


 


Eos # (Auto)     (0.04-0.36)  K/mm3


 


Baso # (Auto)     (0.01-0.08)  K/mm3


 


Puncture Site     


 


ABG pH     (7.35-7.45)  


 


ABG pCO2     (35.0-45.0)  mmHg


 


ABG pO2     (80.0-100.0)  mmHg


 


ABG HCO3     (22.0-26.0)  meq/L


 


ABG O2 Saturation     (96.0-97.0)  %


 


ABG Base Excess     (-2-2.0)  


 


Jose Rafael Test     


 


A-a Gradient     mmHg


 


O2 Delivery Device     


 


Oxygen Flow Rate     


 


FiO2     (21..00)  %


 


Sodium     (136-145)  mEq/L


 


Potassium     (3.5-5.1)  mEq/L


 


Chloride     ()  mEq/L


 


Carbon Dioxide     (21-32)  mEq/L


 


Anion Gap     (5-15)  


 


BUN     (7-18)  mg/dL


 


Creatinine     (0.55-1.02)  mg/dL


 


Est Cr Clr Drug Dosing     mL/min


 


Estimated GFR (MDRD)     (>60)  mL/min


 


BUN/Creatinine Ratio     (14-18)  


 


Glucose     ()  mg/dL


 


POC Glucose  231 H  350 H  256 H  ()  mg/dL


 


Lactic Acid     (0.4-2.0)  mmol/L


 


Calcium     (8.5-10.1)  mg/dL


 


Phosphorus     (2.6-4.7)  mg/dL


 


Magnesium     (1.8-2.4)  mg/dl


 


Iron     ()  ug/dL


 


TIBC     (100-400)  ug/dL


 


% Saturation     (20-55)  %


 


Transferrin     (202-364)  mg/dL


 


Troponin I     (0.00-0.056)  ng/mL


 


C-Reactive Protein     (<1.0)  mg/dL


 


NT-Pro-B Natriuret Pep     (0-450)  pg/mL


 


Vancomycin Trough     (10.0-20.0)  











[place] Results Last 24 Hours: 


 Microbiology











 06/04/19 14:30 Aerobic Blood Culture - Preliminary





 Blood    NO GROWTH AFTER 3 DAYS





 Anaerobic Blood Culture - Preliminary





    NO GROWTH AFTER 3 DAYS


 


 06/04/19 14:25 Aerobic Blood Culture - Preliminary





 Blood    NO GROWTH AFTER 3 DAYS





 Anaerobic Blood Culture - Preliminary





    NO GROWTH AFTER 3 DAYS











Med Orders - Current: 


 Current Medications





Acetaminophen (Tylenol)  975 mg PO Q6H PRN


   PRN Reason: Pain


   Last Admin: 06/05/19 21:51 Dose:  975 mg


Albuterol/Ipratropium (Duoneb 3.0-0.5 Mg/3 Ml)  3 ml NEB QIDRT [facility]


   Last Admin: 06/07/19 20:08 Dose:  3 ml


Aspirin (Halfprin)  81 mg PO DAILY [facility]


   Last Admin: 06/07/19 08:29 Dose:  81 mg


Cholecalciferol (Vitamin D3)  1,000 units PO BEDTIME [facility]


   Last Admin: 06/06/19 20:19 Dose:  1,000 units


Clopidogrel Bisulfate (Plavix)  75 mg PO BEDTIME [facility]


   Last Admin: 06/06/19 20:20 Dose:  75 mg


Fentanyl (Duragesic)  12 mcg TRDERM Q72H [facility]


   Last Admin: 06/05/19 12:55 Dose:  12 mcg


Gabapentin (Neurontin)  300 mg PO TID [facility]


   Last Admin: 06/07/19 16:04 Dose:  300 mg


Vancomycin HCl 1 gm/ Sodium (Chloride)  250 mls @ 250 mls/hr IV Q24H [facility]


   Last Admin: 06/06/19 22:11 Dose:  250 mls/hr


Piperacillin Sod/Tazobactam (Sod 4.5 gm/ Sodium Chloride)  100 mls @ 25 mls/hr 

IV Q12H [facility]


   Last Admin: 06/07/19 10:02 Dose:  25 mls/hr


Insulin Human Lispro (Humalog)  0 unit SUBCUT QIDACANDBED [facility]; Protocol


   Last Admin: 06/07/19 18:00 Dose:  9 unit


Metoprolol Succinate (Toprol Xl)  50 mg PO DAILY [facility]


   Last Admin: 06/07/19 08:35 Dose:  50 mg


Miscellaneous Information (Remove Patch)  1 ea TRDERM Q72H [facility]


Potassium Chloride (Klor-Con M20)  40 meq PO TID [facility]


   Stop: 06/08/19 15:01


   Last Admin: 06/07/19 16:04 Dose:  40 meq


Rivaroxaban (Xarelto)  15 mg PO DAILY [facility]


   Last Admin: 06/07/19 08:30 Dose:  15 mg


Saccharomyces Boulardii (Florastor)  250 mg PO BID [facility]


   Last Admin: 06/07/19 08:29 Dose:  250 mg


Senna/Docusate Sodium (Senna Plus)  1 tab PO DAILY PRN


   PRN Reason: Constipation


Sodium Chloride (Saline Flush)  10 ml FLUSH ASDIRECTED PRN


   PRN Reason: Keep Vein Open


   Last Admin: 06/04/19 14:46 Dose:  10 ml


Spironolactone (Aldactone)  25 mg PO DAILY [facility]


Vancomycin HCl (Pharmacy To Dose - Vancomycin)  0 dose .XX DAILY PRN


   PRN Reason: RX TO DOSE VANCO





Discontinued Medications





Albuterol/Ipratropium (Duoneb 3.0-0.5 Mg/3 Ml)  3 ml NEB Q4HRRT PRN


   PRN Reason: sob/wheezing


   Last Admin: 06/06/19 11:43 Dose:  3 ml


Cholecalciferol (Vitamin D3)  1,000 units PO DAILY [facility]


   Last Admin: 06/04/19 21:18 Dose:  Not Given


Furosemide (Lasix)  40 mg IVPUSH NOW ONE


   Stop: 06/05/19 18:42


   Last Admin: 06/05/19 19:24 Dose:  40 mg


Furosemide (Lasix)  40 mg IVPUSH NOW ONE


   Stop: 06/06/19 13:35


   Last Admin: 06/06/19 14:30 Dose:  40 mg


Ceftriaxone Sodium 2 gm/ (Sodium Chloride)  100 mls @ 200 mls/hr IV NOW STA


   Stop: 06/04/19 15:15


   Last Admin: 06/04/19 16:10 Dose:  200 mls/hr


Sodium Chloride (Normal Saline)  1,000 mls @ 500 mls/hr IV ONETIME ONE


   Stop: 06/04/19 16:46


   Last Admin: 06/04/19 16:09 Dose:  500 mls/hr


Azithromycin 500 mg/ Sodium (Chloride)  250 mls @ 250 mls/hr IV Q24H [facility]


   Last Admin: 06/06/19 17:18 Dose:  250 mls/hr


Sodium Chloride (Normal Saline)  1,000 mls @ 100 mls/hr IV ASDIRECTED [facility]


   Last Admin: 06/05/19 10:09 Dose:  100 mls/hr


Vancomycin HCl 1 gm/Vancomycin HCl 250 mg/ Sodium Chloride  250 mls @ 100 mls/

hr IV ONETIME ONE


   Stop: 06/04/19 20:26


   Last Admin: 06/04/19 21:16 Dose:  100 mls/hr


Piperacillin Sod/Tazobactam (Sod 4.5 gm/ Sodium Chloride)  100 mls @ 25 mls/hr 

IV Q12H [facility]


   Last Admin: 06/05/19 09:51 Dose:  25 mls/hr


Sodium Chloride (Normal Saline) Confirm Administered Dose 100 mls @ as directed 

.ROUTE .STK-MED ONE


   Stop: 06/04/19 18:55


   Last Admin: 06/04/19 19:59 Dose:  Not Given


Piperacillin Sod/Tazobactam (Sod 4.5 gm/ Sodium Chloride)  100 mls @ 200 mls/hr 

IV ONETIME ONE


   Stop: 06/04/19 19:44


   Last Admin: 06/04/19 19:19 Dose:  200 mls/hr


Potassium Chloride 10 meq/ (Premix)  100 mls @ 100 mls/hr IV Q1H [facility]


   Stop: 06/06/19 11:59


   Last Admin: 06/06/19 13:20 Dose:  Not Given


Piperacillin Sod/Tazobactam Sod (Piperacil-Tazobact) Confirm Administered Dose 

4.5 gm .ROUTE .STK-MED ONE


   Stop: 06/05/19 09:30


   Last Admin: 06/05/19 12:13 Dose:  Not Given


Potassium Chloride (Klor-Con M20)  40 meq PO ONETIME ONE


   Stop: 06/04/19 15:38


   Last Admin: 06/04/19 16:11 Dose:  40 meq


Potassium Chloride (Klor-Con M20)  60 meq PO ONETIME ONE


   Stop: 06/05/19 10:35


   Last Admin: 06/05/19 12:13 Dose:  60 meq


Potassium Chloride (Potassium Chloride Solution)  60 meq PO ONETIME ONE


   Stop: 06/06/19 12:01


   Last Admin: 06/06/19 11:27 Dose:  60 meq











- Exam


Quality Assessment: Supplemental Oxygen ( 5 liters  decreased  to  3  liters )


General: Alert, Oriented


HEENT: Pupils Equal, Pupils Reactive, EOMI, Mucous Membr. Moist/Pink


Neck: Supple


Lungs: Clear to Auscultation, Normal Respiratory Effort, Decreased Breath Sounds

, Crackles, Other (absent   lll bs )


Cardiovascular: Regular Rate, Regular Rhythm


GI/Abdominal Exam: Normal Bowel Sounds, Soft, Non-Tender, No Organomegaly, No 

Distention, No Abnormal Bruit, No Mass, Pelvis Stable


 (Female) Exam: Normal External Exam, Normal Speculum Exam, Normal Bimanual 

Exam


Back Exam: Normal Inspection, Full Range of Motion


Extremities: Normal Inspection, Normal Range of Motion, Non-Tender, No Pedal 

Edema, Normal Capillary Refill


Skin: Warm, Dry, Intact


Wound/Incisions: Healing Well


Neurological: No New Focal Deficit


Psy/Mental Status: Alert, Normal Affect, Normal Mood





- Problem List & Annotations


(1) CHF (congestive heart failure), NYHA class II


SNOMED Code(s): 678105564, 593281216


   Code(s): I50.9 - HEART FAILURE, UNSPECIFIED   Status: Acute   Priority: High

   Current Visit: Yes   Onset Date: 06/06/19   


Qualifiers: 


   Congestive heart failure type: unspecified   Qualified Code(s): I50.9 - 

Heart failure, unspecified   





(2) Diabetes


SNOMED Code(s): 26002786


   Code(s): E11.9 - TYPE 2 DIABETES MELLITUS WITHOUT COMPLICATIONS   Status: 

Acute   Priority: High   Current Visit: Yes   Onset Date: 06/05/19   


Qualifiers: 


   Diabetes mellitus type: type 2   Diabetes mellitus long term insulin use: 

unspecified long term insulin use status   Diabetes mellitus complication status

: with neurologic complications   Diabetes mellitus complication detail: with 

unspecified neuropathy   Qualified Code(s): E11.40 - Type 2 diabetes mellitus 

with diabetic neuropathy, unspecified   





(3) Chronic renal insufficiency, stage III (moderate)


SNOMED Code(s): 048292452


   Code(s): N18.3 - CHRONIC KIDNEY DISEASE, STAGE 3 (MODERATE)   Status: Acute 

  Priority: Medium   Current Visit: Yes   Onset Date: 06/05/19   





(4) Anemia


SNOMED Code(s): 651014484


   Code(s): D64.9 - ANEMIA, UNSPECIFIED   Status: Acute   Priority: Medium   

Current Visit: Yes   Onset Date: 06/05/19   


Qualifiers: 


   Anemia type: due to chronic kidney disease   Chronic kidney disease stage: 

stage 3 (moderate)   Qualified Code(s): N18.3 - Chronic kidney disease, stage 3 

(moderate); D63.1 - Anemia in chronic kidney disease   





(5) Hypokalemia


SNOMED Code(s): 89313032


   Code(s): E87.6 - HYPOKALEMIA   Status: Acute   Priority: Medium   Current 

Visit: Yes   Onset Date: 06/05/19   





(6) Pneumonia


SNOMED Code(s): 208918505


   Code(s): J18.9 - PNEUMONIA, UNSPECIFIED ORGANISM   Status: Acute   Priority: 

High   Current Visit: Yes   Onset Date: 06/05/19   


Qualifiers: 


   Pneumonia type: due to unspecified organism   Laterality: left   Lung 

location: unspecified part of lung   Qualified Code(s): J18.9 - Pneumonia, 

unspecified organism   





(7) Fall


SNOMED Code(s): 9665427, 532521802


   Code(s): W19.XXXA - UNSPECIFIED FALL, INITIAL ENCOUNTER   Status: Acute   

Priority: Medium   Current Visit: No   Onset Date: 06/05/19   


Qualifiers: 


   Encounter type: subsequent encounter   Qualified Code(s): W19.XXXD - 

Unspecified fall, subsequent encounter   





(8) Hypomagnesemia


SNOMED Code(s): 882011179


   Code(s): E83.42 - HYPOMAGNESEMIA   Status: Acute   Priority: Medium   

Current Visit: No   





(9) Dehydration


SNOMED Code(s): 06425423


   Code(s): E86.0 - DEHYDRATION   Status: Resolved   Priority: Medium   Current 

Visit: No   Onset Date: 06/05/19   





(10) Hypoglycemia


SNOMED Code(s): 892779229


   Code(s): E16.2 - HYPOGLYCEMIA, UNSPECIFIED   Status: Resolved   Priority: 

Low   Current Visit: No   Onset Date: 06/06/19   





(11) Hypokalemia


SNOMED Code(s): 38826891


   Code(s): E87.6 - HYPOKALEMIA   Status: Resolved   Priority: Medium   Current 

Visit: No   Onset Date: 06/05/19   





- Problem List Review


Problem List Initiated/Reviewed/Updated: Yes





- Plan


Plan:: 





1. d/c  azithromycin, cont  vancomycin and Zosyn, has h/o MRSA.


2. 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


3. Added fentanyl patch.


4. Increasing O2 requirements and WOB, judicial diuresis.echo   pending 


5.  anemia   starting  anticoagulation and  will check  hemmoccults    pending.

  check  iron   folate and b12   ansd  retic and  beta 2  microglobulin 


6.crf stable  creat and   decreased lasix /  edema   not  likely  to  respond  

optimally  sec  to  low  protein and  malnutrition  and  advanced  debility 


 7. k  still lowe  and  increase  k   intake  foods and  oral  tabs .


 8.  weakness   advanced and will  need  prolonged  rehab \ snf  


    boh

## 2019-06-07 NOTE — CR
Chest: Portable view of the chest was obtained.

 

Comparison: Prior chest x-ray of 06/05/19.

 

Left-sided perihilar parenchymal densities are seen which are both 

alveolar and interstitial.  Findings are fairly stable from previous 

exam.  Mild increasing parenchymal change noted within the right upper

 perihilar region as an interval change from prior exam.  Other 

findings within the right lung are stable from previous study.  Heart 

size at the upper limits of normal.  Scoliosis is noted within the 

spine.  Bony structures are osteopenic.

 

Impression:

1.  Slight increasing density within the right upper perihilar region 

from prior exam.  Differential includes worsening pulmonary vascular 

congestion as well as pneumonia.

2.  Other portions of the chest are felt to be stable.

 

Diagnostic code #3

## 2019-06-08 RX ADMIN — POTASSIUM CHLORIDE SCH MEQ: 1500 TABLET, EXTENDED RELEASE ORAL at 16:20

## 2019-06-08 RX ADMIN — POTASSIUM CHLORIDE SCH MEQ: 1500 TABLET, EXTENDED RELEASE ORAL at 21:07

## 2019-06-08 RX ADMIN — VITAMIN D, TAB 1000IU (100/BT) SCH UNITS: 25 TAB at 21:07

## 2019-06-08 RX ADMIN — POTASSIUM CHLORIDE SCH MEQ: 1500 TABLET, EXTENDED RELEASE ORAL at 08:31

## 2019-06-08 RX ADMIN — Medication SCH TAB: at 17:56

## 2019-06-08 RX ADMIN — Medication SCH MG: at 08:31

## 2019-06-08 RX ADMIN — INSULIN GLARGINE SCH UNITS: 100 INJECTION, SOLUTION SUBCUTANEOUS at 13:13

## 2019-06-08 RX ADMIN — Medication SCH MG: at 21:07

## 2019-06-08 NOTE — PCM.PN
- General Info


Date of Service: 06/08/19


Admission Dx/Problem (Free Text): 





86 yo with chronic back pain, DMII, CHF admitted from ED


 after several days of fatigue, cough, hypoxemia, fever.


 CXR reveals bilteral interstitial infiltrates, 


 i/os  -800


o2  5  liters  n.c 


  


 feels  better 


 eating  better /  less  sob but  slept  only fair 


 pain  left  arm  better


  iv  site  okay 


 lungs   decreased  bs  lft  base / few  crackles  


cor  rrrn 


abd  benign


 ext. gross  edema  2 plus 


  loss  muscle  


 neuro  grossly intact and weak 


  ms  arthritis  extensive


  osteoporosis  posture in bed 


 lab  reviewed   hgn  9.6


  bnp  2345


  k  3.0


  mg  1.8 


  na 135 


creat 1.6 


gfr 40 


 xray   infiltrates   little  change and  left  pleural effusion  unchanged /  

mild  vasc  congestion 


 


  6/8/19 afebrile 


 feeling  better 


 vss and  o2  at  3  liters  with sats 91-93 %/  no  weights 


  positive   fluid  balance  but   uo  off .  orally   appears  hydrated 


 lungs  decreased wheezing and  decreased  bs  l posterior  c/w  effusion 


 cor  rrr in   sinus    at  80/  edema   little  better 


 abd  benign eating  well 


  blood  noted  on  wiping 


  on  xaralto/ asa and plavix


  hgn  8.2   and refuses  transfusion 


  iron and other  labs   coming  back 


   pneumonia  improving  on vanco and  zosyn  not  covering   legionella /  

switch  to  oral  meds  tomorrow  and  or  monday 


hypoxia   long standing and severe and  will require  o2  wean  at  n.h


 crf  stable   on  daily  lasix and added  spironolactone 


  dehydration  resolved


anorexia  resolved


 anemia   stop  aspirin and may  need  to  decrease  xaralto  dose  await   

primary  input.  hx  of   cva 


chf  severe  on   lasix and spironolactone and   beta blocker 


weakness   severe    needs  howard   assist and  needs  p.t   but   recovery   

slow 


  brady fuchs   low a  and  suppliment  ca and   mg 


   boh 


Functional Status: Reports: Pain Controlled, Other (left  arm  aching and  

bruising  and   arthritis )





- Review of Systems


General: Reports: No Symptoms, Weakness


HEENT: Reports: No Symptoms


Pulmonary: Reports: No Symptoms, Shortness of Breath, Other


Cardiovascular: Reports: No Symptoms, Edema


Gastrointestinal: Reports: No Symptoms


Genitourinary: Reports: No Symptoms


Musculoskeletal: Reports: No Symptoms


Skin: Reports: No Symptoms


Neurological: Reports: No Symptoms


Psychiatric: Reports: No Symptoms





- Patient Data


Vitals - Most Recent: 


 Last Vital Signs











Temp  36.9 C   06/08/19 08:07


 


Pulse  83   06/08/19 08:31


 


Resp  13   06/08/19 08:07


 


BP  122/49 L  06/08/19 08:31


 


Pulse Ox  91 L  06/08/19 09:42











Weight - Most Recent: 67.177 kg


I&O - Last 24 Hours: 


 Intake & Output











 06/07/19 06/08/19 06/08/19





 22:59 06:59 14:59


 


Intake Total 920 500 470


 


Output Total 50 350 


 


Balance 870 150 470











Lab Results Last 24 Hours: 


 Laboratory Results - last 24 hr











  06/07/19 06/07/19 06/07/19 Range/Units





  05:30 05:30 12:11 


 


WBC     (3.98-10.04)  K/mm3


 


RBC     (3.98-5.22)  M/mm3


 


Hgb     (11.2-15.7)  gm/L


 


Hct     (34.1-44.9)  %


 


MCV     (79.4-94.8)  fl


 


MCH     (25.6-32.2)  pg


 


MCHC     (32.2-35.5)  g/dl


 


RDW Std Deviation     (36.4-46.3)  fL


 


Plt Count     (182-369)  K/mm3


 


MPV     (9.4-12.3)  fl


 


Neut % (Auto)     (34.0-71.1)  %


 


Lymph % (Auto)     (19.3-51.7)  %


 


Mono % (Auto)     (4.7-12.5)  %


 


Eos % (Auto)     (0.7-5.8)  


 


Baso % (Auto)     (0.1-1.2)  %


 


Neut # (Auto)     (1.56-6.13)  K/mm3


 


Lymph # (Auto)     (1.18-3.74)  K/mm3


 


Mono # (Auto)     (0.24-0.36)  K/mm3


 


Eos # (Auto)     (0.04-0.36)  K/mm3


 


Baso # (Auto)     (0.01-0.08)  K/mm3


 


Percent Retic     (0.50-1.70)  %


 


Sodium     (136-145)  mEq/L


 


Potassium     (3.5-5.1)  mEq/L


 


Chloride     ()  mEq/L


 


Carbon Dioxide     (21-32)  mEq/L


 


Anion Gap     (5-15)  


 


BUN     (7-18)  mg/dL


 


Creatinine     (0.55-1.02)  mg/dL


 


Est Cr Clr Drug Dosing     mL/min


 


Estimated GFR (MDRD)     (>60)  mL/min


 


BUN/Creatinine Ratio     (14-18)  


 


Glucose     ()  mg/dL


 


POC Glucose    350 H  ()  mg/dL


 


Calcium     (8.5-10.1)  mg/dL


 


Magnesium     (1.8-2.4)  mg/dl


 


Iron  18 L    ()  ug/dL


 


TIBC  183    (100-400)  ug/dL


 


% Saturation  10 L    (20-55)  %


 


Transferrin  146 L    (202-364)  mg/dL


 


Total Bilirubin     (0.2-1.0)  mg/dL


 


AST     (15-37)  U/L


 


ALT     (14-59)  U/L


 


Alkaline Phosphatase     ()  U/L


 


C-Reactive Protein   19.5 H*   (<1.0)  mg/dL


 


Total Protein     (6.4-8.2)  g/dl


 


Albumin     (3.4-5.0)  g/dl


 


Globulin     gm/dL


 


Albumin/Globulin Ratio     (1-2)  














  06/07/19 06/07/19 06/08/19 Range/Units





  17:17 21:20 05:30 


 


WBC    10.61 H  (3.98-10.04)  K/mm3


 


RBC    2.94 L  (3.98-5.22)  M/mm3


 


Hgb    8.0 L  (11.2-15.7)  gm/L


 


Hct    26.4 L  (34.1-44.9)  %


 


MCV    89.8  (79.4-94.8)  fl


 


MCH    27.2  (25.6-32.2)  pg


 


MCHC    30.3 L  (32.2-35.5)  g/dl


 


RDW Std Deviation    46.4 H  (36.4-46.3)  fL


 


Plt Count    284  (182-369)  K/mm3


 


MPV    10.3  (9.4-12.3)  fl


 


Neut % (Auto)    77.3 H  (34.0-71.1)  %


 


Lymph % (Auto)    14.3 L  (19.3-51.7)  %


 


Mono % (Auto)    6.1  (4.7-12.5)  %


 


Eos % (Auto)    1.8  (0.7-5.8)  


 


Baso % (Auto)    0.1  (0.1-1.2)  %


 


Neut # (Auto)    8.20 H  (1.56-6.13)  K/mm3


 


Lymph # (Auto)    1.52  (1.18-3.74)  K/mm3


 


Mono # (Auto)    0.65 H  (0.24-0.36)  K/mm3


 


Eos # (Auto)    0.19  (0.04-0.36)  K/mm3


 


Baso # (Auto)    0.01  (0.01-0.08)  K/mm3


 


Percent Retic     (0.50-1.70)  %


 


Sodium     (136-145)  mEq/L


 


Potassium     (3.5-5.1)  mEq/L


 


Chloride     ()  mEq/L


 


Carbon Dioxide     (21-32)  mEq/L


 


Anion Gap     (5-15)  


 


BUN     (7-18)  mg/dL


 


Creatinine     (0.55-1.02)  mg/dL


 


Est Cr Clr Drug Dosing     mL/min


 


Estimated GFR (MDRD)     (>60)  mL/min


 


BUN/Creatinine Ratio     (14-18)  


 


Glucose     ()  mg/dL


 


POC Glucose  256 H  320 H   ()  mg/dL


 


Calcium     (8.5-10.1)  mg/dL


 


Magnesium     (1.8-2.4)  mg/dl


 


Iron     ()  ug/dL


 


TIBC     (100-400)  ug/dL


 


% Saturation     (20-55)  %


 


Transferrin     (202-364)  mg/dL


 


Total Bilirubin     (0.2-1.0)  mg/dL


 


AST     (15-37)  U/L


 


ALT     (14-59)  U/L


 


Alkaline Phosphatase     ()  U/L


 


C-Reactive Protein     (<1.0)  mg/dL


 


Total Protein     (6.4-8.2)  g/dl


 


Albumin     (3.4-5.0)  g/dl


 


Globulin     gm/dL


 


Albumin/Globulin Ratio     (1-2)  














  06/08/19 06/08/19 06/08/19 Range/Units





  05:30 05:30 06:26 


 


WBC     (3.98-10.04)  K/mm3


 


RBC     (3.98-5.22)  M/mm3


 


Hgb     (11.2-15.7)  gm/L


 


Hct     (34.1-44.9)  %


 


MCV     (79.4-94.8)  fl


 


MCH     (25.6-32.2)  pg


 


MCHC     (32.2-35.5)  g/dl


 


RDW Std Deviation     (36.4-46.3)  fL


 


Plt Count     (182-369)  K/mm3


 


MPV     (9.4-12.3)  fl


 


Neut % (Auto)     (34.0-71.1)  %


 


Lymph % (Auto)     (19.3-51.7)  %


 


Mono % (Auto)     (4.7-12.5)  %


 


Eos % (Auto)     (0.7-5.8)  


 


Baso % (Auto)     (0.1-1.2)  %


 


Neut # (Auto)     (1.56-6.13)  K/mm3


 


Lymph # (Auto)     (1.18-3.74)  K/mm3


 


Mono # (Auto)     (0.24-0.36)  K/mm3


 


Eos # (Auto)     (0.04-0.36)  K/mm3


 


Baso # (Auto)     (0.01-0.08)  K/mm3


 


Percent Retic   0.65   (0.50-1.70)  %


 


Sodium  137    (136-145)  mEq/L


 


Potassium  3.2 L    (3.5-5.1)  mEq/L


 


Chloride  98    ()  mEq/L


 


Carbon Dioxide  28    (21-32)  mEq/L


 


Anion Gap  14.2    (5-15)  


 


BUN  25 H    (7-18)  mg/dL


 


Creatinine  1.6 H    (0.55-1.02)  mg/dL


 


Est Cr Clr Drug Dosing  17.79    mL/min


 


Estimated GFR (MDRD)  30    (>60)  mL/min


 


BUN/Creatinine Ratio  15.6    (14-18)  


 


Glucose  261 H    ()  mg/dL


 


POC Glucose    285 H  ()  mg/dL


 


Calcium  8.7    (8.5-10.1)  mg/dL


 


Magnesium  1.9    (1.8-2.4)  mg/dl


 


Iron     ()  ug/dL


 


TIBC     (100-400)  ug/dL


 


% Saturation     (20-55)  %


 


Transferrin     (202-364)  mg/dL


 


Total Bilirubin  0.3    (0.2-1.0)  mg/dL


 


AST  15    (15-37)  U/L


 


ALT  14    (14-59)  U/L


 


Alkaline Phosphatase  67    ()  U/L


 


C-Reactive Protein  22.8 H*    (<1.0)  mg/dL


 


Total Protein  6.1 L    (6.4-8.2)  g/dl


 


Albumin  2.0 L    (3.4-5.0)  g/dl


 


Globulin  4.1    gm/dL


 


Albumin/Globulin Ratio  0.5 L    (1-2)  











[facility] Results Last 24 Hours: 


 Microbiology











 06/07/19 22:55 Stool Occult Blood ([facility]) - Final





 Stool / Feces    POSITIVE OCCULT BLOOD





    REFERENCE RANGE: NEGATIVE


 


 06/04/19 14:30 Aerobic Blood Culture - Preliminary





 Blood    NO GROWTH AFTER 3 DAYS





 Anaerobic Blood Culture - Preliminary





    NO GROWTH AFTER 3 DAYS


 


 06/04/19 14:25 Aerobic Blood Culture - Preliminary





 Blood    NO GROWTH AFTER 3 DAYS





 Anaerobic Blood Culture - Preliminary





    NO GROWTH AFTER 3 DAYS











Med Orders - Current: 


 Current Medications





Acetaminophen (Tylenol)  975 mg PO Q6H PRN


   PRN Reason: Pain


   Last Admin: 06/05/19 21:51 Dose:  975 mg


Albuterol/Ipratropium (Duoneb 3.0-0.5 Mg/3 Ml)  3 ml NEB QIDRT [facility]


   Last Admin: 06/08/19 09:39 Dose:  3 ml


Aspirin (Halfprin)  81 mg PO DAILY [facility]


   Last Admin: 06/08/19 08:32 Dose:  Not Given


Cholecalciferol (Vitamin D3)  1,000 units PO BEDTIME [facility]


   Last Admin: 06/07/19 21:45 Dose:  1,000 units


Clopidogrel Bisulfate (Plavix)  75 mg PO BEDTIME [facility]


   Last Admin: 06/07/19 21:46 Dose:  75 mg


Fentanyl (Duragesic)  12 mcg TRDERM Q72H [facility]


   Last Admin: 06/08/19 11:15 Dose:  12 mcg


Furosemide (Lasix)  20 mg PO DAILY [facility]


Gabapentin (Neurontin)  300 mg PO TID [facility]


   Last Admin: 06/08/19 08:31 Dose:  300 mg


Vancomycin HCl 1 gm/ Sodium (Chloride)  250 mls @ 250 mls/hr IV Q24H [facility]


   Last Admin: 06/07/19 21:46 Dose:  250 mls/hr


Piperacillin Sod/Tazobactam (Sod 4.5 gm/ Sodium Chloride)  100 mls @ 25 mls/hr 

IV Q12H [facility]


   Last Admin: 06/08/19 11:13 Dose:  25 mls/hr


Insulin Glargine (Lantus)  30 unit SUBCUT DAILY [facility]


Insulin Human Lispro (Humalog)  0 unit SUBCUT QIDACANDBED [facility]; Protocol


   Last Admin: 06/08/19 08:23 Dose:  9 unit


Metoprolol Succinate (Toprol Xl)  50 mg PO DAILY [facility]


   Last Admin: 06/08/19 08:31 Dose:  50 mg


Miscellaneous Information (Remove Patch)  1 ea TRDERM Q72H [facility]


   Last Admin: 06/08/19 11:15 Dose:  1 ea


Potassium Chloride (Klor-Con M20)  40 meq PO TID [facility]


   Stop: 06/09/19 09:01


   Last Admin: 06/08/19 08:31 Dose:  40 meq


Rivaroxaban (Xarelto)  15 mg PO DAILY [facility]


   Last Admin: 06/08/19 11:14 Dose:  15 mg


Saccharomyces Boulardii (Florastor)  250 mg PO BID [facility]


   Last Admin: 06/08/19 08:31 Dose:  250 mg


Senna/Docusate Sodium (Senna Plus)  1 tab PO DAILY [facility]N


   PRN Reason: Constipation


Sodium Chloride (Saline Flush)  10 ml FLUSH ASDIRECTED PRN


   PRN Reason: Keep Vein Open


   Last Admin: 06/04/19 14:46 Dose:  10 ml


Spironolactone (Aldactone)  50 mg PO DAILY [facility]


Vancomycin HCl (Pharmacy To Dose - Vancomycin)  0 dose .XX DAILY PRN


   PRN Reason: RX TO DOSE VANCO





Discontinued Medications





Albuterol/Ipratropium (Duoneb 3.0-0.5 Mg/3 Ml)  3 ml NEB Q4HRRT PRN


   PRN Reason: sob/wheezing


   Last Admin: 06/06/19 11:43 Dose:  3 ml


Cholecalciferol (Vitamin D3)  1,000 units PO DAILY [facility]


   Last Admin: 06/04/19 21:18 Dose:  Not Given


Furosemide (Lasix)  40 mg IVPUSH NOW ONE


   Stop: 06/05/19 18:42


   Last Admin: 06/05/19 19:24 Dose:  40 mg


Furosemide (Lasix)  40 mg IVPUSH NOW ONE


   Stop: 06/06/19 13:35


   Last Admin: 06/06/19 14:30 Dose:  40 mg


Ceftriaxone Sodium 2 gm/ (Sodium Chloride)  100 mls @ 200 mls/hr IV NOW STA


   Stop: 06/04/19 15:15


   Last Admin: 06/04/19 16:10 Dose:  200 mls/hr


Sodium Chloride (Normal Saline)  1,000 mls @ 500 mls/hr IV ONETIME ONE


   Stop: 06/04/19 16:46


   Last Admin: 06/04/19 16:09 Dose:  500 mls/hr


Azithromycin 500 mg/ Sodium (Chloride)  250 mls @ 250 mls/hr IV Q24H [facility]


   Last Admin: 06/06/19 17:18 Dose:  250 mls/hr


Sodium Chloride (Normal Saline)  1,000 mls @ 100 mls/hr IV ASDIRECTED [facility]


   Last Admin: 06/05/19 10:09 Dose:  100 mls/hr


Vancomycin HCl 1 gm/Vancomycin HCl 250 mg/ Sodium Chloride  250 mls @ 100 mls/

hr IV ONETIME ONE


   Stop: 06/04/19 20:26


   Last Admin: 06/04/19 21:16 Dose:  100 mls/hr


Piperacillin Sod/Tazobactam (Sod 4.5 gm/ Sodium Chloride)  100 mls @ 25 mls/hr 

IV Q12H [facility]


   Last Admin: 06/05/19 09:51 Dose:  25 mls/hr


Sodium Chloride (Normal Saline) Confirm Administered Dose 100 mls @ as directed 

.ROUTE .STK-MED ONE


   Stop: 06/04/19 18:55


   Last Admin: 06/04/19 19:59 Dose:  Not Given


Piperacillin Sod/Tazobactam (Sod 4.5 gm/ Sodium Chloride)  100 mls @ 200 mls/hr 

IV ONETIME ONE


   Stop: 06/04/19 19:44


   Last Admin: 06/04/19 19:19 Dose:  200 mls/hr


Potassium Chloride 10 meq/ (Premix)  100 mls @ 100 mls/hr IV Q1H [facility]


   Stop: 06/06/19 11:59


   Last Admin: 06/06/19 13:20 Dose:  Not Given


Piperacillin Sod/Tazobactam Sod (Piperacil-Tazobact) Confirm Administered Dose 

4.5 gm .ROUTE .STK-MED ONE


   Stop: 06/05/19 09:30


   Last Admin: 06/05/19 12:13 Dose:  Not Given


Potassium Chloride (Klor-Con M20)  40 meq PO ONETIME ONE


   Stop: 06/04/19 15:38


   Last Admin: 06/04/19 16:11 Dose:  40 meq


Potassium Chloride (Klor-Con M20)  60 meq PO ONETIME ONE


   Stop: 06/05/19 10:35


   Last Admin: 06/05/19 12:13 Dose:  60 meq


Potassium Chloride (Potassium Chloride Solution)  60 meq PO ONETIME ONE


   Stop: 06/06/19 12:01


   Last Admin: 06/06/19 11:27 Dose:  60 meq


Spironolactone (Aldactone)  25 mg PO DAILY [facility]


   Last Admin: 06/08/19 08:31 Dose:  25 mg


Spironolactone (Aldactone)  25 mg PO ONETIME ONE


   Stop: 06/08/19 10:02


   Last Admin: 06/08/19 11:14 Dose:  25 mg











- Exam


Quality Assessment: Supplemental Oxygen


General: Alert, Oriented


HEENT: Pupils Equal, Pupils Reactive, EOMI, Mucous Membr. Moist/Pink


Neck: Supple


Lungs: Clear to Auscultation, Normal Respiratory Effort


Cardiovascular: Regular Rate, Regular Rhythm


GI/Abdominal Exam: Normal Bowel Sounds, Soft, Non-Tender, No Organomegaly, No 

Distention, No Abnormal Bruit, No Mass, Pelvis Stable


Back Exam: Normal Inspection, Full Range of Motion


Extremities: Normal Inspection, Normal Range of Motion, Non-Tender, No Pedal 

Edema, Normal Capillary Refill


Skin: Warm, Dry, Intact


Wound/Incisions: Healing Well


Neurological: No New Focal Deficit


Psy/Mental Status: Alert, Normal Affect, Normal Mood





- Problem List & Annotations


(1) CHF (congestive heart failure), NYHA class II


SNOMED Code(s): 940102086, 513946630


   Code(s): I50.9 - HEART FAILURE, UNSPECIFIED   Status: Acute   Priority: High

   Current Visit: Yes   Onset Date: 06/06/19   


Qualifiers: 


   Congestive heart failure type: unspecified   Qualified Code(s): I50.9 - 

Heart failure, unspecified   





(2) Diabetes


SNOMED Code(s): 12534190


   Code(s): E11.9 - TYPE 2 DIABETES MELLITUS WITHOUT COMPLICATIONS   Status: 

Acute   Priority: High   Current Visit: Yes   Onset Date: 06/06/19   


Qualifiers: 


   Diabetes mellitus type: type 2   Diabetes mellitus long term insulin use: 

with long term use   Diabetes mellitus complication status: with neurologic 

complications   Diabetes mellitus complication detail: with unspecified 

neuropathy   Qualified Code(s): E11.40 - Type 2 diabetes mellitus with diabetic 

neuropathy, unspecified; Z79.4 - Long term (current) use of insulin   


Annotation/Comment:: restrting  long  acting  lantis and may  need  adjust  sec

  to  lows . 30  units  for  bs  of  400/  adjust  sliding   scale  down  for  

novolog    





(3) Chronic renal insufficiency, stage III (moderate)


SNOMED Code(s): 458991962


   Code(s): N18.3 - CHRONIC KIDNEY DISEASE, STAGE 3 (MODERATE)   Status: Acute 

  Priority: Medium   Current Visit: Yes   Onset Date: 06/05/19   Annotation/

Comment:: add  spironolactone  restart  daily  lasix  p.o    





(4) Anemia


SNOMED Code(s): 722606712


   Code(s): D64.9 - ANEMIA, UNSPECIFIED   Status: Acute   Priority: Medium   

Current Visit: Yes   Onset Date: 06/05/19   


Qualifiers: 


 


Annotation/Comment:: anemia  sec  to  anticoagulation   with  xarolto / plavix 

and asa   asa  held   mild  bleeding  noted   from  hemmhroids   





(5) Hypokalemia


SNOMED Code(s): 06197301


   Code(s): E87.6 - HYPOKALEMIA   Status: Acute   Priority: Medium   Current 

Visit: Yes   Onset Date: 06/05/19   Annotation/Comment:: increase   mg and 

calcium  restarting  spironolactone and  will not  take  oral  k /  eating  

some   raisins and   smoothie    





(6) Pneumonia


SNOMED Code(s): 033663992


   Code(s): J18.9 - PNEUMONIA, UNSPECIFIED ORGANISM   Status: Acute   Priority: 

High   Current Visit: Yes   Onset Date: 06/05/19   


Qualifiers: 


   Pneumonia type: due to unspecified organism   Laterality: left   Lung 

location: unspecified part of lung   Qualified Code(s): J18.9 - Pneumonia, 

unspecified organism   


Annotation/Comment::  very  slow   improvmetna nd  continued  atelectasis and 

pleural  effusion  left    





(7) Fall


SNOMED Code(s): 9008738, 624465243


   Code(s): W19.XXXA - UNSPECIFIED FALL, INITIAL ENCOUNTER   Status: Acute   

Priority: Medium   Current Visit: No   Onset Date: 06/05/19   


Qualifiers: 


   Encounter type: subsequent encounter   Qualified Code(s): W19.XXXD - 

Unspecified fall, subsequent encounter   


Annotation/Comment::  bruising  left  arm   healing   still  achey  in  

shoulder and  arm    





(8) Hypomagnesemia


SNOMED Code(s): 603012619


   Code(s): E83.42 - HYPOMAGNESEMIA   Status: Acute   Priority: Medium   

Current Visit: No   Onset Date: 06/06/19   Annotation/Comment:: replacment 

ordered  orally  sometimes  refusing    





(9) Hypomagnesemia


SNOMED Code(s): 305600458


   Code(s): E83.42 - HYPOMAGNESEMIA   Status: Acute   Current Visit: Yes   





(10) Sciatica neuralgia


SNOMED Code(s): 55748831


   Code(s): M54.30 - SCIATICA, UNSPECIFIED SIDE   Status: Acute   Priority: 

Medium   Current Visit: Yes   


Qualifiers: 


   Laterality: right   Qualified Code(s): M54.31 - Sciatica, right side   





(11) Back contusion


SNOMED Code(s): 48268325


   Code(s): S20.229A - CONTUSION OF UNSPECIFIED BACK WALL OF THORAX, INIT 

ENCNTR   Status: Acute   Priority: Low   Current Visit: No   Onset Date: 06/06/ 19   Annotation/Comment:: aching  less  but   present    





- Problem List Review


Problem List Initiated/Reviewed/Updated: Yes





- My Orders


Last 24 Hours: 


My Active Orders





06/08/19 11:27


GLUCOSE RANDOM [CHEM] Timed 





06/08/19 11:45


Insulin Glarg,Human.Rec.Analog [LantUS]   30 unit SUBCUT DAILY 





06/09/19 09:00


Furosemide [Lasix]   20 mg PO DAILY 














- Plan


Plan:: 


6/7/19


1. d/c  azithromycin, cont  vancomycin and Zosyn, has h/o MRSA.


2. 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


3. Added fentanyl patch.


4. Increasing O2 requirements and WOB, judicial diuresis.echo   pending 


5.  anemia   starting  anticoagulation and  will check  hemmoccults    pending.

  check  iron   folate and b12   ansd  retic and  beta 2  microglobulin 


6.crf stable  creat and   decreased lasix /  edema   not  likely  to  respond  

optimally  sec  to  low  protein and  malnutrition  and  advanced  debility 


 7. k  still lowe  and  increase  k   intake  foods and  oral  tabs .


 8.  weakness   advanced and will  need  prolonged  rehab \ snf  


    boh  

















6/8/19 address  mag/  recheck i am 


 hypoxia  wean as tolerated 


 pneumonia  cont  current  antibiotics  recheck cbc and crp 


pain  control   good  overall 


  weakness   rehab  limited  but  improving  pain 


crf  lasix  daily 


 hypokalemia  increased  spironolactone  recheck  bnp           boh

## 2019-06-09 RX ADMIN — VITAMIN D, TAB 1000IU (100/BT) SCH UNITS: 25 TAB at 20:52

## 2019-06-09 RX ADMIN — POTASSIUM CHLORIDE SCH MEQ: 1500 TABLET, EXTENDED RELEASE ORAL at 09:39

## 2019-06-09 RX ADMIN — Medication SCH MG: at 09:38

## 2019-06-09 RX ADMIN — Medication SCH TAB: at 06:25

## 2019-06-09 RX ADMIN — Medication SCH MG: at 20:52

## 2019-06-09 RX ADMIN — INSULIN GLARGINE SCH UNITS: 100 INJECTION, SOLUTION SUBCUTANEOUS at 09:36

## 2019-06-09 RX ADMIN — Medication SCH TAB: at 16:05

## 2019-06-09 NOTE — CR
Chest: 2 views of the chest were obtained.

 

Comparison: Prior chest x-ray 06/07/19 and 06/05/19.

 

Lateral view shows small pleural effusion on the left side.  Diffuse 

bilateral parenchymal change is seen on both sides of the chest.  

Nodular type density is seen within the left mid chest.  Findings are 

without definite change from previous exam.  Heart size is stable.  

Chin overlaps the upper lungs.  Scoliosis is noted within the spine 

with degenerative change.  Prior cholecystectomy is noted.

 

Impression:

1.  Diffuse parenchymal changes in both sides of the chest with 

nodular density within the mid left chest.  Findings are felt to be 

fairly stable from previous chest x-ray.

2.  Other findings as noted above also believed to be stable.  Nothing

 acute is appreciated.

 

Diagnostic code #3

## 2019-06-09 NOTE — PCM.PN
- General Info


Date of Service: 06/09/19


Admission Dx/Problem (Free Text): 





88 yo with chronic back pain, DMII, CHF admitted from ED


 after several days of fatigue, cough, hypoxemia, fever.


 CXR reveals bilteral interstitial infiltrates, 


 i/os  -800


o2  5  liters  n.c 


  


 feels  better 


 eating  better /  less  sob but  slept  only fair 


 pain  left  arm  better


  iv  site  okay 


 lungs   decreased  bs  lft  base / few  crackles  


cor  rrrn 


abd  benign


 ext. gross  edema  2 plus 


  loss  muscle  


 neuro  grossly intact and weak 


  ms  arthritis  extensive


  osteoporosis  posture in bed 


 lab  reviewed   hgn  9.6


  bnp  2345


  k  3.0


  mg  1.8 


  na 135 


creat 1.6 


gfr 40 


 xray   infiltrates   little  change and  left  pleural effusion  unchanged /  

mild  vasc  congestion 


 


  6/8/19 afebrile 


 feeling  better 


 vss and  o2  at  3  liters  with sats 91-93 %/  no  weights 


  positive   fluid  balance  but   uo  off .  orally   appears  hydrated 


 lungs  decreased wheezing and  decreased  bs  l posterior  c/w  effusion 


 cor  rrr in   sinus    at  80/  edema   little  better 


 abd  benign eating  well 


  blood  noted  on  wiping 


  on  xaralto/ asa and plavix


  hgn  8.2   and refuses  transfusion 


  iron and other  labs   coming  back 


   pneumonia  improving  on vanco and  zosyn  not  covering   legionella /  

switch  to  oral  meds  tomorrow  and  or  monday 


hypoxia   long standing and severe and  will require  o2  wean  at  n.h


 crf  stable   on  daily  lasix and added  spironolactone 


  dehydration  resolved


anorexia  resolved


 anemia   stop  aspirin and may  need  to  decrease  xaralto  dose  await   

primary  input.  hx  of   cva 


chf  severe  on   lasix and spironolactone and   beta blocker 


weakness   severe    needs  howard   assist and  needs  p.t   but   recovery   

slow 


  ma   shila   low a  and  suppliment  ca and   mg 


   boh 











6/9/19 


afebrile /  doing  better / having  nosebleeds again 


vss. o2     increased  3 liter n.c 


  sats  decreased  90-93/ denies  chest  paina and or  bowling /sob / slept well /  

good  appetite 


  hr  stable 


  bs  high   despite starting  long  acting  and  on  ss novolog 160-307.  


chest   xray  increased   edema  and  rul  infiltrte 


 lungs   very  clear    and  left   pleural  effusion less 


 crp  13 


  labs  okay    t.p and alb  very low 


  edema   mild  moderate  only 


hgn 7.9   refuses   transfusion 


   assess  


 pneumonia  improving  slowly 


 atelectasis and weakness  improving /  increased  fluid  but  clinically  

better /  give  additional  dose   lasix    today 


 anemia  sec  to anticoagulation with asa  xarolto and plavix and will  have  

to  hold and  decrease  at  least  one   decrease  dose  xarolto  indicated  

sec  to  other  anticoag  needs and pateint   will not  dc    plavix 


 malnutrition  protein  shakes and  eating  better   but long  term  requires  

supplement  protien


chf   see  above   better   but  labs and  xray  not  reflecting  this  





Functional Status: Reports: Pain Controlled





- Review of Systems


General: Reports: No Symptoms


HEENT: Reports: No Symptoms


Pulmonary: Reports: No Symptoms, Shortness of Breath


Cardiovascular: Reports: No Symptoms


Gastrointestinal: Reports: No Symptoms


Genitourinary: Reports: No Symptoms


Musculoskeletal: Reports: No Symptoms


Skin: Reports: No Symptoms


Neurological: Reports: No Symptoms


Psychiatric: Reports: No Symptoms





- Patient Data


Vitals - Most Recent: 


 Last Vital Signs











Temp  37.4 C   06/09/19 13:30


 


Pulse  89   06/09/19 13:21


 


Resp  17   06/09/19 13:30


 


BP  115/60   06/09/19 13:21


 


Pulse Ox  100   06/09/19 13:21











Weight - Most Recent: 67.086 kg


I&O - Last 24 Hours: 


 Intake & Output











 06/08/19 06/09/19 06/09/19





 22:59 06:59 14:59


 


Intake Total 750 700 240


 


Output Total  250 


 


Balance 750 450 240











Lab Results Last 24 Hours: 


 Laboratory Results - last 24 hr











  06/08/19 06/08/19 06/08/19 Range/Units





  16:39 20:30 20:55 


 


WBC     (3.98-10.04)  K/mm3


 


RBC     (3.98-5.22)  M/mm3


 


Hgb     (11.2-15.7)  gm/L


 


Hct     (34.1-44.9)  %


 


MCV     (79.4-94.8)  fl


 


MCH     (25.6-32.2)  pg


 


MCHC     (32.2-35.5)  g/dl


 


RDW Std Deviation     (36.4-46.3)  fL


 


Plt Count     (182-369)  K/mm3


 


MPV     (9.4-12.3)  fl


 


Neut % (Auto)     (34.0-71.1)  %


 


Lymph % (Auto)     (19.3-51.7)  %


 


Mono % (Auto)     (4.7-12.5)  %


 


Eos % (Auto)     (0.7-5.8)  


 


Baso % (Auto)     (0.1-1.2)  %


 


Neut # (Auto)     (1.56-6.13)  K/mm3


 


Lymph # (Auto)     (1.18-3.74)  K/mm3


 


Mono # (Auto)     (0.24-0.36)  K/mm3


 


Eos # (Auto)     (0.04-0.36)  K/mm3


 


Baso # (Auto)     (0.01-0.08)  K/mm3


 


Manual Slide Review     


 


Sodium     (136-145)  mEq/L


 


Potassium     (3.5-5.1)  mEq/L


 


Chloride     ()  mEq/L


 


Carbon Dioxide     (21-32)  mEq/L


 


Anion Gap     (5-15)  


 


BUN     (7-18)  mg/dL


 


Creatinine     (0.55-1.02)  mg/dL


 


Est Cr Clr Drug Dosing     mL/min


 


Estimated GFR (MDRD)     (>60)  mL/min


 


BUN/Creatinine Ratio     (14-18)  


 


Glucose     ()  mg/dL


 


POC Glucose  376 H   380 H  ()  mg/dL


 


Calcium     (8.5-10.1)  mg/dL


 


Magnesium     (1.8-2.4)  mg/dl


 


Total Bilirubin     (0.2-1.0)  mg/dL


 


AST     (15-37)  U/L


 


ALT     (14-59)  U/L


 


Alkaline Phosphatase     ()  U/L


 


C-Reactive Protein     (<1.0)  mg/dL


 


Total Protein     (6.4-8.2)  g/dl


 


Albumin     (3.4-5.0)  g/dl


 


Globulin     gm/dL


 


Albumin/Globulin Ratio     (1-2)  


 


Vancomycin Trough   9.4 L   (10.0-20.0)  














  06/09/19 06/09/19 06/09/19 Range/Units





  04:45 04:45 06:22 


 


WBC  10.74 H    (3.98-10.04)  K/mm3


 


RBC  2.83 L    (3.98-5.22)  M/mm3


 


Hgb  7.9 L    (11.2-15.7)  gm/L


 


Hct  25.8 L    (34.1-44.9)  %


 


MCV  91.2    (79.4-94.8)  fl


 


MCH  27.9    (25.6-32.2)  pg


 


MCHC  30.6 L    (32.2-35.5)  g/dl


 


RDW Std Deviation  46.7 H    (36.4-46.3)  fL


 


Plt Count  304    (182-369)  K/mm3


 


MPV  9.7    (9.4-12.3)  fl


 


Neut % (Auto)  71.5 H    (34.0-71.1)  %


 


Lymph % (Auto)  18.2 L    (19.3-51.7)  %


 


Mono % (Auto)  6.2    (4.7-12.5)  %


 


Eos % (Auto)  3.3    (0.7-5.8)  


 


Baso % (Auto)  0.2    (0.1-1.2)  %


 


Neut # (Auto)  7.69 H    (1.56-6.13)  K/mm3


 


Lymph # (Auto)  1.95    (1.18-3.74)  K/mm3


 


Mono # (Auto)  0.67 H    (0.24-0.36)  K/mm3


 


Eos # (Auto)  0.35    (0.04-0.36)  K/mm3


 


Baso # (Auto)  0.02    (0.01-0.08)  K/mm3


 


Manual Slide Review  Abnormal smear    


 


Sodium   140   (136-145)  mEq/L


 


Potassium   4.2   (3.5-5.1)  mEq/L


 


Chloride   104   ()  mEq/L


 


Carbon Dioxide   28   (21-32)  mEq/L


 


Anion Gap   12.2   (5-15)  


 


BUN   18   (7-18)  mg/dL


 


Creatinine   1.4 H   (0.55-1.02)  mg/dL


 


Est Cr Clr Drug Dosing   20.33   mL/min


 


Estimated GFR (MDRD)   36   (>60)  mL/min


 


BUN/Creatinine Ratio   12.9 L   (14-18)  


 


Glucose   166 H   ()  mg/dL


 


POC Glucose    184 H  ()  mg/dL


 


Calcium   9.2   (8.5-10.1)  mg/dL


 


Magnesium   2.4   (1.8-2.4)  mg/dl


 


Total Bilirubin   0.3   (0.2-1.0)  mg/dL


 


AST   16   (15-37)  U/L


 


ALT   16   (14-59)  U/L


 


Alkaline Phosphatase   65   ()  U/L


 


C-Reactive Protein   13.4 H*   (<1.0)  mg/dL


 


Total Protein   6.1 L   (6.4-8.2)  g/dl


 


Albumin   2.1 L   (3.4-5.0)  g/dl


 


Globulin   4.0   gm/dL


 


Albumin/Globulin Ratio   0.5 L   (1-2)  


 


Vancomycin Trough     (10.0-20.0)  














  06/09/19 Range/Units





  11:57 


 


WBC   (3.98-10.04)  K/mm3


 


RBC   (3.98-5.22)  M/mm3


 


Hgb   (11.2-15.7)  gm/L


 


Hct   (34.1-44.9)  %


 


MCV   (79.4-94.8)  fl


 


MCH   (25.6-32.2)  pg


 


MCHC   (32.2-35.5)  g/dl


 


RDW Std Deviation   (36.4-46.3)  fL


 


Plt Count   (182-369)  K/mm3


 


MPV   (9.4-12.3)  fl


 


Neut % (Auto)   (34.0-71.1)  %


 


Lymph % (Auto)   (19.3-51.7)  %


 


Mono % (Auto)   (4.7-12.5)  %


 


Eos % (Auto)   (0.7-5.8)  


 


Baso % (Auto)   (0.1-1.2)  %


 


Neut # (Auto)   (1.56-6.13)  K/mm3


 


Lymph # (Auto)   (1.18-3.74)  K/mm3


 


Mono # (Auto)   (0.24-0.36)  K/mm3


 


Eos # (Auto)   (0.04-0.36)  K/mm3


 


Baso # (Auto)   (0.01-0.08)  K/mm3


 


Manual Slide Review   


 


Sodium   (136-145)  mEq/L


 


Potassium   (3.5-5.1)  mEq/L


 


Chloride   ()  mEq/L


 


Carbon Dioxide   (21-32)  mEq/L


 


Anion Gap   (5-15)  


 


BUN   (7-18)  mg/dL


 


Creatinine   (0.55-1.02)  mg/dL


 


Est Cr Clr Drug Dosing   mL/min


 


Estimated GFR (MDRD)   (>60)  mL/min


 


BUN/Creatinine Ratio   (14-18)  


 


Glucose   ()  mg/dL


 


POC Glucose  297 H  ()  mg/dL


 


Calcium   (8.5-10.1)  mg/dL


 


Magnesium   (1.8-2.4)  mg/dl


 


Total Bilirubin   (0.2-1.0)  mg/dL


 


AST   (15-37)  U/L


 


ALT   (14-59)  U/L


 


Alkaline Phosphatase   ()  U/L


 


C-Reactive Protein   (<1.0)  mg/dL


 


Total Protein   (6.4-8.2)  g/dl


 


Albumin   (3.4-5.0)  g/dl


 


Globulin   gm/dL


 


Albumin/Globulin Ratio   (1-2)  


 


Vancomycin Trough   (10.0-20.0)  











[place] Results Last 24 Hours: 


 Microbiology











 06/04/19 14:30 Aerobic Blood Culture - Preliminary





 Blood    NO GROWTH AFTER 4 DAYS





 Anaerobic Blood Culture - Preliminary





    NO GROWTH AFTER 4 DAYS


 


 06/04/19 14:25 Aerobic Blood Culture - Preliminary





 Blood    NO GROWTH AFTER 4 DAYS





 Anaerobic Blood Culture - Preliminary





    NO GROWTH AFTER 4 DAYS











Med Orders - Current: 


 Current Medications





Acetaminophen (Tylenol)  975 mg PO Q6H PRN


   PRN Reason: Pain


   Last Admin: 06/05/19 21:51 Dose:  975 mg


Albuterol/Ipratropium (Duoneb 3.0-0.5 Mg/3 Ml)  3 ml NEB QIDRT [facility]


   Last Admin: 06/09/19 10:33 Dose:  3 ml


Aspirin (Halfprin)  81 mg PO DAILY [facility]


   Last Admin: 06/08/19 08:32 Dose:  Not Given


Calcium Carbonate (Calcium Carbonate/Vitamin D 600 Mg-200 Unit)  1 tab PO 

BIDMEALS [facility]


   Last Admin: 06/09/19 06:25 Dose:  1 tab


Cholecalciferol (Vitamin D3)  1,000 units PO BEDTIME [facility]


   Last Admin: 06/08/19 21:07 Dose:  1,000 units


Clopidogrel Bisulfate (Plavix)  75 mg PO BEDTIME [facility]


   Last Admin: 06/08/19 21:08 Dose:  75 mg


Fentanyl (Duragesic)  12 mcg TRDERM Q72H [facility]


   Last Admin: 06/08/19 11:15 Dose:  12 mcg


Furosemide (Lasix)  20 mg PO DAILY [facility]


   Last Admin: 06/09/19 10:11 Dose:  Not Given


Furosemide (Lasix)  40 mg IVPUSH DAILY [facility]


Gabapentin (Neurontin)  300 mg PO TID [facility]


   Last Admin: 06/09/19 09:38 Dose:  300 mg


Vancomycin HCl 1 gm/Vancomycin HCl 250 mg/ Sodium Chloride  250 mls @ 166.667 

mls/hr IV Q24H [facility]


Piperacillin Sod/Tazobactam (Sod 4.5 gm/ Sodium Chloride)  100 mls @ 25 mls/hr 

IV Q8H [facility]


   Last Admin: 06/09/19 09:40 Dose:  25 mls/hr


Insulin Glargine (Lantus)  30 unit SUBCUT DAILY [facility]


   Last Admin: 06/09/19 09:36 Dose:  30 units


Insulin Human Lispro (Humalog)  0 unit SUBCUT QIDACANDBED [facility]; Protocol


   Last Admin: 06/09/19 12:11 Dose:  9 unit


Metoprolol Succinate (Toprol Xl)  50 mg PO DAILY [facility]


   Last Admin: 06/09/19 09:38 Dose:  50 mg


Miscellaneous Information (Remove Patch)  1 ea TRDERM Q72H [facility]


   Last Admin: 06/08/19 11:15 Dose:  1 ea


Rivaroxaban (Xarelto)  15 mg PO DAILY [facility]


   Last Admin: 06/09/19 10:11 Dose:  Not Given


Saccharomyces Boulardii (Florastor)  250 mg PO BID [facility]


   Last Admin: 06/09/19 09:38 Dose:  250 mg


Senna/Docusate Sodium (Senna Plus)  1 tab PO DAILY PRN


   PRN Reason: Constipation


Sodium Chloride (Saline Flush)  10 ml FLUSH ASDIRECTED PRN


   PRN Reason: Keep Vein Open


   Last Admin: 06/04/19 14:46 Dose:  10 ml


Spironolactone (Aldactone)  50 mg PO DAILY [facility]


   Last Admin: 06/09/19 09:39 Dose:  50 mg


Vancomycin HCl (Pharmacy To Dose - Vancomycin)  0 dose .XX DAILY PRN


   PRN Reason: RX TO DOSE VANCO





Discontinued Medications





Albuterol/Ipratropium (Duoneb 3.0-0.5 Mg/3 Ml)  3 ml NEB Q4HRRT PRN


   PRN Reason: sob/wheezing


   Last Admin: 06/06/19 11:43 Dose:  3 ml


Cholecalciferol (Vitamin D3)  1,000 units PO DAILY [facility]


   Last Admin: 06/04/19 21:18 Dose:  Not Given


Furosemide (Lasix)  40 mg IVPUSH NOW ONE


   Stop: 06/05/19 18:42


   Last Admin: 06/05/19 19:24 Dose:  40 mg


Furosemide (Lasix)  40 mg IVPUSH NOW ONE


   Stop: 06/06/19 13:35


   Last Admin: 06/06/19 14:30 Dose:  40 mg


Ceftriaxone Sodium 2 gm/ (Sodium Chloride)  100 mls @ 200 mls/hr IV NOW STA


   Stop: 06/04/19 15:15


   Last Admin: 06/04/19 16:10 Dose:  200 mls/hr


Sodium Chloride (Normal Saline)  1,000 mls @ 500 mls/hr IV ONETIME ONE


   Stop: 06/04/19 16:46


   Last Admin: 06/04/19 16:09 Dose:  500 mls/hr


Azithromycin 500 mg/ Sodium (Chloride)  250 mls @ 250 mls/hr IV Q24H [facility]


   Last Admin: 06/06/19 17:18 Dose:  250 mls/hr


Sodium Chloride (Normal Saline)  1,000 mls @ 100 mls/hr IV ASDIRECTED [facility]


   Last Admin: 06/05/19 10:09 Dose:  100 mls/hr


Vancomycin HCl 1 gm/Vancomycin HCl 250 mg/ Sodium Chloride  250 mls @ 100 mls/

hr IV ONETIME ONE


   Stop: 06/04/19 20:26


   Last Admin: 06/04/19 21:16 Dose:  100 mls/hr


Piperacillin Sod/Tazobactam (Sod 4.5 gm/ Sodium Chloride)  100 mls @ 25 mls/hr 

IV Q12H [facility]


   Last Admin: 06/05/19 09:51 Dose:  25 mls/hr


Sodium Chloride (Normal Saline) Confirm Administered Dose 100 mls @ as directed 

.ROUTE .STK-MED ONE


   Stop: 06/04/19 18:55


   Last Admin: 06/04/19 19:59 Dose:  Not Given


Piperacillin Sod/Tazobactam (Sod 4.5 gm/ Sodium Chloride)  100 mls @ 200 mls/hr 

IV ONETIME ONE


   Stop: 06/04/19 19:44


   Last Admin: 06/04/19 19:19 Dose:  200 mls/hr


Vancomycin HCl 1 gm/ Sodium (Chloride)  250 mls @ 250 mls/hr IV Q24H [facility]


   Last Admin: 06/08/19 21:43 Dose:  Not Given


Piperacillin Sod/Tazobactam (Sod 4.5 gm/ Sodium Chloride)  100 mls @ 25 mls/hr 

IV Q12H [facility]


   Last Admin: 06/08/19 21:08 Dose:  25 mls/hr


Potassium Chloride 10 meq/ (Premix)  100 mls @ 100 mls/hr IV Q1H [facility]


   Stop: 06/06/19 11:59


   Last Admin: 06/06/19 13:20 Dose:  Not Given


Magnesium Sulfate 2 gm/ Premix  50 mls @ 25 mls/hr IV ONETIME ONE


   Stop: 06/08/19 15:59


   Last Admin: 06/08/19 16:15 Dose:  25 mls/hr


Vancomycin HCl 1.25 gm/ Sodium (Chloride)  250 mls @ 250 mls/hr IV Q12H [facility]


Vancomycin HCl 1.25 gm/ Sodium (Chloride)  250 mls @ 250 mls/hr IV Q24H [facility]


   Last Admin: 06/08/19 21:53 Dose:  250 mls/hr


Magnesium Sulfate (Magnesium Sulfate 50%)  1 gm IM ONETIME ONE


   Stop: 06/08/19 12:13


   Last Admin: 06/08/19 13:29 Dose:  Not Given


Piperacillin Sod/Tazobactam Sod (Piperacil-Tazobact) Confirm Administered Dose 

4.5 gm .ROUTE .STK-MED ONE


   Stop: 06/05/19 09:30


   Last Admin: 06/05/19 12:13 Dose:  Not Given


Potassium Chloride (Klor-Con M20)  40 meq PO ONETIME ONE


   Stop: 06/04/19 15:38


   Last Admin: 06/04/19 16:11 Dose:  40 meq


Potassium Chloride (Klor-Con M20)  60 meq PO ONETIME ONE


   Stop: 06/05/19 10:35


   Last Admin: 06/05/19 12:13 Dose:  60 meq


Potassium Chloride (Potassium Chloride Solution)  60 meq PO ONETIME ONE


   Stop: 06/06/19 12:01


   Last Admin: 06/06/19 11:27 Dose:  60 meq


Potassium Chloride (Klor-Con M20)  40 meq PO TID [facility]


   Stop: 06/09/19 09:01


   Last Admin: 06/09/19 09:39 Dose:  40 meq


Spironolactone (Aldactone)  25 mg PO DAILY [facility]


   Last Admin: 06/08/19 08:31 Dose:  25 mg


Spironolactone (Aldactone)  25 mg PO ONETIME ONE


   Stop: 06/08/19 10:02


   Last Admin: 06/08/19 11:14 Dose:  25 mg











- Problem List & Annotations


(1) CHF (congestive heart failure), NYHA class II


SNOMED Code(s): 642273918, 227200007


   Code(s): I50.9 - HEART FAILURE, UNSPECIFIED   Status: Acute   Priority: High

   Current Visit: Yes   Onset Date: 06/06/19   


Qualifiers: 


   Congestive heart failure type: unspecified   Qualified Code(s): I50.9 - 

Heart failure, unspecified   





(2) Diabetes


SNOMED Code(s): 49676372


   Code(s): E11.9 - TYPE 2 DIABETES MELLITUS WITHOUT COMPLICATIONS   Status: 

Chronic   Priority: High   Current Visit: Yes   Onset Date: 06/06/19   


Qualifiers: 


   Diabetes mellitus type: type 2   Diabetes mellitus long term insulin use: 

with long term use   Diabetes mellitus complication status: with neurologic 

complications   Diabetes mellitus complication detail: with unspecified 

neuropathy   Qualified Code(s): E11.40 - Type 2 diabetes mellitus with diabetic 

neuropathy, unspecified; Z79.4 - Long term (current) use of insulin   


Annotation/Comment:: restrting  long  acting  lantis and may  need  adjust  sec

  to  lows . 30  units  for  bs  of  400/  adjust  sliding   scale  down  for  

novolog    





(3) Chronic renal insufficiency, stage III (moderate)


SNOMED Code(s): 257648557


   Code(s): N18.3 - CHRONIC KIDNEY DISEASE, STAGE 3 (MODERATE)   Status: 

Chronic   Priority: Medium   Current Visit: Yes   Onset Date: 06/05/19   

Annotation/Comment:: add  spironolactone  restart  daily  lasix  p.o    





(4) Anemia


SNOMED Code(s): 764638794


   Code(s): D64.9 - ANEMIA, UNSPECIFIED   Status: Acute   Priority: Medium   

Current Visit: Yes   Onset Date: 06/05/19   


Qualifiers: 


 


Annotation/Comment:: anemia  sec  to  anticoagulation   with  xarolto / plavix 

and asa   asa  held   mild  bleeding  noted   from  hemmhroids   





(5) Hypokalemia


SNOMED Code(s): 07373415


   Code(s): E87.6 - HYPOKALEMIA   Status: Acute   Priority: Medium   Current 

Visit: Yes   Onset Date: 06/05/19   Annotation/Comment:: increase   mg and 

calcium  restarting  spironolactone and  will not  take  oral  k /  eating  

some   raisins and   smoothie    





(6) Pneumonia


SNOMED Code(s): 326235726


   Code(s): J18.9 - PNEUMONIA, UNSPECIFIED ORGANISM   Status: Acute   Priority: 

High   Current Visit: Yes   Onset Date: 06/05/19   


Qualifiers: 


   Pneumonia type: due to unspecified organism   Laterality: left   Lung 

location: unspecified part of lung   Qualified Code(s): J18.9 - Pneumonia, 

unspecified organism   


Annotation/Comment::  very  slow   improvmetna nd  continued  atelectasis and 

pleural  effusion  left    





(7) Fall


SNOMED Code(s): 4371053, 790147640


   Code(s): W19.XXXA - UNSPECIFIED FALL, INITIAL ENCOUNTER   Status: Acute   

Priority: Medium   Current Visit: No   Onset Date: 06/05/19   


Qualifiers: 


   Encounter type: subsequent encounter   Qualified Code(s): W19.XXXD - 

Unspecified fall, subsequent encounter   


Annotation/Comment::  bruising  left  arm   healing   still  achey  in  

shoulder and  arm    





(8) Hypomagnesemia


SNOMED Code(s): 112733484


   Code(s): E83.42 - HYPOMAGNESEMIA   Status: Acute   Priority: Medium   

Current Visit: No   Onset Date: 06/06/19   Annotation/Comment:: replacment 

ordered  orally  sometimes  refusing    





(9) Hypomagnesemia


SNOMED Code(s): 148450641


   Code(s): E83.42 - HYPOMAGNESEMIA   Status: Acute   Current Visit: Yes   





(10) Sciatica neuralgia


SNOMED Code(s): 74323299


   Code(s): M54.30 - SCIATICA, UNSPECIFIED SIDE   Status: Acute   Priority: 

Medium   Current Visit: Yes   


Qualifiers: 


   Laterality: right   Qualified Code(s): M54.31 - Sciatica, right side   





(11) Back contusion


SNOMED Code(s): 19525742


   Code(s): S20.229A - CONTUSION OF UNSPECIFIED BACK WALL OF THORAX, INIT 

ENCNTR   Status: Resolved   Priority: Low   Current Visit: No   Onset Date: 06/ 06/19   Annotation/Comment:: aching  less  but   present    





(12) CHF (congestive heart failure), NYHA class II


SNOMED Code(s): 849326649, 437394182


   Code(s): I50.9 - HEART FAILURE, UNSPECIFIED   Status: Acute   Current Visit: 

Yes   


Qualifiers: 


   Congestive heart failure type: combined   Congestive heart failure chronicity

: acute on chronic   Qualified Code(s): I50.43 - Acute on chronic combined 

systolic (congestive) and diastolic (congestive) heart failure   





(13) Atrial fibrillation with RVR


SNOMED Code(s): 258833249699215


   Code(s): I48.91 - UNSPECIFIED ATRIAL FIBRILLATION   Status: Resolved   

Priority: High   Current Visit: No   Onset Date: 06/07/19   





(14) Hypoxia


SNOMED Code(s): 017034107


   Code(s): R09.02 - HYPOXEMIA   Status: Acute   Priority: High   Current Visit

: No   Onset Date: 06/07/19   





(15) DM2 (diabetes mellitus, type 2)


SNOMED Code(s): 89115480


   Code(s): E11.9 - TYPE 2 DIABETES MELLITUS WITHOUT COMPLICATIONS   Status: 

Chronic   Priority: High   Current Visit: No   Onset Date: 06/07/19   


Qualifiers: 


   Diabetes mellitus long term insulin use: unspecified long term insulin use 

status   Diabetes mellitus complication status: with unspecified complications 





- Problem List Review


Problem List Initiated/Reviewed/Updated: Yes





- My Orders


Last 24 Hours: 


My Active Orders





06/08/19 17:00


Calcium Carbonate/Vitamin D3 [Calcium Carbonate/Vitamin D 600 MG-200 Unit]   1 

tab PO BIDMEALS 





06/09/19 09:00


Furosemide [Lasix]   20 mg PO DAILY 





06/09/19 13:45


Furosemide [Lasix]   40 mg IVPUSH DAILY 














- Plan


Plan:: 


6/7/19


1. d/c  azithromycin, cont  vancomycin and Zosyn, has h/o MRSA.


2. 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


3. Added fentanyl patch.


4. Increasing O2 requirements and WOB, judicial diuresis.echo   pending 


5.  anemia   starting  anticoagulation and  will check  hemmoccults    pending.

  check  iron   folate and b12   ansd  retic and  beta 2  microglobulin 


6.crf stable  creat and   decreased lasix /  edema   not  likely  to  respond  

optimally  sec  to  low  protein and  malnutrition  and  advanced  debility 


 7. k  still lowe  and  increase  k   intake  foods and  oral  tabs .


 8.  weakness   advanced and will  need  prolonged  rehab \ snf  


    boh  

















6/8/19 address  mag/  recheck i am 


 hypoxia  wean as tolerated 


 pneumonia  cont  current  antibiotics  recheck cbc and crp 


pain  control   good  overall 


  weakness   rehab  limited  but  improving  pain 


crf  lasix  daily 


 hypokalemia  increased  spironolactone  recheck  bnp           boh 














6/9/19 single  dose   iv  lasix / hold  xaralto and plavix and  asa  sec  to  

cont    nose  bleeds . 


 monitor  chf  and  crf


  monitor  anemia 


  monitor  low  protien 


   cont  zosyn   she  responding  to  pneumonia  treatment  slowly  a nd  high  

risk   of  relapse

## 2019-06-10 RX ADMIN — Medication SCH: at 18:24

## 2019-06-10 RX ADMIN — VITAMIN D, TAB 1000IU (100/BT) SCH UNITS: 25 TAB at 21:50

## 2019-06-10 RX ADMIN — Medication SCH: at 07:05

## 2019-06-10 RX ADMIN — Medication SCH TAB: at 07:02

## 2019-06-10 RX ADMIN — Medication SCH TAB: at 17:14

## 2019-06-10 RX ADMIN — Medication SCH MG: at 21:51

## 2019-06-10 RX ADMIN — Medication SCH MG: at 08:59

## 2019-06-10 RX ADMIN — INSULIN GLARGINE SCH UNITS: 100 INJECTION, SOLUTION SUBCUTANEOUS at 09:10

## 2019-06-10 NOTE — PCM.PN
- General Info


Date of Service: 06/10/19


Admission Dx/Problem (Free Text): 





88 yo with chronic back pain, DMII, CHF admitted from ED


 after several days of fatigue, cough, hypoxemia, fever.


 CXR reveals bilteral interstitial infiltrates, 


 i/os  -800


o2  5  liters  n.c 


  


 feels  better 


 eating  better /  less  sob but  slept  only fair 


 pain  left  arm  better


  iv  site  okay 


 lungs   decreased  bs  lft  base / few  crackles  


cor  rrrn 


abd  benign


 ext. gross  edema  2 plus 


  loss  muscle  


 neuro  grossly intact and weak 


  ms  arthritis  extensive


  osteoporosis  posture in bed 


 lab  reviewed   hgn  9.6


  bnp  2345


  k  3.0


  mg  1.8 


  na 135 


creat 1.6 


gfr 40 


 xray   infiltrates   little  change and  left  pleural effusion  unchanged /  

mild  vasc  congestion 


 


  6/8/19 afebrile 


 feeling  better 


 vss and  o2  at  3  liters  with sats 91-93 %/  no  weights 


  positive   fluid  balance  but   uo  off .  orally   appears  hydrated 


 lungs  decreased wheezing and  decreased  bs  l posterior  c/w  effusion 


 cor  rrr in   sinus    at  80/  edema   little  better 


 abd  benign eating  well 


  blood  noted  on  wiping 


  on  xaralto/ asa and plavix


  hgn  8.2   and refuses  transfusion 


  iron and other  labs   coming  back 


   pneumonia  improving  on vanco and  zosyn  not  covering   legionella /  

switch  to  oral  meds  tomorrow  and  or  monday 


hypoxia   long standing and severe and  will require  o2  wean  at  n.h


 crf  stable   on  daily  lasix and added  spironolactone 


  dehydration  resolved


anorexia  resolved


 anemia   stop  aspirin and may  need  to  decrease  xaralto  dose  await   

primary  input.  hx  of   cva 


chf  severe  on   lasix and spironolactone and   beta blocker 


weakness   severe    needs  howard   assist and  needs  p.t   but   recovery   

slow 


  ma   shila   low a  and  suppliment  ca and   mg 


   boh 











6/9/19 


afebrile /  doing  better / having  nosebleeds again 


vss. o2     increased  3 liter n.c 


  sats  decreased  90-93/ denies  chest  paina and or  bowling /sob / slept well /  

good  appetite 


  hr  stable 


  bs  high   despite starting  long  acting  and  on  ss novolog 160-307.  


chest   xray  increased   edema  and  rul  infiltrte 


 lungs   very  clear    and  left   pleural  effusion less 


 crp  13 


  labs  okay    t.p and alb  very low 


  edema   mild  moderate  only 


hgn 7.9   refuses   transfusion 


   assess  


 pneumonia  improving  slowly 


 atelectasis and weakness  improving /  increased  fluid  but  clinically  

better /  give  additional  dose   lasix    today 


 anemia  sec  to anticoagulation with asa  xarolto and plavix and will  have  

to  hold and  decrease  at  least  one   decrease  dose  xarolto  indicated  

sec  to  other  anticoag  needs and pateint   will not  dc    plavix 


 malnutrition  protein  shakes and  eating  better   but long  term  requires  

supplement  protien


chf   see  above   better   but  labs and  xray  not  reflecting  this  





6/10/2019


Xarelto, Plavix, and aspirin were held yesterday secondary to dropping blood 

count. Patient was given Xarelto this morning. Hemoglobin is down to 7.6 and 

patient continues to not want blood products. IV was established and she was 

given this morning's IV antibiotics of vancomycin and Zosyn. She did miss one 

dose last night. Patient states that she is feeling better, she is afebrile, 

and had 1 nosebleed. C-reactive protein is also decreased to 9.0 with increase 

in her white count from 10.7-12.7. Creatinine has increased from 1.4-1.7.





Functional Status: Reports: Pain Controlled





- Review of Systems


General: Reports: Weakness


HEENT: Reports: No Symptoms


Pulmonary: Reports: No Symptoms.  Denies: Shortness of Breath


Cardiovascular: Reports: No Symptoms.  Denies: Chest Pain


Gastrointestinal: Reports: No Symptoms


Neurological: Reports: Confusion





- Patient Data


Vitals - Most Recent: 


 Last Vital Signs











Temp  99.0 F   06/10/19 05:05


 


Pulse  90   06/10/19 09:21


 


Resp  16   06/10/19 09:09


 


BP  120/45 L  06/10/19 09:21


 


Pulse Ox  92 L  06/10/19 09:45











Weight - Most Recent: 150 lb 1.6 oz


I&O - Last 24 Hours: 


 Intake & Output











 06/09/19 06/10/19 06/10/19





 22:59 06:59 14:59


 


Intake Total 1190 400 240


 


Balance 1190 400 240











Lab Results Last 24 Hours: 


 Laboratory Results - last 24 hr











  06/04/19 06/09/19 06/09/19 Range/Units





  18:55 11:57 17:32 


 


WBC     (3.98-10.04)  K/mm3


 


RBC     (3.98-5.22)  M/mm3


 


Hgb     (11.2-15.7)  gm/L


 


Hct     (34.1-44.9)  %


 


MCV     (79.4-94.8)  fl


 


MCH     (25.6-32.2)  pg


 


MCHC     (32.2-35.5)  g/dl


 


RDW Std Deviation     (36.4-46.3)  fL


 


Plt Count     (182-369)  K/mm3


 


MPV     (9.4-12.3)  fl


 


Neut % (Auto)     (34.0-71.1)  %


 


Lymph % (Auto)     (19.3-51.7)  %


 


Mono % (Auto)     (4.7-12.5)  %


 


Eos % (Auto)     (0.7-5.8)  


 


Baso % (Auto)     (0.1-1.2)  %


 


Neut # (Auto)     (1.56-6.13)  K/mm3


 


Lymph # (Auto)     (1.18-3.74)  K/mm3


 


Mono # (Auto)     (0.24-0.36)  K/mm3


 


Eos # (Auto)     (0.04-0.36)  K/mm3


 


Baso # (Auto)     (0.01-0.08)  K/mm3


 


Manual Slide Review     


 


Sodium     (136-145)  mEq/L


 


Potassium     (3.5-5.1)  mEq/L


 


Chloride     ()  mEq/L


 


Carbon Dioxide     (21-32)  mEq/L


 


Anion Gap     (5-15)  


 


BUN     (7-18)  mg/dL


 


Creatinine     (0.55-1.02)  mg/dL


 


Est Cr Clr Drug Dosing     mL/min


 


Estimated GFR (MDRD)     (>60)  mL/min


 


BUN/Creatinine Ratio     (14-18)  


 


Glucose     ()  mg/dL


 


POC Glucose   297 H  198 H  ()  mg/dL


 


Calcium     (8.5-10.1)  mg/dL


 


Magnesium     (1.8-2.4)  mg/dl


 


Total Bilirubin     (0.2-1.0)  mg/dL


 


AST     (15-37)  U/L


 


ALT     (14-59)  U/L


 


Alkaline Phosphatase     ()  U/L


 


C-Reactive Protein     (<1.0)  mg/dL


 


Total Protein     (6.4-8.2)  g/dl


 


Albumin     (3.4-5.0)  g/dl


 


Globulin     gm/dL


 


Albumin/Globulin Ratio     (1-2)  


 


L.pneumophila IgG Ab  < 1:16    (< 1:16)  














  06/09/19 06/10/19 06/10/19 Range/Units





  20:57 04:37 04:37 


 


WBC   12.70 H   (3.98-10.04)  K/mm3


 


RBC   2.73 L   (3.98-5.22)  M/mm3


 


Hgb   7.6 L   (11.2-15.7)  gm/L


 


Hct   25.1 L   (34.1-44.9)  %


 


MCV   91.9   (79.4-94.8)  fl


 


MCH   27.8   (25.6-32.2)  pg


 


MCHC   30.3 L   (32.2-35.5)  g/dl


 


RDW Std Deviation   47.7 H   (36.4-46.3)  fL


 


Plt Count   352   (182-369)  K/mm3


 


MPV   9.9   (9.4-12.3)  fl


 


Neut % (Auto)   75.6 H   (34.0-71.1)  %


 


Lymph % (Auto)   13.2 L   (19.3-51.7)  %


 


Mono % (Auto)   6.7   (4.7-12.5)  %


 


Eos % (Auto)   3.5   (0.7-5.8)  


 


Baso % (Auto)   0.1   (0.1-1.2)  %


 


Neut # (Auto)   9.61 H   (1.56-6.13)  K/mm3


 


Lymph # (Auto)   1.68   (1.18-3.74)  K/mm3


 


Mono # (Auto)   0.85 H   (0.24-0.36)  K/mm3


 


Eos # (Auto)   0.44 H   (0.04-0.36)  K/mm3


 


Baso # (Auto)   0.01   (0.01-0.08)  K/mm3


 


Manual Slide Review   Abnormal smear   


 


Sodium    140  (136-145)  mEq/L


 


Potassium    4.3  (3.5-5.1)  mEq/L


 


Chloride    103  ()  mEq/L


 


Carbon Dioxide    29  (21-32)  mEq/L


 


Anion Gap    12.3  (5-15)  


 


BUN    15  (7-18)  mg/dL


 


Creatinine    1.7 H  (0.55-1.02)  mg/dL


 


Est Cr Clr Drug Dosing    16.75  mL/min


 


Estimated GFR (MDRD)    28  (>60)  mL/min


 


BUN/Creatinine Ratio    8.8 L  (14-18)  


 


Glucose    104  ()  mg/dL


 


POC Glucose  152 H    ()  mg/dL


 


Calcium    9.1  (8.5-10.1)  mg/dL


 


Magnesium    1.9  (1.8-2.4)  mg/dl


 


Total Bilirubin    0.4  (0.2-1.0)  mg/dL


 


AST    26  (15-37)  U/L


 


ALT    24  (14-59)  U/L


 


Alkaline Phosphatase    64  ()  U/L


 


C-Reactive Protein    9.0 H*  (<1.0)  mg/dL


 


Total Protein    5.8 L  (6.4-8.2)  g/dl


 


Albumin    2.0 L  (3.4-5.0)  g/dl


 


Globulin    3.8  gm/dL


 


Albumin/Globulin Ratio    0.5 L  (1-2)  


 


L.pneumophila IgG Ab     (< 1:16)  














  06/10/19 Range/Units





  06:25 


 


WBC   (3.98-10.04)  K/mm3


 


RBC   (3.98-5.22)  M/mm3


 


Hgb   (11.2-15.7)  gm/L


 


Hct   (34.1-44.9)  %


 


MCV   (79.4-94.8)  fl


 


MCH   (25.6-32.2)  pg


 


MCHC   (32.2-35.5)  g/dl


 


RDW Std Deviation   (36.4-46.3)  fL


 


Plt Count   (182-369)  K/mm3


 


MPV   (9.4-12.3)  fl


 


Neut % (Auto)   (34.0-71.1)  %


 


Lymph % (Auto)   (19.3-51.7)  %


 


Mono % (Auto)   (4.7-12.5)  %


 


Eos % (Auto)   (0.7-5.8)  


 


Baso % (Auto)   (0.1-1.2)  %


 


Neut # (Auto)   (1.56-6.13)  K/mm3


 


Lymph # (Auto)   (1.18-3.74)  K/mm3


 


Mono # (Auto)   (0.24-0.36)  K/mm3


 


Eos # (Auto)   (0.04-0.36)  K/mm3


 


Baso # (Auto)   (0.01-0.08)  K/mm3


 


Manual Slide Review   


 


Sodium   (136-145)  mEq/L


 


Potassium   (3.5-5.1)  mEq/L


 


Chloride   ()  mEq/L


 


Carbon Dioxide   (21-32)  mEq/L


 


Anion Gap   (5-15)  


 


BUN   (7-18)  mg/dL


 


Creatinine   (0.55-1.02)  mg/dL


 


Est Cr Clr Drug Dosing   mL/min


 


Estimated GFR (MDRD)   (>60)  mL/min


 


BUN/Creatinine Ratio   (14-18)  


 


Glucose   ()  mg/dL


 


POC Glucose  118 H  ()  mg/dL


 


Calcium   (8.5-10.1)  mg/dL


 


Magnesium   (1.8-2.4)  mg/dl


 


Total Bilirubin   (0.2-1.0)  mg/dL


 


AST   (15-37)  U/L


 


ALT   (14-59)  U/L


 


Alkaline Phosphatase   ()  U/L


 


C-Reactive Protein   (<1.0)  mg/dL


 


Total Protein   (6.4-8.2)  g/dl


 


Albumin   (3.4-5.0)  g/dl


 


Globulin   gm/dL


 


Albumin/Globulin Ratio   (1-2)  


 


L.pneumophila IgG Ab   (< 1:16)  











[place] Results Last 24 Hours: 


 Microbiology











 06/04/19 14:30 Aerobic Blood Culture - Preliminary





 Blood    NO GROWTH AFTER 5 DAYS





 Anaerobic Blood Culture - Preliminary





    NO GROWTH AFTER 5 DAYS


 


 06/04/19 14:25 Aerobic Blood Culture - Preliminary





 Blood    NO GROWTH AFTER 5 DAYS





 Anaerobic Blood Culture - Preliminary





    NO GROWTH AFTER 5 DAYS











Med Orders - Current: 


 Current Medications





Acetaminophen (Tylenol)  975 mg PO Q6H PRN


   PRN Reason: Pain


   Last Admin: 06/05/19 21:51 Dose:  975 mg


Albuterol/Ipratropium (Duoneb 3.0-0.5 Mg/3 Ml)  3 ml NEB QIDRT [facility]


   Last Admin: 06/10/19 09:45 Dose:  3 ml


Aspirin (Halfprin)  81 mg PO DAILY [facility]


   Last Admin: 06/08/19 08:32 Dose:  Not Given


Calcium Carbonate (Calcium Carbonate/Vitamin D 600 Mg-200 Unit)  1 tab PO 

BIDMEALS [facility]


   Last Admin: 06/10/19 07:05 Dose:  Not Given


Cholecalciferol (Vitamin D3)  1,000 units PO BEDTIME [facility]


   Last Admin: 06/09/19 20:52 Dose:  1,000 units


Clopidogrel Bisulfate (Plavix)  75 mg PO BEDTIME [facility]


   Last Admin: 06/08/19 21:08 Dose:  75 mg


Fentanyl (Duragesic)  12 mcg TRDERM Q72H [facility]


   Last Admin: 06/08/19 11:15 Dose:  12 mcg


Furosemide (Lasix)  40 mg IVPUSH DAILY [facility]


   Last Admin: 06/10/19 08:59 Dose:  40 mg


Gabapentin (Neurontin)  300 mg PO TID [facility]


   Last Admin: 06/10/19 08:59 Dose:  300 mg


Vancomycin HCl 1 gm/Vancomycin HCl 250 mg/ Sodium Chloride  250 mls @ 166.667 

mls/hr IV Q24H [facility]


   Last Admin: 06/09/19 17:23 Dose:  166.667 mls/hr


Piperacillin Sod/Tazobactam (Sod 4.5 gm/ Sodium Chloride)  100 mls @ 25 mls/hr 

IV Q12H [facility]


Insulin Glargine (Lantus)  30 unit SUBCUT DAILY [facility]


   Last Admin: 06/10/19 09:10 Dose:  30 units


Insulin Human Lispro (Humalog)  0 unit SUBCUT QIDACANDBED [facility]; Protocol


   Last Admin: 06/10/19 07:47 Dose:  Not Given


Metoprolol Succinate (Toprol Xl)  50 mg PO DAILY [facility]


   Last Admin: 06/10/19 09:21 Dose:  50 mg


Miscellaneous Information (Remove Patch)  1 ea TRDERM Q72H [facility]


   Last Admin: 06/08/19 11:15 Dose:  1 ea


Saccharomyces Boulardii (Florastor)  250 mg PO BID [facility]


   Last Admin: 06/10/19 08:59 Dose:  250 mg


Senna/Docusate Sodium (Senna Plus)  1 tab PO DAILY PRN


   PRN Reason: Constipation


Sodium Chloride (Saline Flush)  10 ml FLUSH ASDIRECTED PRN


   PRN Reason: Keep Vein Open


   Last Admin: 06/04/19 14:46 Dose:  10 ml


Spironolactone (Aldactone)  50 mg PO DAILY [facility]


   Last Admin: 06/10/19 09:22 Dose:  50 mg


Vancomycin HCl (Pharmacy To Dose - Vancomycin)  0 dose .XX DAILY PRN


   PRN Reason: RX TO DOSE VANCO





Discontinued Medications





Albuterol/Ipratropium (Duoneb 3.0-0.5 Mg/3 Ml)  3 ml NEB Q4HRRT PRN


   PRN Reason: sob/wheezing


   Last Admin: 06/06/19 11:43 Dose:  3 ml


Cholecalciferol (Vitamin D3)  1,000 units PO DAILY [facility]


   Last Admin: 06/04/19 21:18 Dose:  Not Given


Furosemide (Lasix)  40 mg IVPUSH NOW ONE


   Stop: 06/05/19 18:42


   Last Admin: 06/05/19 19:24 Dose:  40 mg


Furosemide (Lasix)  40 mg IVPUSH NOW ONE


   Stop: 06/06/19 13:35


   Last Admin: 06/06/19 14:30 Dose:  40 mg


Furosemide (Lasix)  20 mg PO DAILY [facility]


   Last Admin: 06/09/19 10:11 Dose:  Not Given


Ceftriaxone Sodium 2 gm/ (Sodium Chloride)  100 mls @ 200 mls/hr IV NOW STA


   Stop: 06/04/19 15:15


   Last Admin: 06/04/19 16:10 Dose:  200 mls/hr


Sodium Chloride (Normal Saline)  1,000 mls @ 500 mls/hr IV ONETIME ONE


   Stop: 06/04/19 16:46


   Last Admin: 06/04/19 16:09 Dose:  500 mls/hr


Azithromycin 500 mg/ Sodium (Chloride)  250 mls @ 250 mls/hr IV Q24H [facility]


   Last Admin: 06/06/19 17:18 Dose:  250 mls/hr


Sodium Chloride (Normal Saline)  1,000 mls @ 100 mls/hr IV ASDIRECTED [facility]


   Last Admin: 06/05/19 10:09 Dose:  100 mls/hr


Vancomycin HCl 1 gm/Vancomycin HCl 250 mg/ Sodium Chloride  250 mls @ 100 mls/

hr IV ONETIME ONE


   Stop: 06/04/19 20:26


   Last Admin: 06/04/19 21:16 Dose:  100 mls/hr


Piperacillin Sod/Tazobactam (Sod 4.5 gm/ Sodium Chloride)  100 mls @ 25 mls/hr 

IV Q12H [facility]


   Last Admin: 06/05/19 09:51 Dose:  25 mls/hr


Sodium Chloride (Normal Saline) Confirm Administered Dose 100 mls @ as directed 

.ROUTE .STK-MED ONE


   Stop: 06/04/19 18:55


   Last Admin: 06/04/19 19:59 Dose:  Not Given


Piperacillin Sod/Tazobactam (Sod 4.5 gm/ Sodium Chloride)  100 mls @ 200 mls/hr 

IV ONETIME ONE


   Stop: 06/04/19 19:44


   Last Admin: 06/04/19 19:19 Dose:  200 mls/hr


Vancomycin HCl 1 gm/ Sodium (Chloride)  250 mls @ 250 mls/hr IV Q24H [facility]


   Last Admin: 06/08/19 21:43 Dose:  Not Given


Piperacillin Sod/Tazobactam (Sod 4.5 gm/ Sodium Chloride)  100 mls @ 25 mls/hr 

IV Q12H [facility]


   Last Admin: 06/08/19 21:08 Dose:  25 mls/hr


Potassium Chloride 10 meq/ (Premix)  100 mls @ 100 mls/hr IV Q1H [facility]


   Stop: 06/06/19 11:59


   Last Admin: 06/06/19 13:20 Dose:  Not Given


Magnesium Sulfate 2 gm/ Premix  50 mls @ 25 mls/hr IV ONETIME ONE


   Stop: 06/08/19 15:59


   Last Admin: 06/08/19 16:15 Dose:  25 mls/hr


Vancomycin HCl 1.25 gm/ Sodium (Chloride)  250 mls @ 250 mls/hr IV Q12H [facility]


Vancomycin HCl 1.25 gm/ Sodium (Chloride)  250 mls @ 250 mls/hr IV Q24H [facility]


   Last Admin: 06/08/19 21:53 Dose:  250 mls/hr


Piperacillin Sod/Tazobactam (Sod 4.5 gm/ Sodium Chloride)  100 mls @ 25 mls/hr 

IV Q8H [facility]


   Last Admin: 06/10/19 05:51 Dose:  25 mls/hr


Magnesium Sulfate (Magnesium Sulfate 50%)  1 gm IM ONETIME ONE


   Stop: 06/08/19 12:13


   Last Admin: 06/08/19 13:29 Dose:  Not Given


Piperacillin Sod/Tazobactam Sod (Piperacil-Tazobact) Confirm Administered Dose 

4.5 gm .ROUTE .STK-MED ONE


   Stop: 06/05/19 09:30


   Last Admin: 06/05/19 12:13 Dose:  Not Given


Potassium Chloride (Klor-Con M20)  40 meq PO ONETIME ONE


   Stop: 06/04/19 15:38


   Last Admin: 06/04/19 16:11 Dose:  40 meq


Potassium Chloride (Klor-Con M20)  60 meq PO ONETIME ONE


   Stop: 06/05/19 10:35


   Last Admin: 06/05/19 12:13 Dose:  60 meq


Potassium Chloride (Potassium Chloride Solution)  60 meq PO ONETIME ONE


   Stop: 06/06/19 12:01


   Last Admin: 06/06/19 11:27 Dose:  60 meq


Potassium Chloride (Klor-Con M20)  40 meq PO TID [facility]


   Stop: 06/09/19 09:01


   Last Admin: 06/09/19 09:39 Dose:  40 meq


Rivaroxaban (Xarelto)  15 mg PO DAILY [facility]


   Last Admin: 06/10/19 09:00 Dose:  15 mg


Spironolactone (Aldactone)  25 mg PO DAILY [facility]


   Last Admin: 06/08/19 08:31 Dose:  25 mg


Spironolactone (Aldactone)  25 mg PO ONETIME ONE


   Stop: 06/08/19 10:02


   Last Admin: 06/08/19 11:14 Dose:  25 mg











- Exam


Quality Assessment: Supplemental Oxygen


General: Alert


HEENT: Pupils Equal


Neck: Supple


Lungs: Normal Respiratory Effort, Rales


GI/Abdominal Exam: Normal Bowel Sounds, Soft, Non-Tender, No Distention


Extremities: Normal Inspection, Pedal Edema


Skin: Warm, Dry


Neurological: No New Focal Deficit


Psy/Mental Status: Alert, Normal Affect





- Problem List & Annotations


(1) Anemia


SNOMED Code(s): 789813206


   Code(s): D64.9 - ANEMIA, UNSPECIFIED   Status: Acute   Priority: Medium   

Current Visit: Yes   Onset Date: 06/05/19   


Qualifiers: 


   Anemia type: due to chronic kidney disease   Chronic kidney disease stage: 

stage 3 (moderate)   Qualified Code(s): N18.3 - Chronic kidney disease, stage 3 

(moderate); D63.1 - Anemia in chronic kidney disease   


Annotation/Comment:: anemia  sec  to  anticoagulation   with  xarolto / plavix 

and asa   asa  held   mild  bleeding  noted   from  hemmhroids   





(2) CHF (congestive heart failure), NYHA class II


SNOMED Code(s): 231571770, 735631170


   Code(s): I50.9 - HEART FAILURE, UNSPECIFIED   Status: Acute   Priority: High

   Current Visit: Yes   Onset Date: 06/06/19   


Qualifiers: 


   Congestive heart failure type: unspecified   Qualified Code(s): I50.9 - 

Heart failure, unspecified   





(3) Pneumonia


SNOMED Code(s): 361811964


   Code(s): J18.9 - PNEUMONIA, UNSPECIFIED ORGANISM   Status: Acute   Priority: 

High   Current Visit: Yes   Onset Date: 06/05/19   


Qualifiers: 


   Pneumonia type: due to unspecified organism   Laterality: left   Lung 

location: unspecified part of lung   Qualified Code(s): J18.9 - Pneumonia, 

unspecified organism   


Annotation/Comment::  very  slow   improvmetna nd  continued  atelectasis and 

pleural  effusion  left    





(4) Chronic renal insufficiency, stage III (moderate)


SNOMED Code(s): 858402639


   Code(s): N18.3 - CHRONIC KIDNEY DISEASE, STAGE 3 (MODERATE)   Status: 

Chronic   Priority: Medium   Current Visit: Yes   Onset Date: 06/05/19   

Annotation/Comment:: add  spironolactone  restart  daily  lasix  p.o    





- Problem List Review


Problem List Initiated/Reviewed/Updated: Yes





- Plan


Plan:: 





Pneumonia


* Currently on Zosyn and vancomycin.


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 

that either here or at her nursing facility.


* White count did increase from 10.7-12.7 overnight.


* C-reactive protein decreased from 13.4-9.0


* DC vancomycin


* Recheck CBC and C-reactive protein in the morning.


Anemia


* Likely multifactorial to include blood loss and anemia of chronic disease


* Iron and ferritin are both low


* Mild to moderate epistaxis


* Refusing transfusion


* Add oral iron and vitamin C


* Hold Plavix, aspirin, and Xarelto at this time


* Patient did receive Xarelto this morning


Chronic renal insufficiency


* Creatinine has increased to 1.7, but this is stable with her baseline


* DC vancomycin. Combination of vancomycin and Zosyn can worsen renal function 

and patient has no history of MRSA and a negative screen.


* Follow CMP in the morning


Chronic heart failure


* Improved symptomatically


* Down to 3 L nasal cannula and improved BNP


* Continue spironolactone at 50 mg daily


* Potassium 4.3





Prognosis: Guarded. Patient has a poor overall prognosis, guarded short-term 

prognosis. I discussed comfort care with her today and she wants to discuss it 

with her daughter. Patient does state she does not want to be resuscitated, 

therefore she has an appropriate DNR/DNI order. She does agree at this time to 

continue with antibiotics, but no blood products.


Length of stay greater than 96 hours secondary to worsening anemia secondary to 

iron deficiency and anemia of chronic disease and multiple system failure.

## 2019-06-11 VITALS — DIASTOLIC BLOOD PRESSURE: 36 MMHG | SYSTOLIC BLOOD PRESSURE: 118 MMHG

## 2019-06-11 RX ADMIN — INSULIN GLARGINE SCH UNITS: 100 INJECTION, SOLUTION SUBCUTANEOUS at 09:57

## 2019-06-11 RX ADMIN — Medication SCH TAB: at 06:30

## 2019-06-11 RX ADMIN — Medication SCH MG: at 09:03

## 2019-06-11 NOTE — [id number]
**Discharge Summary





- Hospital Course


HPI Initial Comments: 





86 yo with chronic back pain, DMII, CHF admitted from ED after several days of 

fatigue, cough, hypoxemia, fever. CXR reveals bilteral interstitial infiltrates

, somewhat improved since previous admission. Had several admissions with 

similar presentation. Admitted as IP for further treatment.


Diagnosis: Stroke: No





- Discharge Data


Discharge Date: 06/11/19


Discharge Disposition: DC/Tfer to SNF [facility]


Condition: Good





- Discharge Diagnosis/Problem(s)


(1) Anemia


SNOMED Code(s): 031486066


   ICD Code: D64.9 - ANEMIA, UNSPECIFIED   Status: Acute   Priority: Medium   

Current Visit: Yes   Onset Date: 06/05/19   Problem Details: anemia  sec  to  

anticoagulation   with  xarolto / plavix and asa   asa  held   mild  bleeding  

noted   from  hemmhroids   


Qualifiers: 


   Anemia type: due to chronic kidney disease   Chronic kidney disease stage: 

stage 3 (moderate)   Qualified Code(s): N18.3 - Chronic kidney disease, stage 3 

(moderate); D63.1 - Anemia in chronic kidney disease   





(2) CHF (congestive heart failure), NYHA class II


SNOMED Code(s): 376859897, 752641934


   ICD Code: I50.9 - HEART FAILURE, UNSPECIFIED   Status: Acute   Priority: 

High   Current Visit: Yes   Onset Date: 06/06/19   


Qualifiers: 


   Congestive heart failure type: unspecified   Qualified Code(s): I50.9 - 

Heart failure, unspecified   





(3) Pneumonia


SNOMED Code(s): 682977758


   ICD Code: J18.9 - PNEUMONIA, UNSPECIFIED ORGANISM   Status: Acute   Priority

: High   Current Visit: Yes   Onset Date: 06/05/19   Problem Details:  very  

slow   improvmetna nd  continued  atelectasis and pleural  effusion  left    


Qualifiers: 


   Pneumonia type: due to unspecified organism   Laterality: left   Lung 

location: unspecified part of lung   Qualified Code(s): J18.9 - Pneumonia, 

unspecified organism   





(4) Chronic renal insufficiency, stage III (moderate)


SNOMED Code(s): 046416689


   ICD Code: N18.3 - CHRONIC KIDNEY DISEASE, STAGE 3 (MODERATE)   Status: 

Chronic   Priority: Medium   Current Visit: Yes   Onset Date: 06/05/19   

Problem Details: add  spironolactone  restart  daily  lasix  p.o    





- Patient Summary/Data


Consults: 


 Consultations





06/05/19 10:27


PT Evaluation and Treatment [CONS] Routine 





06/06/19 21:37


Consult to Respiratory Therapy [Respiratory Care Assess and Treatment] [CONS] 

Routine 














- Patient Instructions


Diet: Heart Healthy Diet, Diabetic Diet


Activity: As Tolerated


Driving: Do Not Drive


Showering/Bathing: May Shower


Notify Provider of: Fever





- Discharge Plan


*PRESCRIPTION DRUG MONITORING PROGRAM REVIEWED*: No


*COPY OF PRESCRIPTION DRUG MONITORING REPORT IN PATIENT FLAVIO: No


Prescriptions/Med Rec: 


Cefdinir [Omnicef] 300 mg PO Q12HR #6 cap


Albuterol/Ipratropium [DuoNeb 3.0-0.5 MG/3 ML] 3 ml NEB QIDRT #120 neb


Ascorbic Acid [Vitamin C] 250 mg PO TIDAC #90 tablet


fentaNYL [Duragesic] 12 mcg TRDERM Q72H #2 patch


Ferrous Sulfate 325 mg PO TIDMEALS #90 tablet


Rivaroxaban [Xarelto] 10 mg PO DAILY #30 tablet


Spironolactone [Aldactone] 50 mg PO DAILY #30 tablet


Home Medications: 


 Home Meds





Clopidogrel [Plavix] 75 mg PO BEDTIME 11/25/15 [History]


Cyanocobalamin (Vitamin B12) [Vitamin B12] 1,000 mcg PO DAILY 11/25/15 [History]


Gabapentin [Neurontin] 600 mg PO TID 11/25/15 [History]


Insulin Aspart [Novolog Flexpen] 11 unit SQ TID 11/25/15 [History]


Sennosides/Docusate Sodium [Senna-Docusate Sodium] 1 tab PO DAILY 04/10/16 [

History]


Acetaminophen 500 mg PO BEDTIME 08/17/16 [History]


Acetaminophen [Tylenol Extra Strength] 1,000 mg PO Q6HR PRN 08/17/16 [History]


Calcium Carbonate [Tums] 400 mg PO Q4H PRN 08/17/16 [History]


Vitamin E 400 unit PO DAILY 08/17/16 [History]


Loratadine [Claritin] 10 mg PO DAILY PRN 11/10/18 [History]


Furosemide [Lasix] 20 mg PO DAILY 06/04/19 [History]


Furosemide [Lasix] 20 mg PO DAILY PRN 06/04/19 [History]


Insulin Degludec [Tresiba] 30 unit SQ DAILY 06/04/19 [History]


MO/Pet,Wh/Phenylephrine/Shk Lv [Preparation H Oint] 1 appful RECTAL BID PRN 06/ 04/19 [History]


Metoprolol Succinate 50 mg PO DAILY 06/04/19 [History]


Polyethylene Glycol 3350 [MiraLAX] 17 gm PO DAILY PRN 06/04/19 [History]


Sennosides [Senna] 8.6 mg PO DAILY PRN 06/04/19 [History]


Sennosides/Docusate Sodium [Senexon-S Tablet] 1 tab PO DAILY PRN 06/04/19 [

History]


Albuterol/Ipratropium [DuoNeb 3.0-0.5 MG/3 ML] 3 ml NEB QIDRT #120 neb 06/11/19 

[Rx]


Ascorbic Acid [Vitamin C] 250 mg PO TIDAC #90 tablet 06/11/19 [Rx]


Cefdinir [Omnicef] 300 mg PO Q12HR #6 cap 06/11/19 [Rx]


Ferrous Sulfate 325 mg PO TIDMEALS #90 tablet 06/11/19 [Rx]


Remove Patch 1 ea TRDERM Q72H  each 06/11/19 [Rx]


Rivaroxaban [Xarelto] 10 mg PO DAILY #30 tablet 06/11/19 [Rx]


Spironolactone [Aldactone] 50 mg PO DAILY #30 tablet 06/11/19 [Rx]


fentaNYL [Duragesic] 12 mcg TRDERM Q72H #2 patch 06/11/19 [Rx]








Oxygen Therapy Mode: Nasal Cannula (2 L)


Oxygen Flow Rate (L/min): 2


Maintain SpO2% greater than: 90


Patient Handouts:  Hypokalemia, Potassium Content of Foods, Heart Failure, 

Community-Acquired Pneumonia, Adult


Forms:  ED Department Discharge


Referrals: 


Tulio Scott MD [Primary Care Provider] - 06/21/19 2:30 pm (Please follow up 

with Dr. Scott on Friday June 21st at 1430.)





- Discharge Summary/Plan Comment


DC Time >30 min.: Yes


Discharge Summary/Plan Comment: 





Patient has improved and is ready for discharge to a skilled nursing facility.


Restart Xarelto at a lower dose of 10 mg a day secondary to her Hemoccult-

positive stools and drop in her blood count which has stabilized. Restart 

Plavix at 75 mg a day and stop aspirin.


Switch to Cefdinir 300 mg BID for 3 more days.


Follow CBC as an outpatient.


Restart home dose of insulin.





- General Info


Date of Service: 06/11/19


Admission Dx/Problem (Free Text: 





86 yo with chronic back pain, DMII, CHF admitted from ED


 after several days of fatigue, cough, hypoxemia, fever.


 CXR reveals bilteral interstitial infiltrates, 


 i/os  -800


o2  5  liters  n.c 


  


 feels  better 


 eating  better /  less  sob but  slept  only fair 


 pain  left  arm  better


  iv  site  okay 


 lungs   decreased  bs  lft  base / few  crackles  


cor  rrrn 


abd  benign


 ext. gross  edema  2 plus 


  loss  muscle  


 neuro  grossly intact and weak 


  ms  arthritis  extensive


  osteoporosis  posture in bed 


 lab  reviewed   hgn  9.6


  bnp  2345


  k  3.0


  mg  1.8 


  na 135 


creat 1.6 


gfr 40 


 xray   infiltrates   little  change and  left  pleural effusion  unchanged /  

mild  vasc  congestion 


 


  6/8/19 afebrile 


 feeling  better 


 vss and  o2  at  3  liters  with sats 91-93 %/  no  weights 


  positive   fluid  balance  but   uo  off .  orally   appears  hydrated 


 lungs  decreased wheezing and  decreased  bs  l posterior  c/w  effusion 


 cor  rrr in   sinus    at  80/  edema   little  better 


 abd  benign eating  well 


  blood  noted  on  wiping 


  on  xaralto/ asa and plavix


  hgn  8.2   and refuses  transfusion 


  iron and other  labs   coming  back 


   pneumonia  improving  on vanco and  zosyn  not  covering   legionella /  

switch  to  oral  meds  tomorrow  and  or  monday 


hypoxia   long standing and severe and  will require  o2  wean  at  n.h


 crf  stable   on  daily  lasix and added  spironolactone 


  dehydration  resolved


anorexia  resolved


 anemia   stop  aspirin and may  need  to  decrease  xaralto  dose  await   

primary  input.  hx  of   cva 


chf  severe  on   lasix and spironolactone and   beta blocker 


weakness   severe    needs  howard   assist and  needs  p.t   but   recovery   

slow 


  ma   sagrariolkl   low a  and  suppliment  ca and   mg 


   boh 











6/9/19 


afebrile /  doing  better / having  nosebleeds again 


vss. o2     increased  3 liter n.c 


  sats  decreased  90-93/ denies  chest  paina and or  bowling /sob / slept well /  

good  appetite 


  hr  stable 


  bs  high   despite starting  long  acting  and  on  ss novolog 160-307.  


chest   xray  increased   edema  and  rul  infiltrte 


 lungs   very  clear    and  left   pleural  effusion less 


 crp  13 


  labs  okay    t.p and alb  very low 


  edema   mild  moderate  only 


hgn 7.9   refuses   transfusion 


   assess  


 pneumonia  improving  slowly 


 atelectasis and weakness  improving /  increased  fluid  but  clinically  

better /  give  additional  dose   lasix    today 


 anemia  sec  to anticoagulation with asa  xarolto and plavix and will  have  

to  hold and  decrease  at  least  one   decrease  dose  xarolto  indicated  

sec  to  other  anticoag  needs and pateint   will not  dc    plavix 


 malnutrition  protein  shakes and  eating  better   but long  term  requires  

supplement  protien


chf   see  above   better   but  labs and  xray  not  reflecting  this  





6/10/2019


Xarelto, Plavix, and aspirin were held yesterday secondary to dropping blood 

count. Patient was given Xarelto this morning. Hemoglobin is down to 7.6 and 

patient continues to not want blood products. IV was established and she was 

given this morning's IV antibiotics of vancomycin and Zosyn. She did miss one 

dose last night. Patient states that she is feeling better, she is afebrile, 

and had 1 nosebleed. C-reactive protein is also decreased to 9.0 with increase 

in her white count from 10.7-12.7. Creatinine has increased from 1.4-1.7.


June 11, 2019 


Patient continues to improve. Hemoglobin has stabilized and even increased. 

Creatinine is stable at 1.8. Subjectively she states she is doing well and 

ready for discharge.





Functional Status: Reports: Pain Controlled





- Review of Systems


General: Reports: No Symptoms


HEENT: Reports: No Symptoms


Pulmonary: Reports: No Symptoms


Cardiovascular: Reports: No Symptoms





- Patient Data


Vitals - Most Recent: 


 Last Vital Signs











Temp  98.1 F   06/11/19 08:06


 


Pulse  97   06/11/19 09:03


 


Resp  22 H  06/11/19 08:06


 


BP  118/36 L  06/11/19 09:03


 


Pulse Ox  99   06/11/19 09:52











Weight - Most Recent: 150 lb 4.8 oz


I&O - Last 24 hours: 


 Intake & Output











 06/10/19 06/11/19 06/11/19





 22:59 06:59 14:59


 


Intake Total 800 296 120


 


Balance 800 296 120











Lab Results - Last 24 hrs: 


 Laboratory Results - last 24 hr











  06/07/19 06/10/19 06/10/19 Range/Units





  05:30 16:59 17:57 


 


WBC     (3.98-10.04)  K/mm3


 


RBC     (3.98-5.22)  M/mm3


 


Hgb     (11.2-15.7)  gm/L


 


Hct     (34.1-44.9)  %


 


MCV     (79.4-94.8)  fl


 


MCH     (25.6-32.2)  pg


 


MCHC     (32.2-35.5)  g/dl


 


RDW Std Deviation     (36.4-46.3)  fL


 


Plt Count     (182-369)  K/mm3


 


MPV     (9.4-12.3)  fl


 


Neut % (Auto)     (34.0-71.1)  %


 


Lymph % (Auto)     (19.3-51.7)  %


 


Mono % (Auto)     (4.7-12.5)  %


 


Eos % (Auto)     (0.7-5.8)  


 


Baso % (Auto)     (0.1-1.2)  %


 


Neut # (Auto)     (1.56-6.13)  K/mm3


 


Lymph # (Auto)     (1.18-3.74)  K/mm3


 


Mono # (Auto)     (0.24-0.36)  K/mm3


 


Eos # (Auto)     (0.04-0.36)  K/mm3


 


Baso # (Auto)     (0.01-0.08)  K/mm3


 


Manual Slide Review     


 


Sodium     (136-145)  mEq/L


 


Potassium     (3.5-5.1)  mEq/L


 


Chloride     ()  mEq/L


 


Carbon Dioxide     (21-32)  mEq/L


 


Anion Gap     (5-15)  


 


BUN     (7-18)  mg/dL


 


Creatinine     (0.55-1.02)  mg/dL


 


Est Cr Clr Drug Dosing     mL/min


 


Estimated GFR (MDRD)     (>60)  mL/min


 


BUN/Creatinine Ratio     (14-18)  


 


Glucose     ()  mg/dL


 


POC Glucose   54 L  81 L  ()  mg/dL


 


Calcium     (8.5-10.1)  mg/dL


 


Magnesium     (1.8-2.4)  mg/dl


 


Total Bilirubin     (0.2-1.0)  mg/dL


 


AST     (15-37)  U/L


 


ALT     (14-59)  U/L


 


Alkaline Phosphatase     ()  U/L


 


C-Reactive Protein     (<1.0)  mg/dL


 


Total Protein     (6.4-8.2)  g/dl


 


Albumin     (3.4-5.0)  g/dl


 


Globulin     gm/dL


 


Albumin/Globulin Ratio     (1-2)  


 


Beta-2-Microglobulin  3.5 H    (0.6-2.4)  mg/L














  06/10/19 06/11/19 06/11/19 Range/Units





  21:37 05:25 05:25 


 


WBC   9.36   (3.98-10.04)  K/mm3


 


RBC   2.87 L   (3.98-5.22)  M/mm3


 


Hgb   8.1 L   (11.2-15.7)  gm/L


 


Hct   26.3 L   (34.1-44.9)  %


 


MCV   91.6   (79.4-94.8)  fl


 


MCH   28.2   (25.6-32.2)  pg


 


MCHC   30.8 L   (32.2-35.5)  g/dl


 


RDW Std Deviation   48.7 H   (36.4-46.3)  fL


 


Plt Count   372 H   (182-369)  K/mm3


 


MPV   9.9   (9.4-12.3)  fl


 


Neut % (Auto)   67.0   (34.0-71.1)  %


 


Lymph % (Auto)   20.9   (19.3-51.7)  %


 


Mono % (Auto)   6.6   (4.7-12.5)  %


 


Eos % (Auto)   3.8   (0.7-5.8)  


 


Baso % (Auto)   0.2   (0.1-1.2)  %


 


Neut # (Auto)   6.26 H   (1.56-6.13)  K/mm3


 


Lymph # (Auto)   1.96   (1.18-3.74)  K/mm3


 


Mono # (Auto)   0.62 H   (0.24-0.36)  K/mm3


 


Eos # (Auto)   0.36   (0.04-0.36)  K/mm3


 


Baso # (Auto)   0.02   (0.01-0.08)  K/mm3


 


Manual Slide Review   Abnormal smear   


 


Sodium    139  (136-145)  mEq/L


 


Potassium    4.3  (3.5-5.1)  mEq/L


 


Chloride    101  ()  mEq/L


 


Carbon Dioxide    29  (21-32)  mEq/L


 


Anion Gap    13.3  (5-15)  


 


BUN    18  (7-18)  mg/dL


 


Creatinine    1.8 H  (0.55-1.02)  mg/dL


 


Est Cr Clr Drug Dosing    15.82  mL/min


 


Estimated GFR (MDRD)    27  (>60)  mL/min


 


BUN/Creatinine Ratio    10.0 L  (14-18)  


 


Glucose    163 H  ()  mg/dL


 


POC Glucose  334 H    ()  mg/dL


 


Calcium    8.9  (8.5-10.1)  mg/dL


 


Magnesium    2.0  (1.8-2.4)  mg/dl


 


Total Bilirubin    0.3  (0.2-1.0)  mg/dL


 


AST    21  (15-37)  U/L


 


ALT    25  (14-59)  U/L


 


Alkaline Phosphatase    66  ()  U/L


 


C-Reactive Protein    9.6 H*  (<1.0)  mg/dL


 


Total Protein    6.2 L  (6.4-8.2)  g/dl


 


Albumin    2.1 L  (3.4-5.0)  g/dl


 


Globulin    4.1  gm/dL


 


Albumin/Globulin Ratio    0.5 L  (1-2)  


 


Beta-2-Microglobulin     (0.6-2.4)  mg/L














  06/11/19 06/11/19 Range/Units





  06:23 11:09 


 


WBC    (3.98-10.04)  K/mm3


 


RBC    (3.98-5.22)  M/mm3


 


Hgb    (11.2-15.7)  gm/L


 


Hct    (34.1-44.9)  %


 


MCV    (79.4-94.8)  fl


 


MCH    (25.6-32.2)  pg


 


MCHC    (32.2-35.5)  g/dl


 


RDW Std Deviation    (36.4-46.3)  fL


 


Plt Count    (182-369)  K/mm3


 


MPV    (9.4-12.3)  fl


 


Neut % (Auto)    (34.0-71.1)  %


 


Lymph % (Auto)    (19.3-51.7)  %


 


Mono % (Auto)    (4.7-12.5)  %


 


Eos % (Auto)    (0.7-5.8)  


 


Baso % (Auto)    (0.1-1.2)  %


 


Neut # (Auto)    (1.56-6.13)  K/mm3


 


Lymph # (Auto)    (1.18-3.74)  K/mm3


 


Mono # (Auto)    (0.24-0.36)  K/mm3


 


Eos # (Auto)    (0.04-0.36)  K/mm3


 


Baso # (Auto)    (0.01-0.08)  K/mm3


 


Manual Slide Review    


 


Sodium    (136-145)  mEq/L


 


Potassium    (3.5-5.1)  mEq/L


 


Chloride    ()  mEq/L


 


Carbon Dioxide    (21-32)  mEq/L


 


Anion Gap    (5-15)  


 


BUN    (7-18)  mg/dL


 


Creatinine    (0.55-1.02)  mg/dL


 


Est Cr Clr Drug Dosing    mL/min


 


Estimated GFR (MDRD)    (>60)  mL/min


 


BUN/Creatinine Ratio    (14-18)  


 


Glucose    ()  mg/dL


 


POC Glucose  192 H  372 H  ()  mg/dL


 


Calcium    (8.5-10.1)  mg/dL


 


Magnesium    (1.8-2.4)  mg/dl


 


Total Bilirubin    (0.2-1.0)  mg/dL


 


AST    (15-37)  U/L


 


ALT    (14-59)  U/L


 


Alkaline Phosphatase    ()  U/L


 


C-Reactive Protein    (<1.0)  mg/dL


 


Total Protein    (6.4-8.2)  g/dl


 


Albumin    (3.4-5.0)  g/dl


 


Globulin    gm/dL


 


Albumin/Globulin Ratio    (1-2)  


 


Beta-2-Microglobulin    (0.6-2.4)  mg/L











TYRA Results - Last 24 hrs: 


 Microbiology











 06/04/19 14:30 Aerobic Blood Culture - Preliminary





 Blood    NO GROWTH AFTER 6 DAYS





 Anaerobic Blood Culture - Preliminary





    NO GROWTH AFTER 6 DAYS


 


 06/04/19 14:25 Aerobic Blood Culture - Preliminary





 Blood    NO GROWTH AFTER 6 DAYS





 Anaerobic Blood Culture - Preliminary





    NO GROWTH AFTER 6 DAYS











Med Orders - Current: 


 Current Medications





Acetaminophen (Tylenol)  975 mg PO Q6H PRN


   PRN Reason: Pain


   Last Admin: 06/10/19 14:05 Dose:  325 mg


Albuterol/Ipratropium (Duoneb 3.0-0.5 Mg/3 Ml)  3 ml NEB QIDRT [facility]


   Last Admin: 06/11/19 09:52 Dose:  3 ml


Ascorbic Acid (Vitamin C)  250 mg PO TIDAC [facility]


   Last Admin: 06/11/19 12:59 Dose:  250 mg


Aspirin (Halfprin)  81 mg PO DAILY [facility]


   Last Admin: 06/08/19 08:32 Dose:  Not Given


Calcium Carbonate (Calcium Carbonate/Vitamin D 600 Mg-200 Unit)  1 tab PO 

BIDMEALS [facility]


   Last Admin: 06/11/19 06:30 Dose:  1 tab


Cholecalciferol (Vitamin D3)  1,000 units PO BEDTIME [facility]


   Last Admin: 06/10/19 21:50 Dose:  1,000 units


Clopidogrel Bisulfate (Plavix)  75 mg PO BEDTIME [facility]


   Last Admin: 06/10/19 21:53 Dose:  Not Given


Fentanyl (Duragesic)  12 mcg TRDERM Q72H [facility]


   Last Admin: 06/11/19 13:02 Dose:  12 mcg


Ferrous Sulfate (Ferrous Sulfate)  325 mg PO TIDMEALS [facility]


   Last Admin: 06/11/19 12:59 Dose:  325 mg


Furosemide (Lasix)  40 mg IVPUSH DAILY [facility]


   Last Admin: 06/11/19 09:03 Dose:  40 mg


Gabapentin (Neurontin)  300 mg PO TID [facility]


   Last Admin: 06/11/19 09:03 Dose:  300 mg


Piperacillin Sod/Tazobactam (Sod 4.5 gm/ Sodium Chloride)  100 mls @ 25 mls/hr 

IV Q12H [facility]


   Last Admin: 06/11/19 06:25 Dose:  25 mls/hr


Insulin Glargine (Lantus)  30 unit SUBCUT DAILY [facility]


   Last Admin: 06/11/19 09:57 Dose:  30 units


Insulin Human Lispro (Humalog)  0 unit SUBCUT QIDACANDBED [facility]; Protocol


   Last Admin: 06/11/19 13:00 Dose:  10 units


Metoprolol Succinate (Toprol Xl)  50 mg PO DAILY [facility]


   Last Admin: 06/11/19 09:03 Dose:  50 mg


Miscellaneous Information (Remove Patch)  1 ea TRDERM Q72H [facility]


   Last Admin: 06/11/19 13:08 Dose:  1 ea


Saccharomyces Boulardii (Florastor)  250 mg PO BID [facility]


   Last Admin: 06/11/19 09:03 Dose:  250 mg


Senna/Docusate Sodium (Senna Plus)  1 tab PO DAILY PRN


   PRN Reason: Constipation


Sodium Chloride (Saline Flush)  10 ml FLUSH ASDIRECTED PRN


   PRN Reason: Keep Vein Open


   Last Admin: 06/04/19 14:46 Dose:  10 ml


Spironolactone (Aldactone)  50 mg PO DAILY [facility]


   Last Admin: 06/11/19 09:03 Dose:  50 mg





Discontinued Medications





Albuterol/Ipratropium (Duoneb 3.0-0.5 Mg/3 Ml)  3 ml NEB Q4HRRT PRN


   PRN Reason: sob/wheezing


   Last Admin: 06/06/19 11:43 Dose:  3 ml


Cholecalciferol (Vitamin D3)  1,000 units PO DAILY [facility]


   Last Admin: 06/04/19 21:18 Dose:  Not Given


Furosemide (Lasix)  40 mg IVPUSH NOW ONE


   Stop: 06/05/19 18:42


   Last Admin: 06/05/19 19:24 Dose:  40 mg


Furosemide (Lasix)  40 mg IVPUSH NOW ONE


   Stop: 06/06/19 13:35


   Last Admin: 06/06/19 14:30 Dose:  40 mg


Furosemide (Lasix)  20 mg PO DAILY [facility]


   Last Admin: 06/09/19 10:11 Dose:  Not Given


Ceftriaxone Sodium 2 gm/ (Sodium Chloride)  100 mls @ 200 mls/hr IV NOW STA


   Stop: 06/04/19 15:15


   Last Admin: 06/04/19 16:10 Dose:  200 mls/hr


Sodium Chloride (Normal Saline)  1,000 mls @ 500 mls/hr IV ONETIME ONE


   Stop: 06/04/19 16:46


   Last Admin: 06/04/19 16:09 Dose:  500 mls/hr


Azithromycin 500 mg/ Sodium (Chloride)  250 mls @ 250 mls/hr IV Q24H [facility]


   Last Admin: 06/06/19 17:18 Dose:  250 mls/hr


Sodium Chloride (Normal Saline)  1,000 mls @ 100 mls/hr IV ASDIRECTED [facility]


   Last Admin: 06/05/19 10:09 Dose:  100 mls/hr


Vancomycin HCl 1 gm/Vancomycin HCl 250 mg/ Sodium Chloride  250 mls @ 100 mls/

hr IV ONETIME ONE


   Stop: 06/04/19 20:26


   Last Admin: 06/04/19 21:16 Dose:  100 mls/hr


Piperacillin Sod/Tazobactam (Sod 4.5 gm/ Sodium Chloride)  100 mls @ 25 mls/hr 

IV Q12H [facility]


   Last Admin: 06/05/19 09:51 Dose:  25 mls/hr


Sodium Chloride (Normal Saline) Confirm Administered Dose 100 mls @ as directed 

.ROUTE .STK-MED ONE


   Stop: 06/04/19 18:55


   Last Admin: 06/04/19 19:59 Dose:  Not Given


Piperacillin Sod/Tazobactam (Sod 4.5 gm/ Sodium Chloride)  100 mls @ 200 mls/hr 

IV ONETIME ONE


   Stop: 06/04/19 19:44


   Last Admin: 06/04/19 19:19 Dose:  200 mls/hr


Vancomycin HCl 1 gm/ Sodium (Chloride)  250 mls @ 250 mls/hr IV Q24H [facility]


   Last Admin: 06/08/19 21:43 Dose:  Not Given


Piperacillin Sod/Tazobactam (Sod 4.5 gm/ Sodium Chloride)  100 mls @ 25 mls/hr 

IV Q12H [facility]


   Last Admin: 06/08/19 21:08 Dose:  25 mls/hr


Potassium Chloride 10 meq/ (Premix)  100 mls @ 100 mls/hr IV Q1H [facility]


   Stop: 06/06/19 11:59


   Last Admin: 06/06/19 13:20 Dose:  Not Given


Magnesium Sulfate 2 gm/ Premix  50 mls @ 25 mls/hr IV ONETIME ONE


   Stop: 06/08/19 15:59


   Last Admin: 06/08/19 16:15 Dose:  25 mls/hr


Vancomycin HCl 1.25 gm/ Sodium (Chloride)  250 mls @ 250 mls/hr IV Q12H [facility]


Vancomycin HCl 1.25 gm/ Sodium (Chloride)  250 mls @ 250 mls/hr IV Q24H [facility]


   Last Admin: 06/08/19 21:53 Dose:  250 mls/hr


Vancomycin HCl 1 gm/Vancomycin HCl 250 mg/ Sodium Chloride  250 mls @ 166.667 

mls/hr IV Q24H [facility]


   Last Admin: 06/09/19 17:23 Dose:  166.667 mls/hr


Piperacillin Sod/Tazobactam (Sod 4.5 gm/ Sodium Chloride)  100 mls @ 25 mls/hr 

IV Q8H [facility]


   Last Admin: 06/10/19 05:51 Dose:  25 mls/hr


Insulin Human Lispro (Humalog)  0 unit SUBCUT QIDACANDBED [facility]; Protocol


   Last Admin: 06/10/19 18:02 Dose:  Not Given


Magnesium Sulfate (Magnesium Sulfate 50%)  1 gm IM ONETIME ONE


   Stop: 06/08/19 12:13


   Last Admin: 06/08/19 13:29 Dose:  Not Given


Piperacillin Sod/Tazobactam Sod (Piperacil-Tazobact) Confirm Administered Dose 

4.5 gm .ROUTE .STK-MED ONE


   Stop: 06/05/19 09:30


   Last Admin: 06/05/19 12:13 Dose:  Not Given


Potassium Chloride (Klor-Con M20)  40 meq PO ONETIME ONE


   Stop: 06/04/19 15:38


   Last Admin: 06/04/19 16:11 Dose:  40 meq


Potassium Chloride (Klor-Con M20)  60 meq PO ONETIME ONE


   Stop: 06/05/19 10:35


   Last Admin: 06/05/19 12:13 Dose:  60 meq


Potassium Chloride (Potassium Chloride Solution)  60 meq PO ONETIME ONE


   Stop: 06/06/19 12:01


   Last Admin: 06/06/19 11:27 Dose:  60 meq


Potassium Chloride (Klor-Con M20)  40 meq PO TID [facility]


   Stop: 06/09/19 09:01


   Last Admin: 06/09/19 09:39 Dose:  40 meq


Rivaroxaban (Xarelto)  15 mg PO DAILY [facility]


   Last Admin: 06/10/19 09:00 Dose:  15 mg


Spironolactone (Aldactone)  25 mg PO DAILY [facility]


   Last Admin: 06/08/19 08:31 Dose:  25 mg


Spironolactone (Aldactone)  25 mg PO ONETIME ONE


   Stop: 06/08/19 10:02


   Last Admin: 06/08/19 11:14 Dose:  25 mg


Vancomycin HCl (Pharmacy To Dose - Vancomycin)  0 dose .XX DAILY PRN


   PRN Reason: RX TO DOSE VANCO











- Exam


Quality Assessment: Reports: Supplemental Oxygen


General: Reports: Alert, Oriented


HEENT: Reports: Pupils Equal


Neck: Reports: Supple


Lungs: Reports: Clear to Auscultation, Normal Respiratory Effort


Cardiovascular: Reports: Regular Rate, Regular Rhythm


GI/Abdominal Exam: Normal Bowel Sounds, Soft, Non-Tender, No Distention


Extremities: Normal Inspection, No Pedal Edema

## 2020-10-06 NOTE — EDM.PDOC
ED HPI GENERAL MEDICAL PROBLEM





- General


Chief Complaint: Fever


Stated Complaint: [facility]


Time Seen by Provider: 10/06/20 14:21


Source of Information: Reports: Patient, EMS, Nursing Home Records


History Limitations: Reports: No Limitations





- History of Present Illness


INITIAL COMMENTS - FREE TEXT/NARRATIVE: 





The patient presents by [facility] from [facility] for fever and 

generalized weakness.  Her oxygen sats were low at the nursing home.  They were 

95% here.  She did fall but she has no pain.  She is not on any blood thinners. 

She has no headache, chills, cough, chest pain, SOB, abdominal pain, nausea, 

vomiting or dysuria.


Onset: Gradual


Duration: Hour(s):


Improves with: Reports: None


Worsens with: Reports: None


Associated Symptoms: Reports: Fever/Chills.  Denies: Chest Pain, Cough, 

Headaches, Nausea/Vomiting, Shortness of Breath





- Related Data


                                    Allergies











Allergy/AdvReac Type Severity Reaction Status Date / Time


 


levofloxacin [From Levaquin] Allergy  Itching Verified 10/06/20 14:29











Home Meds: 


                                    Home Meds





Clopidogrel [Plavix] 75 mg PO BEDTIME 11/25/15 [History]


Gabapentin [Neurontin] 600 mg PO BID 11/25/15 [History]


Insulin Aspart [Novolog Flexpen] 13 unit SQ TID 11/25/15 [History]


Acetaminophen 500 mg PO BEDTIME 08/17/16 [History]


Calcium Carbonate [Tums] 400 mg PO Q4H PRN 08/17/16 [History]


Vitamin E 400 unit PO DAILY 08/17/16 [History]


Furosemide [Lasix] 20 mg PO DAILY 06/04/19 [History]


Furosemide [Lasix] 20 mg PO DAILY PRN 06/04/19 [History]


Insulin Degludec [Tresiba] 20 unit SQ DAILY 06/04/19 [History]


MO/Pet,Wh/Phenylephrine/Shk Lv [Preparation H Oint] 1 appful RECTAL BID PRN 

06/04/19 [History]


Metoprolol Succinate 50 mg PO DAILY 06/04/19 [History]


polyethylene glycoL 3350 [MiraLAX] 17 gm PO DAILY PRN 06/04/19 [History]


Albuterol/Ipratropium [DuoNeb 3.0-0.5 MG/3 ML] 3 ml NEB QIDRT #120 neb 06/11/19 

[Rx]


Spironolactone [Aldactone] 50 mg PO DAILY #30 tablet 06/11/19 [Rx]


Insulin Aspart [NovoLOG] 1 dose SUBCUT TID 10/06/20 [History]











Past Medical History


HEENT History: Reports: Impaired Vision


Other HEENT History: dysphagia


Cardiovascular History: Reports: Arrhythmia, Heart Failure, Hypertension


Other Cardiovascular History: Afib, anemia


Respiratory History: Reports: Other (See Below)


Other Respiratory History: hypoxemia


Gastrointestinal History: Reports: Chronic Constipation


Other Gastrointestinal History: dysphagia


Genitourinary History: Reports: Chronic Renal Insuffiency, Retention, Urinary


OB/GYN History: Reports: Pregnancy


Musculoskeletal History: Reports: Back Pain, Chronic


Other Musculoskeletal History: muscle weakness


Neurological History: Reports: TIA, Other (See Below)


Other Neuro History: cerebral infarct; neuralgia


Psychiatric History: Reports: Depression


Other Psychiatric History: vascular dementia


Endocrine/Metabolic History: Reports: Diabetes, Type II


Hematologic History: Reports: Anemia


Oncologic (Cancer) History: Reports: Other (See Below)


Other Oncologic History: unknown skin cancer


Dermatologic History: Reports: Other (See Below)


Other Dermatologic History: previous supspected skin cancer





- Past Surgical History


GI Surgical History: Reports: Cholecystectomy





Social & Family History





- Family History


Family Medical History: Noncontributory





- Tobacco Use


Smoking Status *Q: Unknown Ever Smoked





- Caffeine Use


Caffeine Use: Reports: None





ED ROS GENERAL





- Review of Systems


Review Of Systems: See Below


Constitutional: Reports: Fever


HEENT: Reports: No Symptoms


Respiratory: Reports: No Symptoms


Cardiovascular: Reports: No Symptoms


Endocrine: Reports: No Symptoms


GI/Abdominal: Reports: No Symptoms


: Reports: No Symptoms





ED EXAM, GENERAL





- Physical Exam


Exam: See Below


Exam Limited By: No Limitations


General Appearance: Alert, No Apparent Distress


Ears: Normal External Exam


Nose: Normal Inspection


Head: Atraumatic, Normocephalic


Neck: Normal Inspection


Respiratory/Chest: No Respiratory Distress, Lungs Clear, Normal Breath Sounds


Cardiovascular: Regular Rate, Rhythm, No Edema, No Murmur


GI/Abdominal: Soft, Non-Tender, No Organomegaly, No Mass


Back Exam: Normal Inspection


Extremities: Normal Inspection





Course





- Vital Signs


Last Recorded V/S: 


                                Last Vital Signs











Temp  98.4 F   10/06/20 15:01


 


Pulse  75   10/06/20 15:01


 


Resp  21 H  10/06/20 15:01


 


BP  122/60   10/06/20 15:01


 


Pulse Ox  88 L  10/06/20 15:01














- Orders/Labs/Meds


Orders: 


                               Active Orders 24 hr











 Category Date Time Status


 


 Cardiac Monitoring [RC] .AS DIRECTED Care  10/06/20 14:53 Active


 


 Oxygen Therapy [RC] PRN Care  10/06/20 14:53 Active


 


 Peripheral IV Care [RC] .AS DIRECTED Care  10/06/20 14:53 Active


 


 Chest 1V Frontal [CR] Stat Exams  10/06/20 14:54 Taken


 


 Head wo Cont [CT] Stat Exams  10/06/20 14:55 Taken


 


 CULTURE BLOOD [BC] Stat Lab  10/06/20 15:20 Received


 


 Sodium Chloride 0.9% [Saline Flush] Med  10/06/20 14:53 Active





 10 ml FLUSH ASDIRECTED PRN   


 


 Blood Culture x2 Reflex Set [OM.PC] Stat Oth  10/06/20 14:54 Ordered


 


 Peripheral IV Insertion Adult [OM.PC] Stat Oth  10/06/20 14:53 Ordered








                                Medication Orders





Sodium Chloride (Saline Flush)  10 ml FLUSH ASDIRECTED PRN


   PRN Reason: Keep Vein Open


   Last Admin: 10/06/20 15:07  Dose: 10 ml


   Documented by: RADHIKA








Labs: 


                                Laboratory Tests











  10/06/20 10/06/20 10/06/20 Range/Units





  15:00 15:00 15:00 


 


WBC  14.36 H    (3.98-10.04)  K/mm3


 


RBC  4.03    (3.98-5.22)  M/mm3


 


Hgb  12.1  D    (11.2-15.7)  gm/dl


 


Hct  38.6    (34.1-44.9)  %


 


MCV  95.8 H D    (79.4-94.8)  fl


 


MCH  30.0    (25.6-32.2)  pg


 


MCHC  31.3 L    (32.2-35.5)  g/dl


 


RDW Std Deviation  51.4 H    (36.4-46.3)  fL


 


Plt Count  238  D    (182-369)  K/mm3


 


MPV  10.2    (9.4-12.3)  fl


 


Neut % (Auto)  80.8 H    (34.0-71.1)  %


 


Lymph % (Auto)  10.0 L    (19.3-51.7)  %


 


Mono % (Auto)  7.9    (4.7-12.5)  %


 


Eos % (Auto)  0.8    (0.7-5.8)  


 


Baso % (Auto)  0.2    (0.1-1.2)  %


 


Neut # (Auto)  11.60 H    (1.56-6.13)  K/mm3


 


Lymph # (Auto)  1.44    (1.18-3.74)  K/mm3


 


Mono # (Auto)  1.13 H    (0.24-0.36)  K/mm3


 


Eos # (Auto)  0.11    (0.04-0.36)  K/mm3


 


Baso # (Auto)  0.03    (0.01-0.08)  K/mm3


 


Manual Slide Review  Abnormal smear    


 


PT   10.3   (9.7-11.7)  SECONDS


 


INR   0.96   


 


APTT   26   (22-31)  SECONDS


 


D-Dimer, Quantitative   1.09 H   (0.19-0.50)  mg/L


 


Sodium    140  (136-145)  mEq/L


 


Potassium    3.8  (3.5-5.1)  mEq/L


 


Chloride    102  ()  mEq/L


 


Carbon Dioxide    30  (21-32)  mEq/L


 


Anion Gap    11.8  (5-15)  


 


BUN    16  (7-18)  mg/dL


 


Creatinine    1.7 H  (0.55-1.02)  mg/dL


 


Est Cr Clr Drug Dosing    16.43  mL/min


 


Estimated GFR (MDRD)    28  (>60)  mL/min


 


BUN/Creatinine Ratio    9.4 L  (14-18)  


 


Glucose    104  ()  mg/dL


 


Lactic Acid     (0.4-2.0)  mmol/L


 


Calcium    8.7  (8.5-10.1)  mg/dL


 


Ferritin     (8-252)  ng/ml


 


Total Bilirubin    0.6  (0.2-1.0)  mg/dL


 


AST    11 L  (15-37)  U/L


 


ALT    15  (14-59)  U/L


 


Alkaline Phosphatase    66  ()  U/L


 


Lactate Dehydrogenase    186  ()  U/L


 


C-Reactive Protein    8.7 H*  (<1.0)  mg/dL


 


Total Protein    6.9  (6.4-8.2)  g/dl


 


Albumin    3.3 L  (3.4-5.0)  g/dl


 


Globulin    3.6  gm/dL


 


Albumin/Globulin Ratio    0.9 L  (1-2)  


 


Urine Color     (Yellow)  


 


Urine Appearance     (Clear)  


 


Urine pH     (5.0-8.0)  


 


Ur Specific Gravity     (1.005-1.030)  


 


Urine Protein     (Negative)  


 


Urine Glucose (UA)     (Negative)  


 


Urine Ketones     (Negative)  


 


Urine Occult Blood     (Negative)  


 


Urine Nitrite     (Negative)  


 


Urine Bilirubin     (Negative)  


 


Urine Urobilinogen     (0.2-1.0)  


 


Ur Leukocyte Esterase     (Negative)  


 


U Hyaline Cast (Auto)     (0-5)  /lpf


 


Urine RBC     (0-5)  /hpf


 


Urine WBC     (0-5)  /hpf


 


Ur Squamous Epith Cells     (0-5)  /hpf


 


Urine Bacteria     (FEW)  /hpf


 


Urine Mucus     (FEW)  /hpf


 


SARS-CoV-2 RNA (MENA)     (NEGATIVE)  














  10/06/20 10/06/20 10/06/20 Range/Units





  15:00 15:20 16:19 


 


WBC     (3.98-10.04)  K/mm3


 


RBC     (3.98-5.22)  M/mm3


 


Hgb     (11.2-15.7)  gm/dl


 


Hct     (34.1-44.9)  %


 


MCV     (79.4-94.8)  fl


 


MCH     (25.6-32.2)  pg


 


MCHC     (32.2-35.5)  g/dl


 


RDW Std Deviation     (36.4-46.3)  fL


 


Plt Count     (182-369)  K/mm3


 


MPV     (9.4-12.3)  fl


 


Neut % (Auto)     (34.0-71.1)  %


 


Lymph % (Auto)     (19.3-51.7)  %


 


Mono % (Auto)     (4.7-12.5)  %


 


Eos % (Auto)     (0.7-5.8)  


 


Baso % (Auto)     (0.1-1.2)  %


 


Neut # (Auto)     (1.56-6.13)  K/mm3


 


Lymph # (Auto)     (1.18-3.74)  K/mm3


 


Mono # (Auto)     (0.24-0.36)  K/mm3


 


Eos # (Auto)     (0.04-0.36)  K/mm3


 


Baso # (Auto)     (0.01-0.08)  K/mm3


 


Manual Slide Review     


 


PT     (9.7-11.7)  SECONDS


 


INR     


 


APTT     (22-31)  SECONDS


 


D-Dimer, Quantitative     (0.19-0.50)  mg/L


 


Sodium     (136-145)  mEq/L


 


Potassium     (3.5-5.1)  mEq/L


 


Chloride     ()  mEq/L


 


Carbon Dioxide     (21-32)  mEq/L


 


Anion Gap     (5-15)  


 


BUN     (7-18)  mg/dL


 


Creatinine     (0.55-1.02)  mg/dL


 


Est Cr Clr Drug Dosing     mL/min


 


Estimated GFR (MDRD)     (>60)  mL/min


 


BUN/Creatinine Ratio     (14-18)  


 


Glucose     ()  mg/dL


 


Lactic Acid   1.2   (0.4-2.0)  mmol/L


 


Calcium     (8.5-10.1)  mg/dL


 


Ferritin  92    (8-252)  ng/ml


 


Total Bilirubin     (0.2-1.0)  mg/dL


 


AST     (15-37)  U/L


 


ALT     (14-59)  U/L


 


Alkaline Phosphatase     ()  U/L


 


Lactate Dehydrogenase     ()  U/L


 


C-Reactive Protein     (<1.0)  mg/dL


 


Total Protein     (6.4-8.2)  g/dl


 


Albumin     (3.4-5.0)  g/dl


 


Globulin     gm/dL


 


Albumin/Globulin Ratio     (1-2)  


 


Urine Color     (Yellow)  


 


Urine Appearance     (Clear)  


 


Urine pH     (5.0-8.0)  


 


Ur Specific Gravity     (1.005-1.030)  


 


Urine Protein     (Negative)  


 


Urine Glucose (UA)     (Negative)  


 


Urine Ketones     (Negative)  


 


Urine Occult Blood     (Negative)  


 


Urine Nitrite     (Negative)  


 


Urine Bilirubin     (Negative)  


 


Urine Urobilinogen     (0.2-1.0)  


 


Ur Leukocyte Esterase     (Negative)  


 


U Hyaline Cast (Auto)     (0-5)  /lpf


 


Urine RBC     (0-5)  /hpf


 


Urine WBC     (0-5)  /hpf


 


Ur Squamous Epith Cells     (0-5)  /hpf


 


Urine Bacteria     (FEW)  /hpf


 


Urine Mucus     (FEW)  /hpf


 


SARS-CoV-2 RNA (MENA)    Negative  (NEGATIVE)  














  10/06/20 Range/Units





  17:28 


 


WBC   (3.98-10.04)  K/mm3


 


RBC   (3.98-5.22)  M/mm3


 


Hgb   (11.2-15.7)  gm/dl


 


Hct   (34.1-44.9)  %


 


MCV   (79.4-94.8)  fl


 


MCH   (25.6-32.2)  pg


 


MCHC   (32.2-35.5)  g/dl


 


RDW Std Deviation   (36.4-46.3)  fL


 


Plt Count   (182-369)  K/mm3


 


MPV   (9.4-12.3)  fl


 


Neut % (Auto)   (34.0-71.1)  %


 


Lymph % (Auto)   (19.3-51.7)  %


 


Mono % (Auto)   (4.7-12.5)  %


 


Eos % (Auto)   (0.7-5.8)  


 


Baso % (Auto)   (0.1-1.2)  %


 


Neut # (Auto)   (1.56-6.13)  K/mm3


 


Lymph # (Auto)   (1.18-3.74)  K/mm3


 


Mono # (Auto)   (0.24-0.36)  K/mm3


 


Eos # (Auto)   (0.04-0.36)  K/mm3


 


Baso # (Auto)   (0.01-0.08)  K/mm3


 


Manual Slide Review   


 


PT   (9.7-11.7)  SECONDS


 


INR   


 


APTT   (22-31)  SECONDS


 


D-Dimer, Quantitative   (0.19-0.50)  mg/L


 


Sodium   (136-145)  mEq/L


 


Potassium   (3.5-5.1)  mEq/L


 


Chloride   ()  mEq/L


 


Carbon Dioxide   (21-32)  mEq/L


 


Anion Gap   (5-15)  


 


BUN   (7-18)  mg/dL


 


Creatinine   (0.55-1.02)  mg/dL


 


Est Cr Clr Drug Dosing   mL/min


 


Estimated GFR (MDRD)   (>60)  mL/min


 


BUN/Creatinine Ratio   (14-18)  


 


Glucose   ()  mg/dL


 


Lactic Acid   (0.4-2.0)  mmol/L


 


Calcium   (8.5-10.1)  mg/dL


 


Ferritin   (8-252)  ng/ml


 


Total Bilirubin   (0.2-1.0)  mg/dL


 


AST   (15-37)  U/L


 


ALT   (14-59)  U/L


 


Alkaline Phosphatase   ()  U/L


 


Lactate Dehydrogenase   ()  U/L


 


C-Reactive Protein   (<1.0)  mg/dL


 


Total Protein   (6.4-8.2)  g/dl


 


Albumin   (3.4-5.0)  g/dl


 


Globulin   gm/dL


 


Albumin/Globulin Ratio   (1-2)  


 


Urine Color  Yellow  (Yellow)  


 


Urine Appearance  Clear  (Clear)  


 


Urine pH  7.0  (5.0-8.0)  


 


Ur Specific Gravity  1.020  (1.005-1.030)  


 


Urine Protein  Negative  (Negative)  


 


Urine Glucose (UA)  Negative  (Negative)  


 


Urine Ketones  Negative  (Negative)  


 


Urine Occult Blood  Negative  (Negative)  


 


Urine Nitrite  Negative  (Negative)  


 


Urine Bilirubin  Negative  (Negative)  


 


Urine Urobilinogen  0.2  (0.2-1.0)  


 


Ur Leukocyte Esterase  Negative  (Negative)  


 


U Hyaline Cast (Auto)  5-10 H  (0-5)  /lpf


 


Urine RBC  Not seen  (0-5)  /hpf


 


Urine WBC  0-5  (0-5)  /hpf


 


Ur Squamous Epith Cells  0-5  (0-5)  /hpf


 


Urine Bacteria  Many H  (FEW)  /hpf


 


Urine Mucus  Not seen  (FEW)  /hpf


 


SARS-CoV-2 RNA (MENA)   (NEGATIVE)  











Meds: 


Medications











Generic Name Dose Route Start Last Admin





  Trade Name Freq  PRN Reason Stop Dose Admin


 


Sodium Chloride  10 ml  10/06/20 14:53  10/06/20 15:07





  Saline Flush  FLUSH   10 ml





  ASDIRECTED PRN   Administration





  Keep Vein Open  














- Re-Assessments/Exams


Free Text/Narrative Re-Assessment/Exam: 





10/06/20 17:58


I ordered an IV saline lock, CXR, CT of her head, labs, UA and COVID 19.  Her 

WBC is elevated at 14.36.  Her D-dimer was elevated but consistent with her age.

  Her creatinine was elevated at 1.7.  Her CRP was elevated at 8.7.  Her UA 

shows no UTI.  Her COVID 19 was negative.  Her CXR shows probable left lower 

lobe atelectasis or scarring.  Concern for early congestive heart failure.  Her 

head CT shows senescent changes noted.  No acute intracranial abnormality.





I am not seeing any infection.  Her WBC is elevated.  That will need to be 

rechecked.  Her COVID 19 is negative.





Departure





- Departure


Time of Disposition: 18:05


Disposition: Home, Self-Care 01


Condition: Good


Clinical Impression: 


 Renal insufficiency





Leukocytosis


Qualifiers:


 Leukocytosis type: unspecified Qualified Code(s): D72.829 - Elevated white 

blood cell count, unspecified





Fall


Qualifiers:


 Encounter type: subsequent encounter Qualified Code(s): W19.XXXD - Unspecified 

fall, subsequent encounter








- Discharge Information


*PRESCRIPTION DRUG MONITORING PROGRAM REVIEWED*: Not Applicable


*COPY OF PRESCRIPTION DRUG MONITORING REPORT IN PATIENT FLAVIO: Not Applicable


Referrals: 


Ru Vaughan MD [Primary Care Provider] - 1 Week


Forms:  ED Department Discharge


Additional Instructions: 


Take your medication as prescribed.  Please return if you are worse.





Sepsis Event Note (ED)





- Evaluation


Sepsis Screening Result: No Definite Risk





- Focused Exam


Vital Signs: 


                                   Vital Signs











  Temp Pulse Resp BP Pulse Ox


 


 10/06/20 15:01  98.4 F  75  21 H  122/60  88 L


 


 10/06/20 14:26  99.2 F  78  20  124/54 L  95














- My Orders


Last 24 Hours: 


My Active Orders





10/06/20 14:53


Cardiac Monitoring [RC] .AS DIRECTED 


Oxygen Therapy [RC] PRN 


Peripheral IV Care [RC] .AS DIRECTED 


Sodium Chloride 0.9% [Saline Flush]   10 ml FLUSH ASDIRECTED PRN 


Peripheral IV Insertion Adult [OM.PC] Stat 





10/06/20 14:54


Chest 1V Frontal [CR] Stat 


Blood Culture x2 Reflex Set [OM.PC] Stat 





10/06/20 14:55


Head wo Cont [CT] Stat 





10/06/20 15:20


CULTURE BLOOD [BC] Stat 














- Assessment/Plan


Last 24 Hours: 


My Active Orders





10/06/20 14:53


Cardiac Monitoring [RC] .AS DIRECTED 


Oxygen Therapy [RC] PRN 


Peripheral IV Care [RC] .AS DIRECTED 


Sodium Chloride 0.9% [Saline Flush]   10 ml FLUSH ASDIRECTED PRN 


Peripheral IV Insertion Adult [OM.PC] Stat 





10/06/20 14:54


Chest 1V Frontal [CR] Stat 


Blood Culture x2 Reflex Set [OM.PC] Stat 





10/06/20 14:55


Head wo Cont [CT] Stat 





10/06/20 15:20


CULTURE BLOOD [BC] Stat

## 2020-11-03 NOTE — CR
PROCEDURE INFORMATION:

Exam: XR Chest, 1 View

Exam date and time: 10/6/2020 3:15 PM

Age: 88 years old

Clinical indication: Chest pain

TECHNIQUE:

Imaging protocol: XR of the chest

Views: 1 view.

COMPARISON:

DX Chest 2V 6/9/2019 11:18 AM

FINDINGS:

Lungs: Poor inspiratory effort with somewhat diminished lung volumes. Pulmonary 
vascularity

appears slightly redistributed. Chronic appearing bilateral lung parenchymal 
interstitial opacities

present. Patient is somewhat rotated to the left. Bibasilar atelectasis and 
scarring. Improvement in

aeration of left lung base.

Pleural space: Probable trace left pleural effusion.

Heart/Mediastinum: No change mild cardiomegaly. Atheromatous calcifications of 
aorta.

Bones/joints: Osteopenia.

Other findings: There is right hilar fullness likely due to hypoinflation and 
accentuated by patient

rotation. Overlying EKG leads.

IMPRESSION:

Bilateral lower lobe atelectasis and/or scarring with probable early congestive 
heart failure/fluid

overload.

Remainder of findings described as above.

Thank you for allowing us to participate in the care of your patient.

Dictated and Authenticated by: Jhonathan Green MD

11/02/2020 11:09 PM Central Time ([place] & [place])

NID[place]

## 2020-11-03 NOTE — CT
PROCEDURE INFORMATION:

Exam: CT Head Without Contrast

Exam date and time: 10/6/2020 3:39 PM

Age: 88 years old

Clinical indication: Injury or trauma; Fall; Concussion/head injury; 
Consciousness not specified

TECHNIQUE:

Imaging protocol: Computed tomography of the head without contrast.

COMPARISON:

No relevant prior studies available.

FINDINGS:

Brain: Cerebral volume loss noted. Scattered areas of decreased attenuation in 
the deep

periventricular white matter consistent with small vessel ischemic change. No 
evidence for acute

intracranial hemorrhage.

Cerebral ventricles: No hydrocephalus.

Bones/joints: No acute calvarial fracture.

Paranasal sinuses: Visualized sinuses are unremarkable. No fluid levels.

Mastoid air cells: Visualized mastoid air cells are well aerated.

Soft tissues: Unremarkable.

IMPRESSION:

Senescent changes noted.

No acute intracranial abnormality.

Remainder of findings described as above.

Thank you for allowing us to participate in the care of your patient.

Dictated and Authenticated by: Jhonathan Green MD

11/02/2020 11:21 PM Central Time ([place] & [place])

NIDIA

## 2021-02-08 NOTE — EDM.PDOC
<Raman Moreno - Last Filed: 02/09/21 19:09>





ED HPI GENERAL MEDICAL PROBLEM





- General


Chief Complaint: Lower Extremity Injury/Pain


Stated Complaint: [facility]


Time Seen by Provider: 02/08/21 17:21





- History of Present Illness


INITIAL COMMENTS - FREE TEXT/NARRATIVE: 





89-year-old female brought in by EMS with a injury to her left elbow and hip.





Patient fell she thinks her hip gave out or possibly her blood sugar dropped 

after she got up from a chair to walk to the bathroom.  This occurred at [facility]

nursing [place].  This occurred just prior to arrival.  Patient also hit her head 

and she is on Plavix.  She is not aware of any loss of consciousness.  EMS 

thought she had a skin tear on her left elbow.  Patient denies significant left 

elbow pain however is not fully moving this..  Patient denies any other injury 

associated with this most unfortunate mishap.





Her hip is the the main issue she cannot move her hip EMS did report that it was

shortened and externally rotated.


  ** Left Hip


Pain Score (Numeric/FACES): 10





- Related Data


                                    Allergies











Allergy/AdvReac Type Severity Reaction Status Date / Time


 


levofloxacin [From Levaquin] Allergy  Itching Verified 02/08/21 19:06











Home Meds: 


                                    Home Meds





Clopidogrel [Plavix] 75 mg PO DAILY 11/25/15 [History]


Gabapentin [Neurontin] 600 mg PO BID 11/25/15 [History]


Insulin Aspart [Novolog Flexpen] 13 unit SQ TID 11/25/15 [History]


Acetaminophen 500 mg PO BEDTIME 08/17/16 [History]


Calcium Carbonate [Tums] 400 mg PO Q4H PRN 08/17/16 [History]


Vitamin E 400 unit PO DAILY 08/17/16 [History]


Furosemide [Lasix] 20 mg PO ASDIRECTED PRN 06/04/19 [History]


Furosemide [Lasix] 20 mg PO DAILY 06/04/19 [History]


Insulin Degludec [Tresiba] 20 unit SQ BEDTIME 06/04/19 [History]


MO/Pet,Wh/Phenylephrine/Shk Lv [Preparation H Oint] 1 appful RECTAL ASDIRECTED 

PRN 06/04/19 [History]


Metoprolol Succinate 50 mg PO DAILY 06/04/19 [History]


polyethylene glycoL 3350 [MiraLAX] 17 gm PO ASDIRECTED PRN 06/04/19 [History]


Albuterol/Ipratropium [DuoNeb 3.0-0.5 MG/3 ML] 3 ml NEB QIDRT #120 neb 06/11/19 

[Rx]


Spironolactone [Aldactone] 50 mg PO DAILY #30 tablet 06/11/19 [Rx]


Insulin Aspart [NovoLOG] 1 dose SUBCUT TID 10/06/20 [History]











Past Medical History


HEENT History: Reports: Impaired Vision


Other HEENT History: dysphagia


Cardiovascular History: Reports: Arrhythmia, Heart Failure, Hypertension


Other Cardiovascular History: Afib, anemia


Respiratory History: Reports: Other (See Below)


Other Respiratory History: hypoxemia


Gastrointestinal History: Reports: Chronic Constipation


Other Gastrointestinal History: dysphagia


Genitourinary History: Reports: Chronic Renal Insuffiency, Retention, Urinary


OB/GYN History: Reports: Pregnancy


Musculoskeletal History: Reports: Back Pain, Chronic


Other Musculoskeletal History: muscle weakness


Neurological History: Reports: TIA, Other (See Below)


Other Neuro History: cerebral infarct; neuralgia


Psychiatric History: Reports: Depression


Other Psychiatric History: vascular dementia


Endocrine/Metabolic History: Reports: Diabetes, Type II


Hematologic History: Reports: Anemia


Oncologic (Cancer) History: Reports: Other (See Below)


Other Oncologic History: unknown skin cancer


Dermatologic History: Reports: Other (See Below)


Other Dermatologic History: previous supspected skin cancer





- Past Surgical History


GI Surgical History: Reports: Cholecystectomy





Social & Family History





- Family History


Family Medical History: No Pertinent Family History





- Tobacco Use


Tobacco Use Status *Q: Never Tobacco User


Second Hand Smoke Exposure: No





- Caffeine Use


Caffeine Use: Reports: Coffee





- Recreational Drug Use


Recreational Drug Use: No





Review of Systems





- Review of Systems


Review Of Systems: See Below


Constitutional: Reports: No Symptoms


Eyes: Reports: No Symptoms


Ears: Reports: No Symptoms


Nose: Reports: No Symptoms


Mouth/Throat: Reports: No Symptoms


Respiratory: Reports: No Symptoms


Cardiovascular: Reports: No Symptoms


GI/Abdominal: Reports: No Symptoms


Genitourinary: Reports: No Symptoms


Musculoskeletal: Reports: No Symptoms


Skin: Reports: No Symptoms


Neurological: Reports: Headache


Psychiatric: Reports: No Symptoms





ED EXAM, GENERAL





- Physical Exam


Exam: See Below


Exam Limited By: No Limitations


General Appearance: Alert, No Apparent Distress


Eye Exam: Bilateral Eye: EOMI, Normal Inspection, PERRL


Ears: Normal External Exam, Normal Canal, Hearing Grossly Normal, Normal TMs


Nose: Normal Inspection, Normal Mucosa, No Blood


Throat/Mouth: Normal Inspection, Normal Lips, Normal Oropharynx, Normal Voice, 

No Airway Compromise


Head: Other (She has area of swelling and tenderness over the left occiput)


Neck: Normal Inspection, Supple, Non-Tender.  No: Lymphadenopathy (L), 

Lymphadenopathy (R), Tender Lateral, Tender Midline


Respiratory/Chest: No Respiratory Distress, Lungs Clear, Normal Breath Sounds


Cardiovascular: Regular Rate, Rhythm, No Edema, No Murmur


GI/Abdominal: Normal Bowel Sounds, Soft, Non-Tender


Extremities: Pedal Edema, Other (Left leg is externally rotated she is got her 

knee in a flexed position so is hard to assess length discrepancy compared to 

the right.  Left elbow shows significant skin tear this may require sutures will

 reevaluate after the patient gets back from radiology)


Neurological: Alert, Oriented, CN II-XII Intact


Psychiatric: Normal Affect, Normal Mood


Skin Exam: Warm, Dry, Intact


Lymphatic: No Adenopathy





Course





- Re-Assessments/Exams


Free Text/Narrative Re-Assessment/Exam: 





02/08/21 19:06


Awaiting images..  At this time is change of shift further evaluation care and 

disposition per Maite Rienoso NP.





Departure





- Departure


Disposition: Admitted As Inpatient 66


Clinical Impression: 


Acute on chronic renal failure


Qualifiers:


 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 

stage: stage 4 (severe) Qualified Code(s): N17.2 - Acute kidney failure with 

medullary necrosis





Fall


Qualifiers:


 Encounter type: subsequent encounter Qualified Code(s): W19.XXXD - Unspecified 

fall, subsequent encounter





Closed left hip fracture


Qualifiers:


 Encounter type: initial encounter Qualified Code(s): S72.002A - Fracture of 

unspecified part of neck of left femur, initial encounter for closed fracture








- Discharge Information





Sepsis Event Note (ED)





- Evaluation


Sepsis Screening Result: No Definite Risk





<Maite Reinoso - Last Filed: 02/10/21 19:27>





ED HPI GENERAL MEDICAL PROBLEM





- General


Source of Information: Reports: Patient, Nursing Home Records, RN Notes Reviewed


History Limitations: Reports: No Limitations


  ** #1 Interpretation


EKG Date: 02/08/21


Time: 21:48


Rhythm: NSR


Rate (Beats/Min): 81


Axis: Normal


P-Wave: Present


QRS: Normal


ST-T: Normal


QT: Normal


NH/PQ Interval: first degree AVB





Course





- Vital Signs


Last Recorded V/S: 


                                Last Vital Signs











Temp  98.2 F   02/10/21 15:54


 


Pulse  72   02/10/21 16:02


 


Resp  16   02/10/21 15:54


 


BP  104/84   02/10/21 15:54


 


Pulse Ox  92 L  02/10/21 16:02














- Orders/Labs/Meds


Orders: 


                               Active Orders 24 hr











 Category Date Time Status


 


 CBC WITH AUTO DIFF [HEME] DAILY Lab  02/11/21 05:00 Ordered


 


 CBC WITH AUTO DIFF [HEME] DAILY Lab  02/12/21 05:00 Ordered


 


 CBC WITH AUTO DIFF [HEME] DAILY Lab  02/13/21 05:00 Ordered


 


 COMPREHENSIVE METABOLIC PN,CMP [CHEM] DAILY Lab  02/11/21 05:00 Ordered


 


 COMPREHENSIVE METABOLIC PN,CMP [CHEM] DAILY Lab  02/12/21 05:00 Ordered


 


 COMPREHENSIVE METABOLIC PN,CMP [CHEM] DAILY Lab  02/13/21 05:00 Ordered








                                Medication Orders





Albuterol/Ipratropium (Duoneb 3.0-0.5 Mg/3 Ml)  3 ml NEB Q4H PRN


   PRN Reason: Shortness Of Breath/wheezing


Calcium Carbonate/Glycine (Tums)  500 mg PO Q4H PRN


   PRN Reason: Heartburn


Cyclobenzaprine HCl (Flexeril)  10 mg PO TID PRN


   PRN Reason: muscle spasms 


   Last Admin: 02/09/21 12:36  Dose: 10 mg


   Documented by: PROEAMA


Docusate Sodium (Colace)  100 mg PO BID PRN


   PRN Reason: Constipation


Famotidine (Pepcid)  20 mg PO Q48H RYLEY


Gabapentin (Neurontin)  600 mg PO BID [facility] hospital


   Last Admin: 02/10/21 08:58  Dose: 600 mg


   Documented by: STEVEN


   Admin: 02/09/21 20:49  Dose: 600 mg


   Documented by: MERON


Heparin Sodium (Porcine) (Heparin Sodium)  5,000 units SUBCUT Q8H [facility]


   Stop: 02/10/21 23:00


   Last Admin: 02/10/21 14:20  Dose: 5,000 units


   Documented by: STEVEN


   Admin: 02/10/21 06:33  Dose: 5,000 units


   Documented by: MERON


   Admin: 02/09/21 22:29  Dose: 5,000 units


   Documented by: MERON


   Admin: 02/09/21 14:17  Dose: 5,000 units


   Documented by: SARAH


   Admin: 02/09/21 06:45  Dose: 5,000 units


   Documented by: CATRINA


   Admin: 02/09/21 01:08  Dose: 5,000 units


   Documented by: DAYANA


Hydralazine HCl (Apresoline)  10 mg IVPUSH Q4H PRN


   PRN Reason: Hypertension


Hydromorphone HCl (Dilaudid)  0.5 mg IVPUSH Q4H PRN


   PRN Reason: Pain (severe 7-10)


   Last Admin: 02/10/21 09:54  Dose: 0.5 mg


   Documented by: STEVEN


   Admin: 02/09/21 05:13  Dose: 0.5 mg


   Documented by: CATRINA


   Admin: 02/09/21 01:09  Dose: 0.5 mg


   Documented by: DAYANA


Promethazine HCl 12.5 mg/ (Sodium Chloride)  50.5 mls @ 100 mls/hr IV Q6H PRN


   PRN Reason: Nausea/Vomiting


Lactated Ringer's (Ringers, Lactated)  1,000 mls @ 75 mls/hr IV ASDIRECTED [facility] hospital


   Last Admin: 02/10/21 18:26  Dose: 75 mls/hr


   Documented by: STEVEN


Insulin Glargine (Lantus)  15 unit SUBCUT DAILY [facility] hospital


   Last Admin: 02/10/21 08:58  Dose: 15 units


   Documented by: STEVEN


   Admin: 02/09/21 09:57  Dose: 15 units


   Documented by: SARAH


Insulin Human Lispro (Humalog)  0 unit SUBCUT QIDACANDBED [facility] hospital; Protocol


   Last Admin: 02/10/21 18:16 Dose:  Not Given


   Documented by: STEVEN


   Admin: 02/10/21 12:06  Dose: 4 units


   Documented by: STEVEN


   Admin: 02/10/21 08:58  Dose: 2 units


   Documented by: STEVEN


   Admin: 02/09/21 22:30  Dose: 6 units


   Documented by: MERON


   Admin: 02/09/21 17:16  Dose: 4 units


   Documented by: SARAH


   Admin: 02/09/21 12:36  Dose: 6 units


   Documented by: SARAH


   Admin: 02/09/21 08:11  Dose: 6 units


   Documented by: SARAH


Metoprolol Succinate (Toprol Xl)  50 mg PO DAILY RYLEY


   Last Admin: 02/10/21 08:57  Dose: 50 mg


   Documented by: STEVEN


   Admin: 02/09/21 08:11  Dose: 50 mg


   Documented by: SARAH


Oxycodone HCl (Oxycodone)  5 mg PO Q6H PRN


   PRN Reason: Pain (moderate 4-6)


   Last Admin: 02/10/21 08:56  Dose: 5 mg


   Documented by: STEVEN


   Admin: 02/09/21 20:48  Dose: 5 mg


   Documented by: MERON


   Admin: 02/09/21 12:35  Dose: 5 mg


   Documented by: SARAH








Labs: 


                                Laboratory Tests











  02/08/21 02/08/21 02/08/21 Range/Units





  17:58 17:58 17:58 


 


WBC    16.81 H  (3.98-10.04)  K/mm3


 


RBC    4.40  (3.98-5.22)  M/mm3


 


Hgb    13.6  D  (11.2-15.7)  gm/dl


 


Hct    42.0  (34.1-44.9)  %


 


MCV    95.5 H  (79.4-94.8)  fl


 


MCH    30.9  (25.6-32.2)  pg


 


MCHC    32.4  (32.2-35.5)  g/dl


 


RDW Std Deviation    53.0 H  (36.4-46.3)  fL


 


Plt Count    232  (182-369)  K/mm3


 


MPV    10.0  (9.4-12.3)  fl


 


Neut % (Auto)    82.2 H  (34.0-71.1)  %


 


Lymph % (Auto)    11.5 L  (19.3-51.7)  %


 


Mono % (Auto)    5.1  (4.7-12.5)  %


 


Eos % (Auto)    0.7  (0.7-5.8)  


 


Baso % (Auto)    0.2  (0.1-1.2)  %


 


Neut # (Auto)    13.83 H  (1.56-6.13)  K/mm3


 


Lymph # (Auto)    1.93  (1.18-3.74)  K/mm3


 


Mono # (Auto)    0.86 H  (0.24-0.36)  K/mm3


 


Eos # (Auto)    0.11  (0.04-0.36)  K/mm3


 


Baso # (Auto)    0.03  (0.01-0.08)  K/mm3


 


Manual Slide Review    Abnormal smear  


 


PT  10.3    (9.7-12.0)  SECONDS


 


INR  0.96    


 


APTT  23.1    (21.7-31.4)  SECONDS


 


Sodium   143   (136-145)  mEq/L


 


Potassium   4.7   (3.5-5.1)  mEq/L


 


Chloride   105   ()  mEq/L


 


Carbon Dioxide   28   (21-32)  mEq/L


 


Anion Gap   14.7   (5-15)  


 


BUN   25 H   (7-18)  mg/dL


 


Creatinine   2.1 H   (0.55-1.02)  mg/dL


 


Est Cr Clr Drug Dosing   13.04   mL/min


 


Estimated GFR (MDRD)   22   (>60)  mL/min


 


BUN/Creatinine Ratio   11.9 L   (14-18)  


 


Glucose   91   ()  mg/dL


 


POC Glucose     ()  mg/dL


 


Calcium   9.5   (8.5-10.1)  mg/dL


 


Magnesium     (1.8-2.4)  mg/dl


 


Total Bilirubin   0.5   (0.2-1.0)  mg/dL


 


AST   38 H   (15-37)  U/L


 


ALT   32   (14-59)  U/L


 


Alkaline Phosphatase   77   ()  U/L


 


Troponin I     (0.00-0.056)  ng/mL


 


NT-Pro-B Natriuret Pep     (0-450)  pg/mL


 


Total Protein   7.1   (6.4-8.2)  g/dl


 


Albumin   3.6   (3.4-5.0)  g/dl


 


Globulin   3.5   gm/dL


 


Albumin/Globulin Ratio   1.0   (1-2)  


 


Urine Color     (Yellow)  


 


Urine Appearance     (Clear)  


 


Urine pH     (5.0-8.0)  


 


Ur Specific Gravity     (1.005-1.030)  


 


Urine Protein     (Negative)  


 


Urine Glucose (UA)     (Negative)  


 


Urine Ketones     (Negative)  


 


Urine Occult Blood     (Negative)  


 


Urine Nitrite     (Negative)  


 


Urine Bilirubin     (Negative)  


 


Urine Urobilinogen     (0.2-1.0)  


 


Ur Leukocyte Esterase     (Negative)  


 


U Hyaline Cast (Auto)     (0-5)  /lpf


 


Urine RBC     (0-5)  /hpf


 


Urine WBC     (0-5)  /hpf


 


Ur Squamous Epith Cells     (0-5)  /hpf


 


Urine Bacteria     (FEW)  /hpf


 


Urine Mucus     (FEW)  /hpf


 


SARS-CoV-2 RNA (MENA)     (NEGATIVE)  














  02/08/21 02/08/21 02/08/21 Range/Units





  17:58 17:58 18:55 


 


WBC     (3.98-10.04)  K/mm3


 


RBC     (3.98-5.22)  M/mm3


 


Hgb     (11.2-15.7)  gm/dl


 


Hct     (34.1-44.9)  %


 


MCV     (79.4-94.8)  fl


 


MCH     (25.6-32.2)  pg


 


MCHC     (32.2-35.5)  g/dl


 


RDW Std Deviation     (36.4-46.3)  fL


 


Plt Count     (182-369)  K/mm3


 


MPV     (9.4-12.3)  fl


 


Neut % (Auto)     (34.0-71.1)  %


 


Lymph % (Auto)     (19.3-51.7)  %


 


Mono % (Auto)     (4.7-12.5)  %


 


Eos % (Auto)     (0.7-5.8)  


 


Baso % (Auto)     (0.1-1.2)  %


 


Neut # (Auto)     (1.56-6.13)  K/mm3


 


Lymph # (Auto)     (1.18-3.74)  K/mm3


 


Mono # (Auto)     (0.24-0.36)  K/mm3


 


Eos # (Auto)     (0.04-0.36)  K/mm3


 


Baso # (Auto)     (0.01-0.08)  K/mm3


 


Manual Slide Review     


 


PT     (9.7-12.0)  SECONDS


 


INR     


 


APTT     (21.7-31.4)  SECONDS


 


Sodium     (136-145)  mEq/L


 


Potassium     (3.5-5.1)  mEq/L


 


Chloride     ()  mEq/L


 


Carbon Dioxide     (21-32)  mEq/L


 


Anion Gap     (5-15)  


 


BUN     (7-18)  mg/dL


 


Creatinine     (0.55-1.02)  mg/dL


 


Est Cr Clr Drug Dosing     mL/min


 


Estimated GFR (MDRD)     (>60)  mL/min


 


BUN/Creatinine Ratio     (14-18)  


 


Glucose     ()  mg/dL


 


POC Glucose     ()  mg/dL


 


Calcium     (8.5-10.1)  mg/dL


 


Magnesium   2.4   (1.8-2.4)  mg/dl


 


Total Bilirubin     (0.2-1.0)  mg/dL


 


AST     (15-37)  U/L


 


ALT     (14-59)  U/L


 


Alkaline Phosphatase     ()  U/L


 


Troponin I   < 0.017   (0.00-0.056)  ng/mL


 


NT-Pro-B Natriuret Pep  136    (0-450)  pg/mL


 


Total Protein     (6.4-8.2)  g/dl


 


Albumin     (3.4-5.0)  g/dl


 


Globulin     gm/dL


 


Albumin/Globulin Ratio     (1-2)  


 


Urine Color     (Yellow)  


 


Urine Appearance     (Clear)  


 


Urine pH     (5.0-8.0)  


 


Ur Specific Gravity     (1.005-1.030)  


 


Urine Protein     (Negative)  


 


Urine Glucose (UA)     (Negative)  


 


Urine Ketones     (Negative)  


 


Urine Occult Blood     (Negative)  


 


Urine Nitrite     (Negative)  


 


Urine Bilirubin     (Negative)  


 


Urine Urobilinogen     (0.2-1.0)  


 


Ur Leukocyte Esterase     (Negative)  


 


U Hyaline Cast (Auto)     (0-5)  /lpf


 


Urine RBC     (0-5)  /hpf


 


Urine WBC     (0-5)  /hpf


 


Ur Squamous Epith Cells     (0-5)  /hpf


 


Urine Bacteria     (FEW)  /hpf


 


Urine Mucus     (FEW)  /hpf


 


SARS-CoV-2 RNA (MENA)    Negative  (NEGATIVE)  














  02/08/21 02/08/21 Range/Units





  19:55 21:59 


 


WBC    (3.98-10.04)  K/mm3


 


RBC    (3.98-5.22)  M/mm3


 


Hgb    (11.2-15.7)  gm/dl


 


Hct    (34.1-44.9)  %


 


MCV    (79.4-94.8)  fl


 


MCH    (25.6-32.2)  pg


 


MCHC    (32.2-35.5)  g/dl


 


RDW Std Deviation    (36.4-46.3)  fL


 


Plt Count    (182-369)  K/mm3


 


MPV    (9.4-12.3)  fl


 


Neut % (Auto)    (34.0-71.1)  %


 


Lymph % (Auto)    (19.3-51.7)  %


 


Mono % (Auto)    (4.7-12.5)  %


 


Eos % (Auto)    (0.7-5.8)  


 


Baso % (Auto)    (0.1-1.2)  %


 


Neut # (Auto)    (1.56-6.13)  K/mm3


 


Lymph # (Auto)    (1.18-3.74)  K/mm3


 


Mono # (Auto)    (0.24-0.36)  K/mm3


 


Eos # (Auto)    (0.04-0.36)  K/mm3


 


Baso # (Auto)    (0.01-0.08)  K/mm3


 


Manual Slide Review    


 


PT    (9.7-12.0)  SECONDS


 


INR    


 


APTT    (21.7-31.4)  SECONDS


 


Sodium    (136-145)  mEq/L


 


Potassium    (3.5-5.1)  mEq/L


 


Chloride    ()  mEq/L


 


Carbon Dioxide    (21-32)  mEq/L


 


Anion Gap    (5-15)  


 


BUN    (7-18)  mg/dL


 


Creatinine    (0.55-1.02)  mg/dL


 


Est Cr Clr Drug Dosing    mL/min


 


Estimated GFR (MDRD)    (>60)  mL/min


 


BUN/Creatinine Ratio    (14-18)  


 


Glucose    ()  mg/dL


 


POC Glucose   171 H  ()  mg/dL


 


Calcium    (8.5-10.1)  mg/dL


 


Magnesium    (1.8-2.4)  mg/dl


 


Total Bilirubin    (0.2-1.0)  mg/dL


 


AST    (15-37)  U/L


 


ALT    (14-59)  U/L


 


Alkaline Phosphatase    ()  U/L


 


Troponin I    (0.00-0.056)  ng/mL


 


NT-Pro-B Natriuret Pep    (0-450)  pg/mL


 


Total Protein    (6.4-8.2)  g/dl


 


Albumin    (3.4-5.0)  g/dl


 


Globulin    gm/dL


 


Albumin/Globulin Ratio    (1-2)  


 


Urine Color  Light yellow   (Yellow)  


 


Urine Appearance  Clear   (Clear)  


 


Urine pH  6.0   (5.0-8.0)  


 


Ur Specific Gravity  1.020   (1.005-1.030)  


 


Urine Protein  Negative   (Negative)  


 


Urine Glucose (UA)  Negative   (Negative)  


 


Urine Ketones  Negative   (Negative)  


 


Urine Occult Blood  Negative   (Negative)  


 


Urine Nitrite  Negative   (Negative)  


 


Urine Bilirubin  Negative   (Negative)  


 


Urine Urobilinogen  0.2   (0.2-1.0)  


 


Ur Leukocyte Esterase  Trace H   (Negative)  


 


U Hyaline Cast (Auto)  5-10 H   (0-5)  /lpf


 


Urine RBC  0-5   (0-5)  /hpf


 


Urine WBC  0-5   (0-5)  /hpf


 


Ur Squamous Epith Cells  0-5   (0-5)  /hpf


 


Urine Bacteria  Many H   (FEW)  /hpf


 


Urine Mucus  Not seen   (FEW)  /hpf


 


SARS-CoV-2 RNA (MENA)    (NEGATIVE)  











Meds: 


Medications











Generic Name Dose Route Start Last Admin





  Trade Name Freq  PRN Reason Stop Dose Admin


 


Albuterol/Ipratropium  3 ml  02/08/21 22:06 





  Duoneb 3.0-0.5 Mg/3 Ml  NEB  





  Q4H PRN  





  Shortness Of Breath/wheezing  


 


Calcium Carbonate/Glycine  500 mg  02/09/21 09:48 





  Tums  PO  





  Q4H PRN  





  Heartburn  


 


Cyclobenzaprine HCl  10 mg  02/09/21 10:43  02/09/21 12:36





  Flexeril  PO   10 mg





  TID PRN   Administration





  muscle spasms   


 


Docusate Sodium  100 mg  02/08/21 22:06 





  Colace  PO  





  BID PRN  





  Constipation  


 


Famotidine  20 mg  02/11/21 09:00 





  Pepcid  PO  





  Q48H RYLEY  


 


Gabapentin  600 mg  02/09/21 21:00  02/10/21 08:58





  Neurontin  PO   600 mg





  BID RYLEY   Administration


 


Heparin Sodium (Porcine)  5,000 units  02/08/21 22:15  02/10/21 14:20





  Heparin Sodium  SUBCUT  02/10/21 23:00  5,000 units





  Q8H RYLEY   Administration


 


Hydralazine HCl  10 mg  02/08/21 22:19 





  Apresoline  IVPUSH  





  Q4H PRN  





  Hypertension  


 


Hydromorphone HCl  0.5 mg  02/08/21 22:06  02/10/21 09:54





  Dilaudid  IVPUSH   0.5 mg





  Q4H PRN   Administration





  Pain (severe 7-10)  


 


Promethazine HCl 12.5 mg/  50.5 mls @ 100 mls/hr  02/08/21 22:06 





  Sodium Chloride  IV  





  Q6H PRN  





  Nausea/Vomiting  


 


Lactated Ringer's  1,000 mls @ 75 mls/hr  02/10/21 18:30  02/10/21 18:26





  Ringers, Lactated  IV   75 mls/hr





  ASDIRECTED RYLEY   Administration


 


Insulin Glargine  15 unit  02/09/21 09:00  02/10/21 08:58





  Lantus  SUBCUT   15 units





  DAILY RYLEY   Administration


 


Insulin Human Lispro  0 unit  02/09/21 07:00  02/10/21 18:16





  Humalog  SUBCUT   Not Given





  QIDACANDBED Critical access hospital  





  Protocol  


 


Metoprolol Succinate  50 mg  02/09/21 09:00  02/10/21 08:57





  Toprol Xl  PO   50 mg





  DAILY RYLEY   Administration


 


Oxycodone HCl  5 mg  02/08/21 22:06  02/10/21 08:56





  Oxycodone  PO   5 mg





  Q6H PRN   Administration





  Pain (moderate 4-6)  














Discontinued Medications














Generic Name Dose Route Start Last Admin





  Trade Name Freq  PRN Reason Stop Dose Admin


 


Acetaminophen  650 mg  02/08/21 22:06  02/09/21 12:34





  Tylenol  PO   650 mg





  Q6H PRN   Administration





  Pain (Mild 1-3)/fever  


 


Calcium Carbonate/Glycine  400 mg  02/08/21 22:13 





  Tums  PO  





  Q4H PRN  





  Heartburn  


 


Famotidine  20 mg  02/09/21 09:00  02/09/21 09:57





  Pepcid  PO   20 mg





  DAILY RYLEY   Administration


 


Furosemide  40 mg  02/08/21 21:41  02/08/21 22:00





  Lasix  IVPUSH  02/08/21 21:42  40 mg





  NOW ONE   Administration


 


Furosemide  20 mg  02/09/21 11:00  02/09/21 13:04





  Lasix  PO   Not Given





  DAILY RYLEY  


 


Furosemide  20 mg  02/09/21 18:00  02/09/21 18:47





  Lasix  IVPUSH  02/09/21 18:01  20 mg





  ONETIME ONE   Administration


 


Gabapentin  600 mg  02/09/21 09:00  02/09/21 08:10





  Neurontin  PO   600 mg





  BID RYLEY   Administration


 


Hydromorphone HCl  0.25 mg  02/08/21 20:01  02/08/21 20:10





  Dilaudid  IVPUSH  02/08/21 20:02  0.25 mg





  ONETIME ONE   Administration


 


Hydromorphone HCl  0.25 mg  02/08/21 22:21  02/08/21 23:17





  Dilaudid  IVPUSH  02/08/21 22:22  0.25 mg





  ONETIME ONE   Administration


 


Sodium Chloride  1,000 mls @ 65 mls/hr  02/08/21 22:15 





  Normal Saline  IV  





  ASDIRECTED [facility]  


 


Lactated Ringer's  1,000 mls @ 50 mls/hr  02/09/21 13:30  02/09/21 14:18





  Ringers, Lactated  IV   50 mls/hr





  ASDIRECTED RYLEY   Administration


 


Lactated Ringer's  1,000 mls @ 65 mls/hr  02/09/21 18:00  02/10/21 06:46





  Ringers, Lactated  IV   65 mls/hr





  ASDIRECTED RYLEY   Administration


 


Lactated Ringer's  1,000 mls @ 100 mls/hr  02/10/21 09:30  02/10/21 09:44





  Ringers, Lactated  IV  02/10/21 19:29  100 mls/hr





  ASDIRECTED RYLEY   Administration


 


Lidocaine HCl  10 ml  02/08/21 18:57  02/09/21 00:57





  Xylocaine 1%  INJECT  02/08/21 18:58  Not Given





  ONETIME ONE  


 


Non-Formulary Medication  15 unit  02/09/21 09:00 





  Insulin Degludec [Tresiba]  SQ  





  DAILY RYLEY  


 


Ondansetron HCl  4 mg  02/08/21 18:47  02/08/21 18:51





  Zofran  IVPUSH  02/08/21 18:48  4 mg





  ONETIME ONE   Administration


 


Ondansetron HCl  Confirm  02/08/21 19:50  02/08/21 20:11





  Zofran  Administered  02/08/21 19:51  Not Given





  Dose  





  4 mg  





  .ROUTE  





  .STK-MED ONE  


 


Ondansetron HCl  4 mg  02/08/21 20:06  02/08/21 20:11





  Zofran  IVPUSH  02/08/21 20:07  4 mg





  ONETIME ONE   Administration


 


Sodium Polystyrene Sulfonate  15 gm  02/09/21 08:49  02/09/21 09:57





  Kayexalate  PO  02/09/21 08:50  15 gm





  ONETIME ONE   Administration


 


Spironolactone  50 mg  02/09/21 09:00  02/09/21 08:10





  Aldactone  PO   50 mg





  DAILY RYLEY   Administration


 


Tamsulosin HCl  0.4 mg  02/09/21 17:30  02/09/21 17:44





  Flomax  PO   Not Given





  DAILY RYLEY  














- Re-Assessments/Exams


Free Text/Narrative Re-Assessment/Exam: 


02/08/21 2010


Care assumed from Dr. Cruz at end of shift.  Head CT shows no acute 

abnormalities.  X-ray of the left hip is suspicious for fracture.  I have 

ordered a CT scan of the left hip to be completed.  Elbow x-ray shows no 

evidence of acute fracture.  On evaluation, patient does have a 4 cm skin tear 

to the left elbow.  Unfortunately there is a section of skin missing, therefore 

suturing is not a possibility.  I have applied Steri-Strips to the wound and 

area was covered with gauze.  Patient has been having some intermittent nausea 

with a small amount of vomiting.  I have ordered Zofran 4 mg IV as well as 

Dilaudid 0.25 mg IV.








02/08/21 2145


CT scan reviewed by myself and Dr. Andrews.  Shows comminuted fracture of the lef

t hip involving the head and the neck.  Official radiologist read is pending.  

Chest x-ray also reviewed by myself and Dr. Andrews.  It does show signs of some 

pulmonary vascular congestion.  Patient has been requiring 3 L of O2 in the ER 

while she normally wears 2 L of O2 at home.  I have ordered 40 mg of IV Lasix to

 be given now.  Case was discussed with Dr. Pruett, orthopedist.  He recommended 

admission by the hospitalist for medical management and he will plan to take her

 to surgery for repair on Thursday morning.  Case discussed with hospitalist, 

Dr. De Dios.  He has accepted the patient for admission.  





02/08/21 22:18


Radiologist read of the CT of the left hip shows acute complex proximal left 

femoral fracture.  Predominant fracture line appears to be predominantly 

basocervical oblique however a fracture line extends superolateral anteriorly 

transcervically.  Fracture lines involving the greater trochanter as well.  

There is some angulation and overriding of the fracture fragments.








Departure





- Departure


Time of Disposition: 21:45


Condition: Good

## 2021-02-08 NOTE — CT
Head CT

 

Technique: Multiple axial sections through the brain were obtained.  

Intravenous contrast was not utilized.  Reconstructed coronal and 

sagittal images obtained.

 

Comparison: No prior intracranial imaging is available.

 

Findings: Ventricles along with basal cisterns and sulci over the 

convexities are mildly enlarged.  Diminished density is noted within 

the periventricular white matter which is felt to represent small 

vessel ischemic demyelination change.  No other abnormal parenchymal 

densities are seen within the brain parenchyma.  No midline shift or 

mass-effect is appreciated.

 

Atherosclerotic calcification is seen within the cerebral arteries 

within the carotid siphon and within the basilar artery.

 

Bone window settings were reviewed which show air-fluid level within 

the right maxillary sinus.  No mastoid sinus findings are seen.  No 

acute calvarial finding is appreciated.

 

Impression:

1.  Air-fluid level within the right maxillary sinus.  Please rule out

 any symptoms of acute sinusitis.

2.  Senescent change as noted above.

3.  No acute intracranial abnormality is appreciated.

 

Diagnostic code #3

## 2021-02-09 NOTE — CR
Pelvis and left hip: AP view of the pelvis was obtained as well as 2 

views of the left hip.

 

Comparison: Previous AP pelvis study of 04/10/16.

 

Severe joint space narrowing is noted within the right hip.  Joint 

space of the left hip is fairly well preserved.  Degenerative change 

is noted within the lumbar spine and within the sacroiliac joints.  

 

Fracture is identified involving the greater trochanter.  No definite 

complete fracture is appreciated.

 

Impression:

1.  Fracture within the greater trochanter.  No complete fracture is 

seen through the femoral neck or intertrochanteric region.

2.  Degenerative change as noted above.

 

Diagnostic code #3

## 2021-02-09 NOTE — PCM.HP.2
H&P History of Present Illness





- General


Date of Service: 02/09/21


Admit Problem/Dx: 


                           Admission Diagnosis/Problem





Admission Diagnosis/Problem      Hip fracture requiring operative repair








Source of Information: Patient, Old Records, Provider, RN, RN Notes Reviewed


History Limitations: Reports: No Limitations





- History of Present Illness


Initial Comments - Free Text/Narative: 


This is an 88 yo female who presents to ED via [facility] on 2/8/2021 

after a fall resulting in a left elbow and hip injury.  Patient states that she 

thinks her hip gave out or possibly her blood sugar dropped after she got up 

from a chair to walk to the bathroom and she fell.  This occurred at the skilled

nursing facility where she resides.  She reports she did hit her head and she is

on Plavix.  Denies any loss of consciousness. She does have a large skin tear to

her left elbow.  Left leg is noted to be externally rotated and shortened.





In the ED temp is 97.4.  Pulse is 58.  Respirations 16.  Blood pressure 127/55. 

Pulse ox 78%.  Twelve-lead EKG is obtained showing a sinus rhythm at 81 bpm with

a first-degree AV block.  No signs of ischemia.  Labs are changed showing WBC 

does elevated at 16.81.  Hemoglobin is 13.6.  Platelets are good at 232,000.  

Neutrophils are elevated at 13.83.  INR 0.96.  Sodium 143.  Potassium 4.7.  

Chloride 105.  Carbon dioxide 28.  Anion gap 14.7.  BUN is elevated 25.  

Creatinine elevated 2.1.  GFR is 22.  Glucose 91.  Calcium 9.5.  Bilirubin 0.5. 

AST is 38, ALT 32, alkaline phosphatase 77.  Protein 7.1.  Albumin 3.6.  

Magnesium is 2.4.  Troponin is less than 0.017.  proBNP is 136.  SARS Covid 2 

RNA is negative.  UA shows trace leukocyte esterase, 5-10 hyaline casts, many 

bacteria.  Head CT is obtained showing air-fluid levels in the right maxillary 

sinus, please rule out any symptoms of acute sinusitis.  There is also senescent

change but nothing acute noted.  Elbow x-ray is obtained and shows no acute 

fracture.  Patient does have a 4 cm skin tear to the left elbow which is covered

using Steri-Strips and gauze.  She is given Zofran for nausea and Dilaudid for 

pain.  CT of the left hip is obtained showing acute complex proximal left femo

ral fracture.  Predominant fracture line appears to be predominantly 

basocervical oblique however a fracture line extends superior laterally 

anteriorly transcervically.  Fracture lines involving the greater trochanter as 

well.  There is some angulation and overriding of the fracture fragments.  Tiff

ent is noted to require 3 L of oxygen in the ER and it is reported she normally 

wears 2 L at home.  She is given 40 mg of IV push Lasix.  Case discussed with 

Dr. Pruett, orthopedic surgeon, who recommends patient be admitted for surgical 

repair on Thursday, due to her taking Plavix.





She carries a history of heart failure, hypertension, A. fib, anemia, chronic 

constipation, dysphagia, chronic renal sufficiency, urinary retention, chronic 

back pain, TIA, cerebral infarct, neuralgia, depression, vascular dementia, type

II DM.  She was never a smoker.  Her PCP is Dr. Vaughan.  She is a DNR/DNI.  

She subsequently admitted to the medical floor inpatient for management of her 

hip fracture.





  ** Left Hip


Pain Score (Numeric/FACES): 6





- Related Data


Allergies/Adverse Reactions: 


                                    Allergies











Allergy/AdvReac Type Severity Reaction Status Date / Time


 


levofloxacin [From Levaquin] Allergy  Itching Verified 02/08/21 19:06











Home Medications: 


                                    Home Meds





Clopidogrel [Plavix] 75 mg PO DAILY 11/25/15 [History]


Gabapentin [Neurontin] 600 mg PO BID 11/25/15 [History]


Insulin Aspart [Novolog Flexpen] 13 unit SQ TID 11/25/15 [History]


Acetaminophen 500 mg PO BEDTIME 08/17/16 [History]


Calcium Carbonate [Tums] 400 mg PO Q4H PRN 08/17/16 [History]


Vitamin E 400 unit PO DAILY 08/17/16 [History]


Furosemide [Lasix] 20 mg PO ASDIRECTED PRN 06/04/19 [History]


Furosemide [Lasix] 20 mg PO DAILY 06/04/19 [History]


Insulin Degludec [Tresiba] 20 unit SQ BEDTIME 06/04/19 [History]


MO/Pet,Wh/Phenylephrine/Shk Lv [Preparation H Oint] 1 appful RECTAL ASDIRECTED 

PRN 06/04/19 [History]


Metoprolol Succinate 50 mg PO DAILY 06/04/19 [History]


polyethylene glycoL 3350 [MiraLAX] 17 gm PO ASDIRECTED PRN 06/04/19 [History]


Albuterol/Ipratropium [DuoNeb 3.0-0.5 MG/3 ML] 3 ml NEB QIDRT #120 neb 06/11/19 

[Rx]


Spironolactone [Aldactone] 50 mg PO DAILY #30 tablet 06/11/19 [Rx]


Insulin Aspart [NovoLOG] 1 dose SUBCUT TID 10/06/20 [History]











Past Medical History


HEENT History: Reports: Impaired Vision


Other HEENT History: dysphagia


Cardiovascular History: Reports: Afib, Arrhythmia, Heart Failure, Hypertension


Other Cardiovascular History: Afib, anemia


Respiratory History: Reports: Pneumonia, Recurrent, Other (See Below)


Other Respiratory History: hypoxemia requiring home O2


Gastrointestinal History: Reports: Chronic Constipation


Other Gastrointestinal History: dysphagia


Genitourinary History: Reports: Chronic Renal Insuffiency, Retention, Urinary


OB/GYN History: Reports: Pregnancy


Musculoskeletal History: Reports: Back Pain, Chronic


Other Musculoskeletal History: muscle weakness


Neurological History: Reports: TIA, Other (See Below)


Other Neuro History: cerebral infarct; neuralgia


Psychiatric History: Reports: Dementia, Depression


Other Psychiatric History: vascular dementia


Endocrine/Metabolic History: Reports: Diabetes, Type II


Hematologic History: Reports: Anemia


Oncologic (Cancer) History: Reports: Other (See Below)


Other Oncologic History: unknown skin cancer


Dermatologic History: Reports: Other (See Below)


Other Dermatologic History: previous supspected skin cancer





- Infectious Disease History


Infectious Disease History: Reports: Chicken Pox, Influenza, Measles, Mumps





- Past Surgical History


HEENT Surgical History: Reports: None


Cardiovascular Surgical History: Reports: None


GI Surgical History: Reports: Cholecystectomy


Female  Surgical History: Reports: None


Oncologic Surgical History: Reports: None





Social & Family History





- Family History


Family Medical History: No Pertinent Family History





- Tobacco Use


Tobacco Use Status *Q: Unknown Ever Used Tobacco


Second Hand Smoke Exposure: No





- Caffeine Use


Caffeine Use: Reports: Coffee





- Recreational Drug Use


Recreational Drug Use: No





H&P Review of Systems





- Review of Systems:


Review Of Systems: See Below


General: Reports: No Symptoms.  Denies: Fever, Chills, Malaise, Weakness, 

Fatigue


HEENT: Reports: No Symptoms.  Denies: Headaches, Sore Throat


Pulmonary: Reports: No Symptoms.  Denies: Shortness of Breath, Wheezing, 

Pleuritic Chest Pain, Cough, Sputum


Cardiovascular: Reports: No Symptoms.  Denies: Chest Pain, Palpitations, Dyspnea

 on Exertion, Edema


Gastrointestinal: Reports: No Symptoms.  Denies: Abdominal Pain, Constipation, 

Diarrhea, Nausea, Vomiting


Genitourinary: Reports: No Symptoms.  Denies: Pain


Musculoskeletal: Reports: Shoulder Pain (Left), Arm Pain (Left elbow), Leg Pain 

(Left hip)


Skin: Reports: No Symptoms.  Denies: Cyanosis


Psychiatric: Reports: No Symptoms.  Denies: Confusion


Neurological: Reports: Pre-Existing Deficit (Difficulty with ambulation prior ),

 Difficulty Walking, Weakness, Gait Disturbance.  Denies: Confusion, Numbness, 

Tingling


Hematologic/Lymphatic: Reports: No Symptoms


Immunologic: Reports: No Symptoms





Exam





- Exam


Exam: See Below





- Vital Signs


Vital Signs: 


                                Last Vital Signs











Temp  98.6 F   02/09/21 03:41


 


Pulse  85   02/09/21 05:10


 


Resp  16   02/09/21 03:41


 


BP  115/51 L  02/09/21 05:13


 


Pulse Ox  94 L  02/09/21 05:10











Weight: 154 lb 12.8 oz





- Exam


Quality Assessment: Supplemental Oxygen, Urinary Catheter, DVT Prophylaxis


General: Alert, Oriented, Cooperative.  No: Mild Distress


HEENT: Conjunctiva Clear, EACs Clear, EOMI, Mucosa Moist & Pink, Posterior 

Pharynx Clear


Neck: Supple, Trachea Midline


Lungs: Clear to Auscultation, Normal Respiratory Effort


Cardiovascular: Regular Rate, Regular Rhythm


GI/Abdominal Exam: Normal Bowel Sounds, Soft, Non-Tender, No Distention


 (Female) Exam: Deferred


Rectal (Female) Exam: Deferred


Back Exam: Normal Inspection, Full Range of Motion


Extremities: Normal Capillary Refill, Pedal Edema (1+ ), Leg Pain (Left), 

Limited Range of Motion (Left 2/2 pain )


Peripheral Pulses: 2+: Radial (L), Radial (R), Dorsalis Pedis (L), Dorsalis 

Pedis (R)


Skin: Warm, Dry, Intact


Neurological: Cranial Nerves Intact (Grossly )


Neuro Extensive - Mental Status: Alert, Oriented x3





- Patient Data


Lab Results Last 24 hrs: 


                         Laboratory Results - last 24 hr











  02/08/21 02/08/21 02/08/21 Range/Units





  17:58 17:58 17:58 


 


WBC    16.81 H  (3.98-10.04)  K/mm3


 


RBC    4.40  (3.98-5.22)  M/mm3


 


Hgb    13.6  D  (11.2-15.7)  gm/dl


 


Hct    42.0  (34.1-44.9)  %


 


MCV    95.5 H  (79.4-94.8)  fl


 


MCH    30.9  (25.6-32.2)  pg


 


MCHC    32.4  (32.2-35.5)  g/dl


 


RDW Std Deviation    53.0 H  (36.4-46.3)  fL


 


Plt Count    232  (182-369)  K/mm3


 


MPV    10.0  (9.4-12.3)  fl


 


Neut % (Auto)    82.2 H  (34.0-71.1)  %


 


Lymph % (Auto)    11.5 L  (19.3-51.7)  %


 


Mono % (Auto)    5.1  (4.7-12.5)  %


 


Eos % (Auto)    0.7  (0.7-5.8)  


 


Baso % (Auto)    0.2  (0.1-1.2)  %


 


Neut # (Auto)    13.83 H  (1.56-6.13)  K/mm3


 


Lymph # (Auto)    1.93  (1.18-3.74)  K/mm3


 


Mono # (Auto)    0.86 H  (0.24-0.36)  K/mm3


 


Eos # (Auto)    0.11  (0.04-0.36)  K/mm3


 


Baso # (Auto)    0.03  (0.01-0.08)  K/mm3


 


Manual Slide Review    Abnormal smear  


 


PT  10.3    (9.7-12.0)  SECONDS


 


INR  0.96    


 


APTT  23.1    (21.7-31.4)  SECONDS


 


Sodium   143   (136-145)  mEq/L


 


Potassium   4.7   (3.5-5.1)  mEq/L


 


Chloride   105   ()  mEq/L


 


Carbon Dioxide   28   (21-32)  mEq/L


 


Anion Gap   14.7   (5-15)  


 


BUN   25 H   (7-18)  mg/dL


 


Creatinine   2.1 H   (0.55-1.02)  mg/dL


 


Est Cr Clr Drug Dosing   13.04   mL/min


 


Estimated GFR (MDRD)   22   (>60)  mL/min


 


BUN/Creatinine Ratio   11.9 L   (14-18)  


 


Glucose   91   ()  mg/dL


 


POC Glucose     ()  mg/dL


 


Calcium   9.5   (8.5-10.1)  mg/dL


 


Magnesium     (1.8-2.4)  mg/dl


 


Total Bilirubin   0.5   (0.2-1.0)  mg/dL


 


AST   38 H   (15-37)  U/L


 


ALT   32   (14-59)  U/L


 


Alkaline Phosphatase   77   ()  U/L


 


Troponin I     (0.00-0.056)  ng/mL


 


NT-Pro-B Natriuret Pep     (0-450)  pg/mL


 


Total Protein   7.1   (6.4-8.2)  g/dl


 


Albumin   3.6   (3.4-5.0)  g/dl


 


Globulin   3.5   gm/dL


 


Albumin/Globulin Ratio   1.0   (1-2)  


 


Urine Color     (Yellow)  


 


Urine Appearance     (Clear)  


 


Urine pH     (5.0-8.0)  


 


Ur Specific Gravity     (1.005-1.030)  


 


Urine Protein     (Negative)  


 


Urine Glucose (UA)     (Negative)  


 


Urine Ketones     (Negative)  


 


Urine Occult Blood     (Negative)  


 


Urine Nitrite     (Negative)  


 


Urine Bilirubin     (Negative)  


 


Urine Urobilinogen     (0.2-1.0)  


 


Ur Leukocyte Esterase     (Negative)  


 


U Hyaline Cast (Auto)     (0-5)  /lpf


 


Urine RBC     (0-5)  /hpf


 


Urine WBC     (0-5)  /hpf


 


Ur Squamous Epith Cells     (0-5)  /hpf


 


Urine Bacteria     (FEW)  /hpf


 


Urine Mucus     (FEW)  /hpf


 


SARS-CoV-2 RNA (MENA)     (NEGATIVE)  


 


MRSA (PCR)     














  02/08/21 02/08/21 02/08/21 Range/Units





  17:58 17:58 18:55 


 


WBC     (3.98-10.04)  K/mm3


 


RBC     (3.98-5.22)  M/mm3


 


Hgb     (11.2-15.7)  gm/dl


 


Hct     (34.1-44.9)  %


 


MCV     (79.4-94.8)  fl


 


MCH     (25.6-32.2)  pg


 


MCHC     (32.2-35.5)  g/dl


 


RDW Std Deviation     (36.4-46.3)  fL


 


Plt Count     (182-369)  K/mm3


 


MPV     (9.4-12.3)  fl


 


Neut % (Auto)     (34.0-71.1)  %


 


Lymph % (Auto)     (19.3-51.7)  %


 


Mono % (Auto)     (4.7-12.5)  %


 


Eos % (Auto)     (0.7-5.8)  


 


Baso % (Auto)     (0.1-1.2)  %


 


Neut # (Auto)     (1.56-6.13)  K/mm3


 


Lymph # (Auto)     (1.18-3.74)  K/mm3


 


Mono # (Auto)     (0.24-0.36)  K/mm3


 


Eos # (Auto)     (0.04-0.36)  K/mm3


 


Baso # (Auto)     (0.01-0.08)  K/mm3


 


Manual Slide Review     


 


PT     (9.7-12.0)  SECONDS


 


INR     


 


APTT     (21.7-31.4)  SECONDS


 


Sodium     (136-145)  mEq/L


 


Potassium     (3.5-5.1)  mEq/L


 


Chloride     ()  mEq/L


 


Carbon Dioxide     (21-32)  mEq/L


 


Anion Gap     (5-15)  


 


BUN     (7-18)  mg/dL


 


Creatinine     (0.55-1.02)  mg/dL


 


Est Cr Clr Drug Dosing     mL/min


 


Estimated GFR (MDRD)     (>60)  mL/min


 


BUN/Creatinine Ratio     (14-18)  


 


Glucose     ()  mg/dL


 


POC Glucose     ()  mg/dL


 


Calcium     (8.5-10.1)  mg/dL


 


Magnesium   2.4   (1.8-2.4)  mg/dl


 


Total Bilirubin     (0.2-1.0)  mg/dL


 


AST     (15-37)  U/L


 


ALT     (14-59)  U/L


 


Alkaline Phosphatase     ()  U/L


 


Troponin I   < 0.017   (0.00-0.056)  ng/mL


 


NT-Pro-B Natriuret Pep  136    (0-450)  pg/mL


 


Total Protein     (6.4-8.2)  g/dl


 


Albumin     (3.4-5.0)  g/dl


 


Globulin     gm/dL


 


Albumin/Globulin Ratio     (1-2)  


 


Urine Color     (Yellow)  


 


Urine Appearance     (Clear)  


 


Urine pH     (5.0-8.0)  


 


Ur Specific Gravity     (1.005-1.030)  


 


Urine Protein     (Negative)  


 


Urine Glucose (UA)     (Negative)  


 


Urine Ketones     (Negative)  


 


Urine Occult Blood     (Negative)  


 


Urine Nitrite     (Negative)  


 


Urine Bilirubin     (Negative)  


 


Urine Urobilinogen     (0.2-1.0)  


 


Ur Leukocyte Esterase     (Negative)  


 


U Hyaline Cast (Auto)     (0-5)  /lpf


 


Urine RBC     (0-5)  /hpf


 


Urine WBC     (0-5)  /hpf


 


Ur Squamous Epith Cells     (0-5)  /hpf


 


Urine Bacteria     (FEW)  /hpf


 


Urine Mucus     (FEW)  /hpf


 


SARS-CoV-2 RNA (MENA)    Negative  (NEGATIVE)  


 


MRSA (PCR)     














  02/08/21 02/08/21 02/09/21 Range/Units





  19:55 21:59 00:05 


 


WBC     (3.98-10.04)  K/mm3


 


RBC     (3.98-5.22)  M/mm3


 


Hgb     (11.2-15.7)  gm/dl


 


Hct     (34.1-44.9)  %


 


MCV     (79.4-94.8)  fl


 


MCH     (25.6-32.2)  pg


 


MCHC     (32.2-35.5)  g/dl


 


RDW Std Deviation     (36.4-46.3)  fL


 


Plt Count     (182-369)  K/mm3


 


MPV     (9.4-12.3)  fl


 


Neut % (Auto)     (34.0-71.1)  %


 


Lymph % (Auto)     (19.3-51.7)  %


 


Mono % (Auto)     (4.7-12.5)  %


 


Eos % (Auto)     (0.7-5.8)  


 


Baso % (Auto)     (0.1-1.2)  %


 


Neut # (Auto)     (1.56-6.13)  K/mm3


 


Lymph # (Auto)     (1.18-3.74)  K/mm3


 


Mono # (Auto)     (0.24-0.36)  K/mm3


 


Eos # (Auto)     (0.04-0.36)  K/mm3


 


Baso # (Auto)     (0.01-0.08)  K/mm3


 


Manual Slide Review     


 


PT     (9.7-12.0)  SECONDS


 


INR     


 


APTT     (21.7-31.4)  SECONDS


 


Sodium     (136-145)  mEq/L


 


Potassium     (3.5-5.1)  mEq/L


 


Chloride     ()  mEq/L


 


Carbon Dioxide     (21-32)  mEq/L


 


Anion Gap     (5-15)  


 


BUN     (7-18)  mg/dL


 


Creatinine     (0.55-1.02)  mg/dL


 


Est Cr Clr Drug Dosing     mL/min


 


Estimated GFR (MDRD)     (>60)  mL/min


 


BUN/Creatinine Ratio     (14-18)  


 


Glucose     ()  mg/dL


 


POC Glucose   171 H   ()  mg/dL


 


Calcium     (8.5-10.1)  mg/dL


 


Magnesium     (1.8-2.4)  mg/dl


 


Total Bilirubin     (0.2-1.0)  mg/dL


 


AST     (15-37)  U/L


 


ALT     (14-59)  U/L


 


Alkaline Phosphatase     ()  U/L


 


Troponin I     (0.00-0.056)  ng/mL


 


NT-Pro-B Natriuret Pep     (0-450)  pg/mL


 


Total Protein     (6.4-8.2)  g/dl


 


Albumin     (3.4-5.0)  g/dl


 


Globulin     gm/dL


 


Albumin/Globulin Ratio     (1-2)  


 


Urine Color  Light yellow    (Yellow)  


 


Urine Appearance  Clear    (Clear)  


 


Urine pH  6.0    (5.0-8.0)  


 


Ur Specific Gravity  1.020    (1.005-1.030)  


 


Urine Protein  Negative    (Negative)  


 


Urine Glucose (UA)  Negative    (Negative)  


 


Urine Ketones  Negative    (Negative)  


 


Urine Occult Blood  Negative    (Negative)  


 


Urine Nitrite  Negative    (Negative)  


 


Urine Bilirubin  Negative    (Negative)  


 


Urine Urobilinogen  0.2    (0.2-1.0)  


 


Ur Leukocyte Esterase  Trace H    (Negative)  


 


U Hyaline Cast (Auto)  5-10 H    (0-5)  /lpf


 


Urine RBC  0-5    (0-5)  /hpf


 


Urine WBC  0-5    (0-5)  /hpf


 


Ur Squamous Epith Cells  0-5    (0-5)  /hpf


 


Urine Bacteria  Many H    (FEW)  /hpf


 


Urine Mucus  Not seen    (FEW)  /hpf


 


SARS-CoV-2 RNA (MENA)     (NEGATIVE)  


 


MRSA (PCR)    Negative  











Result Diagrams: 


                                 02/09/21 06:25





                                 02/09/21 06:25


  ** #1 Interpretation


EKG Date: 02/09/21


Time: 08:59


Rhythm: NSR


P-Wave: Present


QRS: Normal


ST-T: Normal


QT: Normal


Comparison: No Change (from 6/4/19)


EKG Interpretation Comments: 


Ordered due to hypokalemia





Sinus rhythm with Q-waves in III and aVF - consider old inferior infarct. 








Sepsis Event Note





- Evaluation


Sepsis Screening Result: No Definite Risk





- Focused Exam


Vital Signs: 


                                   Vital Signs











  Temp Pulse Resp BP Pulse Ox


 


 02/09/21 05:13     115/51 L 


 


 02/09/21 05:10   85    94 L


 


 02/09/21 03:41  98.6 F   16  89/64 L 


 


 02/09/21 00:04  98.1 F  86  20  137/51 L  93 L














- Problem List


(1) Closed left hip fracture


SNOMED Code(s): 793521712


   ICD Code: S72.002A - FRACTURE OF UNSP PART OF NECK OF LEFT FEMUR, INIT   S

tatus: Acute   Priority: High   Current Visit: Yes   


Qualifiers: 


   Encounter type: initial encounter   Qualified Code(s): S72.002A - Fracture of

 unspecified part of neck of left femur, initial encounter for closed fracture  

 





(2) Anemia


SNOMED Code(s): 549093612


   ICD Code: D64.9 - ANEMIA, UNSPECIFIED   Status: Chronic   Priority: Medium   

Current Visit: No   Onset Date: 06/05/19   Problem Details: anemia  sec  to  

anticoagulation   with  xarolto / plavix and asa   asa  held   mild  bleeding  

noted   from  hemmhroids   


Qualifiers: 


   Anemia type: due to chronic kidney disease   Chronic kidney disease stage: 

stage 4 (severe)   Qualified Code(s): N18.4 - Chronic kidney disease, stage 4 

(severe); D63.1 - Anemia in chronic kidney disease   





(3) CHF (congestive heart failure), NYHA class II


SNOMED Code(s): 126698424, 824558152


   ICD Code: I50.9 - HEART FAILURE, UNSPECIFIED   Status: Chronic   Priority: 

Medium   Current Visit: No   Onset Date: 06/06/19   


Qualifiers: 


   Congestive heart failure type: unspecified   Qualified Code(s): I50.9 - Heart

 failure, unspecified   





(4) Fall


SNOMED Code(s): 2075861, 477643909


   ICD Code: W19.XXXA - UNSPECIFIED FALL, INITIAL ENCOUNTER   Status: Acute   

Priority: High   Current Visit: Yes   


Qualifiers: 


   Encounter type: subsequent encounter   Qualified Code(s): W19.XXXD - 

Unspecified fall, subsequent encounter   





(5) DM2 (diabetes mellitus, type 2)


SNOMED Code(s): 35575119


   ICD Code: E11.9 - TYPE 2 DIABETES MELLITUS WITHOUT COMPLICATIONS   Status: 

Chronic   Priority: High   Current Visit: No   Onset Date: 06/07/19   


Qualifiers: 


   Diabetes mellitus long term insulin use: unspecified long term insulin use 

status   Diabetes mellitus complication status: with unspecified complications 





(6) Afib


SNOMED Code(s): 30285566


   ICD Code: I48.91 - UNSPECIFIED ATRIAL FIBRILLATION   Status: Chronic   

Priority: Low   Current Visit: No   


Qualifiers: 


   Atrial fibrillation type: paroxysmal   Qualified Code(s): I48.0 - Paroxysmal 

atrial fibrillation   





(7) HTN (hypertension)


SNOMED Code(s): 04241262


   ICD Code: I10 - ESSENTIAL (PRIMARY) HYPERTENSION   Status: Chronic   

Priority: Medium   Current Visit: No   


Qualifiers: 


   Hypertension type: unspecified   Qualified Code(s): I10 - Essential (primary)

 hypertension   





(8) Chronic constipation


SNOMED Code(s): 895139403


   ICD Code: K59.09 - OTHER CONSTIPATION   Status: Chronic   Priority: Low   

Current Visit: No   





(9) Urinary retention


SNOMED Code(s): 234242965


   ICD Code: R33.9 - RETENTION OF URINE, UNSPECIFIED   Status: Chronic   

Priority: Low   Current Visit: No   





(10) Chronic back pain


SNOMED Code(s): 320065718


   ICD Code: M54.9 - DORSALGIA, UNSPECIFIED; G89.29 - OTHER CHRONIC PAIN   

Status: Chronic   Priority: Low   Current Visit: No   


Qualifiers: 


   Back pain location: back pain in unspecified location   Back pain laterality:

 unspecified   Qualified Code(s): M54.9 - Dorsalgia, unspecified; G89.29 - Other

 chronic pain   





(11) History of TIA (transient ischemic attack)


SNOMED Code(s): 483809079


   ICD Code: Z86.73 - PRSNL HX OF TIA (TIA), AND CEREB INFRC W/O RESID DEFICITS 

  Status: Chronic   Priority: Low   Current Visit: No   





(12) Depression


SNOMED Code(s): 27382608


   ICD Code: F32.9 - MAJOR DEPRESSIVE DISORDER, SINGLE EPISODE, UNSPECIFIED   

Status: Chronic   Priority: Low   Current Visit: No   


Qualifiers: 


   Depression Type: other depression   Qualified Code(s): F32.89 - Other 

specified depressive episodes   





(13) Nursing home resident


SNOMED Code(s): 267905213


   ICD Code: Z59.3 - PROBLEMS RELATED TO LIVING IN RESIDENTIAL INSTITUTION   

Status: Chronic   Priority: Low   Current Visit: No   





(14) Hyperbilirubinemia


SNOMED Code(s): 69325429


   ICD Code: E80.6 - OTHER DISORDERS OF BILIRUBIN METABOLISM   Status: Acute   

Priority: Medium   Current Visit: Yes   





(15) Elevated AST (SGOT)


SNOMED Code(s): 759746950


   ICD Code: R74.01 - ELEVATION OF LEVELS OF LIVER TRANSAMINASE LEVELS   Status:

 Acute   Priority: Medium   Current Visit: Yes   





(16) Elevated ALT measurement


SNOMED Code(s): 554751404


   ICD Code: R74.01 - ELEVATION OF LEVELS OF LIVER TRANSAMINASE LEVELS   Status:

 Acute   Priority: Medium   Current Visit: Yes   


Problem List Initiated/Reviewed/Updated: Yes


Orders Last 24hrs: 


                               Active Orders 24 hr











 Category Date Time Status


 


 Patient Status [ADT] Routine ADT  02/08/21 23:29 Active


 


 Bedrest Bedside Commode [RC] BID Care  02/08/21 22:06 Active


 


 Cardiac Monitoring [RC] CONTINUOUS Care  02/08/21 22:06 Active


 


 Page Catheter Insertion [Insert Urinary Catheter] [OM. Care  02/08/21 22:00 

Ordered





 PC] Q24H   


 


 Oxygen Therapy [RC] PRN Care  02/08/21 22:06 Active


 


 Pulse Oximetry [RC] CONTINUOUS Care  02/08/21 22:06 Active


 


 RT Aerosol Therapy [RC] ASDIRECTED Care  02/08/21 22:10 Active


 


 Urinary Catheter Assessment [RC] 04,10,16,22 Care  02/08/21 21:49 Active


 


 VTE/DVT Education [RC] DAILY Care  02/08/21 22:06 Active


 


 OT Evaluation and Treatment [CONS] Routine Cons  02/08/21 22:10 Active


 


 PT Evaluation and Treatment [CONS] Routine Cons  02/08/21 22:10 Active


 


 Consistent Carbohydrate Diet [DIET] Diet  02/08/21 Breakfast Active


 


 Chest 1V Frontal [CR] Stat Exams  02/08/21 18:04 Taken


 


 Elbow Min 3V Lt [CR] Stat Exams  02/08/21 17:38 Taken


 


 Hip Min 2V or 3V w Pelvis Lt [CR] Stat Exams  02/08/21 17:38 Taken


 


 Hip wo Cont Lt [CT] Stat Exams  02/08/21 19:06 Taken


 


 CBC WITH AUTO DIFF [HEME] DAILY Lab  02/09/21 06:25 Received


 


 CBC WITH AUTO DIFF [HEME] DAILY Lab  02/10/21 05:00 Ordered


 


 CBC WITH AUTO DIFF [HEME] DAILY Lab  02/11/21 05:00 Ordered


 


 CBC WITH AUTO DIFF [HEME] DAILY Lab  02/12/21 05:00 Ordered


 


 CBC WITH AUTO DIFF [HEME] DAILY Lab  02/13/21 05:00 Ordered


 


 COMPREHENSIVE METABOLIC PN,CMP [CHEM] DAILY Lab  02/09/21 06:25 Received


 


 COMPREHENSIVE METABOLIC PN,CMP [CHEM] DAILY Lab  02/10/21 05:00 Ordered


 


 COMPREHENSIVE METABOLIC PN,CMP [CHEM] DAILY Lab  02/11/21 05:00 Ordered


 


 COMPREHENSIVE METABOLIC PN,CMP [CHEM] DAILY Lab  02/12/21 05:00 Ordered


 


 COMPREHENSIVE METABOLIC PN,CMP [CHEM] DAILY Lab  02/13/21 05:00 Ordered


 


 CULTURE URINE [RM] Stat Lab  02/08/21 19:55 Received


 


 PRO B-TYPE NATRIUR PEPT,BNPPRO [CHEM] Routine Lab  02/09/21 06:25 Received


 


 Acetaminophen [TylenoL] Med  02/08/21 22:06 Active





 650 mg PO Q6H PRN   


 


 Albuterol/Ipratropium [DuoNeb 3.0-0.5 MG/3 ML] Med  02/08/21 22:06 Active





 3 ml NEB Q4H PRN   


 


 Calcium Carbonate [Tums] Med  02/08/21 22:13 Active





 400 mg PO Q4H PRN   


 


 Docusate Sodium [Colace] Med  02/08/21 22:06 Active





 100 mg PO BID PRN   


 


 Gabapentin [Neurontin] Med  02/09/21 09:00 Active





 600 mg PO BID   


 


 HYDROmorphone [Dilaudid] Med  02/08/21 22:06 Active





 0.5 mg IVPUSH Q4H PRN   


 


 Heparin Sodium Med  02/08/21 22:15 Active





 5,000 units SUBCUT Q8H   


 


 Insulin Degludec [Tresiba] Med  02/09/21 09:00 Active





 15 unit SQ DAILY   


 


 Insulin Lispro [HumaLOG] Med  02/09/21 07:00 Active





 See Protocol  SUBCUT QIDACANDBED   


 


 Metoprolol Succinate [Toprol XL] Med  02/09/21 09:00 Active





 50 mg PO DAILY   


 


 Promethazine [Phenergan] 12.5 mg Med  02/08/21 22:06 Active





 Sodium Chloride 0.9% [Normal Saline] 50 ml   





 IV Q6H   


 


 Spironolactone [Aldactone] Med  02/09/21 09:00 Active





 50 mg PO DAILY   


 


 hydrALAZINE [Apresoline] Med  02/08/21 22:19 Active





 10 mg IVPUSH Q4H PRN   


 


 oxyCODONE Med  02/08/21 22:06 Active





 5 mg PO Q6H PRN   


 


 Code Status [Resuscitation Status] Routine Resus Stat  02/09/21 00:50 Ordered








                                Medication Orders





Acetaminophen (Tylenol)  650 mg PO Q6H PRN


   PRN Reason: Pain (Mild 1-3)/fever


Albuterol/Ipratropium (Duoneb 3.0-0.5 Mg/3 Ml)  3 ml NEB Q4H PRN


   PRN Reason: Shortness Of Breath/wheezing


Calcium Carbonate/Glycine (Tums)  400 mg PO Q4H PRN


   PRN Reason: Heartburn


Docusate Sodium (Colace)  100 mg PO BID PRN


   PRN Reason: Constipation


Gabapentin (Neurontin)  600 mg PO BID [facility]


Heparin Sodium (Porcine) (Heparin Sodium)  5,000 units SUBCUT Q8H [facility]


   Last Admin: 02/09/21 06:45  Dose: 5,000 units


   Documented by: CATRINA


   Admin: 02/09/21 01:08  Dose: 5,000 units


   Documented by: DAYANA


Hydralazine HCl (Apresoline)  10 mg IVPUSH Q4H PRN


   PRN Reason: Hypertension


Hydromorphone HCl (Dilaudid)  0.5 mg IVPUSH Q4H PRN


   PRN Reason: Pain (severe 7-10)


   Last Admin: 02/09/21 05:13  Dose: 0.5 mg


   Documented by: CATRINA


   Admin: 02/09/21 01:09  Dose: 0.5 mg


   Documented by: DAYANA


Promethazine HCl 12.5 mg/ (Sodium Chloride)  50.5 mls @ 100 mls/hr IV Q6H PRN


   PRN Reason: Nausea/Vomiting


Insulin Human Lispro (Humalog)  0 unit SUBCUT QIDACANDBED [facility]; Protocol


Metoprolol Succinate (Toprol Xl)  50 mg PO DAILY [facility]


Non-Formulary Medication (Insulin Degludec [Tresiba])  15 unit SQ DAILY [facility]


Oxycodone HCl (Oxycodone)  5 mg PO Q6H PRN


   PRN Reason: Pain (moderate 4-6)


Spironolactone (Aldactone)  50 mg PO DAILY [facility]








Assessment/Plan Comment:: 


Assessment - Day of admission 2/9/21 (admitted late 2/8/21)


* 89 Yo female who [facility] after fall at [facility] resulting in left hip and elbow 

  pain


* Unknown why she fell. Hit head. Skin tear on left elbow


* Left leg externally rotated and shortened


* 12-lead EKG shows NSR at 81 BPM with a first degree AV block.


* Head CT shows air-fluid levels in right maxillary sinus and senescent change. 

  Nothing acute


* Left hip X-ray suspicious for fracture


* Left elbow x-ray shows nothing acute


* 4cm skin tear on left elbow with skin missing repaired with steri-strips and 

  gauze


* Left Hip CT shows acute complex proximal left femoral fracture 


   * Predominant fracture line appears to be predominantly basocervical oblique 

     however a fracture line extends superolaterally anteriorly transcervically.

      


   * Fracture lines involving the greater trochanter as well.


   * There is some angulation and overriding of the fracture fragments.


* CXR shows nothing acute


* Dr. Pruett, orthopedic surgeon consulted by ED - plans for surgery on 2/11/21 

  due to patient being on Plavix\


* Given Dilaudid for pain and Zofran for nausea


* Labs:


   * WBC 16.81 --> 18.22


   * hemoglobin 13.6 --> 13.9 


   * platelet 232 --> 189 


   * neutrophils 13.83 --> 16.37 


   * INR 0.96 


   * sodium 143 --> 140 


   * potassium 4.7 --> 5.5 


   * anion gap 14.7 --> 17.5 


   * BUN 25 --> 29 


   * creatinine 2.1 --> 2.3 


   * GFR 20 ---> 20 


   * glucose 91 --> 267


   * magnesium 2.4 


   * total bilirubin 0.5 --> 1.3 


   * AST 38 ---> 192 


   * ALT 32 --> 327 


   * alkaline phosphatase 77 --> 113 


   * troponin less than 0.017 


   * proBNP 136 --> 335 


   * albumin 3.6 --> 3.6 


   * UA negative but trace leukocyte esterase, 5-10 hyaline casts, and many 

     bacteria noted 


   * SARS Covid 2 RNA negative 


   * MRSA negative 


   * urine culture pending





PLAN:


Closed left hip fracture


Fall


Nursing home resident


* Pain medications as ordered


* Bedrest


* Page until post surgical


* Flexeril as ordered


* PT/OT


* CM/SW for discharge planning


* Dr. Pruett, orthopedic surgeon consulted in ED


* Planning surgery on 2/11/21 (wait due to Plavix)


* Hold home Plavix 


* Heparin/SCDs for VTE


* IS


* Optimize for surgery


* Ice pack as needed





Hyperkalemia


* Kayexalate 15 gm now


* 12-lead ekg - shows no acute changes


* Monitor labs





Hyperbilirubinemia


Elevated AST (SGOT)


Elevated ALT measurement


* Avoid tylenol


* Re-check labs and monitor


* IV fluids as ordered 





DM2 (diabetes mellitus, type 2)


* Lantus (sub for Tresiba) at home dosing


* Medium scale humalog


* QID AC and bedtime blood glucose checks


* Consistent carbohydrate diet








Anemia


CHF (congestive heart failure), NYHA class II


Afib


HTN (hypertension)


Chronic constipation


Urinary retention


Chronic back pain


History of TIA (transient ischemic attack)


Depression


* Review/reconcile home medications


* Telemetry


* PRN stool softeners





Code status: DNR/DNI


PCP: Dr. Vaughan


DVT prophylaxis: Heparin/SCDs 


Social: Patient resides in [facility]


Disposition: Patient admitted inpatient with telemetry for surgical management 

of left hip fracture on 2/11/21. 














- Mortality Measure


Prognosis:: Poor

## 2021-02-09 NOTE — CR
Left elbow: 4 views left elbow were obtained.

 

Comparison: No prior elbow study is available.

 

Bony structures are osteopenic.  Soft tissue swelling is noted.  

Slight bony irregularity is noted off the posterior olecranon process 

which appears to be old.  No acute fracture, dislocation or other bony

 abnormality is appreciated.

 

Impression:

1.  Soft tissue swelling.

2.  No acute osseous finding is seen.

 

Diagnostic code #2

## 2021-02-09 NOTE — CR
Chest: Portable view of the chest was obtained.

 

Comparison: Prior chest x-ray of 06/09/19.

 

Findings: Heart is enlarged.  Slight tortuosity of the thoracic aorta 

is noted.  Pulmonary vessels are slightly increased.  Lungs otherwise 

are clear.  Bony structures are osteopenic.  Scoliosis and 

degenerative change is noted within the spine.

 

Impression:

1.  Possible mild CHF.

2.  Other findings as noted above.

 

Diagnostic code #3

## 2021-02-10 NOTE — PCM.PREANE
Preanesthetic Assessment





- Procedure


Proposed Procedure: 





Left hip fracture with ronna and screws








- Anesthesia/Transfusion/Family Hx


Anesthesia History: Prior Anesthesia Reaction (nausea and vomiting)


Family History of Anesthesia Reaction: No


Transfusion History: No Prior Transfusion(s)





- Review of Systems


General: Weakness, Malaise


Pulmonary: Shortness of Breath


Cardiovascular: Dyspnea on Exertion


Gastrointestinal: No Symptoms


Neurological: Difficulty Walking


Other: Reports: Easy Bleeding (Plavix), Easy Bruising, Diabetes





- Physical Assessment


Vital Signs: 





                                Last Vital Signs











Temp  37.2 C   02/10/21 08:10


 


Pulse  73   02/10/21 09:05


 


Resp  16   02/10/21 08:10


 


BP  115/67   02/10/21 08:57


 


Pulse Ox  92 L  02/10/21 09:05











Height: 1.5 m


Weight: 70.398 kg


ASA Class: 3


Mental Status: Alert & Oriented x3


Airway Class: Mallampati = 3


Dentition: Reports: Partial (top)


Thyro-Mental Finger Breadths: 2


Mouth Opening Finger Breadths: 2


ROM/Head Extension: Limited/Partial


Lungs: Clear to Auscultation, Normal Respiratory Effort


Cardiovascular: Regular Rate, Regular Rhythm, No Murmurs





- Lab


Values: 





                             Laboratory Last Values











WBC  14.15 K/mm3 (3.98-10.04)  H  02/10/21  06:18    


 


RBC  3.95 M/mm3 (3.98-5.22)  L  02/10/21  06:18    


 


Hgb  12.0 gm/dl (11.2-15.7)  D 02/10/21  06:18    


 


Hct  38.2 % (34.1-44.9)   02/10/21  06:18    


 


MCV  96.7 fl (79.4-94.8)  H  02/10/21  06:18    


 


MCH  30.4 pg (25.6-32.2)   02/10/21  06:18    


 


MCHC  31.4 g/dl (32.2-35.5)  L  02/10/21  06:18    


 


RDW Std Deviation  54.1 fL (36.4-46.3)  H  02/10/21  06:18    


 


Plt Count  158 K/mm3 (182-369)  L  02/10/21  06:18    


 


MPV  11.1 fl (9.4-12.3)   02/10/21  06:18    


 


Neut % (Auto)  77.4 % (34.0-71.1)  H  02/10/21  06:18    


 


Lymph % (Auto)  12.4 % (19.3-51.7)  L  02/10/21  06:18    


 


Mono % (Auto)  5.7 % (4.7-12.5)   02/10/21  06:18    


 


Eos % (Auto)  3.8  (0.7-5.8)   02/10/21  06:18    


 


Baso % (Auto)  0.2 % (0.1-1.2)   02/10/21  06:18    


 


Neut # (Auto)  10.94 K/mm3 (1.56-6.13)  H  02/10/21  06:18    


 


Lymph # (Auto)  1.76 K/mm3 (1.18-3.74)   02/10/21  06:18    


 


Mono # (Auto)  0.81 K/mm3 (0.24-0.36)  H  02/10/21  06:18    


 


Eos # (Auto)  0.54 K/mm3 (0.04-0.36)  H  02/10/21  06:18    


 


Baso # (Auto)  0.03 K/mm3 (0.01-0.08)   02/10/21  06:18    


 


Manual Slide Review  Normal smear   02/10/21  06:18    


 


PT  10.3 SECONDS (9.7-12.0)   02/08/21  17:58    


 


INR  0.96   02/08/21  17:58    


 


APTT  23.1 SECONDS (21.7-31.4)   02/08/21  17:58    


 


Sodium  142 mEq/L (136-145)   02/10/21  06:18    


 


Potassium  4.1 mEq/L (3.5-5.1)   02/10/21  06:18    


 


Chloride  103 mEq/L ()   02/10/21  06:18    


 


Carbon Dioxide  30 mEq/L (21-32)   02/10/21  06:18    


 


Anion Gap  13.1  (5-15)   02/10/21  06:18    


 


BUN  37 mg/dL (7-18)  H  02/10/21  06:18    


 


Creatinine  2.6 mg/dL (0.55-1.02)  H  02/10/21  06:18    


 


Est Cr Clr Drug Dosing  10.54 mL/min  02/10/21  06:18    


 


Estimated GFR (MDRD)  17 mL/min (>60)   02/10/21  06:18    


 


BUN/Creatinine Ratio  14.2  (14-18)   02/10/21  06:18    


 


Glucose  184 mg/dL ()  H  02/10/21  06:18    


 


POC Glucose  204 mg/dL ()  H  02/10/21  11:51    


 


Hemoglobin A1c  7.0 % (-5.6) H  02/09/21  06:25    


 


Calcium  8.6 mg/dL (8.5-10.1)   02/10/21  06:18    


 


Magnesium  2.1 mg/dl (1.8-2.4)   02/10/21  06:18    


 


Total Bilirubin  1.2 mg/dL (0.2-1.0)  H  02/10/21  06:18    


 


AST  149 U/L (15-37)  H  02/10/21  06:18    


 


ALT  226 U/L (14-59)  H  02/10/21  06:18    


 


Alkaline Phosphatase  87 U/L ()   02/10/21  06:18    


 


Troponin I  < 0.017 ng/mL (0.00-0.056)   02/08/21  17:58    


 


NT-Pro-B Natriuret Pep  335 pg/mL (0-450)   02/09/21  06:25    


 


Total Protein  6.3 g/dl (6.4-8.2)  L  02/10/21  06:18    


 


Albumin  3.0 g/dl (3.4-5.0)  L  02/10/21  06:18    


 


Globulin  3.3 gm/dL  02/10/21  06:18    


 


Albumin/Globulin Ratio  0.9  (1-2)  L  02/10/21  06:18    


 


TSH 3rd Generation  3.093 uIU/mL (0.358-3.74)   02/09/21  06:25    


 


Urine Color  Light yellow  (Yellow)   02/08/21  19:55    


 


Urine Appearance  Clear  (Clear)   02/08/21  19:55    


 


Urine pH  6.0  (5.0-8.0)   02/08/21  19:55    


 


Ur Specific Gravity  1.020  (1.005-1.030)   02/08/21  19:55    


 


Urine Protein  Negative  (Negative)   02/08/21  19:55    


 


Urine Glucose (UA)  Negative  (Negative)   02/08/21  19:55    


 


Urine Ketones  Negative  (Negative)   02/08/21  19:55    


 


Urine Occult Blood  Negative  (Negative)   02/08/21  19:55    


 


Urine Nitrite  Negative  (Negative)   02/08/21  19:55    


 


Urine Bilirubin  Negative  (Negative)   02/08/21  19:55    


 


Urine Urobilinogen  0.2  (0.2-1.0)   02/08/21  19:55    


 


Ur Leukocyte Esterase  Trace  (Negative)  H  02/08/21  19:55    


 


U Hyaline Cast (Auto)  5-10 /lpf (0-5)  H  02/08/21  19:55    


 


Urine RBC  0-5 /hpf (0-5)   02/08/21  19:55    


 


Urine WBC  0-5 /hpf (0-5)   02/08/21  19:55    


 


Ur Squamous Epith Cells  0-5 /hpf (0-5)   02/08/21  19:55    


 


Urine Bacteria  Many /hpf (FEW)  H  02/08/21  19:55    


 


Urine Mucus  Not seen /hpf (FEW)   02/08/21  19:55    


 


SARS-CoV-2 RNA (MENA)  Negative  (NEGATIVE)   02/08/21  18:55    


 


MRSA (PCR)  Negative   02/09/21  00:05    














- Imaging/EKG


Impressions: 





EKG NSR rate 81 first degree AV block








- Allergies


Allergies/Adverse Reactions: 


                                    Allergies











Allergy/AdvReac Type Severity Reaction Status Date / Time


 


levofloxacin [From Levaquin] Allergy  Itching Verified 02/08/21 19:06














- Anesthesia Plan


Pre-Op Medication Ordered: Beta Blocker


Beta Blocker: Metoprolol


Med Last Dose Date: 02/10/21


Med Last Dose Time: 09:00





- Acknowledgements


Anesthesia Type Planned: Regional Block, MAC


Pt an Appropriate Candidate for the Planned Anesthesia: Yes


Alternatives and Risks of Anesthesia Discussed w Pt/Guardian: Yes


Pt/Guardian Understands and Agrees with Anesthesia Plan: Yes





PreAnesthesia Questionnaire


HEENT History: Reports: Impaired Vision


Other HEENT History: dysphagia


Cardiovascular History: Reports: Afib, Arrhythmia, Heart Failure, Hypertension


Other Cardiovascular History: Afib, anemia


Respiratory History: Reports: Pneumonia, Recurrent, Other (See Below)


Other Respiratory History: hypoxemia requiring home O2


Gastrointestinal History: Reports: Chronic Constipation


Other Gastrointestinal History: dysphagia


Genitourinary History: Reports: Chronic Renal Insuffiency, Retention, Urinary


OB/GYN History: Reports: Pregnancy


Musculoskeletal History: Reports: Back Pain, Chronic


Other Musculoskeletal History: muscle weakness


Neurological History: Reports: TIA, Other (See Below)


Other Neuro History: cerebral infarct; neuralgia


Psychiatric History: Reports: Dementia, Depression


Other Psychiatric History: vascular dementia


Endocrine/Metabolic History: Reports: Diabetes, Type II


Hematologic History: Reports: Anemia


Oncologic (Cancer) History: Reports: Other (See Below)


Other Oncologic History: unknown skin cancer


Dermatologic History: Reports: Other (See Below)


Other Dermatologic History: previous supspected skin cancer





- Infectious Disease History


Infectious Disease History: Reports: Chicken Pox, Influenza, Measles, Mumps





- Past Surgical History


HEENT Surgical History: Reports: None


Cardiovascular Surgical History: Reports: None


GI Surgical History: Reports: Cholecystectomy


Female  Surgical History: Reports: None


Oncologic Surgical History: Reports: None





- SUBSTANCE USE


Tobacco Use Status *Q: Unknown Ever Used Tobacco


Tobacco Use Within Last Twelve Months: No


Second Hand Smoke Exposure: No


Days Per Week of Alcohol Use: 0


Number of Drinks Per Day: 0


Total Drinks Per Week: 0


Recreational Drug Use History: No





- HOME MEDS


Home Medications: 


                                    Home Meds





Clopidogrel [Plavix] 75 mg PO DAILY 11/25/15 [History]


Gabapentin [Neurontin] 600 mg PO BID 11/25/15 [History]


Insulin Aspart [Novolog Flexpen] 13 unit SQ TID 11/25/15 [History]


Acetaminophen 500 mg PO BEDTIME 08/17/16 [History]


Calcium Carbonate [Tums] 400 mg PO Q4H PRN 08/17/16 [History]


Vitamin E 400 unit PO DAILY 08/17/16 [History]


Furosemide [Lasix] 20 mg PO ASDIRECTED PRN 06/04/19 [History]


Furosemide [Lasix] 20 mg PO DAILY 06/04/19 [History]


Insulin Degludec [Tresiba] 20 unit SQ BEDTIME 06/04/19 [History]


MO/Pet,Wh/Phenylephrine/Shk Lv [Preparation H Oint] 1 appful RECTAL ASDIRECTED 

PRN 06/04/19 [History]


Metoprolol Succinate 50 mg PO DAILY 06/04/19 [History]


polyethylene glycoL 3350 [MiraLAX] 17 gm PO ASDIRECTED PRN 06/04/19 [History]


Albuterol/Ipratropium [DuoNeb 3.0-0.5 MG/3 ML] 3 ml NEB QIDRT #120 neb 06/11/19 

[Rx]


Spironolactone [Aldactone] 50 mg PO DAILY #30 tablet 06/11/19 [Rx]


Insulin Aspart [NovoLOG] 1 dose SUBCUT TID 10/06/20 [History]











- CURRENT (IN HOUSE) MEDS


Current Meds: 





                               Current Medications





Albuterol/Ipratropium (Duoneb 3.0-0.5 Mg/3 Ml)  3 ml NEB Q4H PRN


   PRN Reason: Shortness Of Breath/wheezing


Calcium Carbonate/Glycine (Tums)  500 mg PO Q4H PRN


   PRN Reason: Heartburn


Cyclobenzaprine HCl (Flexeril)  10 mg PO TID PRN


   PRN Reason: muscle spasms 


   Last Admin: 02/09/21 12:36 Dose:  10 mg


   Documented by: 


Docusate Sodium (Colace)  100 mg PO BID PRN


   PRN Reason: Constipation


Famotidine (Pepcid)  20 mg PO Q48H [facility]


Gabapentin (Neurontin)  600 mg PO BID [facility]


   Last Admin: 02/10/21 08:58 Dose:  600 mg


   Documented by: 


Heparin Sodium (Porcine) (Heparin Sodium)  5,000 units SUBCUT Q8H [facility]


   Stop: 02/10/21 23:00


   Last Admin: 02/10/21 06:33 Dose:  5,000 units


   Documented by: 


Hydralazine HCl (Apresoline)  10 mg IVPUSH Q4H PRN


   PRN Reason: Hypertension


Hydromorphone HCl (Dilaudid)  0.5 mg IVPUSH Q4H PRN


   PRN Reason: Pain (severe 7-10)


   Last Admin: 02/10/21 09:54 Dose:  0.5 mg


   Documented by: 


Promethazine HCl 12.5 mg/ (Sodium Chloride)  50.5 mls @ 100 mls/hr IV Q6H PRN


   PRN Reason: Nausea/Vomiting


Lactated Ringer's (Ringers, Lactated)  1,000 mls @ 100 mls/hr IV ASDIRECTED [facility]


   Stop: 02/10/21 19:29


   Last Admin: 02/10/21 09:44 Dose:  100 mls/hr


   Documented by: 


Insulin Glargine (Lantus)  15 unit SUBCUT DAILY [facility]


   Last Admin: 02/10/21 08:58 Dose:  15 units


   Documented by: 


Insulin Human Lispro (Humalog)  0 unit SUBCUT QIDACANDBED [facility]; Protocol


   Last Admin: 02/10/21 12:06 Dose:  4 units


   Documented by: 


Metoprolol Succinate (Toprol Xl)  50 mg PO DAILY [facility]


   Last Admin: 02/10/21 08:57 Dose:  50 mg


   Documented by: 


Oxycodone HCl (Oxycodone)  5 mg PO Q6H PRN


   PRN Reason: Pain (moderate 4-6)


   Last Admin: 02/10/21 08:56 Dose:  5 mg


   Documented by: 





Discontinued Medications





Acetaminophen (Tylenol)  650 mg PO Q6H PRN


   PRN Reason: Pain (Mild 1-3)/fever


   Last Admin: 02/09/21 12:34 Dose:  650 mg


   Documented by: 


Calcium Carbonate/Glycine (Tums)  400 mg PO Q4H PRN


   PRN Reason: Heartburn


Famotidine (Pepcid)  20 mg PO DAILY [facility]


   Last Admin: 02/09/21 09:57 Dose:  20 mg


   Documented by: 


Furosemide (Lasix)  40 mg IVPUSH NOW ONE


   Stop: 02/08/21 21:42


   Last Admin: 02/08/21 22:00 Dose:  40 mg


   Documented by: 


Furosemide (Lasix)  20 mg PO DAILY [facility]


   Last Admin: 02/09/21 13:04 Dose:  Not Given


   Documented by: 


Furosemide (Lasix)  20 mg IVPUSH ONETIME ONE


   Stop: 02/09/21 18:01


   Last Admin: 02/09/21 18:47 Dose:  20 mg


   Documented by: 


Gabapentin (Neurontin)  600 mg PO BID [facility]


   Last Admin: 02/09/21 08:10 Dose:  600 mg


   Documented by: 


Hydromorphone HCl (Dilaudid)  0.25 mg IVPUSH ONETIME ONE


   Stop: 02/08/21 20:02


   Last Admin: 02/08/21 20:10 Dose:  0.25 mg


   Documented by: 


Hydromorphone HCl (Dilaudid)  0.25 mg IVPUSH ONETIME ONE


   Stop: 02/08/21 22:22


   Last Admin: 02/08/21 23:17 Dose:  0.25 mg


   Documented by: 


Sodium Chloride (Normal Saline)  1,000 mls @ 65 mls/hr IV ASDIRECTED [facility]


Lactated Ringer's (Ringers, Lactated)  1,000 mls @ 50 mls/hr IV ASDIRECTED [facility]


   Last Admin: 02/09/21 14:18 Dose:  50 mls/hr


   Documented by: 


Lactated Ringer's (Ringers, Lactated)  1,000 mls @ 65 mls/hr IV ASDIRECTED [facility]


   Last Admin: 02/10/21 06:46 Dose:  65 mls/hr


   Documented by: 


Lidocaine HCl (Xylocaine 1%)  10 ml INJECT ONETIME ONE


   Stop: 02/08/21 18:58


   Last Admin: 02/09/21 00:57 Dose:  Not Given


   Documented by: 


Non-Formulary Medication (Insulin Degludec [Tresiba])  15 unit SQ DAILY [facility]


Ondansetron HCl (Zofran)  4 mg IVPUSH ONETIME ONE


   Stop: 02/08/21 18:48


   Last Admin: 02/08/21 18:51 Dose:  4 mg


   Documented by: 


Ondansetron HCl (Zofran) Confirm Administered Dose 4 mg .ROUTE .STK-MED ONE


   Stop: 02/08/21 19:51


   Last Admin: 02/08/21 20:11 Dose:  Not Given


   Documented by: 


Ondansetron HCl (Zofran)  4 mg IVPUSH ONETIME ONE


   Stop: 02/08/21 20:07


   Last Admin: 02/08/21 20:11 Dose:  4 mg


   Documented by: 


Sodium Polystyrene Sulfonate (Kayexalate)  15 gm PO ONETIME ONE


   Stop: 02/09/21 08:50


   Last Admin: 02/09/21 09:57 Dose:  15 gm


   Documented by: 


Spironolactone (Aldactone)  50 mg PO DAILY [facility]


   Last Admin: 02/09/21 08:10 Dose:  50 mg


   Documented by: 


Tamsulosin HCl (Flomax)  0.4 mg PO DAILY [facility]


   Last Admin: 02/09/21 17:44 Dose:  Not Given


   Documented by:

## 2021-02-10 NOTE — PCM.PN
- General Info


Date of Service: 02/10/21


Admission Dx/Problem (Free Text): 


                           Admission Diagnosis/Problem





Admission Diagnosis/Problem      Hip fracture requiring operative repair








Subjective Update: 


In to see Liliana.  She is laying in bed.  She reports that she is having a bit of

a rough morning and her pain in her hip is quite severe.  Nursing reports that 

they just repositioned her.  Left elbow bandage shows drainage from her skin 

tear.  Nursing will change bandage today.  Renal function has decreased and we 

will increase IV fluids.  Patient is up to 3 L of oxygen which is not surprising

given her pain medications and immobility.  She is utilizing I-S.  Plan is for 

surgery tomorrow.





Functional Status: Reports: Pain Controlled (Mostly -when laying still no pain 

however severe pain with movement.), Tolerating Diet, Urinating (perez), Incent

nacho Spirometry.  Denies: Ambulating, New Symptoms





- Review of Systems


General: Reports: No Symptoms, Weakness, Fatigue, Malaise.  Denies: Fever, 

Chills


HEENT: Reports: No Symptoms.  Denies: Headaches, Sore Throat


Pulmonary: Reports: No Symptoms.  Denies: Shortness of Breath, Cough, Sputum, 

Wheezing


Cardiovascular: Reports: No Symptoms.  Denies: Chest Pain, Palpitations, Dyspnea

on Exertion, Lightheadedness


Gastrointestinal: Reports: No Symptoms.  Denies: Abdominal Pain, Constipation, 

Diarrhea, Nausea, Vomiting


Genitourinary: Reports: No Symptoms.  Denies: Pain


Musculoskeletal: Reports: Arm Pain (left elbow), Leg Pain (left )


Skin: Reports: No Symptoms.  Denies: Cyanosis


Neurological: Reports: No Symptoms, Difficulty Walking, Weakness, Gait 

Disturbance.  Denies: Confusion, Pre-Existing Deficit


Psychiatric: Reports: No Symptoms





- Patient Data


Vitals - Most Recent: 


                                Last Vital Signs











Temp  98.4 F   02/10/21 03:31


 


Pulse  70   02/10/21 03:31


 


Resp  16   02/10/21 03:31


 


BP  121/97 H  02/10/21 03:31


 


Pulse Ox  90 L  02/10/21 06:03











Weight - Most Recent: 155 lb 3.2 oz


I&O - Last 24 Hours: 


                                 Intake & Output











 02/09/21 02/10/21 02/10/21





 22:59 06:59 14:59


 


Intake Total 810 964 


 


Output Total 275 400 


 


Balance 535 564 











Lab Results Last 24 Hours: 


                         Laboratory Results - last 24 hr











  02/09/21 02/09/21 02/09/21 Range/Units





  06:08 06:25 06:25 


 


WBC   18.22 H   (3.98-10.04)  K/mm3


 


RBC   4.60   (3.98-5.22)  M/mm3


 


Hgb   13.9   (11.2-15.7)  gm/dl


 


Hct   44.0   (34.1-44.9)  %


 


MCV   95.7 H   (79.4-94.8)  fl


 


MCH   30.2   (25.6-32.2)  pg


 


MCHC   31.6 L   (32.2-35.5)  g/dl


 


RDW Std Deviation   53.1 H   (36.4-46.3)  fL


 


Plt Count   189   (182-369)  K/mm3


 


MPV   10.5   (9.4-12.3)  fl


 


Neut % (Auto)   89.8 H   (34.0-71.1)  %


 


Lymph % (Auto)   6.3 L   (19.3-51.7)  %


 


Mono % (Auto)   3.1 L   (4.7-12.5)  %


 


Eos % (Auto)   0.4 L   (0.7-5.8)  


 


Baso % (Auto)   0.2   (0.1-1.2)  %


 


Neut # (Auto)   16.37 H   (1.56-6.13)  K/mm3


 


Lymph # (Auto)   1.14 L   (1.18-3.74)  K/mm3


 


Mono # (Auto)   0.57 H   (0.24-0.36)  K/mm3


 


Eos # (Auto)   0.07   (0.04-0.36)  K/mm3


 


Baso # (Auto)   0.03   (0.01-0.08)  K/mm3


 


Manual Slide Review   Abnormal smear   


 


Sodium    140  (136-145)  mEq/L


 


Potassium    5.5 H  (3.5-5.1)  mEq/L


 


Chloride    101  ()  mEq/L


 


Carbon Dioxide    27  (21-32)  mEq/L


 


Anion Gap    17.5 H  (5-15)  


 


BUN    29 H  (7-18)  mg/dL


 


Creatinine    2.3 H  (0.55-1.02)  mg/dL


 


Est Cr Clr Drug Dosing    11.91  mL/min


 


Estimated GFR (MDRD)    20  (>60)  mL/min


 


BUN/Creatinine Ratio    12.6 L  (14-18)  


 


Glucose    267 H  ()  mg/dL


 


POC Glucose  267 H    ()  mg/dL


 


Hemoglobin A1c    ( - 5.6) %


 


Calcium    9.1  (8.5-10.1)  mg/dL


 


Total Bilirubin    1.3 H  (0.2-1.0)  mg/dL


 


AST    292 H  (15-37)  U/L


 


ALT    327 H  (14-59)  U/L


 


Alkaline Phosphatase    113  ()  U/L


 


NT-Pro-B Natriuret Pep     (0-450)  pg/mL


 


Total Protein    7.1  (6.4-8.2)  g/dl


 


Albumin    3.6  (3.4-5.0)  g/dl


 


Globulin    3.5  gm/dL


 


Albumin/Globulin Ratio    1.0  (1-2)  


 


TSH 3rd Generation     (0.358-3.74)  uIU/mL














  02/09/21 02/09/21 02/09/21 Range/Units





  06:25 06:25 06:25 


 


WBC     (3.98-10.04)  K/mm3


 


RBC     (3.98-5.22)  M/mm3


 


Hgb     (11.2-15.7)  gm/dl


 


Hct     (34.1-44.9)  %


 


MCV     (79.4-94.8)  fl


 


MCH     (25.6-32.2)  pg


 


MCHC     (32.2-35.5)  g/dl


 


RDW Std Deviation     (36.4-46.3)  fL


 


Plt Count     (182-369)  K/mm3


 


MPV     (9.4-12.3)  fl


 


Neut % (Auto)     (34.0-71.1)  %


 


Lymph % (Auto)     (19.3-51.7)  %


 


Mono % (Auto)     (4.7-12.5)  %


 


Eos % (Auto)     (0.7-5.8)  


 


Baso % (Auto)     (0.1-1.2)  %


 


Neut # (Auto)     (1.56-6.13)  K/mm3


 


Lymph # (Auto)     (1.18-3.74)  K/mm3


 


Mono # (Auto)     (0.24-0.36)  K/mm3


 


Eos # (Auto)     (0.04-0.36)  K/mm3


 


Baso # (Auto)     (0.01-0.08)  K/mm3


 


Manual Slide Review     


 


Sodium     (136-145)  mEq/L


 


Potassium     (3.5-5.1)  mEq/L


 


Chloride     ()  mEq/L


 


Carbon Dioxide     (21-32)  mEq/L


 


Anion Gap     (5-15)  


 


BUN     (7-18)  mg/dL


 


Creatinine     (0.55-1.02)  mg/dL


 


Est Cr Clr Drug Dosing     mL/min


 


Estimated GFR (MDRD)     (>60)  mL/min


 


BUN/Creatinine Ratio     (14-18)  


 


Glucose     ()  mg/dL


 


POC Glucose     ()  mg/dL


 


Hemoglobin A1c    7.0 H ( - 5.6) %


 


Calcium     (8.5-10.1)  mg/dL


 


Total Bilirubin     (0.2-1.0)  mg/dL


 


AST     (15-37)  U/L


 


ALT     (14-59)  U/L


 


Alkaline Phosphatase     ()  U/L


 


NT-Pro-B Natriuret Pep  335    (0-450)  pg/mL


 


Total Protein     (6.4-8.2)  g/dl


 


Albumin     (3.4-5.0)  g/dl


 


Globulin     gm/dL


 


Albumin/Globulin Ratio     (1-2)  


 


TSH 3rd Generation   3.093   (0.358-3.74)  uIU/mL














  02/09/21 02/09/21 02/09/21 Range/Units





  12:22 17:04 21:11 


 


WBC     (3.98-10.04)  K/mm3


 


RBC     (3.98-5.22)  M/mm3


 


Hgb     (11.2-15.7)  gm/dl


 


Hct     (34.1-44.9)  %


 


MCV     (79.4-94.8)  fl


 


MCH     (25.6-32.2)  pg


 


MCHC     (32.2-35.5)  g/dl


 


RDW Std Deviation     (36.4-46.3)  fL


 


Plt Count     (182-369)  K/mm3


 


MPV     (9.4-12.3)  fl


 


Neut % (Auto)     (34.0-71.1)  %


 


Lymph % (Auto)     (19.3-51.7)  %


 


Mono % (Auto)     (4.7-12.5)  %


 


Eos % (Auto)     (0.7-5.8)  


 


Baso % (Auto)     (0.1-1.2)  %


 


Neut # (Auto)     (1.56-6.13)  K/mm3


 


Lymph # (Auto)     (1.18-3.74)  K/mm3


 


Mono # (Auto)     (0.24-0.36)  K/mm3


 


Eos # (Auto)     (0.04-0.36)  K/mm3


 


Baso # (Auto)     (0.01-0.08)  K/mm3


 


Manual Slide Review     


 


Sodium     (136-145)  mEq/L


 


Potassium     (3.5-5.1)  mEq/L


 


Chloride     ()  mEq/L


 


Carbon Dioxide     (21-32)  mEq/L


 


Anion Gap     (5-15)  


 


BUN     (7-18)  mg/dL


 


Creatinine     (0.55-1.02)  mg/dL


 


Est Cr Clr Drug Dosing     mL/min


 


Estimated GFR (MDRD)     (>60)  mL/min


 


BUN/Creatinine Ratio     (14-18)  


 


Glucose     ()  mg/dL


 


POC Glucose  277 H  222 H  286 H  ()  mg/dL


 


Hemoglobin A1c    ( - 5.6) %


 


Calcium     (8.5-10.1)  mg/dL


 


Total Bilirubin     (0.2-1.0)  mg/dL


 


AST     (15-37)  U/L


 


ALT     (14-59)  U/L


 


Alkaline Phosphatase     ()  U/L


 


NT-Pro-B Natriuret Pep     (0-450)  pg/mL


 


Total Protein     (6.4-8.2)  g/dl


 


Albumin     (3.4-5.0)  g/dl


 


Globulin     gm/dL


 


Albumin/Globulin Ratio     (1-2)  


 


TSH 3rd Generation     (0.358-3.74)  uIU/mL














  02/10/21 Range/Units





  06:18 


 


WBC  14.15 H  (3.98-10.04)  K/mm3


 


RBC  3.95 L  (3.98-5.22)  M/mm3


 


Hgb  12.0  D  (11.2-15.7)  gm/dl


 


Hct  38.2  (34.1-44.9)  %


 


MCV  96.7 H  (79.4-94.8)  fl


 


MCH  30.4  (25.6-32.2)  pg


 


MCHC  31.4 L  (32.2-35.5)  g/dl


 


RDW Std Deviation  54.1 H  (36.4-46.3)  fL


 


Plt Count  158 L  (182-369)  K/mm3


 


MPV  11.1  (9.4-12.3)  fl


 


Neut % (Auto)  77.4 H  (34.0-71.1)  %


 


Lymph % (Auto)  12.4 L  (19.3-51.7)  %


 


Mono % (Auto)  5.7  (4.7-12.5)  %


 


Eos % (Auto)  3.8  (0.7-5.8)  


 


Baso % (Auto)  0.2  (0.1-1.2)  %


 


Neut # (Auto)  10.94 H  (1.56-6.13)  K/mm3


 


Lymph # (Auto)  1.76  (1.18-3.74)  K/mm3


 


Mono # (Auto)  0.81 H  (0.24-0.36)  K/mm3


 


Eos # (Auto)  0.54 H  (0.04-0.36)  K/mm3


 


Baso # (Auto)  0.03  (0.01-0.08)  K/mm3


 


Manual Slide Review   


 


Sodium   (136-145)  mEq/L


 


Potassium   (3.5-5.1)  mEq/L


 


Chloride   ()  mEq/L


 


Carbon Dioxide   (21-32)  mEq/L


 


Anion Gap   (5-15)  


 


BUN   (7-18)  mg/dL


 


Creatinine   (0.55-1.02)  mg/dL


 


Est Cr Clr Drug Dosing   mL/min


 


Estimated GFR (MDRD)   (>60)  mL/min


 


BUN/Creatinine Ratio   (14-18)  


 


Glucose   ()  mg/dL


 


POC Glucose   ()  mg/dL


 


Hemoglobin A1c  ( - 5.6) %


 


Calcium   (8.5-10.1)  mg/dL


 


Total Bilirubin   (0.2-1.0)  mg/dL


 


AST   (15-37)  U/L


 


ALT   (14-59)  U/L


 


Alkaline Phosphatase   ()  U/L


 


NT-Pro-B Natriuret Pep   (0-450)  pg/mL


 


Total Protein   (6.4-8.2)  g/dl


 


Albumin   (3.4-5.0)  g/dl


 


Globulin   gm/dL


 


Albumin/Globulin Ratio   (1-2)  


 


TSH 3rd Generation   (0.358-3.74)  uIU/mL











Med Orders - Current: 


                               Current Medications





Albuterol/Ipratropium (Duoneb 3.0-0.5 Mg/3 Ml)  3 ml NEB Q4H PRN


   PRN Reason: Shortness Of Breath/wheezing


Calcium Carbonate/Glycine (Tums)  500 mg PO Q4H PRN


   PRN Reason: Heartburn


Cyclobenzaprine HCl (Flexeril)  10 mg PO TID PRN


   PRN Reason: muscle spasms 


   Last Admin: 02/09/21 12:36 Dose:  10 mg


   Documented by: 


Docusate Sodium (Colace)  100 mg PO BID PRN


   PRN Reason: Constipation


Famotidine (Pepcid)  20 mg PO DAILY [facility]


   Last Admin: 02/09/21 09:57 Dose:  20 mg


   Documented by: 


Gabapentin (Neurontin)  600 mg PO BID [facility]


   Last Admin: 02/09/21 20:49 Dose:  600 mg


   Documented by: 


Heparin Sodium (Porcine) (Heparin Sodium)  5,000 units SUBCUT Q8H [facility]


   Last Admin: 02/10/21 06:33 Dose:  5,000 units


   Documented by: 


Hydralazine HCl (Apresoline)  10 mg IVPUSH Q4H PRN


   PRN Reason: Hypertension


Hydromorphone HCl (Dilaudid)  0.5 mg IVPUSH Q4H PRN


   PRN Reason: Pain (severe 7-10)


   Last Admin: 02/09/21 05:13 Dose:  0.5 mg


   Documented by: 


Promethazine HCl 12.5 mg/ (Sodium Chloride)  50.5 mls @ 100 mls/hr IV Q6H PRN


   PRN Reason: Nausea/Vomiting


Lactated Ringer's (Ringers, Lactated)  1,000 mls @ 65 mls/hr IV ASDIRECTED [facility]


   Last Admin: 02/10/21 06:46 Dose:  65 mls/hr


   Documented by: 


Insulin Glargine (Lantus)  15 unit SUBCUT DAILY [facility]


   Last Admin: 02/09/21 09:57 Dose:  15 units


   Documented by: 


Insulin Human Lispro (Humalog)  0 unit SUBCUT QIDACANDBED [facility]; Protocol


   Last Admin: 02/09/21 22:30 Dose:  6 units


   Documented by: 


Metoprolol Succinate (Toprol Xl)  50 mg PO DAILY [facility]


   Last Admin: 02/09/21 08:11 Dose:  50 mg


   Documented by: 


Oxycodone HCl (Oxycodone)  5 mg PO Q6H PRN


   PRN Reason: Pain (moderate 4-6)


   Last Admin: 02/09/21 20:48 Dose:  5 mg


   Documented by: 





Discontinued Medications





Acetaminophen (Tylenol)  650 mg PO Q6H PRN


   PRN Reason: Pain (Mild 1-3)/fever


   Last Admin: 02/09/21 12:34 Dose:  650 mg


   Documented by: 


Calcium Carbonate/Glycine (Tums)  400 mg PO Q4H PRN


   PRN Reason: Heartburn


Furosemide (Lasix)  40 mg IVPUSH NOW ONE


   Stop: 02/08/21 21:42


   Last Admin: 02/08/21 22:00 Dose:  40 mg


   Documented by: 


Furosemide (Lasix)  20 mg PO DAILY [facility]


   Last Admin: 02/09/21 13:04 Dose:  Not Given


   Documented by: 


Furosemide (Lasix)  20 mg IVPUSH ONETIME ONE


   Stop: 02/09/21 18:01


   Last Admin: 02/09/21 18:47 Dose:  20 mg


   Documented by: 


Gabapentin (Neurontin)  600 mg PO BID [facility]


   Last Admin: 02/09/21 08:10 Dose:  600 mg


   Documented by: 


Hydromorphone HCl (Dilaudid)  0.25 mg IVPUSH ONETIME ONE


   Stop: 02/08/21 20:02


   Last Admin: 02/08/21 20:10 Dose:  0.25 mg


   Documented by: 


Hydromorphone HCl (Dilaudid)  0.25 mg IVPUSH ONETIME ONE


   Stop: 02/08/21 22:22


   Last Admin: 02/08/21 23:17 Dose:  0.25 mg


   Documented by: 


Sodium Chloride (Normal Saline)  1,000 mls @ 65 mls/hr IV ASDIRECTED [facility]


Lactated Ringer's (Ringers, Lactated)  1,000 mls @ 50 mls/hr IV ASDIRECTED [facility]


   Last Admin: 02/09/21 14:18 Dose:  50 mls/hr


   Documented by: 


Lidocaine HCl (Xylocaine 1%)  10 ml INJECT ONETIME ONE


   Stop: 02/08/21 18:58


   Last Admin: 02/09/21 00:57 Dose:  Not Given


   Documented by: 


Non-Formulary Medication (Insulin Degludec [Tresiba])  15 unit SQ DAILY [facility]


Ondansetron HCl (Zofran)  4 mg IVPUSH ONETIME ONE


   Stop: 02/08/21 18:48


   Last Admin: 02/08/21 18:51 Dose:  4 mg


   Documented by: 


Ondansetron HCl (Zofran) Confirm Administered Dose 4 mg .ROUTE .STK-MED ONE


   Stop: 02/08/21 19:51


   Last Admin: 02/08/21 20:11 Dose:  Not Given


   Documented by: 


Ondansetron HCl (Zofran)  4 mg IVPUSH ONETIME ONE


   Stop: 02/08/21 20:07


   Last Admin: 02/08/21 20:11 Dose:  4 mg


   Documented by: 


Sodium Polystyrene Sulfonate (Kayexalate)  15 gm PO ONETIME ONE


   Stop: 02/09/21 08:50


   Last Admin: 02/09/21 09:57 Dose:  15 gm


   Documented by: 


Spironolactone (Aldactone)  50 mg PO DAILY [facility]


   Last Admin: 02/09/21 08:10 Dose:  50 mg


   Documented by: 


Tamsulosin HCl (Flomax)  0.4 mg PO DAILY [facility]


   Last Admin: 02/09/21 17:44 Dose:  Not Given


   Documented by: 











- Exam


Quality Assessment: Supplemental Oxygen (3L), Urine Catheter, DVT Prophylaxis


General: Alert, Oriented, Cooperative, No Acute Distress


HEENT: Pupils Equal, Pupils Reactive, Mucous Membr. Moist/Pink


Neck: Supple, Trachea Midline


Lungs: Clear to Auscultation, Normal Respiratory Effort


Cardiovascular: Regular Rate, Regular Rhythm


GI/Abdominal Exam: Normal Bowel Sounds, Soft, Non-Tender, No Organomegaly, No 

Distention


 (Female) Exam: Deferred


Extremities: Pedal Edema (1+), Leg Pain (left), Limited Range of Motion


Skin: Warm, Dry, Intact


Neurological: No New Focal Deficit


Psy/Mental Status: Alert, Normal Affect, Normal Mood





Sepsis Event Note





- Evaluation


Sepsis Screening Result: No Definite Risk





- Focused Exam


Vital Signs: 


                                   Vital Signs











  Temp Pulse Resp BP Pulse Ox Pulse Ox


 


 02/10/21 06:03       90 L


 


 02/10/21 03:31  98.4 F  70  16  121/97 H  93 L 


 


 02/09/21 23:41  98.1 F  61  16  113/63  91 L 


 


 02/09/21 22:06      93 L 


 


 02/09/21 20:48       93 L














- Problem List & Annotations


(1) Closed left hip fracture


SNOMED Code(s): 246909385


   Code(s): S72.002A - FRACTURE OF UNSP PART OF NECK OF LEFT FEMUR, INIT   

Status: Acute   Priority: High   Current Visit: Yes   


Qualifiers: 


   Encounter type: initial encounter   Qualified Code(s): S72.002A - Fracture of

 unspecified part of neck of left femur, initial encounter for closed fracture  

 





(2) Anemia


SNOMED Code(s): 705916825


   Code(s): D64.9 - ANEMIA, UNSPECIFIED   Status: Chronic   Priority: Medium   

Current Visit: No   Onset Date: 06/05/19   


Qualifiers: 


   Anemia type: due to chronic kidney disease   Chronic kidney disease stage: 

stage 4 (severe)   Qualified Code(s): N18.4 - Chronic kidney disease, stage 4 

(severe); D63.1 - Anemia in chronic kidney disease   


Annotation/Comment:: anemia  sec  to  anticoagulation   with  xarolto / plavix 

and asa   asa  held   mild  bleeding  noted   from  hemmhroids   





(3) CHF (congestive heart failure), NYHA class II


SNOMED Code(s): 713776232, 805594927


   Code(s): I50.9 - HEART FAILURE, UNSPECIFIED   Status: Chronic   Priority: 

Medium   Current Visit: No   Onset Date: 06/06/19   


Qualifiers: 


   Congestive heart failure type: unspecified   Qualified Code(s): I50.9 - Heart

 failure, unspecified   





(4) Fall


SNOMED Code(s): 7363200, 558071673


   Code(s): W19.XXXA - UNSPECIFIED FALL, INITIAL ENCOUNTER   Status: Acute   

Priority: High   Current Visit: Yes   


Qualifiers: 


   Encounter type: subsequent encounter   Qualified Code(s): W19.XXXD - 

Unspecified fall, subsequent encounter   





(5) DM2 (diabetes mellitus, type 2)


SNOMED Code(s): 65750043


   Code(s): E11.9 - TYPE 2 DIABETES MELLITUS WITHOUT COMPLICATIONS   Status: 

Chronic   Priority: High   Current Visit: No   Onset Date: 06/07/19   


Qualifiers: 


   Diabetes mellitus long term insulin use: unspecified long term insulin use 

status   Diabetes mellitus complication status: with unspecified complications 





(6) Afib


SNOMED Code(s): 14605459


   Code(s): I48.91 - UNSPECIFIED ATRIAL FIBRILLATION   Status: Chronic   

Priority: Low   Current Visit: No   


Qualifiers: 


   Atrial fibrillation type: paroxysmal   Qualified Code(s): I48.0 - Paroxysmal 

atrial fibrillation   





(7) HTN (hypertension)


SNOMED Code(s): 62480390


   Code(s): I10 - ESSENTIAL (PRIMARY) HYPERTENSION   Status: Chronic   Priority:

 Medium   Current Visit: No   


Qualifiers: 


   Hypertension type: unspecified   Qualified Code(s): I10 - Essential (primary)

 hypertension   





(8) Chronic constipation


SNOMED Code(s): 425843841


   Code(s): K59.09 - OTHER CONSTIPATION   Status: Chronic   Priority: Low   

Current Visit: No   





(9) Urinary retention


SNOMED Code(s): 092565286


   Code(s): R33.9 - RETENTION OF URINE, UNSPECIFIED   Status: Chronic   

Priority: Low   Current Visit: No   





(10) Chronic back pain


SNOMED Code(s): 152944267


   Code(s): M54.9 - DORSALGIA, UNSPECIFIED; G89.29 - OTHER CHRONIC PAIN   

Status: Chronic   Priority: Low   Current Visit: No   


Qualifiers: 


   Back pain location: back pain in unspecified location   Back pain laterality:

 unspecified   Qualified Code(s): M54.9 - Dorsalgia, unspecified; G89.29 - Other

 chronic pain   





(11) History of TIA (transient ischemic attack)


SNOMED Code(s): 683397704


   Code(s): Z86.73 - PRSNL HX OF TIA (TIA), AND CEREB INFRC W/O RESID DEFICITS  

 Status: Chronic   Priority: Low   Current Visit: No   





(12) Depression


SNOMED Code(s): 30154768


   Code(s): F32.9 - MAJOR DEPRESSIVE DISORDER, SINGLE EPISODE, UNSPECIFIED   

Status: Chronic   Priority: Low   Current Visit: No   


Qualifiers: 


   Depression Type: other depression   Qualified Code(s): F32.89 - Other 

specified depressive episodes   





(13) Nursing home resident


SNOMED Code(s): 392611425


   Code(s): Z59.3 - PROBLEMS RELATED TO LIVING IN RESIDENTIAL INSTITUTION   

Status: Chronic   Priority: Low   Current Visit: No   





(14) Hyperbilirubinemia


SNOMED Code(s): 68443401


   Code(s): E80.6 - OTHER DISORDERS OF BILIRUBIN METABOLISM   Status: Acute   

Priority: Medium   Current Visit: Yes   





(15) Elevated AST (SGOT)


SNOMED Code(s): 960288242


   Code(s): R74.01 - ELEVATION OF LEVELS OF LIVER TRANSAMINASE LEVELS   Status: 

Acute   Priority: Medium   Current Visit: Yes   





(16) Elevated ALT measurement


SNOMED Code(s): 100745515


   Code(s): R74.01 - ELEVATION OF LEVELS OF LIVER TRANSAMINASE LEVELS   Status: 

Acute   Priority: Medium   Current Visit: Yes   





(17) Acute on chronic renal failure


SNOMED Code(s): 479145287


   Code(s): N17.9 - ACUTE KIDNEY FAILURE, UNSPECIFIED; N18.9 - CHRONIC KIDNEY 

DISEASE, UNSPECIFIED   Status: Acute   Priority: High   Current Visit: Yes   


Qualifiers: 


   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 

disease stage: stage 4 (severe)   Qualified Code(s): N17.2 - Acute kidney 

failure with medullary necrosis; N18.4 - Chronic kidney disease, stage 4 

(severe)   





(18) Hyperkalemia


SNOMED Code(s): 60677351


   Code(s): E87.5 - HYPERKALEMIA   Status: Resolved   Priority: High   Current 

Visit: Yes   





- Problem List Review


Problem List Initiated/Reviewed/Updated: Yes





- My Orders


Last 24 Hours: 


My Active Orders





02/09/21 08:50


EKG 12 Lead [EKG Documentation Completion] [RC] ROUTINE 





02/09/21 08:51


Consult to Case Management/ [CONS] Routine 





02/09/21 08:55


Notify Provider Consults [RC] ASDIRECTED 


Consult to Physician [CONS] Routine 





02/09/21 08:56


RT Incentive Spirometry [RC] ASDIRECTED 





02/09/21 09:00


Famotidine [Pepcid]   20 mg PO DAILY 





02/09/21 10:43


Cyclobenzaprine [Flexeril]   10 mg PO TID PRN 





02/09/21 10:46


Antiembolic Devices [RC] PER UNIT ROUTINE 


SCD [Sequential Compression Device] [OM.PC] Routine 





02/10/21 06:18


MAGNESIUM [CHEM] AM 





02/11/21 05:11


MAGNESIUM [CHEM] AM 





02/12/21 05:11


MAGNESIUM [CHEM] AM 





02/13/21 05:11


MAGNESIUM [CHEM] AM 














- Assessment


Assessment:: 


Assessment - Day of admission 2/9/21 (admitted late 2/8/21)


* 88 Yo female who [facility] resulting in left hip and elbow 

  pain


* Unknown why she fell. Hit head. Skin tear on left elbow


* Left leg externally rotated and shortened


* 12-lead EKG shows NSR at 81 BPM with a first degree AV block.


* Head CT shows air-fluid levels in right maxillary sinus and senescent change. 

  Nothing acute


* Left hip X-ray suspicious for fracture


* Left elbow x-ray shows nothing acute


* 4cm skin tear on left elbow with skin missing repaired with steri-strips and 

  gauze


* Left Hip CT shows acute complex proximal left femoral fracture 


   * Predominant fracture line appears to be predominantly basocervical oblique 

     however a fracture line extends superolaterally anteriorly transcervically.

      


   * Fracture lines involving the greater trochanter as well.


   * There is some angulation and overriding of the fracture fragments.


* CXR shows nothing acute


* Dr. Pruett, orthopedic surgeon consulted by ED - plans for surgery on 2/11/21 

  due to patient being on Plavix\


* Given Dilaudid for pain and Zofran for nausea


* Labs:


   * WBC 16.81 --> 18.22


   * hemoglobin 13.6 --> 13.9 


   * platelet 232 --> 189 


   * neutrophils 13.83 --> 16.37 


   * INR 0.96 


   * sodium 143 --> 140 


   * potassium 4.7 --> 5.5 


   * anion gap 14.7 --> 17.5 


   * BUN 25 --> 29 


   * creatinine 2.1 --> 2.3 


   * GFR 20 ---> 20 


   * glucose 91 --> 267


   * magnesium 2.4 


   * total bilirubin 0.5 --> 1.3 


   * AST 38 ---> 192 


   * ALT 32 --> 327 


   * alkaline phosphatase 77 --> 113 


   * troponin less than 0.017 


   * proBNP 136 --> 335 


   * albumin 3.6 --> 3.6 


   * UA negative but trace leukocyte esterase, 5-10 hyaline casts, and many 

     bacteria noted 


   * SARS Covid 2 RNA negative 


   * MRSA negative 


   * urine culture pending





2/10/21


* Continued left hip pain with movement.  No pain when laying still.


* Left elbow bandage noted to be saturated with drainage.  Nursing will change


* Up to 3 L of oxygen via nasal cannula.  Has been utilizing I-S.


* Renal function has been getting progressively worse.  Will increase IV fluids 

  and monitor.


* Transaminitis is improving and bilirubin is trending downward.


* Continue plan for surgery tomorrow.


* Labs today:


   * WBC 14.15 


   * hemoglobin 12.0 


   * platelet 158


   * neutrophils 10.94 


   * sodium 142 


   * potassium 4.1 


   * anion gap 13.1 


   * BUN 37, creatinine 2.6, GFR 17 


   * bilirubin 1.2 


   * , , alk phos 87 


   * albumin 3.0








- Plan


Plan:: 


Closed left hip fracture


Fall


Nursing home resident


* Pain medications as ordered


* Bedrest


* Perez until post surgical


* Flexeril as ordered


* PT/OT


* CM/SW for discharge planning


* Dr. Pruett, orthopedic surgeon consulted in ED


* Planning surgery on 2/11/21 (wait due to Plavix)


* Hold home Plavix 


* Heparin/SCDs for VTE


* IS


* Optimize for surgery


* Ice pack as needed





Hyperbilirubinemia, improved


Elevated AST (SGOT), improved


Elevated ALT measurement, improved 


* Avoid tylenol


* Re-check labs and monitor


* IV fluids as ordered 





Acute on chronic renal failure


* Increase IV fluids


* Avoid nephrotoxic medications


* Monitor labs





DM2 (diabetes mellitus, type 2)


* Lantus (sub for Tresiba) at home dosing


* Medium scale humalog


* QID AC and bedtime blood glucose checks


* Consistent carbohydrate diet





Anemia


CHF (congestive heart failure), NYHA class II


Afib


HTN (hypertension)


Chronic constipation


Urinary retention


Chronic back pain


History of TIA (transient ischemic attack)


Depression


* Review/reconcile home medications


* Telemetry


* PRN stool softeners





Resolved:


Hyperkalemia





Code status: DNR/DNI


PCP: Dr. Vaughan


DVT prophylaxis: Heparin/SCDs 


Social: Patient resides in [facility]


Disposition: Patient admitted inpatient with telemetry for surgical management 

of left hip fracture on 2/11/21.

## 2021-02-11 NOTE — PCM.PN
- General Info


Date of Service: 02/11/21


Admission Dx/Problem (Free Text): 


                           Admission Diagnosis/Problem





Admission Diagnosis/Problem      Hip fracture requiring operative repair








Subjective Update: 





In to see Liliana is morning.  She appears "groggy ".  States she does not like 

the pain to her hip.  Requiring O2 at 6 L per nasal cannula with saturations 88 

to 90%.  Questioning if this is to narcotics or exacerbation of congestive heart

failure as patient has not been getting her Lasix the last couple of days.





Portable chest x-ray radiologist impression: 1.  Increased density within the 

right upper chest either due to pneumonia or changes of aspiration.


2.  Mild atelectasis within both lung bases.


3.  Increased central lung markings possibly due to acute or chronic pulmonary 

vascular congestion





Surgery is on hold until Monday as the surgical crew could not maintain the 

patient's O2 saturations above 90% prior to the procedure.


Functional Status: Reports: Pain Controlled, Urinating (Catheter in place), 

Incentive Spirometry (1 hour while awake).  Denies: Tolerating Diet (N.p.o. for 

surgery), Ambulating (Addressed until after the surgery)





- Review of Systems


General: Reports: No Symptoms


HEENT: Reports: No Symptoms


Pulmonary: Reports: No Symptoms.  Denies: Shortness of Breath, Pleuritic Chest 

Pain, Cough, Wheezing


Cardiovascular: Reports: No Symptoms


Gastrointestinal: Reports: No Symptoms


Genitourinary: Reports: Other (Page catheter in place)


Musculoskeletal: Reports: Other (Left hip pain due to fracture)


Skin: Reports: No Symptoms


Neurological: Reports: No Symptoms


Psychiatric: Reports: No Symptoms





- Patient Data


Vitals - Most Recent: 


                                Last Vital Signs











Temp  98.1 F   02/11/21 07:55


 


Pulse  73   02/11/21 08:25


 


Resp  14   02/11/21 07:55


 


BP  133/64   02/11/21 08:23


 


Pulse Ox  91 L  02/11/21 09:50











Weight - Most Recent: 159 lb


I&O - Last 24 Hours: 


                                 Intake & Output











 02/10/21 02/11/21 02/11/21





 22:59 06:59 14:59


 


Intake Total 1015 896 


 


Output Total 0 425 


 


Balance 1015 471 











Lab Results Last 24 Hours: 


                         Laboratory Results - last 24 hr











  02/10/21 02/10/21 02/10/21 Range/Units





  11:51 16:52 17:12 


 


WBC     (3.98-10.04)  K/mm3


 


RBC     (3.98-5.22)  M/mm3


 


Hgb     (11.2-15.7)  gm/dl


 


Hct     (34.1-44.9)  %


 


MCV     (79.4-94.8)  fl


 


MCH     (25.6-32.2)  pg


 


MCHC     (32.2-35.5)  g/dl


 


RDW Std Deviation     (36.4-46.3)  fL


 


Plt Count     (182-369)  K/mm3


 


MPV     (9.4-12.3)  fl


 


Neut % (Auto)     (34.0-71.1)  %


 


Lymph % (Auto)     (19.3-51.7)  %


 


Mono % (Auto)     (4.7-12.5)  %


 


Eos % (Auto)     (0.7-5.8)  


 


Baso % (Auto)     (0.1-1.2)  %


 


Neut # (Auto)     (1.56-6.13)  K/mm3


 


Lymph # (Auto)     (1.18-3.74)  K/mm3


 


Mono # (Auto)     (0.24-0.36)  K/mm3


 


Eos # (Auto)     (0.04-0.36)  K/mm3


 


Baso # (Auto)     (0.01-0.08)  K/mm3


 


Puncture Site    Rt radial  


 


ABG pH    7.40  (7.35-7.45)  


 


ABG pCO2    47.4 H  (35.0-45.0)  mmHg


 


ABG pO2    66.0 L  (80.0-100.0)  mmHg


 


ABG HCO3    28.7 H  (22.0-26.0)  meq/L


 


ABG O2 Saturation    90.9 L  (96.0-97.0)  %


 


ABG Base Excess    3.7 H  (-2-2.0)  


 


Jose Rafael Test    Positive  


 


O2 Delivery Device    Nasal cannula  


 


Oxygen Flow Rate    4.0  


 


FiO2    0.00 L  (21..00)  %


 


Sodium     (136-145)  mEq/L


 


Potassium     (3.5-5.1)  mEq/L


 


Chloride     ()  mEq/L


 


Carbon Dioxide     (21-32)  mEq/L


 


Anion Gap     (5-15)  


 


BUN     (7-18)  mg/dL


 


Creatinine     (0.55-1.02)  mg/dL


 


Est Cr Clr Drug Dosing     mL/min


 


Estimated GFR (MDRD)     (>60)  mL/min


 


BUN/Creatinine Ratio     (14-18)  


 


Glucose     ()  mg/dL


 


POC Glucose  204 H  129 H   ()  mg/dL


 


Calcium     (8.5-10.1)  mg/dL


 


Magnesium     (1.8-2.4)  mg/dl


 


Total Bilirubin     (0.2-1.0)  mg/dL


 


AST     (15-37)  U/L


 


ALT     (14-59)  U/L


 


Alkaline Phosphatase     ()  U/L


 


Total Protein     (6.4-8.2)  g/dl


 


Albumin     (3.4-5.0)  g/dl


 


Globulin     gm/dL


 


Albumin/Globulin Ratio     (1-2)  


 


MRSA (PCR)     














  02/10/21 02/11/21 02/11/21 Range/Units





  20:42 01:30 04:30 


 


WBC    13.02 H  (3.98-10.04)  K/mm3


 


RBC    3.53 L  (3.98-5.22)  M/mm3


 


Hgb    10.7 L  (11.2-15.7)  gm/dl


 


Hct    34.6  (34.1-44.9)  %


 


MCV    98.0 H  (79.4-94.8)  fl


 


MCH    30.3  (25.6-32.2)  pg


 


MCHC    30.9 L  (32.2-35.5)  g/dl


 


RDW Std Deviation    53.7 H  (36.4-46.3)  fL


 


Plt Count    139 L  (182-369)  K/mm3


 


MPV    10.8  (9.4-12.3)  fl


 


Neut % (Auto)    75.6 H  (34.0-71.1)  %


 


Lymph % (Auto)    14.3 L  (19.3-51.7)  %


 


Mono % (Auto)    5.7  (4.7-12.5)  %


 


Eos % (Auto)    3.6  (0.7-5.8)  


 


Baso % (Auto)    0.2  (0.1-1.2)  %


 


Neut # (Auto)    9.85 H  (1.56-6.13)  K/mm3


 


Lymph # (Auto)    1.86  (1.18-3.74)  K/mm3


 


Mono # (Auto)    0.74 H  (0.24-0.36)  K/mm3


 


Eos # (Auto)    0.47 H  (0.04-0.36)  K/mm3


 


Baso # (Auto)    0.02  (0.01-0.08)  K/mm3


 


Puncture Site     


 


ABG pH     (7.35-7.45)  


 


ABG pCO2     (35.0-45.0)  mmHg


 


ABG pO2     (80.0-100.0)  mmHg


 


ABG HCO3     (22.0-26.0)  meq/L


 


ABG O2 Saturation     (96.0-97.0)  %


 


ABG Base Excess     (-2-2.0)  


 


Jose Rafael Test     


 


O2 Delivery Device     


 


Oxygen Flow Rate     


 


FiO2     (21..00)  %


 


Sodium     (136-145)  mEq/L


 


Potassium     (3.5-5.1)  mEq/L


 


Chloride     ()  mEq/L


 


Carbon Dioxide     (21-32)  mEq/L


 


Anion Gap     (5-15)  


 


BUN     (7-18)  mg/dL


 


Creatinine     (0.55-1.02)  mg/dL


 


Est Cr Clr Drug Dosing     mL/min


 


Estimated GFR (MDRD)     (>60)  mL/min


 


BUN/Creatinine Ratio     (14-18)  


 


Glucose     ()  mg/dL


 


POC Glucose  137 H    ()  mg/dL


 


Calcium     (8.5-10.1)  mg/dL


 


Magnesium     (1.8-2.4)  mg/dl


 


Total Bilirubin     (0.2-1.0)  mg/dL


 


AST     (15-37)  U/L


 


ALT     (14-59)  U/L


 


Alkaline Phosphatase     ()  U/L


 


Total Protein     (6.4-8.2)  g/dl


 


Albumin     (3.4-5.0)  g/dl


 


Globulin     gm/dL


 


Albumin/Globulin Ratio     (1-2)  


 


MRSA (PCR)   Negative   














  02/11/21 02/11/21 02/11/21 Range/Units





  04:30 04:30 06:02 


 


WBC     (3.98-10.04)  K/mm3


 


RBC     (3.98-5.22)  M/mm3


 


Hgb     (11.2-15.7)  gm/dl


 


Hct     (34.1-44.9)  %


 


MCV     (79.4-94.8)  fl


 


MCH     (25.6-32.2)  pg


 


MCHC     (32.2-35.5)  g/dl


 


RDW Std Deviation     (36.4-46.3)  fL


 


Plt Count     (182-369)  K/mm3


 


MPV     (9.4-12.3)  fl


 


Neut % (Auto)     (34.0-71.1)  %


 


Lymph % (Auto)     (19.3-51.7)  %


 


Mono % (Auto)     (4.7-12.5)  %


 


Eos % (Auto)     (0.7-5.8)  


 


Baso % (Auto)     (0.1-1.2)  %


 


Neut # (Auto)     (1.56-6.13)  K/mm3


 


Lymph # (Auto)     (1.18-3.74)  K/mm3


 


Mono # (Auto)     (0.24-0.36)  K/mm3


 


Eos # (Auto)     (0.04-0.36)  K/mm3


 


Baso # (Auto)     (0.01-0.08)  K/mm3


 


Puncture Site     


 


ABG pH     (7.35-7.45)  


 


ABG pCO2     (35.0-45.0)  mmHg


 


ABG pO2     (80.0-100.0)  mmHg


 


ABG HCO3     (22.0-26.0)  meq/L


 


ABG O2 Saturation     (96.0-97.0)  %


 


ABG Base Excess     (-2-2.0)  


 


Jose Rafael Test     


 


O2 Delivery Device     


 


Oxygen Flow Rate     


 


FiO2     (21..00)  %


 


Sodium  143    (136-145)  mEq/L


 


Potassium  4.1    (3.5-5.1)  mEq/L


 


Chloride  106    ()  mEq/L


 


Carbon Dioxide  28    (21-32)  mEq/L


 


Anion Gap  13.1    (5-15)  


 


BUN  34 H    (7-18)  mg/dL


 


Creatinine  2.1 H    (0.55-1.02)  mg/dL


 


Est Cr Clr Drug Dosing  13.04    mL/min


 


Estimated GFR (MDRD)  22    (>60)  mL/min


 


BUN/Creatinine Ratio  16.2    (14-18)  


 


Glucose  120 H    ()  mg/dL


 


POC Glucose    135 H  ()  mg/dL


 


Calcium  8.5    (8.5-10.1)  mg/dL


 


Magnesium   2.1   (1.8-2.4)  mg/dl


 


Total Bilirubin  1.5 H    (0.2-1.0)  mg/dL


 


AST  134 H    (15-37)  U/L


 


ALT  221 H    (14-59)  U/L


 


Alkaline Phosphatase  91    ()  U/L


 


Total Protein  5.9 L    (6.4-8.2)  g/dl


 


Albumin  2.7 L    (3.4-5.0)  g/dl


 


Globulin  3.2    gm/dL


 


Albumin/Globulin Ratio  0.8 L    (1-2)  


 


MRSA (PCR)     














  02/11/21 Range/Units





  10:51 


 


WBC   (3.98-10.04)  K/mm3


 


RBC   (3.98-5.22)  M/mm3


 


Hgb   (11.2-15.7)  gm/dl


 


Hct   (34.1-44.9)  %


 


MCV   (79.4-94.8)  fl


 


MCH   (25.6-32.2)  pg


 


MCHC   (32.2-35.5)  g/dl


 


RDW Std Deviation   (36.4-46.3)  fL


 


Plt Count   (182-369)  K/mm3


 


MPV   (9.4-12.3)  fl


 


Neut % (Auto)   (34.0-71.1)  %


 


Lymph % (Auto)   (19.3-51.7)  %


 


Mono % (Auto)   (4.7-12.5)  %


 


Eos % (Auto)   (0.7-5.8)  


 


Baso % (Auto)   (0.1-1.2)  %


 


Neut # (Auto)   (1.56-6.13)  K/mm3


 


Lymph # (Auto)   (1.18-3.74)  K/mm3


 


Mono # (Auto)   (0.24-0.36)  K/mm3


 


Eos # (Auto)   (0.04-0.36)  K/mm3


 


Baso # (Auto)   (0.01-0.08)  K/mm3


 


Puncture Site   


 


ABG pH   (7.35-7.45)  


 


ABG pCO2   (35.0-45.0)  mmHg


 


ABG pO2   (80.0-100.0)  mmHg


 


ABG HCO3   (22.0-26.0)  meq/L


 


ABG O2 Saturation   (96.0-97.0)  %


 


ABG Base Excess   (-2-2.0)  


 


Jose Rafael Test   


 


O2 Delivery Device   


 


Oxygen Flow Rate   


 


FiO2   (21..00)  %


 


Sodium   (136-145)  mEq/L


 


Potassium   (3.5-5.1)  mEq/L


 


Chloride   ()  mEq/L


 


Carbon Dioxide   (21-32)  mEq/L


 


Anion Gap   (5-15)  


 


BUN   (7-18)  mg/dL


 


Creatinine   (0.55-1.02)  mg/dL


 


Est Cr Clr Drug Dosing   mL/min


 


Estimated GFR (MDRD)   (>60)  mL/min


 


BUN/Creatinine Ratio   (14-18)  


 


Glucose   ()  mg/dL


 


POC Glucose  169 H  ()  mg/dL


 


Calcium   (8.5-10.1)  mg/dL


 


Magnesium   (1.8-2.4)  mg/dl


 


Total Bilirubin   (0.2-1.0)  mg/dL


 


AST   (15-37)  U/L


 


ALT   (14-59)  U/L


 


Alkaline Phosphatase   ()  U/L


 


Total Protein   (6.4-8.2)  g/dl


 


Albumin   (3.4-5.0)  g/dl


 


Globulin   gm/dL


 


Albumin/Globulin Ratio   (1-2)  


 


MRSA (PCR)   











Med Orders - Current: 


                               Current Medications





Acetaminophen (Tylenol)  650 mg PO Q4H PRN


   PRN Reason: Fever


   Last Admin: 02/10/21 23:27 Dose:  650 mg


   Documented by: 


Albuterol/Ipratropium (Duoneb 3.0-0.5 Mg/3 Ml)  3 ml NEB Q4H PRN


   PRN Reason: Shortness Of Breath/wheezing


Calcium Carbonate/Glycine (Tums)  500 mg PO Q4H PRN


   PRN Reason: Heartburn


Cyclobenzaprine HCl (Flexeril)  10 mg PO TID PRN


   PRN Reason: muscle spasms 


   Last Admin: 02/09/21 12:36 Dose:  10 mg


   Documented by: 


Docusate Sodium (Colace)  100 mg PO BID PRN


   PRN Reason: Constipation


Famotidine (Pepcid)  20 mg PO Q48H Formerly [facility], 1928–1983


   Last Admin: 02/11/21 08:23 Dose:  Not Given


   Documented by: 


Gabapentin (Neurontin)  600 mg PO BID Formerly [facility], 1928–1983


   Last Admin: 02/11/21 08:23 Dose:  600 mg


   Documented by: 


Hydralazine HCl (Apresoline)  10 mg IVPUSH Q4H PRN


   PRN Reason: Hypertension


Hydromorphone HCl (Dilaudid)  0.5 mg IVPUSH Q4H PRN


   PRN Reason: Pain (severe 7-10)


   Last Admin: 02/11/21 08:18 Dose:  0.5 mg


   Documented by: 


Promethazine HCl 12.5 mg/ (Sodium Chloride)  50.5 mls @ 100 mls/hr IV Q6H PRN


   PRN Reason: Nausea/Vomiting


Lactated Ringer's (Ringers, Lactated)  1,000 mls @ 75 mls/hr IV ASDIRECTED Formerly [facility], 1928–1983


   Last Admin: 02/11/21 07:37 Dose:  75 mls/hr


   Documented by: 


Insulin Glargine (Lantus)  15 unit SUBCUT DAILY Formerly [facility], 1928–1983


   Last Admin: 02/11/21 08:50 Dose:  Not Given


   Documented by: 


Insulin Human Lispro (Humalog)  0 unit SUBCUT QIDACANDBED Formerly [facility], 1928–1983; Protocol


   Last Admin: 02/11/21 11:40 Dose:  Not Given


   Documented by: 


Metoprolol Succinate (Toprol Xl)  50 mg PO DAILY Formerly [facility], 1928–1983


   Last Admin: 02/11/21 08:23 Dose:  50 mg


   Documented by: 


Oxycodone HCl (Oxycodone)  5 mg PO Q6H PRN


   PRN Reason: Pain (moderate 4-6)


   Last Admin: 02/11/21 01:36 Dose:  5 mg


   Documented by: 





Discontinued Medications





Acetaminophen (Tylenol)  650 mg PO Q6H PRN


   PRN Reason: Pain (Mild 1-3)/fever


   Last Admin: 02/09/21 12:34 Dose:  650 mg


   Documented by: 


Bupivacaine HCl (Marcaine 0.5%) Confirm Administered Dose 30 ml .ROUTE .STK-MED 

ONE


   Stop: 02/11/21 09:56


Calcium Carbonate/Glycine (Tums)  400 mg PO Q4H PRN


   PRN Reason: Heartburn


Famotidine (Pepcid)  20 mg PO DAILY Formerly [facility], 1928–1983


   Last Admin: 02/09/21 09:57 Dose:  20 mg


   Documented by: 


Fentanyl (Sublimaze) Confirm Administered Dose 100 mcg .ROUTE .STK-MED ONE


   Stop: 02/11/21 10:46


Furosemide (Lasix)  40 mg IVPUSH NOW ONE


   Stop: 02/08/21 21:42


   Last Admin: 02/08/21 22:00 Dose:  40 mg


   Documented by: 


Furosemide (Lasix)  20 mg PO DAILY Formerly [facility], 1928–1983


   Last Admin: 02/09/21 13:04 Dose:  Not Given


   Documented by: 


Furosemide (Lasix)  20 mg IVPUSH ONETIME ONE


   Stop: 02/09/21 18:01


   Last Admin: 02/09/21 18:47 Dose:  20 mg


   Documented by: 


Furosemide (Lasix)  40 mg IVPUSH NOW ONE


   Stop: 02/11/21 11:15


   Last Admin: 02/11/21 11:22 Dose:  40 mg


   Documented by: 


Gabapentin (Neurontin)  600 mg PO BID Formerly [facility], 1928–1983


   Last Admin: 02/09/21 08:10 Dose:  600 mg


   Documented by: 


Heparin Sodium (Porcine) (Heparin Sodium)  5,000 units SUBCUT Q8H Formerly [facility], 1928–1983


   Stop: 02/10/21 23:00


   Last Admin: 02/10/21 21:18 Dose:  5,000 units


   Documented by: 


Hydromorphone HCl (Dilaudid)  0.25 mg IVPUSH ONETIME ONE


   Stop: 02/08/21 20:02


   Last Admin: 02/08/21 20:10 Dose:  0.25 mg


   Documented by: 


Hydromorphone HCl (Dilaudid)  0.25 mg IVPUSH ONETIME ONE


   Stop: 02/08/21 22:22


   Last Admin: 02/08/21 23:17 Dose:  0.25 mg


   Documented by: 


Sodium Chloride (Normal Saline)  1,000 mls @ 65 mls/hr IV ASDIRECTED Formerly [facility], 1928–1983


Lactated Ringer's (Ringers, Lactated)  1,000 mls @ 50 mls/hr IV ASDIRECTED Formerly [facility], 1928–1983


   Last Admin: 02/09/21 14:18 Dose:  50 mls/hr


   Documented by: 


Lactated Ringer's (Ringers, Lactated)  1,000 mls @ 65 mls/hr IV ASDIRECTED Formerly [facility], 1928–1983


   Last Admin: 02/10/21 06:46 Dose:  65 mls/hr


   Documented by: 


Lactated Ringer's (Ringers, Lactated)  1,000 mls @ 100 mls/hr IV ASDIRECTED Former[facility], 1928–1983


   Stop: 02/10/21 19:29


   Last Admin: 02/10/21 09:44 Dose:  100 mls/hr


   Documented by: 


Ketamine HCl (Ketalar) Confirm Administered Dose 500 mg .ROUTE .STK-MED ONE


   Stop: 02/11/21 10:47


Lidocaine HCl (Xylocaine 1%)  10 ml INJECT ONETIME ONE


   Stop: 02/08/21 18:58


   Last Admin: 02/09/21 00:57 Dose:  Not Given


   Documented by: 


Lidocaine/Epinephrine (Xylocaine-Mpf 2%-Epi 1:200,000) Confirm Administered Dose

20 ml .ROUTE .STK-MED ONE


   Stop: 02/11/21 10:13


Midazolam HCl (Versed 1 Mg/Ml) Confirm Administered Dose 2 mg .ROUTE .STK-MED 

ONE


   Stop: 02/11/21 10:46


Non-Formulary Medication (Insulin Degludec [Tresiba])  15 unit SQ DAILY Formerly [facility], 1928–1983


Ondansetron HCl (Zofran)  4 mg IVPUSH ONETIME ONE


   Stop: 02/08/21 18:48


   Last Admin: 02/08/21 18:51 Dose:  4 mg


   Documented by: 


Ondansetron HCl (Zofran) Confirm Administered Dose 4 mg .ROUTE .K-MED ONE


   Stop: 02/08/21 19:51


   Last Admin: 02/08/21 20:11 Dose:  Not Given


   Documented by: 


Ondansetron HCl (Zofran)  4 mg IVPUSH ONETIME ONE


   Stop: 02/08/21 20:07


   Last Admin: 02/08/21 20:11 Dose:  4 mg


   Documented by: 


Ropivacaine (Naropin 0.5%) Confirm Administered Dose 30 ml .ROUTE .STK-MED ONE


   Stop: 02/11/21 09:57


Sodium Polystyrene Sulfonate (Kayexalate)  15 gm PO ONETIME ONE


   Stop: 02/09/21 08:50


   Last Admin: 02/09/21 09:57 Dose:  15 gm


   Documented by: 


Spironolactone (Aldactone)  50 mg PO DAILY Formerly [facility], 1928–1983


   Last Admin: 02/09/21 08:10 Dose:  50 mg


   Documented by: 


Tamsulosin HCl (Flomax)  0.4 mg PO DAILY Formerly [facility], 1928–1983


   Last Admin: 02/09/21 17:44 Dose:  Not Given


   Documented by: 











- Exam


Quality Assessment: Supplemental Oxygen (Liters per nasal cannula), Urine 

Catheter (Place due to hip fracture), DVT Prophylaxis (SCDs)


General: Oriented, Lethargic


HEENT: Pupils Equal, Pupils Reactive.  No: Mucous Membr. Moist/Pink (Dry lips 

and oral mucosa)


Neck: Supple


Lungs: Normal Respiratory Effort, Decreased Breath Sounds, Crackles (Bilateral 

bases).  No: Clear to Auscultation


Cardiovascular: Regular Rate, Regular Rhythm


GI/Abdominal Exam: Normal Bowel Sounds, Soft, Non-Tender, No Distention


 (Female) Exam: Deferred


Back Exam: Normal Inspection, Full Range of Motion


Extremities: Normal Inspection, No Pedal Edema, Normal Capillary Refill, Limited

 Range of Motion (Left hip due to fracture).  No: Normal Range of Motion


Peripheral Pulses: 1+: Dorsalis Pedis (L), Dorsalis Pedis (R), 2+: Radial (L), 

Radial (R)


Skin: Warm, Dry, Other (Left elbow skin tear dressing is clean dry and intact)


Wound/Incisions: Dressing Dry and Intact (Elbow)


Neurological: No New Focal Deficit


Psy/Mental Status: Alert, Normal Affect, Normal Mood





Sepsis Event Note





- Evaluation


Sepsis Screening Result: No Definite Risk





- Focused Exam


Vital Signs: 


                                   Vital Signs











  Temp Temp Pulse Resp BP Pulse Ox Pulse Ox


 


 02/11/21 09:50        91 L


 


 02/11/21 08:25    73    89 L 


 


 02/11/21 08:23    74   133/64  


 


 02/11/21 07:55  98.1 F   68  14  113/38 L  90 L 


 


 02/11/21 05:43        99


 


 02/11/21 03:25  97.9 F   60  16  124/62  95 


 


 02/11/21 01:14  97.5 F      


 


 02/11/21 00:15   99 F     














- Problem List & Annotations


(1) Acute on chronic renal failure


SNOMED Code(s): 746635652


   Code(s): N17.9 - ACUTE KIDNEY FAILURE, UNSPECIFIED; N18.9 - CHRONIC KIDNEY 

DISEASE, UNSPECIFIED   Status: Acute   Priority: High   Current Visit: Yes   


Qualifiers: 


   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 

disease stage: stage 4 (severe)   Qualified Code(s): N17.2 - Acute kidney 

failure with medullary necrosis; N18.4 - Chronic kidney disease, stage 4 

(severe)   





(2) Closed left hip fracture


SNOMED Code(s): 442735067


   Code(s): S72.002A - FRACTURE OF UNSP PART OF NECK OF LEFT FEMUR, INIT   

Status: Acute   Priority: High   Current Visit: Yes   


Qualifiers: 


   Encounter type: initial encounter   Qualified Code(s): S72.002A - Fracture of

 unspecified part of neck of left femur, initial encounter for closed fracture  

 





(3) Elevated ALT measurement


SNOMED Code(s): 323337955


   Code(s): R74.01 - ELEVATION OF LEVELS OF LIVER TRANSAMINASE LEVELS   Status: 

Acute   Priority: Medium   Current Visit: Yes   





(4) Elevated AST (SGOT)


SNOMED Code(s): 782446859


   Code(s): R74.01 - ELEVATION OF LEVELS OF LIVER TRANSAMINASE LEVELS   Status: 

Acute   Priority: Medium   Current Visit: Yes   





(5) Fall


SNOMED Code(s): 5416224, 973558228


   Code(s): W19.XXXA - UNSPECIFIED FALL, INITIAL ENCOUNTER   Status: Acute   

Priority: High   Current Visit: Yes   


Qualifiers: 


   Encounter type: subsequent encounter   Qualified Code(s): W19.XXXD - 

Unspecified fall, subsequent encounter   





(6) Hyperbilirubinemia


SNOMED Code(s): 71210649


   Code(s): E80.6 - OTHER DISORDERS OF BILIRUBIN METABOLISM   Status: Acute   

Priority: Medium   Current Visit: Yes   





(7) Hyperkalemia


SNOMED Code(s): 57998258


   Code(s): E87.5 - HYPERKALEMIA   Status: Resolved   Priority: High   Current 

Visit: Yes   





(8) Afib


SNOMED Code(s): 87915062


   Code(s): I48.91 - UNSPECIFIED ATRIAL FIBRILLATION   Status: Chronic   

Priority: Low   Current Visit: No   


Qualifiers: 


   Atrial fibrillation type: paroxysmal   Qualified Code(s): I48.0 - Paroxysmal 

atrial fibrillation   





(9) Anemia


SNOMED Code(s): 227406472


   Code(s): D64.9 - ANEMIA, UNSPECIFIED   Status: Chronic   Priority: Medium   

Current Visit: No   Onset Date: 06/05/19   


Qualifiers: 


   Anemia type: due to chronic kidney disease   Chronic kidney disease stage: 

stage 4 (severe)   Qualified Code(s): N18.4 - Chronic kidney disease, stage 4 

(severe); D63.1 - Anemia in chronic kidney disease   


Annotation/Comment:: anemia  sec  to  anticoagulation   with  xarolto / plavix 

and asa   asa  held   mild  bleeding  noted   from  hemmhroids   





(10) CHF (congestive heart failure), NYHA class II


SNOMED Code(s): 812468401, 159306961


   Code(s): I50.9 - HEART FAILURE, UNSPECIFIED   Status: Chronic   Priority: 

Medium   Current Visit: No   Onset Date: 06/06/19   


Qualifiers: 


   Congestive heart failure type: unspecified   Qualified Code(s): I50.9 - Heart

 failure, unspecified   





(11) Chronic back pain


SNOMED Code(s): 879985409


   Code(s): M54.9 - DORSALGIA, UNSPECIFIED; G89.29 - OTHER CHRONIC PAIN   

Status: Chronic   Priority: Low   Current Visit: No   


Qualifiers: 


   Back pain location: back pain in unspecified location   Back pain laterality:

 unspecified   Qualified Code(s): M54.9 - Dorsalgia, unspecified; G89.29 - Other

 chronic pain   





(12) Chronic constipation


SNOMED Code(s): 416623699


   Code(s): K59.09 - OTHER CONSTIPATION   Status: Chronic   Priority: Low   

Current Visit: No   





(13) DM2 (diabetes mellitus, type 2)


SNOMED Code(s): 30030873


   Code(s): E11.9 - TYPE 2 DIABETES MELLITUS WITHOUT COMPLICATIONS   Status: Chr

onic   Priority: High   Current Visit: No   Onset Date: 06/07/19   


Qualifiers: 


   Diabetes mellitus long term insulin use: unspecified long term insulin use 

status   Diabetes mellitus complication status: with unspecified complications 





(14) Depression


SNOMED Code(s): 63117629


   Code(s): F32.9 - MAJOR DEPRESSIVE DISORDER, SINGLE EPISODE, UNSPECIFIED   

Status: Chronic   Priority: Low   Current Visit: No   


Qualifiers: 


   Depression Type: other depression   Qualified Code(s): F32.89 - Other 

specified depressive episodes   





(15) HTN (hypertension)


SNOMED Code(s): 71814600


   Code(s): I10 - ESSENTIAL (PRIMARY) HYPERTENSION   Status: Chronic   Priority:

 Medium   Current Visit: No   


Qualifiers: 


   Hypertension type: unspecified   Qualified Code(s): I10 - Essential (primary)

 hypertension   





(16) History of TIA (transient ischemic attack)


SNOMED Code(s): 118861412


   Code(s): Z86.73 - PRSNL HX OF TIA (TIA), AND CEREB INFRC W/O RESID DEFICITS  

 Status: Chronic   Priority: Low   Current Visit: No   





(17) Nursing home resident


SNOMED Code(s): 225391203


   Code(s): Z59.3 - PROBLEMS RELATED TO LIVING IN RESIDENTIAL INSTITUTION   

Status: Chronic   Priority: Low   Current Visit: No   





(18) Urinary retention


SNOMED Code(s): 195814761


   Code(s): R33.9 - RETENTION OF URINE, UNSPECIFIED   Status: Chronic   P

riority: Low   Current Visit: No   





- Problem List Review


Problem List Initiated/Reviewed/Updated: Yes





- My Orders


Last 24 Hours: 


My Active Orders





02/11/21 11:20


PRO B-TYPE NATRIUR PEPT,BNPPRO [CHEM] Stat 














- Assessment


Assessment:: 


Assessment - Day of admission 2/9/21 (admitted late 2/8/21)


* 89 Yo female who presents after fall at [facility] resulting in left hip and elbow 

  pain


* Unknown why she fell. Hit head. Skin tear on left elbow


* Left leg externally rotated and shortened


* 12-lead EKG shows NSR at 81 BPM with a first degree AV block.


* Head CT shows air-fluid levels in right maxillary sinus and senescent change. 

  Nothing acute


* Left hip X-ray suspicious for fracture


* Left elbow x-ray shows nothing acute


* 4cm skin tear on left elbow with skin missing repaired with steri-strips and 

  gauze


* Left Hip CT shows acute complex proximal left femoral fracture 


   * Predominant fracture line appears to be predominantly basocervical oblique 

     however a fracture line extends superolaterally anteriorly transcervically.

      


   * Fracture lines involving the greater trochanter as well.


   * There is some angulation and overriding of the fracture fragments.


* CXR shows nothing acute


* Dr. Pruett, orthopedic surgeon consulted by ED - plans for surgery on 2/11/21 

  due to patient being on Plavix\


* Given Dilaudid for pain and Zofran for nausea


* Labs:


   * WBC 16.81 --> 18.22


   * hemoglobin 13.6 --> 13.9 


   * platelet 232 --> 189 


   * neutrophils 13.83 --> 16.37 


   * INR 0.96 


   * sodium 143 --> 140 


   * potassium 4.7 --> 5.5 


   * anion gap 14.7 --> 17.5 


   * BUN 25 --> 29 


   * creatinine 2.1 --> 2.3 


   * GFR 20 ---> 20 


   * glucose 91 --> 267


   * magnesium 2.4 


   * total bilirubin 0.5 --> 1.3 


   * AST 38 ---> 192 


   * ALT 32 --> 327 


   * alkaline phosphatase 77 --> 113 


   * troponin less than 0.017 


   * proBNP 136 --> 335 


   * albumin 3.6 --> 3.6 


   * UA negative but trace leukocyte esterase, 5-10 hyaline casts, and many 

     bacteria noted 


   * SARS Covid 2 RNA negative 


   * MRSA negative 


   * urine culture pending





2/10/21


* Continued left hip pain with movement.  No pain when laying still.


* Left elbow bandage noted to be saturated with drainage.  Nursing will change


* Up to 3 L of oxygen via nasal cannula.  Has been utilizing I-S.


* Renal function has been getting progressively worse.  Will increase IV fluids 

  and monitor.


* Transaminitis is improving and bilirubin is trending downward.


* Continue plan for surgery tomorrow.


* Labs today:


   * WBC 14.15 


   * hemoglobin 12.0 


   * platelet 158


   * neutrophils 10.94 


   * sodium 142 


   * potassium 4.1 


   * anion gap 13.1 


   * BUN 37, creatinine 2.6, GFR 17 


   * bilirubin 1.2 


   * , , alk phos 87 


   * albumin 3.0


   


2/11/21


* Continued left hip pain with movement.  No pain when laying still.


* Left elbow bandage dry and intact


* Up to liters of oxygen per nasal cannula.  O2 saturations ranged from 88 to 

  90%.  Has been utilizing I-S.


* IV fluids stopped as patient is having worsening O2 saturations consistent 

  with congestive heart failure.


* Transaminitis is improving and bilirubin is trending downward.


* Surgery postponed until Monday


* Labs today:


   * WBC 14.15-->13.02


   * hemoglobin 12.0-->10.7


   * sodium 142-->143


   * potassium 4.1 


   * BUN 37, creatinine 2.6, GFR 17-->BUN 34, Creat 2.1


   * Intake and output showed a +[id number]


* Portable view of the chest radiologist impression: 


   1.  Increased density within the right upper chest either due to pneumonia or

 changes of aspiration.


   2.  Mild atelectasis within both lung bases.


   3.  Increased central lung markings possibly due to acute or chronic 

pulmonary vascular congestion


* Surgery postponed until Monday.





- Plan


Plan:: 


Closed left hip fracture


Fall


Nursing home resident


* Pain medications as ordered


* Bedrest


* Page until post surgical


* Flexeril as ordered


* PT/OT


* CM/SW for discharge planning


* Dr. Pruett, orthopedic surgeon consulted in ED


* Planning surgery on 2/15/21 (wait due to decreasing O2 saturations)


* Hold home Plavix 


* Heparin/SCDs for VTE


* IS


* Optimize for surgery


* Ice pack as needed


* Nursing staff to reposition every 2 hours





Hyperbilirubinemia, improved


Elevated AST (SGOT), improved


Elevated ALT measurement, improved 


* Avoid tylenol


* Re-check labs and monitor








Acute on chronic renal failure


* IV fluids discontinued due to worsening heart failure


* Avoid nephrotoxic medications


* Monitor labs





DM2 (diabetes mellitus, type 2)


* Lantus (sub for Tresiba) at home dosing


* Medium scale humalog


* QID AC and bedtime blood glucose checks


* Consistent carbohydrate diet





Anemia


CHF (congestive heart failure), NYHA class II


Afib


HTN (hypertension)


Chronic constipation


Urinary retention


Chronic back pain


History of TIA (transient ischemic attack)


Depression


* Review/reconcile home medications


* Telemetry


* PRN stool softeners


* Lasix 40 mg IV x1


* Check proBNP and procalcitonin level





Resolved:


Hyperkalemia





Code status: DNR/DNI


PCP: Dr. Vaughan


DVT prophylaxis: Heparin/SCDs 


Social: Patient resides in [facility]


Disposition: Patient admitted inpatient with telemetry for surgical management 

of left hip fracture on 2/11/21.  Postponed until February 15, 2021

## 2021-02-11 NOTE — PCM.PRNOTE
- Free Text/Narrative


Note: 


Pain control block requested by surgeon.


Patient's Dx: Left intertrochanteric hip fracture.





Procedure: Left Fascia iliaca nerve block with U/S guidance 


Requesting surgeon: Dr. Bean Ct





Risks and benefits discussed with the patient and her POA previously as part of 

preoperative assessment, including Rt. leg weakness x 24 hrs., block failure, 

groin pain.


As the surgery is being postponed due to the patients general and respiratory 

deterioration, this block is being performed to help with pain coverage. 


Patient in PACU bay, monitors applied. . Time out performed. Oxygen 6L via NC. 

Left groin area was prepped with Chloraprep x 1 and allowed to dry.  Local 

infiltration with 2 cc of 1% Lidocaine. The righ femoral nerve and artery were 

identified under ultrasound prior to needle insertion. The probe then been moved

into longitudinal position and fascia iliaca covering iliopsoas muscle has been 

identified. 4" Stimuplex needle #20 G was inserted under US guidance. Needle 

penetration through fascia barbara and fascia iliaca observed.  Under direct 

visualization of needle tip the injection of 0.5% Ropivacaine with 1:200,000 

epinephrine (30mls) and 10 ml of 2% Lidocaine with 1:200,000 epinephrine  in 

divided doses maintaining negative aspiration was completed without problems as 

the spread of local anesthetic been observed under fascia iliaca in cephalad 

direction. No local anesthetic toxicity was noted. Patient has tolerated the 

procedure well.


Please see the attached U/S image


Time: 11:58 - 12:16

## 2021-02-11 NOTE — CR
Chest: Portable view of the chest was obtained.

 

Comparison: Prior chest x-ray of 02/08/21.

 

Increasing density within the right upper chest is seen from prior 

study.  Lung markings are diffusely increased which are stable.  

Slight areas of atelectasis within both inferior lungs.  Heart size is

 slightly enlarged.  Bony structures are osteopenic.

 

Surgical clips are noted from prior cholecystectomy.

 

Impression:

1.  Increased density within the right upper chest either due to 

pneumonia or changes of aspiration.

2.  Mild atelectasis within both lung bases.

3.  Increased central lung markings possibly due to acute or chronic 

pulmonary vascular congestion.

 

Diagnostic code #3

## 2021-02-12 NOTE — PCM.PN
- General Info


Date of Service: 02/12/21


Admission Dx/Problem (Free Text): 


                           Admission Diagnosis/Problem





Admission Diagnosis/Problem      Hip fracture requiring operative repair








Subjective Update: 


In to see Liliana she remains very weak but when asked how she feels she responds 

"with my fingers." Lung sounds have improved and she is down to 3L on oxygen. 

Will order echo today. Surgery scheduled for 2/15/21. She has been having 

minimal intake so will decrease LA and SA insulin over fears patient will bottom

out. Zosyn started due to possible aspiration PNA and UTI. 





Functional Status: Reports: Pain Controlled, Urinating, Incentive Spirometry.  

Denies: Tolerating Diet (minimal intake ), Ambulating, New Symptoms





- Review of Systems


General: Reports: No Symptoms, Weakness, Fatigue, Malaise.  Denies: Fever, 

Chills


HEENT: Reports: No Symptoms.  Denies: Headaches, Sore Throat


Pulmonary: Reports: No Symptoms.  Denies: Shortness of Breath, Cough, Sputum, Wh

eezing


Cardiovascular: Reports: No Symptoms.  Denies: Chest Pain, Palpitations, Dyspnea

on Exertion


Gastrointestinal: Reports: No Symptoms.  Denies: Abdominal Pain, Constipation, 

Diarrhea, Nausea, Vomiting


Genitourinary: Reports: No Symptoms.  Denies: Pain


Musculoskeletal: Reports: Arm Pain (left elbow ), Leg Pain (left )


Skin: Reports: No Symptoms.  Denies: Cyanosis


Neurological: Reports: Difficulty Walking, Weakness, Gait Disturbance.  Denies: 

Confusion


Psychiatric: Reports: No Symptoms





- Patient Data


Vitals - Most Recent: 


                                Last Vital Signs











Temp  98.8 F   02/12/21 05:31


 


Pulse  72   02/12/21 05:31


 


Resp  16   02/12/21 05:31


 


BP  99/54 L  02/12/21 05:31


 


Pulse Ox  93 L  02/12/21 06:00











Weight - Most Recent: 155 lb 4.8 oz


I&O - Last 24 Hours: 


                                 Intake & Output











 02/11/21 02/12/21 02/12/21





 22:59 06:59 14:59


 


Intake Total 845 325 


 


Output Total 1300 400 


 


Balance -455 -75 











Lab Results Last 24 Hours: 


                         Laboratory Results - last 24 hr











  02/11/21 02/11/21 02/11/21 Range/Units





  10:51 12:28 12:38 


 


WBC     (3.98-10.04)  K/mm3


 


RBC     (3.98-5.22)  M/mm3


 


Hgb     (11.2-15.7)  gm/dl


 


Hct     (34.1-44.9)  %


 


MCV     (79.4-94.8)  fl


 


MCH     (25.6-32.2)  pg


 


MCHC     (32.2-35.5)  g/dl


 


RDW Std Deviation     (36.4-46.3)  fL


 


Plt Count     (182-369)  K/mm3


 


MPV     (9.4-12.3)  fl


 


Neut % (Auto)     (34.0-71.1)  %


 


Lymph % (Auto)     (19.3-51.7)  %


 


Mono % (Auto)     (4.7-12.5)  %


 


Eos % (Auto)     (0.7-5.8)  


 


Baso % (Auto)     (0.1-1.2)  %


 


Neut # (Auto)     (1.56-6.13)  K/mm3


 


Lymph # (Auto)     (1.18-3.74)  K/mm3


 


Mono # (Auto)     (0.24-0.36)  K/mm3


 


Eos # (Auto)     (0.04-0.36)  K/mm3


 


Baso # (Auto)     (0.01-0.08)  K/mm3


 


Manual Slide Review     


 


Sodium     (136-145)  mEq/L


 


Potassium     (3.5-5.1)  mEq/L


 


Chloride     ()  mEq/L


 


Carbon Dioxide     (21-32)  mEq/L


 


Anion Gap     (5-15)  


 


BUN     (7-18)  mg/dL


 


Creatinine     (0.55-1.02)  mg/dL


 


Est Cr Clr Drug Dosing     mL/min


 


Estimated GFR (MDRD)     (>60)  mL/min


 


BUN/Creatinine Ratio     (14-18)  


 


Glucose     ()  mg/dL


 


POC Glucose  169 H    ()  mg/dL


 


Calcium     (8.5-10.1)  mg/dL


 


Total Bilirubin     (0.2-1.0)  mg/dL


 


AST     (15-37)  U/L


 


ALT     (14-59)  U/L


 


Alkaline Phosphatase     ()  U/L


 


NT-Pro-B Natriuret Pep   1815 H   (0-450)  pg/mL


 


Total Protein     (6.4-8.2)  g/dl


 


Albumin     (3.4-5.0)  g/dl


 


Globulin     gm/dL


 


Albumin/Globulin Ratio     (1-2)  


 


Procalcitonin    0.45 H  ng/mL














  02/11/21 02/11/21 02/12/21 Range/Units





  17:16 21:12 06:08 


 


WBC    14.20 H  (3.98-10.04)  K/mm3


 


RBC    3.61 L  (3.98-5.22)  M/mm3


 


Hgb    11.1 L  (11.2-15.7)  gm/dl


 


Hct    34.8  (34.1-44.9)  %


 


MCV    96.4 H  (79.4-94.8)  fl


 


MCH    30.7  (25.6-32.2)  pg


 


MCHC    31.9 L  (32.2-35.5)  g/dl


 


RDW Std Deviation    51.1 H  (36.4-46.3)  fL


 


Plt Count    144 L  (182-369)  K/mm3


 


MPV    10.9  (9.4-12.3)  fl


 


Neut % (Auto)    84.8 H  (34.0-71.1)  %


 


Lymph % (Auto)    6.3 L  (19.3-51.7)  %


 


Mono % (Auto)    7.0  (4.7-12.5)  %


 


Eos % (Auto)    1.0  (0.7-5.8)  


 


Baso % (Auto)    0.1  (0.1-1.2)  %


 


Neut # (Auto)    12.04 H  (1.56-6.13)  K/mm3


 


Lymph # (Auto)    0.90 L  (1.18-3.74)  K/mm3


 


Mono # (Auto)    0.99 H  (0.24-0.36)  K/mm3


 


Eos # (Auto)    0.14  (0.04-0.36)  K/mm3


 


Baso # (Auto)    0.02  (0.01-0.08)  K/mm3


 


Manual Slide Review    Abnormal smear  


 


Sodium     (136-145)  mEq/L


 


Potassium     (3.5-5.1)  mEq/L


 


Chloride     ()  mEq/L


 


Carbon Dioxide     (21-32)  mEq/L


 


Anion Gap     (5-15)  


 


BUN     (7-18)  mg/dL


 


Creatinine     (0.55-1.02)  mg/dL


 


Est Cr Clr Drug Dosing     mL/min


 


Estimated GFR (MDRD)     (>60)  mL/min


 


BUN/Creatinine Ratio     (14-18)  


 


Glucose     ()  mg/dL


 


POC Glucose  316 H  269 H   ()  mg/dL


 


Calcium     (8.5-10.1)  mg/dL


 


Total Bilirubin     (0.2-1.0)  mg/dL


 


AST     (15-37)  U/L


 


ALT     (14-59)  U/L


 


Alkaline Phosphatase     ()  U/L


 


NT-Pro-B Natriuret Pep     (0-450)  pg/mL


 


Total Protein     (6.4-8.2)  g/dl


 


Albumin     (3.4-5.0)  g/dl


 


Globulin     gm/dL


 


Albumin/Globulin Ratio     (1-2)  


 


Procalcitonin     ng/mL














  02/12/21 Range/Units





  06:08 


 


WBC   (3.98-10.04)  K/mm3


 


RBC   (3.98-5.22)  M/mm3


 


Hgb   (11.2-15.7)  gm/dl


 


Hct   (34.1-44.9)  %


 


MCV   (79.4-94.8)  fl


 


MCH   (25.6-32.2)  pg


 


MCHC   (32.2-35.5)  g/dl


 


RDW Std Deviation   (36.4-46.3)  fL


 


Plt Count   (182-369)  K/mm3


 


MPV   (9.4-12.3)  fl


 


Neut % (Auto)   (34.0-71.1)  %


 


Lymph % (Auto)   (19.3-51.7)  %


 


Mono % (Auto)   (4.7-12.5)  %


 


Eos % (Auto)   (0.7-5.8)  


 


Baso % (Auto)   (0.1-1.2)  %


 


Neut # (Auto)   (1.56-6.13)  K/mm3


 


Lymph # (Auto)   (1.18-3.74)  K/mm3


 


Mono # (Auto)   (0.24-0.36)  K/mm3


 


Eos # (Auto)   (0.04-0.36)  K/mm3


 


Baso # (Auto)   (0.01-0.08)  K/mm3


 


Manual Slide Review   


 


Sodium  143  (136-145)  mEq/L


 


Potassium  3.9  (3.5-5.1)  mEq/L


 


Chloride  104  ()  mEq/L


 


Carbon Dioxide  29  (21-32)  mEq/L


 


Anion Gap  13.9  (5-15)  


 


BUN  34 H  (7-18)  mg/dL


 


Creatinine  1.8 H  (0.55-1.02)  mg/dL


 


Est Cr Clr Drug Dosing  15.22  mL/min


 


Estimated GFR (MDRD)  26  (>60)  mL/min


 


BUN/Creatinine Ratio  18.9 H  (14-18)  


 


Glucose  208 H  ()  mg/dL


 


POC Glucose   ()  mg/dL


 


Calcium  8.9  (8.5-10.1)  mg/dL


 


Total Bilirubin  1.1 H  (0.2-1.0)  mg/dL


 


AST  55 H  (15-37)  U/L


 


ALT  163 H  (14-59)  U/L


 


Alkaline Phosphatase  97  ()  U/L


 


NT-Pro-B Natriuret Pep   (0-450)  pg/mL


 


Total Protein  6.4  (6.4-8.2)  g/dl


 


Albumin  2.6 L  (3.4-5.0)  g/dl


 


Globulin  3.8  gm/dL


 


Albumin/Globulin Ratio  0.7 L  (1-2)  


 


Procalcitonin   ng/mL











[place] Results Last 24 Hours: 


                                  Microbiology











 02/08/21 19:55 Urine Culture - Preliminary





 Urine, Clean Catch    Aerococcus Urinae











Med Orders - Current: 


                               Current Medications





Acetaminophen (Tylenol)  650 mg PO Q4H PRN


   PRN Reason: Fever


   Last Admin: 02/10/21 23:27 Dose:  650 mg


   Documented by: 


Albuterol/Ipratropium (Duoneb 3.0-0.5 Mg/3 Ml)  3 ml NEB Q4H PRN


   PRN Reason: Shortness Of Breath/wheezing


Calcium Carbonate/Glycine (Tums)  500 mg PO Q4H PRN


   PRN Reason: Heartburn


Cyclobenzaprine HCl (Flexeril)  10 mg PO TID PRN


   PRN Reason: muscle spasms 


   Last Admin: 02/09/21 12:36 Dose:  10 mg


   Documented by: 


Docusate Sodium (Colace)  100 mg PO BID PRN


   PRN Reason: Constipation


   Last Admin: 02/11/21 15:57 Dose:  100 mg


   Documented by: 


Famotidine (Pepcid)  20 mg PO Q48H [facility]


   Last Admin: 02/11/21 08:23 Dose:  Not Given


   Documented by: 


Furosemide (Lasix)  40 mg IVPUSH NOW ONE


   Stop: 02/12/21 08:13


Gabapentin (Neurontin)  600 mg PO BID [facility]


   Last Admin: 02/11/21 21:35 Dose:  600 mg


   Documented by: 


Hydralazine HCl (Apresoline)  10 mg IVPUSH Q4H PRN


   PRN Reason: Hypertension


Hydromorphone HCl (Dilaudid)  0.5 mg IVPUSH Q4H PRN


   PRN Reason: Pain (severe 7-10)


   Last Admin: 02/11/21 08:18 Dose:  0.5 mg


   Documented by: 


Promethazine HCl 12.5 mg/ (Sodium Chloride)  50.5 mls @ 100 mls/hr IV Q6H PRN


   PRN Reason: Nausea/Vomiting


Piperacillin Sod/Tazobactam (Sod 4.5 gm/ Sodium Chloride)  100 mls @ 200 mls/hr 

IV ONETIME ONE


   Stop: 02/12/21 08:29


Piperacillin Sod/Tazobactam (Sod 4.5 gm/ Sodium Chloride)  100 mls @ 25 mls/hr 

IV Q12H [facility]


Insulin Glargine (Lantus)  7.5 unit SUBCUT ONETIME ONE


   Stop: 02/12/21 09:01


Insulin Glargine (Lantus)  7.5 unit SUBCUT DAILY [facility]


Insulin Human Lispro (Humalog)  0 unit SUBCUT QIDACANDBED [facility]; Protocol


   Last Admin: 02/12/21 07:56 Dose:  Not Given


   Documented by: 


Metoprolol Succinate (Toprol Xl)  50 mg PO DAILY [facility]


   Last Admin: 02/11/21 08:23 Dose:  50 mg


   Documented by: 


Oxycodone HCl (Oxycodone)  5 mg PO Q6H PRN


   PRN Reason: Pain (moderate 4-6)


   Last Admin: 02/11/21 21:48 Dose:  5 mg


   Documented by: 





Discontinued Medications





Acetaminophen (Tylenol)  650 mg PO Q6H PRN


   PRN Reason: Pain (Mild 1-3)/fever


   Last Admin: 02/09/21 12:34 Dose:  650 mg


   Documented by: 


Bupivacaine HCl (Marcaine 0.5%) Confirm Administered Dose 30 ml .ROUTE .STK-MED 

ONE


   Stop: 02/11/21 09:56


Calcium Carbonate/Glycine (Tums)  400 mg PO Q4H PRN


   PRN Reason: Heartburn


Famotidine (Pepcid)  20 mg PO DAILY [facility]


   Last Admin: 02/09/21 09:57 Dose:  20 mg


   Documented by: 


Fentanyl (Sublimaze) Confirm Administered Dose 100 mcg .ROUTE .STK-MED ONE


   Stop: 02/11/21 10:46


Furosemide (Lasix)  40 mg IVPUSH NOW ONE


   Stop: 02/08/21 21:42


   Last Admin: 02/08/21 22:00 Dose:  40 mg


   Documented by: 


Furosemide (Lasix)  20 mg PO DAILY [facility]


   Last Admin: 02/09/21 13:04 Dose:  Not Given


   Documented by: 


Furosemide (Lasix)  20 mg IVPUSH ONETIME ONE


   Stop: 02/09/21 18:01


   Last Admin: 02/09/21 18:47 Dose:  20 mg


   Documented by: 


Furosemide (Lasix)  40 mg IVPUSH NOW ONE


   Stop: 02/11/21 11:15


   Last Admin: 02/11/21 11:22 Dose:  40 mg


   Documented by: 


Gabapentin (Neurontin)  600 mg PO BID [facility]


   Last Admin: 02/09/21 08:10 Dose:  600 mg


   Documented by: 


Heparin Sodium (Porcine) (Heparin Sodium)  5,000 units SUBCUT Q8H [facility]


   Stop: 02/10/21 23:00


   Last Admin: 02/10/21 21:18 Dose:  5,000 units


   Documented by: 


Hydromorphone HCl (Dilaudid)  0.25 mg IVPUSH ONETIME ONE


   Stop: 02/08/21 20:02


   Last Admin: 02/08/21 20:10 Dose:  0.25 mg


   Documented by: 


Hydromorphone HCl (Dilaudid)  0.25 mg IVPUSH ONETIME ONE


   Stop: 02/08/21 22:22


   Last Admin: 02/08/21 23:17 Dose:  0.25 mg


   Documented by: 


Sodium Chloride (Normal Saline)  1,000 mls @ 65 mls/hr IV ASDIRECTED [facility]


Lactated Ringer's (Ringers, Lactated)  1,000 mls @ 50 mls/hr IV ASDIRECTED [facility]


   Last Admin: 02/09/21 14:18 Dose:  50 mls/hr


   Documented by: 


Lactated Ringer's (Ringers, Lactated)  1,000 mls @ 65 mls/hr IV ASDIRECTED [facility]


   Last Admin: 02/10/21 06:46 Dose:  65 mls/hr


   Documented by: 


Lactated Ringer's (Ringers, Lactated)  1,000 mls @ 100 mls/hr IV ASDIRECTED [facility]


   Stop: 02/10/21 19:29


   Last Admin: 02/10/21 09:44 Dose:  100 mls/hr


   Documented by: 


Lactated Ringer's (Ringers, Lactated)  1,000 mls @ 75 mls/hr IV ASDIRECTED [facility]


   Last Admin: 02/11/21 07:37 Dose:  75 mls/hr


   Documented by: 


Piperacillin Sod/Tazobactam (Sod 4.5 gm/ Sodium Chloride)  100 mls @ 25 mls/hr 

IV Q8H [facility]


Insulin Glargine (Lantus)  15 unit SUBCUT DAILY [facility]


   Last Admin: 02/12/21 08:01 Dose:  Not Given


   Documented by: 


Insulin Glargine (Lantus)  15 unit SUBCUT DAILY [facility]


Ketamine HCl (Ketalar) Confirm Administered Dose 500 mg .ROUTE .STK-MED ONE


   Stop: 02/11/21 10:47


Lidocaine HCl (Xylocaine 1%)  10 ml INJECT ONETIME ONE


   Stop: 02/08/21 18:58


   Last Admin: 02/09/21 00:57 Dose:  Not Given


   Documented by: 


Lidocaine/Epinephrine (Xylocaine-Mpf 2%-Epi 1:200,000) Confirm Administered Dose

20 ml .ROUTE .STK-MED ONE


   Stop: 02/11/21 10:13


Midazolam HCl (Versed 1 Mg/Ml) Confirm Administered Dose 2 mg .ROUTE .STK-MED 

ONE


   Stop: 02/11/21 10:46


Non-Formulary Medication (Insulin Degludec [Tresiba])  15 unit SQ DAILY [facility]


Ondansetron HCl (Zofran)  4 mg IVPUSH ONETIME ONE


   Stop: 02/08/21 18:48


   Last Admin: 02/08/21 18:51 Dose:  4 mg


   Documented by: 


Ondansetron HCl (Zofran) Confirm Administered Dose 4 mg .ROUTE .STK-MED ONE


   Stop: 02/08/21 19:51


   Last Admin: 02/08/21 20:11 Dose:  Not Given


   Documented by: 


Ondansetron HCl (Zofran)  4 mg IVPUSH ONETIME ONE


   Stop: 02/08/21 20:07


   Last Admin: 02/08/21 20:11 Dose:  4 mg


   Documented by: 


Ropivacaine (Naropin 0.5%) Confirm Administered Dose 30 ml .ROUTE .STK-MED ONE


   Stop: 02/11/21 09:57


Sodium Polystyrene Sulfonate (Kayexalate)  15 gm PO ONETIME ONE


   Stop: 02/09/21 08:50


   Last Admin: 02/09/21 09:57 Dose:  15 gm


   Documented by: 


Spironolactone (Aldactone)  50 mg PO DAILY [facility]


   Last Admin: 02/09/21 08:10 Dose:  50 mg


   Documented by: 


Tamsulosin HCl (Flomax)  0.4 mg PO DAILY [facility]


   Last Admin: 02/09/21 17:44 Dose:  Not Given


   Documented by: 











- Exam


Quality Assessment: Supplemental Oxygen (3L), Urine Catheter, DVT Prophylaxis


General: Oriented, Cooperative, No Acute Distress, Lethargic (mildly )


HEENT: Pupils Equal, Pupils Reactive, Mucous Membr. Moist/Pink


Neck: Supple, Trachea Midline


Lungs: Normal Respiratory Effort, Decreased Breath Sounds, Crackles


Cardiovascular: Regular Rate, Regular Rhythm


GI/Abdominal Exam: Normal Bowel Sounds, Soft, Non-Tender, No Distention


 (Female) Exam: Deferred


Extremities: Normal Inspection, Normal Range of Motion, Non-Tender, Normal 

Capillary Refill, Pedal Edema (1+)


Skin: Warm, Dry, Intact


Neurological: No New Focal Deficit





Sepsis Event Note





- Evaluation


Sepsis Screening Result: No Definite Risk





- Focused Exam


Vital Signs: 


                                   Vital Signs











  Temp Pulse Resp BP Pulse Ox Pulse Ox


 


 02/12/21 06:00       93 L


 


 02/12/21 05:50       94 L


 


 02/12/21 05:31  98.8 F  72  16  99/54 L  92 L 


 


 02/12/21 04:22       93 L


 


 02/12/21 00:25  97.7 F  64  16  116/75  94 L 


 


 02/11/21 22:00      94 L 


 


 02/11/21 21:20       90 L














- Problem List & Annotations


(1) Closed left hip fracture


SNOMED Code(s): 460871842


   Code(s): S72.002A - FRACTURE OF UNSP PART OF NECK OF LEFT FEMUR, INIT   

Status: Acute   Priority: High   Current Visit: Yes   


Qualifiers: 


   Encounter type: initial encounter   Qualified Code(s): S72.002A - Fracture of

 unspecified part of neck of left femur, initial encounter for closed fracture  

 





(2) Anemia


SNOMED Code(s): 058936800


   Code(s): D64.9 - ANEMIA, UNSPECIFIED   Status: Chronic   Priority: Medium   

Current Visit: No   Onset Date: 06/05/19   


Qualifiers: 


   Anemia type: due to chronic kidney disease   Chronic kidney disease stage: 

stage 4 (severe)   Qualified Code(s): N18.4 - Chronic kidney disease, stage 4 

(severe); D63.1 - Anemia in chronic kidney disease   


Annotation/Comment:: anemia  sec  to  anticoagulation   with  xarolto / plavix 

and asa   asa  held   mild  bleeding  noted   from  hemmhroids   





(3) CHF (congestive heart failure), NYHA class II


SNOMED Code(s): 324412888, 280889631


   Code(s): I50.9 - HEART FAILURE, UNSPECIFIED   Status: Chronic   Priority: 

Medium   Current Visit: No   Onset Date: 06/06/19   


Qualifiers: 


   Congestive heart failure type: unspecified   Qualified Code(s): I50.9 - Heart

 failure, unspecified   





(4) Fall


SNOMED Code(s): 2829781, 471919293


   Code(s): W19.XXXA - UNSPECIFIED FALL, INITIAL ENCOUNTER   Status: Acute   

Priority: High   Current Visit: Yes   


Qualifiers: 


   Encounter type: subsequent encounter   Qualified Code(s): W19.XXXD - 

Unspecified fall, subsequent encounter   





(5) DM2 (diabetes mellitus, type 2)


SNOMED Code(s): 00082805


   Code(s): E11.9 - TYPE 2 DIABETES MELLITUS WITHOUT COMPLICATIONS   Status: 

Chronic   Priority: High   Current Visit: No   Onset Date: 06/07/19   


Qualifiers: 


   Diabetes mellitus long term insulin use: unspecified long term insulin use 

status   Diabetes mellitus complication status: with unspecified complications 





(6) Afib


SNOMED Code(s): 24564998


   Code(s): I48.91 - UNSPECIFIED ATRIAL FIBRILLATION   Status: Chronic   

Priority: Low   Current Visit: No   


Qualifiers: 


   Atrial fibrillation type: paroxysmal   Qualified Code(s): I48.0 - Paroxysmal 

atrial fibrillation   





(7) HTN (hypertension)


SNOMED Code(s): 37402700


   Code(s): I10 - ESSENTIAL (PRIMARY) HYPERTENSION   Status: Chronic   Priority:

 Medium   Current Visit: No   


Qualifiers: 


   Hypertension type: unspecified   Qualified Code(s): I10 - Essential (primary)

 hypertension   





(8) Chronic constipation


SNOMED Code(s): 511708196


   Code(s): K59.09 - OTHER CONSTIPATION   Status: Chronic   Priority: Low   

Current Visit: No   





(9) Urinary retention


SNOMED Code(s): 562607680


   Code(s): R33.9 - RETENTION OF URINE, UNSPECIFIED   Status: Chronic   

Priority: Low   Current Visit: No   





(10) Chronic back pain


SNOMED Code(s): 993613771


   Code(s): M54.9 - DORSALGIA, UNSPECIFIED; G89.29 - OTHER CHRONIC PAIN   

Status: Chronic   Priority: Low   Current Visit: No   


Qualifiers: 


   Back pain location: back pain in unspecified location   Back pain laterality:

 unspecified   Qualified Code(s): M54.9 - Dorsalgia, unspecified; G89.29 - Other

 chronic pain   





(11) History of TIA (transient ischemic attack)


SNOMED Code(s): 377238943


   Code(s): Z86.73 - PRSNL HX OF TIA (TIA), AND CEREB INFRC W/O RESID DEFICITS  

 Status: Chronic   Priority: Low   Current Visit: No   





(12) Depression


SNOMED Code(s): 11173900


   Code(s): F32.9 - MAJOR DEPRESSIVE DISORDER, SINGLE EPISODE, UNSPECIFIED   

Status: Chronic   Priority: Low   Current Visit: No   


Qualifiers: 


   Depression Type: other depression   Qualified Code(s): F32.89 - Other 

specified depressive episodes   





(13) Nursing home resident


SNOMED Code(s): 830510409


   Code(s): Z59.3 - PROBLEMS RELATED TO LIVING IN RESIDENTIAL INSTITUTION   

Status: Chronic   Priority: Low   Current Visit: No   





(14) Hyperbilirubinemia


SNOMED Code(s): 33756064


   Code(s): E80.6 - OTHER DISORDERS OF BILIRUBIN METABOLISM   Status: Acute   

Priority: Medium   Current Visit: Yes   





(15) Elevated AST (SGOT)


SNOMED Code(s): 081697344


   Code(s): R74.01 - ELEVATION OF LEVELS OF LIVER TRANSAMINASE LEVELS   Status: 

Acute   Priority: Medium   Current Visit: Yes   





(16) Elevated ALT measurement


SNOMED Code(s): 957278115


   Code(s): R74.01 - ELEVATION OF LEVELS OF LIVER TRANSAMINASE LEVELS   Status: 

Acute   Priority: Medium   Current Visit: Yes   





(17) Acute on chronic renal failure


SNOMED Code(s): 572543971


   Code(s): N17.9 - ACUTE KIDNEY FAILURE, UNSPECIFIED; N18.9 - CHRONIC KIDNEY 

DISEASE, UNSPECIFIED   Status: Acute   Priority: High   Current Visit: Yes   


Qualifiers: 


   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 

disease stage: stage 4 (severe)   Qualified Code(s): N17.2 - Acute kidney 

failure with medullary necrosis; N18.4 - Chronic kidney disease, stage 4 

(severe)   





(18) Hyperkalemia


SNOMED Code(s): 88259345


   Code(s): E87.5 - HYPERKALEMIA   Status: Resolved   Priority: High   Current 

Visit: Yes   





(19) Fluid overload


SNOMED Code(s): 94869533


   Code(s): E87.70 - FLUID OVERLOAD, UNSPECIFIED   Status: Acute   Priority: 

High   Current Visit: Yes   


Qualifiers: 


   Hypervolemia type: unspecified   Qualified Code(s): E87.70 - Fluid overload, 

unspecified   





(20) Acute respiratory failure


SNOMED Code(s): 92289443


   Code(s): J96.00 - ACUTE RESPIRATORY FAILURE, UNSP W HYPOXIA OR HYPERCAPNIA   

Status: Acute   Current Visit: Yes   





(21) Elevated brain natriuretic peptide (BNP) level


SNOMED Code(s): 888160558, 179950114


   Code(s): R79.89 - OTHER SPECIFIED ABNORMAL FINDINGS OF BLOOD CHEMISTRY   

Status: Acute   Current Visit: Yes   





(22) Pneumonia


SNOMED Code(s): 134281786


   Code(s): J18.9 - PNEUMONIA, UNSPECIFIED ORGANISM   Status: Acute   Priority: 

High   Current Visit: Yes   


Qualifiers: 


   Pneumonia type: due to unspecified organism   Laterality: right   Lung 

location: upper lobe of lung   Qualified Code(s): J18.9 - Pneumonia, unspecified

 organism   


Annotation/Comment::  very  slow   improvmetna nd  continued  atelectasis and 

pleural  effusion  left    





(23) UTI (urinary tract infection)


SNOMED Code(s): 16618885


   Code(s): N39.0 - URINARY TRACT INFECTION, SITE NOT SPECIFIED   Status: Acute 

  Priority: High   Current Visit: Yes   


Qualifiers: 


   Urinary tract infection type: acute cystitis   Hematuria presence: without 

hematuria   Qualified Code(s): N30.00 - Acute cystitis without hematuria   





- Problem List Review


Problem List Initiated/Reviewed/Updated: Yes





- My Orders


Last 24 Hours: 


My Active Orders





02/11/21 09:00


Famotidine [Pepcid]   20 mg PO Q48H 





02/12/21 06:08


MAGNESIUM [CHEM] AM 





02/12/21 08:00


Piperacillin/Tazobactam [Piperacil-Tazobact] 4.5 gm   Sodium Chloride 0.9% 

[Normal Saline] 100 ml IV ONETIME 





02/12/21 08:12


Furosemide [Lasix]   40 mg IVPUSH NOW ONE 





02/12/21 09:00


Insulin Glarg,Human.Rec.Analog [LantUS]   7.5 unit SUBCUT DAILY 


Insulin Glarg,Human.Rec.Analog [LantUS]   7.5 unit SUBCUT ONETIME ONE 





02/12/21 20:00


Piperacillin/Tazobactam [Piperacil-Tazobact] 4.5 gm   Sodium Chloride 0.9% 

[Normal Saline] 100 ml IV Q12H 





02/13/21 05:11


MAGNESIUM [CHEM] AM 














- Assessment


Assessment:: 


Assessment - Day of admission 2/9/21 (admitted late 2/8/21)


* 89 Yo female who [facility] resulting in left hip and elbow 

  pain


* Unknown why she fell. Hit head. Skin tear on left elbow


* Left leg externally rotated and shortened


* 12-lead EKG shows NSR at 81 BPM with a first degree AV block.


* Head CT shows air-fluid levels in right maxillary sinus and senescent change. 

  Nothing acute


* Left hip X-ray suspicious for fracture


* Left elbow x-ray shows nothing acute


* 4cm skin tear on left elbow with skin missing repaired with steri-strips and 

  gauze


* Left Hip CT shows acute complex proximal left femoral fracture 


   * Predominant fracture line appears to be predominantly basocervical oblique 

     however a fracture line extends superolaterally anteriorly transcervically.

      


   * Fracture lines involving the greater trochanter as well.


   * There is some angulation and overriding of the fracture fragments.


* CXR shows nothing acute


* Dr. Pruett, orthopedic surgeon consulted by ED - plans for surgery on 2/11/21 

  due to patient being on Plavix\


* Given Dilaudid for pain and Zofran for nausea


* Labs:


   * WBC 16.81 --> 18.22


   * hemoglobin 13.6 --> 13.9 


   * platelet 232 --> 189 


   * neutrophils 13.83 --> 16.37 


   * INR 0.96 


   * sodium 143 --> 140 


   * potassium 4.7 --> 5.5 


   * anion gap 14.7 --> 17.5 


   * BUN 25 --> 29 


   * creatinine 2.1 --> 2.3 


   * GFR 20 ---> 20 


   * glucose 91 --> 267


   * magnesium 2.4 


   * total bilirubin 0.5 --> 1.3 


   * AST 38 ---> 192 


   * ALT 32 --> 327 


   * alkaline phosphatase 77 --> 113 


   * troponin less than 0.017 


   * proBNP 136 --> 335 


   * albumin 3.6 --> 3.6 


   * UA negative but trace leukocyte esterase, 5-10 hyaline casts, and many 

     bacteria noted 


   * SARS Covid 2 RNA negative 


   * MRSA negative 


   * urine culture pending





2/10/21


* Continued left hip pain with movement.  No pain when laying still.


* Left elbow bandage noted to be saturated with drainage.  Nursing will change


* Up to 3 L of oxygen via nasal cannula.  Has been utilizing I-S.


* Renal function has been getting progressively worse.  Will increase IV fluids 

  and monitor.


* Transaminitis is improving and bilirubin is trending downward.


* Continue plan for surgery tomorrow.


* Labs today:


   * WBC 14.15 


   * hemoglobin 12.0 


   * platelet 158


   * neutrophils 10.94 


   * sodium 142 


   * potassium 4.1 


   * anion gap 13.1 


   * BUN 37, creatinine 2.6, GFR 17 


   * bilirubin 1.2 


   * , , alk phos 87 


   * albumin 3.0


   


2/11/21


* Continued left hip pain with movement.  No pain when laying still.


* Left elbow bandage dry and intact


* Up to 6 liters of oxygen per nasal cannula.  O2 saturations ranged from 88 to 

  90%.  Has been utilizing I-S.


* IV fluids stopped as patient is having worsening O2 saturations consistent 

  with congestive heart failure.


* Transaminitis is improving and bilirubin is trending downward.


* Surgery postponed until Monday


* Labs today:


   * WBC 14.15-->13.02


   * hemoglobin 12.0-->10.7


   * sodium 142-->143


   * potassium 4.1 


   * BUN 37, creatinine 2.6, GFR 17-->BUN 34, Creat 2.1


   * Intake and output showed a +[id number]


* Portable view of the chest radiologist impression: 


   1.  Increased density within the right upper chest either due to pneumonia or

 changes of aspiration.


   2.  Mild atelectasis within both lung bases.


   3.  Increased central lung markings possibly due to acute or chronic 

pulmonary vascular congestion


* Surgery postponed until Monday.





2/12/21


* Continues to have left hip pain with movement 2/2 fracture


* Planned surgical fixation on 2/15/21


* Down to 3L via NC


* UTI growing >100 CFUs aerococcus urinae 


* Started zosyn due to questionable aspiration PNA and UTI


* Given 40mg IVP lasix today


* Echo ordered due to history of prior CHF - Diastolic HF noted from 1/19 


* Labs:


   * WBC 14.20


   * Hgb 11.1


   * Potassium 3.9


   * BUN 34, Creatinine 1.8, GFR 36








- Plan


Plan:: 


Closed left hip fracture


Fall


Nursing home resident


* Pain medications as ordered


* Bedrest


* Page until post surgical


* Flexeril as ordered


* PT/OT


* CM/SW for discharge planning


* Dr. Pruett, orthopedic surgeon consulted in ED


* Planning surgery on 2/15/21 (wait due to decreasing O2 saturations)


* Hold home Plavix 


* Heparin/SCDs for VTE


* IS


* Optimize for surgery


* Ice pack as needed


* Nursing staff to reposition every 2 hours





Acute respiratory failure


CHF exacerbation 


CHF (congestive heart failure), NYHA class II


Elevated Pro-BNP


Fluid overload


Questionable pneumonia


* Review old echo


* New echocardiogram ordered


* Lasix 40mg x1 IVP today


* Stop fluids - renal function improved





UTI


* Zosyn Q8 HR





Hyperbilirubinemia, improved


Elevated AST (SGOT), improved


Elevated ALT measurement, improved 


* Avoid tylenol


* Re-check labs and monitor





Acute on chronic renal failure


* IV fluids discontinued due to worsening heart failure


* Avoid nephrotoxic medications


* Monitor labs





DM2 (diabetes mellitus, type 2)


* Lantus (sub for Tresiba) - will cut dose by hald due to minimal PO intake 


* Low dose scale humalog (down from medium dose due to poor PO intake)


* QID AC and bedtime blood glucose checks


* Consistent carbohydrate diet





Anemia


Afib


HTN (hypertension)


Chronic constipation


Urinary retention


Chronic back pain


History of TIA (transient ischemic attack)


Depression


* Review/reconcile home medications


* Telemetry


* PRN stool softeners





Resolved:


Hyperkalemia





Code status: DNR/DNI


PCP: Dr. Vaughan


DVT prophylaxis: Heparin/SCDs 


Social: Patient resides in [facility]


Disposition: Patient admitted inpatient with telemetry for surgical management 

of left hip fracture on 2/11/21.  Postponed until February 15, 2021





LOS >96 hrs due to delay in surgery for CHF/PNA/UTI treatment.

## 2021-02-13 NOTE — PCM.PN
- General Info


Date of Service: 02/13/21


Admission Dx/Problem (Free Text): 


                           Admission Diagnosis/Problem





Admission Diagnosis/Problem      Hip fracture requiring operative repair








Subjective Update: 


No significant overnight or acute issues. She looks somnolent and not eating m

uch. She had a choking spell yesterday and now on special diet with mechanical 

soft. Her O2 requirement went up to 4L from 3L NC. She is afebrile and her WBC 

is down to 11.50. Her Na is slightly up at 146. Her BS overall is not well 

controlled. 





Functional Status: Reports: Pain Controlled, Urinating.  Denies: New Symptoms





- Review of Systems


General: Denies: Fever, Chills


HEENT: Denies: No Symptoms


Pulmonary: Denies: Shortness of Breath


Cardiovascular: Denies: Chest Pain


Gastrointestinal: Reports: Decreased Appetite.  Denies: Abdominal Pain, Nausea, 

Vomiting


Genitourinary: Reports: No Symptoms


Musculoskeletal: Reports: No Symptoms


Skin: Denies: Rash


Neurological: Reports: Difficulty Walking, Weakness, Gait Disturbance


Psychiatric: Denies: Depression, Anxiety





- Patient Data


Vitals - Most Recent: 


                                Last Vital Signs











Temp  36.4 C   02/13/21 05:31


 


Pulse  61   02/13/21 09:05


 


Resp  18   02/13/21 05:31


 


BP  155/58 H  02/13/21 09:05


 


Pulse Ox  95   02/13/21 10:37











Weight - Most Recent: 70.851 kg


I&O - Last 24 Hours: 


                                 Intake & Output











 02/12/21 02/13/21 02/13/21





 22:59 06:59 14:59


 


Intake Total 1020 300 


 


Output Total 750 800 


 


Balance 270 -500 











Lab Results Last 24 Hours: 


                         Laboratory Results - last 24 hr











  02/12/21 02/12/21 02/13/21 Range/Units





  10:47 16:56 05:19 


 


WBC    11.50 H  (3.98-10.04)  K/mm3


 


RBC    3.72 L  (3.98-5.22)  M/mm3


 


Hgb    11.3  (11.2-15.7)  gm/dl


 


Hct    36.0  (34.1-44.9)  %


 


MCV    96.8 H  (79.4-94.8)  fl


 


MCH    30.4  (25.6-32.2)  pg


 


MCHC    31.4 L  (32.2-35.5)  g/dl


 


RDW Std Deviation    51.2 H  (36.4-46.3)  fL


 


Plt Count    164 L  (182-369)  K/mm3


 


MPV    11.4  (9.4-12.3)  fl


 


Neut % (Auto)    81.1 H  (34.0-71.1)  %


 


Lymph % (Auto)    9.9 L  (19.3-51.7)  %


 


Mono % (Auto)    7.0  (4.7-12.5)  %


 


Eos % (Auto)    1.0  (0.7-5.8)  


 


Baso % (Auto)    0.3  (0.1-1.2)  %


 


Neut # (Auto)    9.33 H  (1.56-6.13)  K/mm3


 


Lymph # (Auto)    1.14 L  (1.18-3.74)  K/mm3


 


Mono # (Auto)    0.80 H  (0.24-0.36)  K/mm3


 


Eos # (Auto)    0.12  (0.04-0.36)  K/mm3


 


Baso # (Auto)    0.03  (0.01-0.08)  K/mm3


 


Manual Slide Review    Abnormal smear  


 


Sodium     (136-145)  mEq/L


 


Potassium     (3.5-5.1)  mEq/L


 


Chloride     ()  mEq/L


 


Carbon Dioxide     (21-32)  mEq/L


 


Anion Gap     (5-15)  


 


BUN     (7-18)  mg/dL


 


Creatinine     (0.55-1.02)  mg/dL


 


Est Cr Clr Drug Dosing     mL/min


 


Estimated GFR (MDRD)     (>60)  mL/min


 


BUN/Creatinine Ratio     (14-18)  


 


Glucose     ()  mg/dL


 


POC Glucose  286 H  354 H   ()  mg/dL


 


Calcium     (8.5-10.1)  mg/dL


 


Magnesium     (1.8-2.4)  mg/dl


 


Total Bilirubin     (0.2-1.0)  mg/dL


 


AST     (15-37)  U/L


 


ALT     (14-59)  U/L


 


Alkaline Phosphatase     ()  U/L


 


Total Protein     (6.4-8.2)  g/dl


 


Albumin     (3.4-5.0)  g/dl


 


Globulin     gm/dL


 


Albumin/Globulin Ratio     (1-2)  














  02/13/21 02/13/21 02/13/21 Range/Units





  05:19 05:19 06:32 


 


WBC     (3.98-10.04)  K/mm3


 


RBC     (3.98-5.22)  M/mm3


 


Hgb     (11.2-15.7)  gm/dl


 


Hct     (34.1-44.9)  %


 


MCV     (79.4-94.8)  fl


 


MCH     (25.6-32.2)  pg


 


MCHC     (32.2-35.5)  g/dl


 


RDW Std Deviation     (36.4-46.3)  fL


 


Plt Count     (182-369)  K/mm3


 


MPV     (9.4-12.3)  fl


 


Neut % (Auto)     (34.0-71.1)  %


 


Lymph % (Auto)     (19.3-51.7)  %


 


Mono % (Auto)     (4.7-12.5)  %


 


Eos % (Auto)     (0.7-5.8)  


 


Baso % (Auto)     (0.1-1.2)  %


 


Neut # (Auto)     (1.56-6.13)  K/mm3


 


Lymph # (Auto)     (1.18-3.74)  K/mm3


 


Mono # (Auto)     (0.24-0.36)  K/mm3


 


Eos # (Auto)     (0.04-0.36)  K/mm3


 


Baso # (Auto)     (0.01-0.08)  K/mm3


 


Manual Slide Review     


 


Sodium  146 H    (136-145)  mEq/L


 


Potassium  3.9    (3.5-5.1)  mEq/L


 


Chloride  105    ()  mEq/L


 


Carbon Dioxide  33 H    (21-32)  mEq/L


 


Anion Gap  11.9    (5-15)  


 


BUN  37 H    (7-18)  mg/dL


 


Creatinine  2.0 H    (0.55-1.02)  mg/dL


 


Est Cr Clr Drug Dosing  13.70    mL/min


 


Estimated GFR (MDRD)  23    (>60)  mL/min


 


BUN/Creatinine Ratio  18.5 H    (14-18)  


 


Glucose  321 H    ()  mg/dL


 


POC Glucose    324 H  ()  mg/dL


 


Calcium  9.2    (8.5-10.1)  mg/dL


 


Magnesium   2.3   (1.8-2.4)  mg/dl


 


Total Bilirubin  0.8    (0.2-1.0)  mg/dL


 


AST  27    (15-37)  U/L


 


ALT  112 H    (14-59)  U/L


 


Alkaline Phosphatase  94    ()  U/L


 


Total Protein  6.8    (6.4-8.2)  g/dl


 


Albumin  2.6 L    (3.4-5.0)  g/dl


 


Globulin  4.2    gm/dL


 


Albumin/Globulin Ratio  0.6 L    (1-2)  











[place] Results Last 24 Hours: 


                                  Microbiology











 02/08/21 19:55 Urine Culture - Final





 Urine, Clean Catch    Aerococcus Urinae











Med Orders - Current: 


                               Current Medications





Acetaminophen (Tylenol)  650 mg PO Q4H PRN


   PRN Reason: Fever


   Last Admin: 02/12/21 08:29 Dose:  650 mg


   Documented by: 


Albuterol/Ipratropium (Duoneb 3.0-0.5 Mg/3 Ml)  3 ml NEB Q4H PRN


   PRN Reason: Shortness Of Breath/wheezing


Calcium Carbonate/Glycine (Tums)  500 mg PO Q4H PRN


   PRN Reason: Heartburn


Cyclobenzaprine HCl (Flexeril)  10 mg PO TID PRN


   PRN Reason: muscle spasms 


   Last Admin: 02/09/21 12:36 Dose:  10 mg


   Documented by: 


Docusate Sodium (Colace)  100 mg PO BID [facility]


   Last Admin: 02/13/21 09:05 Dose:  100 mg


   Documented by: 


Famotidine (Pepcid)  20 mg PO Q48H [facility]


   Last Admin: 02/13/21 09:08 Dose:  20 mg


   Documented by: 


Furosemide (Lasix)  40 mg PO DAILY [facility]


   Last Admin: 02/13/21 09:04 Dose:  40 mg


   Documented by: 


Gabapentin (Neurontin)  600 mg PO BID [facility]


   Last Admin: 02/13/21 09:04 Dose:  600 mg


   Documented by: 


Heparin Sodium (Porcine) (Heparin Sodium)  5,000 units SUBCUT Q8H [facility]


   Last Admin: 02/13/21 02:36 Dose:  5,000 units


   Documented by: 


Hydralazine HCl (Apresoline)  10 mg IVPUSH Q4H PRN


   PRN Reason: Hypertension


Hydromorphone HCl (Dilaudid)  0.5 mg IVPUSH Q4H PRN


   PRN Reason: Pain (severe 7-10)


   Last Admin: 02/11/21 08:18 Dose:  0.5 mg


   Documented by: 


Promethazine HCl 12.5 mg/ (Sodium Chloride)  50.5 mls @ 100 mls/hr IV Q6H PRN


   PRN Reason: Nausea/Vomiting


Piperacillin Sod/Tazobactam (Sod 4.5 gm/ Sodium Chloride)  100 mls @ 25 mls/hr 

IV Q12H [facility]


   Last Admin: 02/13/21 09:04 Dose:  25 mls/hr


   Documented by: 


Insulin Glargine (Lantus)  7.5 unit SUBCUT DAILY [facility]


   Last Admin: 02/13/21 09:06 Dose:  7.5 units


   Documented by: 


Insulin Human Lispro (Humalog)  0 unit SUBCUT QIDACANDBED [facility]; Protocol


   Last Admin: 02/13/21 09:06 Dose:  4 units


   Documented by: 


Magnesium Hydroxide (Milk Of Magnesia)  30 ml PO DAILY PRN


   PRN Reason: Constipation


   Last Admin: 02/12/21 16:09 Dose:  30 ml


   Documented by: 


Metoprolol Succinate (Toprol Xl)  50 mg PO DAILY [facility]


   Last Admin: 02/13/21 09:05 Dose:  50 mg


   Documented by: 


Oxycodone HCl (Oxycodone)  5 mg PO Q6H PRN


   PRN Reason: Pain (moderate 4-6)


   Last Admin: 02/13/21 06:56 Dose:  5 mg


   Documented by: 





Discontinued Medications





Acetaminophen (Tylenol)  650 mg PO Q6H PRN


   PRN Reason: Pain (Mild 1-3)/fever


   Last Admin: 02/09/21 12:34 Dose:  650 mg


   Documented by: 


Bupivacaine HCl (Marcaine 0.5%) Confirm Administered Dose 30 ml .ROUTE .STK-MED 

ONE


   Stop: 02/11/21 09:56


Calcium Carbonate/Glycine (Tums)  400 mg PO Q4H PRN


   PRN Reason: Heartburn


Docusate Sodium (Colace)  100 mg PO BID PRN


   PRN Reason: Constipation


   Last Admin: 02/11/21 15:57 Dose:  100 mg


   Documented by: 


Famotidine (Pepcid)  20 mg PO DAILY [facility]


   Last Admin: 02/09/21 09:57 Dose:  20 mg


   Documented by: 


Fentanyl (Sublimaze) Confirm Administered Dose 100 mcg .ROUTE .STK-MED ONE


   Stop: 02/11/21 10:46


Furosemide (Lasix)  40 mg IVPUSH NOW ONE


   Stop: 02/08/21 21:42


   Last Admin: 02/08/21 22:00 Dose:  40 mg


   Documented by: 


Furosemide (Lasix)  20 mg PO DAILY [facility]


   Last Admin: 02/09/21 13:04 Dose:  Not Given


   Documented by: 


Furosemide (Lasix)  20 mg IVPUSH ONETIME ONE


   Stop: 02/09/21 18:01


   Last Admin: 02/09/21 18:47 Dose:  20 mg


   Documented by: 


Furosemide (Lasix)  40 mg IVPUSH NOW ONE


   Stop: 02/11/21 11:15


   Last Admin: 02/11/21 11:22 Dose:  40 mg


   Documented by: 


Furosemide (Lasix)  40 mg IVPUSH NOW ONE


   Stop: 02/12/21 08:13


   Last Admin: 02/12/21 08:29 Dose:  40 mg


   Documented by: 


Gabapentin (Neurontin)  600 mg PO BID [facility]


   Last Admin: 02/09/21 08:10 Dose:  600 mg


   Documented by: 


Heparin Sodium (Porcine) (Heparin Sodium)  5,000 units SUBCUT Q8H [facility]


   Stop: 02/10/21 23:00


   Last Admin: 02/10/21 21:18 Dose:  5,000 units


   Documented by: 


Hydromorphone HCl (Dilaudid)  0.25 mg IVPUSH ONETIME ONE


   Stop: 02/08/21 20:02


   Last Admin: 02/08/21 20:10 Dose:  0.25 mg


   Documented by: 


Hydromorphone HCl (Dilaudid)  0.25 mg IVPUSH ONETIME ONE


   Stop: 02/08/21 22:22


   Last Admin: 02/08/21 23:17 Dose:  0.25 mg


   Documented by: 


Sodium Chloride (Normal Saline)  1,000 mls @ 65 mls/hr IV ASDIRECTED [facility]


Lactated Ringer's (Ringers, Lactated)  1,000 mls @ 50 mls/hr IV ASDIRECTED [facility]


   Last Admin: 02/09/21 14:18 Dose:  50 mls/hr


   Documented by: 


Lactated Ringer's (Ringers, Lactated)  1,000 mls @ 65 mls/hr IV ASDIRECTED [facility]


   Last Admin: 02/10/21 06:46 Dose:  65 mls/hr


   Documented by: 


Lactated Ringer's (Ringers, Lactated)  1,000 mls @ 100 mls/hr IV ASDIRECTED [facility]


   Stop: 02/10/21 19:29


   Last Admin: 02/10/21 09:44 Dose:  100 mls/hr


   Documented by: 


Lactated Ringer's (Ringers, Lactated)  1,000 mls @ 75 mls/hr IV ASDIRECTED [facility]


   Last Admin: 02/11/21 07:37 Dose:  75 mls/hr


   Documented by: 


Piperacillin Sod/Tazobactam (Sod 4.5 gm/ Sodium Chloride)  100 mls @ 25 mls/hr 

IV Q8H [facility]


Piperacillin Sod/Tazobactam (Sod 4.5 gm/ Sodium Chloride)  100 mls @ 200 mls/hr 

IV ONETIME ONE


   Stop: 02/12/21 08:29


   Last Admin: 02/12/21 08:35 Dose:  200 mls/hr


   Documented by: 


Insulin Glargine (Lantus)  15 unit SUBCUT DAILY [facility]


   Last Admin: 02/12/21 08:01 Dose:  Not Given


   Documented by: 


Insulin Glargine (Lantus)  7.5 unit SUBCUT ONETIME ONE


   Stop: 02/12/21 09:01


   Last Admin: 02/12/21 08:16 Dose:  7.5 units


   Documented by: 


Insulin Glargine (Lantus)  15 unit SUBCUT DAILY [facility]


Ketamine HCl (Ketalar) Confirm Administered Dose 500 mg .ROUTE .STK-MED ONE


   Stop: 02/11/21 10:47


Lidocaine HCl (Xylocaine 1%)  10 ml INJECT ONETIME ONE


   Stop: 02/08/21 18:58


   Last Admin: 02/09/21 00:57 Dose:  Not Given


   Documented by: 


Lidocaine/Epinephrine (Xylocaine-Mpf 2%-Epi 1:200,000) Confirm Administered Dose

20 ml .ROUTE .STK-MED ONE


   Stop: 02/11/21 10:13


Midazolam HCl (Versed 1 Mg/Ml) Confirm Administered Dose 2 mg .ROUTE .STK-MED 

ONE


   Stop: 02/11/21 10:46


Non-Formulary Medication (Insulin Degludec [Tresiba])  15 unit SQ DAILY [facility]


Ondansetron HCl (Zofran)  4 mg IVPUSH ONETIME ONE


   Stop: 02/08/21 18:48


   Last Admin: 02/08/21 18:51 Dose:  4 mg


   Documented by: 


Ondansetron HCl (Zofran) Confirm Administered Dose 4 mg .ROUTE .STK-MED ONE


   Stop: 02/08/21 19:51


   Last Admin: 02/08/21 20:11 Dose:  Not Given


   Documented by: 


Ondansetron HCl (Zofran)  4 mg IVPUSH ONETIME ONE


   Stop: 02/08/21 20:07


   Last Admin: 02/08/21 20:11 Dose:  4 mg


   Documented by: 


Ropivacaine (Naropin 0.5%) Confirm Administered Dose 30 ml .ROUTE .STK-MED ONE


   Stop: 02/11/21 09:57


Sodium Polystyrene Sulfonate (Kayexalate)  15 gm PO ONETIME ONE


   Stop: 02/09/21 08:50


   Last Admin: 02/09/21 09:57 Dose:  15 gm


   Documented by: 


Spironolactone (Aldactone)  50 mg PO DAILY [facility]


   Last Admin: 02/09/21 08:10 Dose:  50 mg


   Documented by: 


Tamsulosin HCl (Flomax)  0.4 mg PO DAILY [facility]


   Last Admin: 02/09/21 17:44 Dose:  Not Given


   Documented by: 











- Exam


Quality Assessment: Supplemental Oxygen, Urine Catheter


General: Alert, Oriented, Cooperative, No Acute Distress, Lethargic


HEENT: Pupils Equal, Pupils Reactive, EOMI, Mucous Membr. Moist/Pink


Neck: Supple


Lungs: Normal Respiratory Effort, Decreased Breath Sounds


Cardiovascular: Regular Rate, Regular Rhythm


GI/Abdominal Exam: Normal Bowel Sounds, Soft, Non-Tender, No Organomegaly, No 

Distention, No Abnormal Bruit, No Mass


 (Female) Exam: Other (has urinary catheter)


Back Exam: Decreased Range of Motion


Extremities: Normal Inspection, Normal Range of Motion (left lower extremity), 

Non-Tender, No Pedal Edema, Normal Capillary Refill


Peripheral Pulses: 0: Dorsalis Pedis (R)


Skin: Warm, Dry, Intact


Neurological: No New Focal Deficit (non focal deficits).  No: Normal Gait


Psy/Mental Status: Normal Affect, Depressed, Other (not awake and alert)





Sepsis Event Note





- Evaluation


Sepsis Screening Result: No Definite Risk





- Focused Exam


Vital Signs: 


                                   Vital Signs











  Temp Pulse Resp BP Pulse Ox Pulse Ox


 


 02/13/21 10:37       95


 


 02/13/21 09:11       92 L


 


 02/13/21 09:05   61   155/58 H  


 


 02/13/21 08:55       95


 


 02/13/21 06:03       94 L


 


 02/13/21 05:31  36.4 C  66  18  151/99 H  93 L 


 


 02/12/21 23:51  36.2 C  63  16  123/49 L  96 














- Problem List Review


Problem List Initiated/Reviewed/Updated: Yes





- Assessment


Assessment:: 


Assessment - Day of admission 2/9/21 (admitted late 2/8/21)


* 88 Yo female who presents after fall at [facility] resulting in left hip and elbow 

  pain


* Unknown why she fell. Hit head. Skin tear on left elbow


* Left leg externally rotated and shortened


* 12-lead EKG shows NSR at 81 BPM with a first degree AV block.


* Head CT shows air-fluid levels in right maxillary sinus and senescent change. 

  Nothing acute


* Left hip X-ray suspicious for fracture


* Left elbow x-ray shows nothing acute


* 4cm skin tear on left elbow with skin missing repaired with steri-strips and 

  gauze


* Left Hip CT shows acute complex proximal left femoral fracture 


   * Predominant fracture line appears to be predominantly basocervical oblique 

     however a fracture line extends superolaterally anteriorly transcervically.

      


   * Fracture lines involving the greater trochanter as well.


   * There is some angulation and overriding of the fracture fragments.


* CXR shows nothing acute


* Dr. Pruett, orthopedic surgeon consulted by ED - plans for surgery on 2/11/21 

  due to patient being on Plavix\


* Given Dilaudid for pain and Zofran for nausea


* Labs:


   * WBC 16.81 --> 18.22


   * hemoglobin 13.6 --> 13.9 


   * platelet 232 --> 189 


   * neutrophils 13.83 --> 16.37 


   * INR 0.96 


   * sodium 143 --> 140 


   * potassium 4.7 --> 5.5 


   * anion gap 14.7 --> 17.5 


   * BUN 25 --> 29 


   * creatinine 2.1 --> 2.3 


   * GFR 20 ---> 20 


   * glucose 91 --> 267


   * magnesium 2.4 


   * total bilirubin 0.5 --> 1.3 


   * AST 38 ---> 192 


   * ALT 32 --> 327 


   * alkaline phosphatase 77 --> 113 


   * troponin less than 0.017 


   * proBNP 136 --> 335 


   * albumin 3.6 --> 3.6 


   * UA negative but trace leukocyte esterase, 5-10 hyaline casts, and many 

     bacteria noted 


   * SARS Covid 2 RNA negative 


   * MRSA negative 


   * urine culture pending





2/10/21


* Continued left hip pain with movement.  No pain when laying still.


* Left elbow bandage noted to be saturated with drainage.  Nursing will change


* Up to 3 L of oxygen via nasal cannula.  Has been utilizing I-S.


* Renal function has been getting progressively worse.  Will increase IV fluids 

  and monitor.


* Transaminitis is improving and bilirubin is trending downward.


* Continue plan for surgery tomorrow.


* Labs today:


   * WBC 14.15 


   * hemoglobin 12.0 


   * platelet 158


   * neutrophils 10.94 


   * sodium 142 


   * potassium 4.1 


   * anion gap 13.1 


   * BUN 37, creatinine 2.6, GFR 17 


   * bilirubin 1.2 


   * , , alk phos 87 


   * albumin 3.0


   


2/11/21


* Continued left hip pain with movement.  No pain when laying still.


* Left elbow bandage dry and intact


* Up to 6 liters of oxygen per nasal cannula.  O2 saturations ranged from 88 to 

  90%.  Has been utilizing I-S.


* IV fluids stopped as patient is having worsening O2 saturations consistent 

  with congestive heart failure.


* Transaminitis is improving and bilirubin is trending downward.


* Surgery postponed until Monday


* Labs today:


   * WBC 14.15-->13.02


   * hemoglobin 12.0-->10.7


   * sodium 142-->143


   * potassium 4.1 


   * BUN 37, creatinine 2.6, GFR 17-->BUN 34, Creat 2.1


   * Intake and output showed a +1536


* Portable view of the chest radiologist impression: 


   1.  Increased density within the right upper chest either due to pneumonia or

 changes of aspiration.


   2.  Mild atelectasis within both lung bases.


   3.  Increased central lung markings possibly due to acute or chronic 

pulmonary vascular congestion


* Surgery postponed until Monday.





2/12/21


* Continues to have left hip pain with movement 2/2 fracture


* Planned surgical fixation on 2/15/21


* Down to 3L via NC


* UTI growing >100 CFUs aerococcus urinae 


* Started zosyn due to questionable aspiration PNA and UTI


* Given 40mg IVP lasix today


* Echo ordered due to history of prior CHF - Diastolic HF noted from 1/19 


* Labs:


   * WBC 14.20


   * Hgb 11.1


   * Potassium 3.9


   * BUN 34, Creatinine 1.8, GFR 36





2/13/2021


* Continues current treatment


* Hold parameters for cyclobenzaprine and gabapentin


* Cut down Gabapentin dose to 100 po BID due to oversedation and 


* Incentive spirometer as directed with beside pulmonary exercise


* Routine AM Labs


* Hold lasix for now


* Encourage to eat and drink fluids


* Lantus 10 units subQ BID


* DVT and GI prophylaxis: Heparin and H2B


* Code status: DNR/DNI


* LOS> 96hrs due to complexity of acute illness





- Plan


Plan:: 


Closed left hip fracture


Fall


Nursing home resident


* Pain medications as ordered


* Bedrest


* Page until post surgical


* Flexeril as ordered


* PT/OT


* CM/SW for discharge planning


* Dr. Pruett, orthopedic surgeon consulted in ED


* Planning surgery on 2/15/21 (wait due to decreasing O2 saturations)


* Hold home Plavix 


* Heparin/SCDs for VTE


* IS


* Optimize for surgery


* Ice pack as needed


* Nursing staff to reposition every 2 hours


* High risk for cardio-pulmonary event for intermediate surgical risk; daughter 

  Juanis is aware





Acute respiratory failure, 3-4L NC


CHF exacerbation 


CHF (congestive heart failure), NYHA class II


Elevated Pro-BNP


Fluid overload


Hypoalbuminemia with Albumin of 2.6


Questionable pneumonia


* Review old echo


* New echocardiogram ordered


* Lasix 40mg x1 IVP today; hold lasix with slightly worsened Cr level and poor 

  oral intake


* Stop fluids - renal function improved


* Already on Zosyn for antibiotic coverage


* Incentive spirometer and bedside pulmonary exercise





UTI


Leukocytosis, improving


* Zosyn Q8 HR


* Treat underlying cause





Elevated ALT measurement, continues to improve 


* Avoid tylenol


* Re-check labs and monitor





Acute on chronic renal failure, worsening


Mild Hypernatremia


* IV fluids discontinued due to worsening heart failure


* Avoid nephrotoxic medications


* Hold lasix for now and encourage to drink fluids


* Monitor labs





DM2 (diabetes mellitus, type 2), not well controlled


* Lantus (sub for Tresiba) - will cut dose by half due to minimal PO 

  intake-->increase LA to 10 units SubQ BID


* Low dose scale humalog (down from medium dose due to poor PO intake)


* QID AC and bedtime blood glucose checks


* Consistent carbohydrate diet





Anemia


Afib


HTN (hypertension)


Chronic constipation


Urinary retention


Chronic back pain


History of TIA (transient ischemic attack)


Depression


* Review/reconcile home medications


* Telemetry


* PRN stool softeners





Resolved:


Hyperkalemia


Hyperbilirubinemia


Elevated AST (SGOT)





Code status: DNR/DNI


PCP: Dr. Vaughan


DVT prophylaxis: Heparin/SCDs


Social: Patient resides in [facility]


Disposition: Patient admitted inpatient with telemetry for surgical management 

of left hip fracture on 2/11/21.  Postponed until February 15, 2021





LOS >96 hrs due to delay in surgery for CHF/PNA/UTI treatment. Spoke to daughter

 Juanis to day and update her about her clinical progress. 





2025: I was informed her BS is > 500. She had a very good appetite this evening.

 We will give her LA insulin and ISS. Repeat BS check in a couple of hours.

## 2021-02-14 NOTE — PCM.PN
- General Info


Date of Service: 02/14/21


Admission Dx/Problem (Free Text): 


                           Admission Diagnosis/Problem





Admission Diagnosis/Problem      Hip fracture requiring operative repair








Subjective Update: 


No significant overnight or acute issues. She is more alert and awake. Her pain 

is controlled. She is on 3L NC sating at 82-91%. She has been drinking a lot of 

protein shakes. Her Na is slightly up at 150. Her K is down to 3.4 and Cr is 

down to 1.7. Her BS overall remains uncontrolled. No report of aspiration of 

choking spell.


Functional Status: Reports: Pain Controlled, Tolerating Diet, Urinating





- Review of Systems


General: Reports: Weakness.  Denies: Fever, Chills


HEENT: Reports: No Symptoms


Pulmonary: Denies: Shortness of Breath


Cardiovascular: Denies: Chest Pain, Dyspnea on Exertion


Gastrointestinal: Denies: Abdominal Pain, Nausea, Vomiting


Genitourinary: Reports: Retention


Musculoskeletal: Reports: Joint Pain


Skin: Denies: Bruising, Pruritis, Rash


Neurological: Reports: Difficulty Walking, Weakness, Gait Disturbance.  Denies: 

Confusion


Psychiatric: Denies: Depression, Anxiety





- Patient Data


Vitals - Most Recent: 


                                Last Vital Signs











Temp  36.9 C   02/14/21 08:20


 


Pulse  68   02/14/21 08:43


 


Resp  18   02/14/21 08:20


 


BP  132/58 L  02/14/21 08:43


 


Pulse Ox  91 L  02/14/21 08:20











Weight - Most Recent: 71.758 kg


I&O - Last 24 Hours: 


                                 Intake & Output











 02/13/21 02/14/21 02/14/21





 22:59 06:59 14:59


 


Intake Total 1168 400 


 


Output Total 375 500 


 


Balance 793 -100 











Lab Results Last 24 Hours: 


                         Laboratory Results - last 24 hr











  02/13/21 02/13/21 02/14/21 Range/Units





  18:10 21:35 05:20 


 


WBC    12.72 H  (3.98-10.04)  K/mm3


 


RBC    3.48 L  (3.98-5.22)  M/mm3


 


Hgb    10.7 L  (11.2-15.7)  gm/dl


 


Hct    33.8 L  (34.1-44.9)  %


 


MCV    97.1 H  (79.4-94.8)  fl


 


MCH    30.7  (25.6-32.2)  pg


 


MCHC    31.7 L  (32.2-35.5)  g/dl


 


RDW Std Deviation    51.0 H  (36.4-46.3)  fL


 


Plt Count    178 L  (182-369)  K/mm3


 


MPV    11.5  (9.4-12.3)  fl


 


Neut % (Auto)    77.3 H  (34.0-71.1)  %


 


Lymph % (Auto)    12.1 L  (19.3-51.7)  %


 


Mono % (Auto)    7.4  (4.7-12.5)  %


 


Eos % (Auto)    2.3  (0.7-5.8)  


 


Baso % (Auto)    0.2  (0.1-1.2)  %


 


Neut # (Auto)    9.83 H  (1.56-6.13)  K/mm3


 


Lymph # (Auto)    1.54  (1.18-3.74)  K/mm3


 


Mono # (Auto)    0.94 H  (0.24-0.36)  K/mm3


 


Eos # (Auto)    0.29  (0.04-0.36)  K/mm3


 


Baso # (Auto)    0.03  (0.01-0.08)  K/mm3


 


Sodium     (136-145)  mEq/L


 


Potassium     (3.5-5.1)  mEq/L


 


Chloride     ()  mEq/L


 


Carbon Dioxide     (21-32)  mEq/L


 


Anion Gap     (5-15)  


 


BUN     (7-18)  mg/dL


 


Creatinine     (0.55-1.02)  mg/dL


 


Est Cr Clr Drug Dosing     mL/min


 


Estimated GFR (MDRD)     (>60)  mL/min


 


BUN/Creatinine Ratio     (14-18)  


 


Glucose  529 H  381 H   ()  mg/dL


 


POC Glucose     ()  mg/dL


 


Calcium     (8.5-10.1)  mg/dL


 


Magnesium     (1.8-2.4)  mg/dl














  02/14/21 02/14/21 02/14/21 Range/Units





  05:20 06:34 11:01 


 


WBC     (3.98-10.04)  K/mm3


 


RBC     (3.98-5.22)  M/mm3


 


Hgb     (11.2-15.7)  gm/dl


 


Hct     (34.1-44.9)  %


 


MCV     (79.4-94.8)  fl


 


MCH     (25.6-32.2)  pg


 


MCHC     (32.2-35.5)  g/dl


 


RDW Std Deviation     (36.4-46.3)  fL


 


Plt Count     (182-369)  K/mm3


 


MPV     (9.4-12.3)  fl


 


Neut % (Auto)     (34.0-71.1)  %


 


Lymph % (Auto)     (19.3-51.7)  %


 


Mono % (Auto)     (4.7-12.5)  %


 


Eos % (Auto)     (0.7-5.8)  


 


Baso % (Auto)     (0.1-1.2)  %


 


Neut # (Auto)     (1.56-6.13)  K/mm3


 


Lymph # (Auto)     (1.18-3.74)  K/mm3


 


Mono # (Auto)     (0.24-0.36)  K/mm3


 


Eos # (Auto)     (0.04-0.36)  K/mm3


 


Baso # (Auto)     (0.01-0.08)  K/mm3


 


Sodium  150 H    (136-145)  mEq/L


 


Potassium  3.4 L    (3.5-5.1)  mEq/L


 


Chloride  109 H    ()  mEq/L


 


Carbon Dioxide  33 H    (21-32)  mEq/L


 


Anion Gap  11.4    (5-15)  


 


BUN  35 H    (7-18)  mg/dL


 


Creatinine  1.7 H    (0.55-1.02)  mg/dL


 


Est Cr Clr Drug Dosing  16.11    mL/min


 


Estimated GFR (MDRD)  28    (>60)  mL/min


 


BUN/Creatinine Ratio  20.6 H    (14-18)  


 


Glucose  123 H    ()  mg/dL


 


POC Glucose   139 H  388 H  ()  mg/dL


 


Calcium  9.2    (8.5-10.1)  mg/dL


 


Magnesium  2.5 H    (1.8-2.4)  mg/dl











Med Orders - Current: 


                               Current Medications





Acetaminophen (Tylenol)  650 mg PO Q4H PRN


   PRN Reason: Fever


   Last Admin: 02/13/21 11:49 Dose:  650 mg


   Documented by: 


Albuterol/Ipratropium (Duoneb 3.0-0.5 Mg/3 Ml)  3 ml NEB Q4H PRN


   PRN Reason: Shortness Of Breath/wheezing


Calcium Carbonate/Glycine (Tums)  500 mg PO Q4H PRN


   PRN Reason: Heartburn


Cyclobenzaprine HCl (Flexeril)  10 mg PO TID PRN


   PRN Reason: muscle spasms 


   Last Admin: 02/09/21 12:36 Dose:  10 mg


   Documented by: 


Docusate Sodium (Colace)  100 mg PO BID [facility]


   Last Admin: 02/14/21 08:43 Dose:  100 mg


   Documented by: 


Famotidine (Pepcid)  20 mg PO Q48H [facility]


   Last Admin: 02/13/21 09:08 Dose:  20 mg


   Documented by: 


Furosemide (Lasix)  40 mg PO DAILY [facility]


Gabapentin (Neurontin)  100 mg PO BID [facility]


   Last Admin: 02/14/21 09:10 Dose:  100 mg


   Documented by: 


Heparin Sodium (Porcine) (Heparin Sodium)  5,000 units SUBCUT Q8H [facility]


   Last Admin: 02/14/21 10:58 Dose:  5,000 units


   Documented by: 


Hydralazine HCl (Apresoline)  10 mg IVPUSH Q4H PRN


   PRN Reason: Hypertension


Hydromorphone HCl (Dilaudid)  0.5 mg IVPUSH Q4H PRN


   PRN Reason: Pain (severe 7-10)


   Last Admin: 02/11/21 08:18 Dose:  0.5 mg


   Documented by: 


Promethazine HCl 12.5 mg/ (Sodium Chloride)  50.5 mls @ 100 mls/hr IV Q6H PRN


   PRN Reason: Nausea/Vomiting


Piperacillin Sod/Tazobactam (Sod 4.5 gm/ Sodium Chloride)  100 mls @ 25 mls/hr 

IV Q12H [facility]


   Last Admin: 02/14/21 08:14 Dose:  25 mls/hr


   Documented by: 


Insulin Glargine (Lantus)  10 unit SUBCUT BID [facility]


   Last Admin: 02/14/21 08:36 Dose:  10 units


   Documented by: 


Insulin Human Lispro (Humalog)  0 unit SUBCUT QIDACANDBED [facility]; Protocol


   Last Admin: 02/14/21 11:06 Dose:  10 units


   Documented by: 


Magnesium Hydroxide (Milk Of Magnesia)  30 ml PO DAILY PRN


   PRN Reason: Constipation


   Last Admin: 02/12/21 16:09 Dose:  30 ml


   Documented by: 


Metoprolol Succinate (Toprol Xl)  50 mg PO DAILY [facility]


   Last Admin: 02/14/21 08:43 Dose:  50 mg


   Documented by: 


Oxycodone HCl (Oxycodone)  5 mg PO Q6H PRN


   PRN Reason: Pain (moderate 4-6)


   Last Admin: 02/13/21 06:56 Dose:  5 mg


   Documented by: 





Discontinued Medications





Acetaminophen (Tylenol)  650 mg PO Q6H PRN


   PRN Reason: Pain (Mild 1-3)/fever


   Last Admin: 02/09/21 12:34 Dose:  650 mg


   Documented by: 


Bupivacaine HCl (Marcaine 0.5%) Confirm Administered Dose 30 ml .ROUTE .STK-MED 

ONE


   Stop: 02/11/21 09:56


Calcium Carbonate/Glycine (Tums)  400 mg PO Q4H PRN


   PRN Reason: Heartburn


Docusate Sodium (Colace)  100 mg PO BID PRN


   PRN Reason: Constipation


   Last Admin: 02/11/21 15:57 Dose:  100 mg


   Documented by: 


Famotidine (Pepcid)  20 mg PO DAILY [facility]


   Last Admin: 02/09/21 09:57 Dose:  20 mg


   Documented by: 


Fentanyl (Sublimaze) Confirm Administered Dose 100 mcg .ROUTE .STK-MED ONE


   Stop: 02/11/21 10:46


Furosemide (Lasix)  40 mg IVPUSH NOW ONE


   Stop: 02/08/21 21:42


   Last Admin: 02/08/21 22:00 Dose:  40 mg


   Documented by: 


Furosemide (Lasix)  20 mg PO DAILY [facility]


   Last Admin: 02/09/21 13:04 Dose:  Not Given


   Documented by: 


Furosemide (Lasix)  20 mg IVPUSH ONETIME ONE


   Stop: 02/09/21 18:01


   Last Admin: 02/09/21 18:47 Dose:  20 mg


   Documented by: 


Furosemide (Lasix)  40 mg IVPUSH NOW ONE


   Stop: 02/11/21 11:15


   Last Admin: 02/11/21 11:22 Dose:  40 mg


   Documented by: 


Furosemide (Lasix)  40 mg IVPUSH NOW ONE


   Stop: 02/12/21 08:13


   Last Admin: 02/12/21 08:29 Dose:  40 mg


   Documented by: 


Furosemide (Lasix)  40 mg PO DAILY [facility]


   Last Admin: 02/13/21 09:04 Dose:  40 mg


   Documented by: 


Gabapentin (Neurontin)  600 mg PO BID [facility]


   Last Admin: 02/09/21 08:10 Dose:  600 mg


   Documented by: 


Gabapentin (Neurontin)  600 mg PO BID [facility]


   Last Admin: 02/13/21 09:04 Dose:  600 mg


   Documented by: 


Heparin Sodium (Porcine) (Heparin Sodium)  5,000 units SUBCUT Q8H [facility]


   Stop: 02/10/21 23:00


   Last Admin: 02/10/21 21:18 Dose:  5,000 units


   Documented by: 


Hydromorphone HCl (Dilaudid)  0.25 mg IVPUSH ONETIME ONE


   Stop: 02/08/21 20:02


   Last Admin: 02/08/21 20:10 Dose:  0.25 mg


   Documented by: 


Hydromorphone HCl (Dilaudid)  0.25 mg IVPUSH ONETIME ONE


   Stop: 02/08/21 22:22


   Last Admin: 02/08/21 23:17 Dose:  0.25 mg


   Documented by: 


Sodium Chloride (Normal Saline)  1,000 mls @ 65 mls/hr IV ASDIRECTED [facility]


Lactated Ringer's (Ringers, Lactated)  1,000 mls @ 50 mls/hr IV ASDIRECTED [facility]


   Last Admin: 02/09/21 14:18 Dose:  50 mls/hr


   Documented by: 


Lactated Ringer's (Ringers, Lactated)  1,000 mls @ 65 mls/hr IV ASDIRECTED [facility]


   Last Admin: 02/10/21 06:46 Dose:  65 mls/hr


   Documented by: 


Lactated Ringer's (Ringers, Lactated)  1,000 mls @ 100 mls/hr IV ASDIRECTED [facility]


   Stop: 02/10/21 19:29


   Last Admin: 02/10/21 09:44 Dose:  100 mls/hr


   Documented by: 


Lactated Ringer's (Ringers, Lactated)  1,000 mls @ 75 mls/hr IV ASDIRECTED [facility]


   Last Admin: 02/11/21 07:37 Dose:  75 mls/hr


   Documented by: 


Piperacillin Sod/Tazobactam (Sod 4.5 gm/ Sodium Chloride)  100 mls @ 25 mls/hr 

IV Q8H [facility]


Piperacillin Sod/Tazobactam (Sod 4.5 gm/ Sodium Chloride)  100 mls @ 200 mls/hr 

IV ONETIME ONE


   Stop: 02/12/21 08:29


   Last Admin: 02/12/21 08:35 Dose:  200 mls/hr


   Documented by: 


Sodium Chloride (Normal Saline)  500 mls @ 45 mls/hr IV ASDIRECTED [facility]


   Stop: 02/14/21 07:37


   Last Admin: 02/13/21 21:56 Dose:  45 mls/hr


   Documented by: 


Insulin Glargine (Lantus)  15 unit SUBCUT DAILY [facility]


   Last Admin: 02/12/21 08:01 Dose:  Not Given


   Documented by: 


Insulin Glargine (Lantus)  7.5 unit SUBCUT ONETIME ONE


   Stop: 02/12/21 09:01


   Last Admin: 02/12/21 08:16 Dose:  7.5 units


   Documented by: 


Insulin Glargine (Lantus)  15 unit SUBCUT DAILY [facility]


Insulin Glargine (Lantus)  7.5 unit SUBCUT DAILY [facility]


   Last Admin: 02/13/21 09:06 Dose:  7.5 units


   Documented by: 


Insulin Glargine (Lantus)  10 unit SUBCUT NOW STA


   Stop: 02/13/21 19:06


   Last Admin: 02/13/21 19:28 Dose:  10 unit


   Documented by: 


Insulin Human Lispro (Humalog)  0 unit SUBCUT QIDACANDBED [facility]; Protocol


   Last Admin: 02/13/21 14:23 Dose:  Not Given


   Documented by: 


Insulin Human Lispro (Humalog)  0 unit SUBCUT QIDACANDBED [facility]; Protocol


Ketamine HCl (Ketalar) Confirm Administered Dose 500 mg .ROUTE .STK-MED ONE


   Stop: 02/11/21 10:47


Lidocaine HCl (Xylocaine 1%)  10 ml INJECT ONETIME ONE


   Stop: 02/08/21 18:58


   Last Admin: 02/09/21 00:57 Dose:  Not Given


   Documented by: 


Lidocaine/Epinephrine (Xylocaine-Mpf 2%-Epi 1:200,000) Confirm Administered Dose

20 ml .ROUTE .STK-MED ONE


   Stop: 02/11/21 10:13


Midazolam HCl (Versed 1 Mg/Ml) Confirm Administered Dose 2 mg .ROUTE .STK-MED 

ONE


   Stop: 02/11/21 10:46


Non-Formulary Medication (Insulin Degludec [Tresiba])  15 unit SQ DAILY [facility]


Ondansetron HCl (Zofran)  4 mg IVPUSH ONETIME ONE


   Stop: 02/08/21 18:48


   Last Admin: 02/08/21 18:51 Dose:  4 mg


   Documented by: 


Ondansetron HCl (Zofran) Confirm Administered Dose 4 mg .ROUTE .STK-MED ONE


   Stop: 02/08/21 19:51


   Last Admin: 02/08/21 20:11 Dose:  Not Given


   Documented by: 


Ondansetron HCl (Zofran)  4 mg IVPUSH ONETIME ONE


   Stop: 02/08/21 20:07


   Last Admin: 02/08/21 20:11 Dose:  4 mg


   Documented by: 


Ropivacaine (Naropin 0.5%) Confirm Administered Dose 30 ml .ROUTE .STK-MED ONE


   Stop: 02/11/21 09:57


Sodium Polystyrene Sulfonate (Kayexalate)  15 gm PO ONETIME ONE


   Stop: 02/09/21 08:50


   Last Admin: 02/09/21 09:57 Dose:  15 gm


   Documented by: 


Spironolactone (Aldactone)  50 mg PO DAILY [facility]


   Last Admin: 02/09/21 08:10 Dose:  50 mg


   Documented by: 


Tamsulosin HCl (Flomax)  0.4 mg PO DAILY [facility]


   Last Admin: 02/09/21 17:44 Dose:  Not Given


   Documented by: 











- Exam


Quality Assessment: Supplemental Oxygen


General: Alert, Oriented, Cooperative, No Acute Distress


HEENT: Pupils Equal, Pupils Reactive, EOMI, Mucous Membr. Moist/Pink


Neck: Supple


Lungs: Normal Respiratory Effort, Decreased Breath Sounds


Cardiovascular: Regular Rate, Regular Rhythm


GI/Abdominal Exam: Normal Bowel Sounds, Soft, Non-Tender, No Organomegaly, No 

Distention, No Abnormal Bruit


 (Female) Exam: Other (urinary catheter)


Back Exam: Normal Inspection, Decreased Range of Motion


Extremities: Normal Inspection, Normal Range of Motion (left lower extremity), 

Non-Tender, No Pedal Edema, Normal Capillary Refill


Peripheral Pulses: 2+: Dorsalis Pedis (L), Dorsalis Pedis (R)


Skin: Warm, Dry, Intact


Neurological: No New Focal Deficit, Normal Gait


Psy/Mental Status: Alert, Normal Affect, Normal Mood





Sepsis Event Note





- Evaluation


Sepsis Screening Result: No Definite Risk





- Focused Exam


Vital Signs: 


                                   Vital Signs











  Temp Pulse Resp BP Pulse Ox


 


 02/14/21 08:43   68   132/58 L 


 


 02/14/21 08:20  36.9 C  66  18  132/58 L  91 L


 


 02/14/21 07:43  36.6 C  61  20  137/88  82 L


 


 02/14/21 05:24  36.7 C  55 L  16  131/36 L  95


 


 02/14/21 00:31  36.7 C  55 L  16  133/36 L  92 L














- Problem List Review


Problem List Initiated/Reviewed/Updated: Yes





- My Orders


Last 24 Hours: 


My Active Orders





02/13/21 12:45


Insulin Lispro [HumaLOG]   0 unit SUBCUT QIDACANDBED 





02/13/21 21:00


Gabapentin [Neurontin]   100 mg PO BID 


Insulin Glarg,Human.Rec.Analog [LantUS]   10 unit SUBCUT BID 





02/15/21 05:11


BMP [BASIC METABOLIC PANEL,BMP] [CHEM] AM 


CBC WITH AUTO DIFF [HEME] AM 


MAGNESIUM [CHEM] AM 





02/15/21 09:00


Furosemide [Lasix]   40 mg PO DAILY 





02/16/21 05:11


BMP [BASIC METABOLIC PANEL,BMP] [CHEM] AM 


CBC WITH AUTO DIFF [HEME] AM 


MAGNESIUM [CHEM] AM 





02/17/21 05:11


BMP [BASIC METABOLIC PANEL,BMP] [CHEM] AM 


CBC WITH AUTO DIFF [HEME] AM 


MAGNESIUM [CHEM] AM 














- Assessment


Assessment:: 


Assessment - Day of admission 2/9/21 (admitted late 2/8/21)


* 89 Yo female who presents after fall at [facility] resulting in left hip and elbow 

  pain


* Unknown why she fell. Hit head. Skin tear on left elbow


* Left leg externally rotated and shortened


* 12-lead EKG shows NSR at 81 BPM with a first degree AV block.


* Head CT shows air-fluid levels in right maxillary sinus and senescent change. 

  Nothing acute


* Left hip X-ray suspicious for fracture


* Left elbow x-ray shows nothing acute


* 4cm skin tear on left elbow with skin missing repaired with steri-strips and 

  gauze


* Left Hip CT shows acute complex proximal left femoral fracture 


   * Predominant fracture line appears to be predominantly basocervical oblique 

     however a fracture line extends superolaterally anteriorly transcervically.

      


   * Fracture lines involving the greater trochanter as well.


   * There is some angulation and overriding of the fracture fragments.


* CXR shows nothing acute


* Dr. Pruett, orthopedic surgeon consulted by ED - plans for surgery on 2/11/21 

  due to patient being on Plavix\


* Given Dilaudid for pain and Zofran for nausea


* Labs:


   * WBC 16.81 --> 18.22


   * hemoglobin 13.6 --> 13.9 


   * platelet 232 --> 189 


   * neutrophils 13.83 --> 16.37 


   * INR 0.96 


   * sodium 143 --> 140 


   * potassium 4.7 --> 5.5 


   * anion gap 14.7 --> 17.5 


   * BUN 25 --> 29 


   * creatinine 2.1 --> 2.3 


   * GFR 20 ---> 20 


   * glucose 91 --> 267


   * magnesium 2.4 


   * total bilirubin 0.5 --> 1.3 


   * AST 38 ---> 192 


   * ALT 32 --> 327 


   * alkaline phosphatase 77 --> 113 


   * troponin less than 0.017 


   * proBNP 136 --> 335 


   * albumin 3.6 --> 3.6 


   * UA negative but trace leukocyte esterase, 5-10 hyaline casts, and many 

     bacteria noted 


   * SARS Covid 2 RNA negative 


   * MRSA negative 


   * urine culture pending





2/10/21


* Continued left hip pain with movement.  No pain when laying still.


* Left elbow bandage noted to be saturated with drainage.  Nursing will change


* Up to 3 L of oxygen via nasal cannula.  Has been utilizing I-S.


* Renal function has been getting progressively worse.  Will increase IV fluids 

  and monitor.


* Transaminitis is improving and bilirubin is trending downward.


* Continue plan for surgery tomorrow.


* Labs today:


   * WBC 14.15 


   * hemoglobin 12.0 


   * platelet 158


   * neutrophils 10.94 


   * sodium 142 


   * potassium 4.1 


   * anion gap 13.1 


   * BUN 37, creatinine 2.6, GFR 17 


   * bilirubin 1.2 


   * , , alk phos 87 


   * albumin 3.0


   


2/11/21


* Continued left hip pain with movement.  No pain when laying still.


* Left elbow bandage dry and intact


* Up to 6 liters of oxygen per nasal cannula.  O2 saturations ranged from 88 to 

  90%.  Has been utilizing I-S.


* IV fluids stopped as patient is having worsening O2 saturations consistent 

  with congestive heart failure.


* Transaminitis is improving and bilirubin is trending downward.


* Surgery postponed until Monday


* Labs today:


   * WBC 14.15-->13.02


   * hemoglobin 12.0-->10.7


   * sodium 142-->143


   * potassium 4.1 


   * BUN 37, creatinine 2.6, GFR 17-->BUN 34, Creat 2.1


   * Intake and output showed a +1536


* Portable view of the chest radiologist impression: 


   1.  Increased density within the right upper chest either due to pneumonia or

 changes of aspiration.


   2.  Mild atelectasis within both lung bases.


   3.  Increased central lung markings possibly due to acute or chronic 

pulmonary vascular congestion


* Surgery postponed until Monday.





2/12/21


* Continues to have left hip pain with movement 2/2 fracture


* Planned surgical fixation on 2/15/21


* Down to 3L via NC


* UTI growing >100 CFUs aerococcus urinae 


* Started zosyn due to questionable aspiration PNA and UTI


* Given 40mg IVP lasix today


* Echo ordered due to history of prior CHF - Diastolic HF noted from 1/19 


* Labs:


   * WBC 14.20


   * Hgb 11.1


   * Potassium 3.9


   * BUN 34, Creatinine 1.8, GFR 36





2/13/2021


* Continues current treatment


* Hold parameters for cyclobenzaprine and gabapentin


* Cut down Gabapentin dose to 100 po BID due to oversedation and 


* Incentive spirometer as directed with beside pulmonary exercise


* Routine AM Labs


* Hold lasix for now


* Encourage to eat and drink fluids


* Lantus 10 units subQ BID


* DVT and GI prophylaxis: Heparin and H2B


* Code status: DNR/DNI


* LOS> 96hrs due to complexity of acute illness





2/14/2021


* Continues current treatment


* Continue hold parameters for cyclobenzaprine and gabapentin


* Incentive spirometer as directed with beside pulmonary exercise


* Routine AM Labs


* Continue to hold lasix 


* Encourage to eat and drink fluids


* May consider going up on Lantus to 13 units subQ BID since she is eating more 


* KCl 40 mEq po and 20 mEq IV x1


* D51/2 at 50 cc for hypernatremia


* Repeat BMP at 1600


* Hold Heparin dose tonight 


* DVT and GI prophylaxis: Heparin and H2B


* Code status: DNR/DNI


* LOS> 96hrs due to complexity of acute illness





- Plan


Plan:: 


Closed left hip fracture


Fall


Nursing home resident


* Pain medications as ordered


* Bedrest


* Page until post surgical


* Flexeril as ordered


* PT/OT


* CM/SW for discharge planning


* Dr. Pruett, orthopedic surgeon consulted in ED


* Planning surgery on 2/15/21 (wait due to decreasing O2 saturations)


* Hold home Plavix 


* Heparin/SCDs for VTE; will hold next dose tonight for AM surgery


* IS


* Optimize for surgery


* Ice pack as needed


* Nursing staff to reposition every 2 hours


* High risk for cardio-pulmonary event for intermediate surgical risk; daughter 

  Juanis is aware





Acute respiratory failure, 3-4L NC


CHF exacerbation 


CHF (congestive heart failure), NYHA class II


Elevated Pro-BNP


Fluid overload


Hypoalbuminemia with Albumin of 2.6


Questionable pneumonia


* Review old echo


* New echocardiogram ordered


* Lasix 40mg x1 IVP today; hold lasix with slightly worsened Cr level and poor 

  oral intake


* Stop fluids - renal function improved


* Already on Zosyn for antibiotic coverage


* Incentive spirometer and bedside pulmonary exercise


* Repeat CXR in AM





UTI


Leukocytosis, slightly worse


* Zosyn Q8 HR


* WBC is 12.72 today


* Treat underlying cause





Elevated ALT measurement, continues to improve 


* Avoid tylenol


* Re-check labs and monitor





Acute on chronic renal failure, improved


hypokalemia with K of 3.4


Mild Hypernatremia with sodium of 150, slightly worse


Hypermagnesemia with Mg of 2.5


* IV fluids discontinued due to worsening heart failure


* Avoid nephrotoxic medications


* She is getting zosyn and a little too much protein supplements


* Cut down supplements and protein shakes


* D51/2NS at 50 cc in 1L x1


* Continue to hold lasix and encourage to drink fluids


* Repeat BMP at 1600





DM2 (diabetes mellitus, type 2), remains uncontrolled


* Lantus (sub for Tresiba) - will cut dose by half due to minimal PO 

  intake-->increase LA to 13 units SubQ BID


* Low dose scale humalog (down from medium dose due to poor PO intake)


* QID AC and bedtime blood glucose checks


* Consistent carbohydrate diet





Anemia


Afib


HTN (hypertension)


Chronic constipation


Urinary retention


Chronic back pain


History of TIA (transient ischemic attack)


Depression


* Review/reconcile home medications


* Telemetry


* PRN stool softeners





Resolved:


Hyperkalemia


Hyperbilirubinemia


Elevated AST (SGOT)





Code status: DNR/DNI


PCP: Dr. Vaughan


DVT prophylaxis: Heparin/SCDs


Social: Patient resides in [facility]


Disposition: Patient admitted inpatient with telemetry for surgical management 

of left hip fracture on 2/11/21.  Postponed until February 15, 2021





LOS >96 hrs due to delay in surgery for CHF/PNA/UTI treatment. Updated daughter 

at bedside. Hold evening heparin dose.

## 2021-02-15 NOTE — CR
Left femur: AP and lateral views of the left femur were obtained.

 

Comparison: Prior operative study performed earlier on the same day as

 well as previous left hip study of 02/08/21.

 

Short intramedullary ronna and compression screw are seen within the 

left hip.  Skin staples are present.  Osseous structures are intact.

 

Impression:

1.  Prior left hip surgery.

2.  No additional abnormality is appreciated on left femur study.

 

Diagnostic code #2

## 2021-02-15 NOTE — PCM.PN
<Armaan Villalobos - Last Filed: 02/15/21 12:41>





- General Info


Date of Service: 02/15/21


Admission Dx/Problem (Free Text): 


                           Admission Diagnosis/Problem





Admission Diagnosis/Problem      Hip fracture requiring operative repair








Subjective Update: 


In to see Liliana.  She remains in bed with a Page catheter.  Pain is controlled.

 She clinically looks better and reports she is feeling better.  She continues 

to have pain with movement but otherwise is doing okay.  She will have surgery 

today for a ronna and screw placement with Dr. Pruett.  She has no complaints.  

Kidney function continues to improve and her glucose remains 2-300 but her 

intake has improved.  Sodium is again elevated.  She is n.p.o. we will follow up

after surgery.





Functional Status: Reports: Pain Controlled, Tolerating Diet, Urinating (Page 

in place ), Incentive Spirometry.  Denies: Ambulating, New Symptoms





- Review of Systems


General: Reports: Weakness, Fatigue.  Denies: Fever, Malaise, Chills


HEENT: Reports: No Symptoms.  Denies: Headaches, Sore Throat


Pulmonary: Reports: No Symptoms.  Denies: Shortness of Breath, Cough, Sputum, 

Wheezing


Cardiovascular: Reports: No Symptoms.  Denies: Chest Pain, Palpitations, Dyspnea

on Exertion, Lightheadedness


Gastrointestinal: Reports: Constipation.  Denies: Abdominal Pain, Diarrhea, 

Nausea, Vomiting


Genitourinary: Reports: No Symptoms.  Denies: Pain


Musculoskeletal: Reports: Leg Pain (bilateral )


Skin: Reports: No Symptoms.  Denies: Cyanosis


Neurological: Reports: Difficulty Walking, Weakness, Gait Disturbance.  Denies: 

Confusion


Psychiatric: Reports: No Symptoms





- Patient Data


Vitals - Most Recent: 


                                Last Vital Signs











Temp  97.5 F   02/15/21 04:11


 


Pulse  50 L  02/15/21 04:11


 


Resp  16   02/15/21 04:11


 


BP  135/46 L  02/15/21 04:11


 


Pulse Ox  95   02/15/21 04:11











Weight - Most Recent: 72.847 kg


I&O - Last 24 Hours: 


                                 Intake & Output











 02/14/21 02/15/21 02/15/21





 22:59 06:59 14:59


 


Intake Total 1140 825 


 


Output Total 400 825 


 


Balance 740 0 











Lab Results Last 24 Hours: 


                         Laboratory Results - last 24 hr











  02/14/21 02/14/21 02/14/21 Range/Units





  11:01 16:14 17:06 


 


WBC     (3.98-10.04)  K/mm3


 


RBC     (3.98-5.22)  M/mm3


 


Hgb     (11.2-15.7)  gm/dl


 


Hct     (34.1-44.9)  %


 


MCV     (79.4-94.8)  fl


 


MCH     (25.6-32.2)  pg


 


MCHC     (32.2-35.5)  g/dl


 


RDW Std Deviation     (36.4-46.3)  fL


 


Plt Count     (182-369)  K/mm3


 


MPV     (9.4-12.3)  fl


 


Neut % (Auto)     (34.0-71.1)  %


 


Lymph % (Auto)     (19.3-51.7)  %


 


Mono % (Auto)     (4.7-12.5)  %


 


Eos % (Auto)     (0.7-5.8)  


 


Baso % (Auto)     (0.1-1.2)  %


 


Neut # (Auto)     (1.56-6.13)  K/mm3


 


Lymph # (Auto)     (1.18-3.74)  K/mm3


 


Mono # (Auto)     (0.24-0.36)  K/mm3


 


Eos # (Auto)     (0.04-0.36)  K/mm3


 


Baso # (Auto)     (0.01-0.08)  K/mm3


 


Manual Slide Review     


 


Sodium   142   (136-145)  mEq/L


 


Potassium   4.0   (3.5-5.1)  mEq/L


 


Chloride   105   ()  mEq/L


 


Carbon Dioxide   28   (21-32)  mEq/L


 


Anion Gap   13.0   (5-15)  


 


BUN   33 H   (7-18)  mg/dL


 


Creatinine   1.8 H   (0.55-1.02)  mg/dL


 


Est Cr Clr Drug Dosing   15.22   mL/min


 


Estimated GFR (MDRD)   26   (>60)  mL/min


 


BUN/Creatinine Ratio   18.3 H   (14-18)  


 


Glucose   342 H   ()  mg/dL


 


POC Glucose  388 H   347 H  ()  mg/dL


 


Calcium   8.9   (8.5-10.1)  mg/dL


 


Magnesium     (1.8-2.4)  mg/dl


 


Blood Type     














  02/14/21 02/15/21 02/15/21 Range/Units





  20:39 06:17 06:20 


 


WBC    9.64  (3.98-10.04)  K/mm3


 


RBC    3.52 L  (3.98-5.22)  M/mm3


 


Hgb    10.7 L  (11.2-15.7)  gm/dl


 


Hct    34.5  (34.1-44.9)  %


 


MCV    98.0 H  (79.4-94.8)  fl


 


MCH    30.4  (25.6-32.2)  pg


 


MCHC    31.0 L  (32.2-35.5)  g/dl


 


RDW Std Deviation    52.0 H  (36.4-46.3)  fL


 


Plt Count    189  (182-369)  K/mm3


 


MPV    11.2  (9.4-12.3)  fl


 


Neut % (Auto)    69.9  (34.0-71.1)  %


 


Lymph % (Auto)    17.4 L  (19.3-51.7)  %


 


Mono % (Auto)    8.5  (4.7-12.5)  %


 


Eos % (Auto)    2.1  (0.7-5.8)  


 


Baso % (Auto)    0.4  (0.1-1.2)  %


 


Neut # (Auto)    6.74 H  (1.56-6.13)  K/mm3


 


Lymph # (Auto)    1.68  (1.18-3.74)  K/mm3


 


Mono # (Auto)    0.82 H  (0.24-0.36)  K/mm3


 


Eos # (Auto)    0.20  (0.04-0.36)  K/mm3


 


Baso # (Auto)    0.04  (0.01-0.08)  K/mm3


 


Manual Slide Review    Normal smear  


 


Sodium     (136-145)  mEq/L


 


Potassium     (3.5-5.1)  mEq/L


 


Chloride     ()  mEq/L


 


Carbon Dioxide     (21-32)  mEq/L


 


Anion Gap     (5-15)  


 


BUN     (7-18)  mg/dL


 


Creatinine     (0.55-1.02)  mg/dL


 


Est Cr Clr Drug Dosing     mL/min


 


Estimated GFR (MDRD)     (>60)  mL/min


 


BUN/Creatinine Ratio     (14-18)  


 


Glucose     ()  mg/dL


 


POC Glucose  367 H  223 H   ()  mg/dL


 


Calcium     (8.5-10.1)  mg/dL


 


Magnesium     (1.8-2.4)  mg/dl


 


Blood Type     














  02/15/21 02/15/21 Range/Units





  06:20 06:20 


 


WBC    (3.98-10.04)  K/mm3


 


RBC    (3.98-5.22)  M/mm3


 


Hgb    (11.2-15.7)  gm/dl


 


Hct    (34.1-44.9)  %


 


MCV    (79.4-94.8)  fl


 


MCH    (25.6-32.2)  pg


 


MCHC    (32.2-35.5)  g/dl


 


RDW Std Deviation    (36.4-46.3)  fL


 


Plt Count    (182-369)  K/mm3


 


MPV    (9.4-12.3)  fl


 


Neut % (Auto)    (34.0-71.1)  %


 


Lymph % (Auto)    (19.3-51.7)  %


 


Mono % (Auto)    (4.7-12.5)  %


 


Eos % (Auto)    (0.7-5.8)  


 


Baso % (Auto)    (0.1-1.2)  %


 


Neut # (Auto)    (1.56-6.13)  K/mm3


 


Lymph # (Auto)    (1.18-3.74)  K/mm3


 


Mono # (Auto)    (0.24-0.36)  K/mm3


 


Eos # (Auto)    (0.04-0.36)  K/mm3


 


Baso # (Auto)    (0.01-0.08)  K/mm3


 


Manual Slide Review    


 


Sodium  148 H   (136-145)  mEq/L


 


Potassium  3.7   (3.5-5.1)  mEq/L


 


Chloride  110 H   ()  mEq/L


 


Carbon Dioxide  28   (21-32)  mEq/L


 


Anion Gap  13.7   (5-15)  


 


BUN  28 H   (7-18)  mg/dL


 


Creatinine  1.6 H   (0.55-1.02)  mg/dL


 


Est Cr Clr Drug Dosing  17.12   mL/min


 


Estimated GFR (MDRD)  30   (>60)  mL/min


 


BUN/Creatinine Ratio  17.5   (14-18)  


 


Glucose  203 H   ()  mg/dL


 


POC Glucose    ()  mg/dL


 


Calcium  9.1   (8.5-10.1)  mg/dL


 


Magnesium  2.5 H   (1.8-2.4)  mg/dl


 


Blood Type   O NEGATIVE  











Med Orders - Current: 


                               Current Medications





Acetaminophen (Tylenol)  650 mg PO Q4H PRN


   PRN Reason: Fever


   Last Admin: 02/14/21 21:00 Dose:  650 mg


   Documented by: 


Albuterol/Ipratropium (Duoneb 3.0-0.5 Mg/3 Ml)  3 ml NEB Q4H PRN


   PRN Reason: Shortness Of Breath/wheezing


Calcium Carbonate/Glycine (Tums)  500 mg PO Q4H PRN


   PRN Reason: Heartburn


Cyclobenzaprine HCl (Flexeril)  10 mg PO TID PRN


   PRN Reason: muscle spasms 


   Last Admin: 02/09/21 12:36 Dose:  10 mg


   Documented by: 


Docusate Sodium (Colace)  100 mg PO BID [facility]


   Last Admin: 02/14/21 20:36 Dose:  100 mg


   Documented by: 


Famotidine (Pepcid)  20 mg PO Q48H [facility]


   Last Admin: 02/13/21 09:08 Dose:  20 mg


   Documented by: 


Furosemide (Lasix)  40 mg PO DAILY [facility]


Gabapentin (Neurontin)  100 mg PO BID [facility]


   Last Admin: 02/14/21 20:36 Dose:  100 mg


   Documented by: 


Hydralazine HCl (Apresoline)  10 mg IVPUSH Q4H PRN


   PRN Reason: Hypertension


Hydromorphone HCl (Dilaudid)  0.5 mg IVPUSH Q4H PRN


   PRN Reason: Pain (severe 7-10)


   Last Admin: 02/11/21 08:18 Dose:  0.5 mg


   Documented by: 


Promethazine HCl 12.5 mg/ (Sodium Chloride)  50.5 mls @ 100 mls/hr IV Q6H PRN


   PRN Reason: Nausea/Vomiting


Piperacillin Sod/Tazobactam (Sod 4.5 gm/ Sodium Chloride)  100 mls @ 25 mls/hr 

IV Q12H [facility]


   Last Admin: 02/14/21 20:30 Dose:  25 mls/hr


   Documented by: 


Potassium Chloride/Dextrose/Sod Cl (D5 1/2 Ns W/ 10 Meq/L Kcl)  1,000 mls @ 50 

mls/hr IV ASDIRECTED [facility]


   Last Admin: 02/14/21 13:21 Dose:  50 mls/hr


   Documented by: 


Insulin Glargine (Lantus)  13 unit SUBCUT BID [facility]


   Last Admin: 02/14/21 21:14 Dose:  Not Given


   Documented by: 


Insulin Human Lispro (Humalog)  0 unit SUBCUT QIDACANDBED [facility]; Protocol


   Last Admin: 02/14/21 22:35 Dose:  10 units


   Documented by: 


Magnesium Hydroxide (Milk Of Magnesia)  30 ml PO DAILY PRN


   PRN Reason: Constipation


   Last Admin: 02/12/21 16:09 Dose:  30 ml


   Documented by: 


Metoprolol Succinate (Toprol Xl)  50 mg PO DAILY [facility]


   Last Admin: 02/14/21 08:43 Dose:  50 mg


   Documented by: 


Oxycodone HCl (Oxycodone)  5 mg PO Q6H PRN


   PRN Reason: Pain (moderate 4-6)


   Last Admin: 02/13/21 06:56 Dose:  5 mg


   Documented by: 





Discontinued Medications





Acetaminophen (Tylenol)  650 mg PO Q6H PRN


   PRN Reason: Pain (Mild 1-3)/fever


   Last Admin: 02/09/21 12:34 Dose:  650 mg


   Documented by: 


Bupivacaine HCl (Marcaine 0.5%) Confirm Administered Dose 30 ml .ROUTE .STK-MED 

ONE


   Stop: 02/11/21 09:56


Calcium Carbonate/Glycine (Tums)  400 mg PO Q4H PRN


   PRN Reason: Heartburn


Docusate Sodium (Colace)  100 mg PO BID PRN


   PRN Reason: Constipation


   Last Admin: 02/11/21 15:57 Dose:  100 mg


   Documented by: 


Famotidine (Pepcid)  20 mg PO DAILY [facility]


   Last Admin: 02/09/21 09:57 Dose:  20 mg


   Documented by: 


Fentanyl (Sublimaze) Confirm Administered Dose 100 mcg .ROUTE .STK-MED ONE


   Stop: 02/11/21 10:46


Furosemide (Lasix)  40 mg IVPUSH NOW ONE


   Stop: 02/08/21 21:42


   Last Admin: 02/08/21 22:00 Dose:  40 mg


   Documented by: 


Furosemide (Lasix)  20 mg PO DAILY [facility]


   Last Admin: 02/09/21 13:04 Dose:  Not Given


   Documented by: 


Furosemide (Lasix)  20 mg IVPUSH ONETIME ONE


   Stop: 02/09/21 18:01


   Last Admin: 02/09/21 18:47 Dose:  20 mg


   Documented by: 


Furosemide (Lasix)  40 mg IVPUSH NOW ONE


   Stop: 02/11/21 11:15


   Last Admin: 02/11/21 11:22 Dose:  40 mg


   Documented by: 


Furosemide (Lasix)  40 mg IVPUSH NOW ONE


   Stop: 02/12/21 08:13


   Last Admin: 02/12/21 08:29 Dose:  40 mg


   Documented by: 


Furosemide (Lasix)  40 mg PO DAILY [facility]


   Last Admin: 02/13/21 09:04 Dose:  40 mg


   Documented by: 


Gabapentin (Neurontin)  600 mg PO BID [facility]


   Last Admin: 02/09/21 08:10 Dose:  600 mg


   Documented by: 


Gabapentin (Neurontin)  600 mg PO BID [facility]


   Last Admin: 02/13/21 09:04 Dose:  600 mg


   Documented by: 


Heparin Sodium (Porcine) (Heparin Sodium)  5,000 units SUBCUT Q8H [facility]


   Stop: 02/10/21 23:00


   Last Admin: 02/10/21 21:18 Dose:  5,000 units


   Documented by: 


Heparin Sodium (Porcine) (Heparin Sodium)  5,000 units SUBCUT Q8H [facility]


   Last Admin: 02/14/21 10:58 Dose:  5,000 units


   Documented by: 


Hydromorphone HCl (Dilaudid)  0.25 mg IVPUSH ONETIME ONE


   Stop: 02/08/21 20:02


   Last Admin: 02/08/21 20:10 Dose:  0.25 mg


   Documented by: 


Hydromorphone HCl (Dilaudid)  0.25 mg IVPUSH ONETIME ONE


   Stop: 02/08/21 22:22


   Last Admin: 02/08/21 23:17 Dose:  0.25 mg


   Documented by: 


Sodium Chloride (Normal Saline)  1,000 mls @ 65 mls/hr IV ASDIRECTED [facility]


Lactated Ringer's (Ringers, Lactated)  1,000 mls @ 50 mls/hr IV ASDIRECTED [facility]


   Last Admin: 02/09/21 14:18 Dose:  50 mls/hr


   Documented by: 


Lactated Ringer's (Ringers, Lactated)  1,000 mls @ 65 mls/hr IV ASDIRECTED [facility]


   Last Admin: 02/10/21 06:46 Dose:  65 mls/hr


   Documented by: 


Lactated Ringer's (Ringers, Lactated)  1,000 mls @ 100 mls/hr IV ASDIRECTED [facility]


   Stop: 02/10/21 19:29


   Last Admin: 02/10/21 09:44 Dose:  100 mls/hr


   Documented by: 


Lactated Ringer's (Ringers, Lactated)  1,000 mls @ 75 mls/hr IV ASDIRECTED [facility]


   Last Admin: 02/11/21 07:37 Dose:  75 mls/hr


   Documented by: 


Piperacillin Sod/Tazobactam (Sod 4.5 gm/ Sodium Chloride)  100 mls @ 25 mls/hr 

IV Q8H [facility]


Piperacillin Sod/Tazobactam (Sod 4.5 gm/ Sodium Chloride)  100 mls @ 200 mls/hr 

IV ONETIME ONE


   Stop: 02/12/21 08:29


   Last Admin: 02/12/21 08:35 Dose:  200 mls/hr


   Documented by: 


Sodium Chloride (Normal Saline)  500 mls @ 45 mls/hr IV ASDIRECTED [facility]


   Stop: 02/14/21 07:37


   Last Admin: 02/13/21 21:56 Dose:  45 mls/hr


   Documented by: 


Potassium Chloride 20 meq/ (Dextrose/Water)  1,010 mls @ 50 mls/hr IV ASDIRECTED

[facility]


   Stop: 02/15/21 08:41


Insulin Glargine (Lantus)  15 unit SUBCUT DAILY [facility]


   Last Admin: 02/12/21 08:01 Dose:  Not Given


   Documented by: 


Insulin Glargine (Lantus)  7.5 unit SUBCUT ONETIME ONE


   Stop: 02/12/21 09:01


   Last Admin: 02/12/21 08:16 Dose:  7.5 units


   Documented by: 


Insulin Glargine (Lantus)  15 unit SUBCUT DAILY [facility]


Insulin Glargine (Lantus)  7.5 unit SUBCUT DAILY [facility]


   Last Admin: 02/13/21 09:06 Dose:  7.5 units


   Documented by: 


Insulin Glargine (Lantus)  10 unit SUBCUT BID [facility]


   Last Admin: 02/14/21 08:36 Dose:  10 units


   Documented by: 


Insulin Glargine (Lantus)  10 unit SUBCUT NOW [facility]


   Stop: 02/13/21 19:06


   Last Admin: 02/13/21 19:28 Dose:  10 unit


   Documented by: 


Insulin Glargine (Lantus)  5 unit SUBCUT NOW STA


   Stop: 02/14/21 21:12


   Last Admin: 02/14/21 22:33 Dose:  5 units


   Documented by: 


Insulin Human Lispro (Humalog)  0 unit SUBCUT QIDACANDBED [facility]; Protocol


   Last Admin: 02/13/21 14:23 Dose:  Not Given


   Documented by: 


Insulin Human Lispro (Humalog)  0 unit SUBCUT QIDACANDBED [facility]; Protocol


Ketamine HCl (Ketalar) Confirm Administered Dose 500 mg .ROUTE .STK-MED ONE


   Stop: 02/11/21 10:47


Lidocaine HCl (Xylocaine 1%)  10 ml INJECT ONETIME ONE


   Stop: 02/08/21 18:58


   Last Admin: 02/09/21 00:57 Dose:  Not Given


   Documented by: 


Lidocaine/Epinephrine (Xylocaine-Mpf 2%-Epi 1:200,000) Confirm Administered Dose

20 ml .ROUTE .STK-MED ONE


   Stop: 02/11/21 10:13


Midazolam HCl (Versed 1 Mg/Ml) Confirm Administered Dose 2 mg .ROUTE .STK-MED 

ONE


   Stop: 02/11/21 10:46


Non-Formulary Medication (Insulin Degludec [Tresiba])  15 unit SQ DAILY [facility]


Ondansetron HCl (Zofran)  4 mg IVPUSH ONETIME ONE


   Stop: 02/08/21 18:48


   Last Admin: 02/08/21 18:51 Dose:  4 mg


   Documented by: 


Ondansetron HCl (Zofran) Confirm Administered Dose 4 mg .ROUTE .STK-MED ONE


   Stop: 02/08/21 19:51


   Last Admin: 02/08/21 20:11 Dose:  Not Given


   Documented by: 


Ondansetron HCl (Zofran)  4 mg IVPUSH ONETIME ONE


   Stop: 02/08/21 20:07


   Last Admin: 02/08/21 20:11 Dose:  4 mg


   Documented by: 


Potassium Chloride (Klor-Con M20)  40 meq PO DAILY [facility]


   Stop: 02/14/21 12:25


   Last Admin: 02/14/21 13:23 Dose:  Not Given


   Documented by: 


Potassium Chloride (Klor-Con M20)  40 meq PO ONETIME ONE


   Stop: 02/14/21 12:39


   Last Admin: 02/14/21 13:10 Dose:  40 meq


   Documented by: 


Ropivacaine (Naropin 0.5%) Confirm Administered Dose 30 ml .ROUTE .STK-MED ONE


   Stop: 02/11/21 09:57


Sodium Polystyrene Sulfonate (Kayexalate)  15 gm PO ONETIME ONE


   Stop: 02/09/21 08:50


   Last Admin: 02/09/21 09:57 Dose:  15 gm


   Documented by: 


Spironolactone (Aldactone)  50 mg PO DAILY [facility]


   Last Admin: 02/09/21 08:10 Dose:  50 mg


   Documented by: 


Tamsulosin HCl (Flomax)  0.4 mg PO DAILY [facility]


   Last Admin: 02/09/21 17:44 Dose:  Not Given


   Documented by: 











- Exam


Quality Assessment: Supplemental Oxygen (3L ), Urine Catheter, DVT Prophylaxis


General: Alert, Oriented, Cooperative, No Acute Distress


HEENT: Pupils Equal, Pupils Reactive, Mucous Membr. Moist/Pink


Neck: Supple, Trachea Midline


Lungs: Clear to Auscultation, Normal Respiratory Effort, Decreased Breath Sounds


Cardiovascular: Regular Rate, Regular Rhythm


GI/Abdominal Exam: Normal Bowel Sounds, Soft, Non-Tender, Distended


 (Female) Exam: Deferred


Extremities: No Pedal Edema, Leg Pain (bilateral ), Limited Range of Motion


Peripheral Pulses: 2+: Radial (L), Radial (R), Dorsalis Pedis (L), Dorsalis 

Pedis (R)


Skin: Warm, Dry, Intact


Neurological: No New Focal Deficit


Psy/Mental Status: Alert





Sepsis Event Note





- Evaluation


Sepsis Screening Result: No Definite Risk





- Focused Exam


Vital Signs: 


                                   Vital Signs











  Temp Temp Pulse Resp BP Pulse Ox


 


 02/15/21 04:11  97.5 F   50 L  16  135/46 L  95


 


 02/15/21 00:00   97.8 F   15  


 


 02/14/21 23:47    56 L   130/43 L  94 L


 


 02/14/21 22:00       93 L


 


 02/14/21 20:49  98.8 F   64  16  162/44 H  97














- Problem List & Annotations


(1) Closed left hip fracture


SNOMED Code(s): 316658017


   Code(s): S72.002A - FRACTURE OF UNSP PART OF NECK OF LEFT FEMUR, INIT   

Status: Acute   Priority: High   Current Visit: Yes   


Qualifiers: 


   Encounter type: initial encounter   Qualified Code(s): S72.002A - Fracture of

 unspecified part of neck of left femur, initial encounter for closed fracture  

 





(2) Anemia


SNOMED Code(s): 120822481


   Code(s): D64.9 - ANEMIA, UNSPECIFIED   Status: Chronic   Priority: Medium   

Current Visit: No   Onset Date: 06/05/19   


Qualifiers: 


   Anemia type: due to chronic kidney disease   Chronic kidney disease stage: 

stage 4 (severe)   Qualified Code(s): N18.4 - Chronic kidney disease, stage 4 

(severe); D63.1 - Anemia in chronic kidney disease   


Annotation/Comment:: anemia  sec  to  anticoagulation   with  xarolto / plavix 

and asa   asa  held   mild  bleeding  noted   from  hemmhroids   





(3) CHF (congestive heart failure), NYHA class II


SNOMED Code(s): 381457772, 808935559


   Code(s): I50.9 - HEART FAILURE, UNSPECIFIED   Status: Chronic   Priority: 

Medium   Current Visit: No   Onset Date: 06/06/19   


Qualifiers: 


   Congestive heart failure type: unspecified   Qualified Code(s): I50.9 - Heart

 failure, unspecified   





(4) Fall


SNOMED Code(s): 4200638, 445443686


   Code(s): W19.XXXA - UNSPECIFIED FALL, INITIAL ENCOUNTER   Status: Acute   

Priority: High   Current Visit: Yes   


Qualifiers: 


   Encounter type: subsequent encounter   Qualified Code(s): W19.XXXD - 

Unspecified fall, subsequent encounter   





(5) DM2 (diabetes mellitus, type 2)


SNOMED Code(s): 22486371


   Code(s): E11.9 - TYPE 2 DIABETES MELLITUS WITHOUT COMPLICATIONS   Status: 

Chronic   Priority: High   Current Visit: No   Onset Date: 06/07/19   


Qualifiers: 


   Diabetes mellitus long term insulin use: unspecified long term insulin use 

status   Diabetes mellitus complication status: with unspecified complications 





(6) Afib


SNOMED Code(s): 55797799


   Code(s): I48.91 - UNSPECIFIED ATRIAL FIBRILLATION   Status: Chronic   

Priority: Low   Current Visit: No   


Qualifiers: 


   Atrial fibrillation type: paroxysmal   Qualified Code(s): I48.0 - Paroxysmal 

atrial fibrillation   





(7) HTN (hypertension)


SNOMED Code(s): 36306778


   Code(s): I10 - ESSENTIAL (PRIMARY) HYPERTENSION   Status: Chronic   Priority:

 Medium   Current Visit: No   


Qualifiers: 


   Hypertension type: unspecified   Qualified Code(s): I10 - Essential (primary)

 hypertension   





(8) Chronic constipation


SNOMED Code(s): 172778249


   Code(s): K59.09 - OTHER CONSTIPATION   Status: Chronic   Priority: Low   

Current Visit: No   





(9) Urinary retention


SNOMED Code(s): 625676772


   Code(s): R33.9 - RETENTION OF URINE, UNSPECIFIED   Status: Chronic   Pr

iority: Low   Current Visit: No   





(10) Chronic back pain


SNOMED Code(s): 096403112


   Code(s): M54.9 - DORSALGIA, UNSPECIFIED; G89.29 - OTHER CHRONIC PAIN   

Status: Chronic   Priority: Low   Current Visit: No   


Qualifiers: 


   Back pain location: back pain in unspecified location   Back pain laterality:

 unspecified   Qualified Code(s): M54.9 - Dorsalgia, unspecified; G89.29 - Other

 chronic pain   





(11) History of TIA (transient ischemic attack)


SNOMED Code(s): 654101725


   Code(s): Z86.73 - PRSNL HX OF TIA (TIA), AND CEREB INFRC W/O RESID DEFICITS  

 Status: Chronic   Priority: Low   Current Visit: No   





(12) Depression


SNOMED Code(s): 21578380


   Code(s): F32.9 - MAJOR DEPRESSIVE DISORDER, SINGLE EPISODE, UNSPECIFIED   St

atus: Chronic   Priority: Low   Current Visit: No   


Qualifiers: 


   Depression Type: other depression   Qualified Code(s): F32.89 - Other 

specified depressive episodes   





(13) Nursing home resident


SNOMED Code(s): 323177180


   Code(s): Z59.3 - PROBLEMS RELATED TO LIVING IN RESIDENTIAL INSTITUTION   

Status: Chronic   Priority: Low   Current Visit: No   





(14) Hyperbilirubinemia


SNOMED Code(s): 35381898


   Code(s): E80.6 - OTHER DISORDERS OF BILIRUBIN METABOLISM   Status: Acute   

Priority: Medium   Current Visit: Yes   





(15) Elevated AST (SGOT)


SNOMED Code(s): 977460462


   Code(s): R74.01 - ELEVATION OF LEVELS OF LIVER TRANSAMINASE LEVELS   Status: 

Acute   Priority: Medium   Current Visit: Yes   





(16) Elevated ALT measurement


SNOMED Code(s): 416798565


   Code(s): R74.01 - ELEVATION OF LEVELS OF LIVER TRANSAMINASE LEVELS   Status: 

Acute   Priority: Medium   Current Visit: Yes   





(17) Acute on chronic renal failure


SNOMED Code(s): 226484035


   Code(s): N17.9 - ACUTE KIDNEY FAILURE, UNSPECIFIED; N18.9 - CHRONIC KIDNEY 

DISEASE, UNSPECIFIED   Status: Acute   Priority: High   Current Visit: Yes   


Qualifiers: 


   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 

disease stage: stage 4 (severe)   Qualified Code(s): N17.2 - Acute kidney 

failure with medullary necrosis; N18.4 - Chronic kidney disease, stage 4 

(severe)   





(18) Hyperkalemia


SNOMED Code(s): 11381169


   Code(s): E87.5 - HYPERKALEMIA   Status: Resolved   Priority: High   Current 

Visit: Yes   





(19) Fluid overload


SNOMED Code(s): 46640703


   Code(s): E87.70 - FLUID OVERLOAD, UNSPECIFIED   Status: Acute   Priority: 

High   Current Visit: Yes   


Qualifiers: 


   Hypervolemia type: unspecified   Qualified Code(s): E87.70 - Fluid overload, 

unspecified   





(20) Acute respiratory failure


SNOMED Code(s): 36232931


   Code(s): J96.00 - ACUTE RESPIRATORY FAILURE, UNSP W HYPOXIA OR HYPERCAPNIA   

Status: Acute   Current Visit: Yes   





(21) Elevated brain natriuretic peptide (BNP) level


SNOMED Code(s): 047869657, 245945625


   Code(s): R79.89 - OTHER SPECIFIED ABNORMAL FINDINGS OF BLOOD CHEMISTRY   

Status: Acute   Current Visit: Yes   





(22) Pneumonia


SNOMED Code(s): 965120857


   Code(s): J18.9 - PNEUMONIA, UNSPECIFIED ORGANISM   Status: Acute   Priority: 

High   Current Visit: Yes   


Qualifiers: 


   Pneumonia type: due to unspecified organism   Laterality: right   Lung 

location: upper lobe of lung   Qualified Code(s): J18.9 - Pneumonia, unspecified

 organism   


Annotation/Comment::  very  slow   improvmetna nd  continued  atelectasis and 

pleural  effusion  left    





(23) UTI (urinary tract infection)


SNOMED Code(s): 28105660


   Code(s): N39.0 - URINARY TRACT INFECTION, SITE NOT SPECIFIED   Status: Acute 

  Priority: High   Current Visit: Yes   


Qualifiers: 


   Urinary tract infection type: acute cystitis   Hematuria presence: without 

hematuria   Qualified Code(s): N30.00 - Acute cystitis without hematuria   





- Problem List Review


Problem List Initiated/Reviewed/Updated: Yes





- Assessment


Assessment:: 


Assessment - Day of admission 2/9/21 (admitted late 2/8/21)


* 89 Yo female who presents after fall at [facility]F resulting in left hip and elbow 

  pain


* Unknown why she fell. Hit head. Skin tear on left elbow


* Left leg externally rotated and shortened


* 12-lead EKG shows NSR at 81 BPM with a first degree AV block.


* Head CT shows air-fluid levels in right maxillary sinus and senescent change. 

  Nothing acute


* Left hip X-ray suspicious for fracture


* Left elbow x-ray shows nothing acute


* 4cm skin tear on left elbow with skin missing repaired with steri-strips and 

  gauze


* Left Hip CT shows acute complex proximal left femoral fracture 


   * Predominant fracture line appears to be predominantly basocervical oblique 

     however a fracture line extends superolaterally anteriorly transcervically.

      


   * Fracture lines involving the greater trochanter as well.


   * There is some angulation and overriding of the fracture fragments.


* CXR shows nothing acute


* Dr. Pruett, orthopedic surgeon consulted by ED - plans for surgery on 2/11/21 

  due to patient being on Plavix\


* Given Dilaudid for pain and Zofran for nausea


* Labs:


   * WBC 16.81 --> 18.22


   * hemoglobin 13.6 --> 13.9 


   * platelet 232 --> 189 


   * neutrophils 13.83 --> 16.37 


   * INR 0.96 


   * sodium 143 --> 140 


   * potassium 4.7 --> 5.5 


   * anion gap 14.7 --> 17.5 


   * BUN 25 --> 29 


   * creatinine 2.1 --> 2.3 


   * GFR 20 ---> 20 


   * glucose 91 --> 267


   * magnesium 2.4 


   * total bilirubin 0.5 --> 1.3 


   * AST 38 ---> 192 


   * ALT 32 --> 327 


   * alkaline phosphatase 77 --> 113 


   * troponin less than 0.017 


   * proBNP 136 --> 335 


   * albumin 3.6 --> 3.6 


   * UA negative but trace leukocyte esterase, 5-10 hyaline casts, and many 

     bacteria noted 


   * SARS Covid 2 RNA negative 


   * MRSA negative 


   * urine culture pending





2/10/21


* Continued left hip pain with movement.  No pain when laying still.


* Left elbow bandage noted to be saturated with drainage.  Nursing will change


* Up to 3 L of oxygen via nasal cannula.  Has been utilizing I-S.


* Renal function has been getting progressively worse.  Will increase IV fluids 

  and monitor.


* Transaminitis is improving and bilirubin is trending downward.


* Continue plan for surgery tomorrow.


* Labs today:


   * WBC 14.15 


   * hemoglobin 12.0 


   * platelet 158


   * neutrophils 10.94 


   * sodium 142 


   * potassium 4.1 


   * anion gap 13.1 


   * BUN 37, creatinine 2.6, GFR 17 


   * bilirubin 1.2 


   * , , alk phos 87 


   * albumin 3.0


   


2/11/21


* Continued left hip pain with movement.  No pain when laying still.


* Left elbow bandage dry and intact


* Up to 6 liters of oxygen per nasal cannula.  O2 saturations ranged from 88 to 

  90%.  Has been utilizing I-S.


* IV fluids stopped as patient is having worsening O2 saturations consistent 

  with congestive heart failure.


* Transaminitis is improving and bilirubin is trending downward.


* Surgery postponed until Monday


* Labs today:


   * WBC 14.15-->13.02


   * hemoglobin 12.0-->10.7


   * sodium 142-->143


   * potassium 4.1 


   * BUN 37, creatinine 2.6, GFR 17-->BUN 34, Creat 2.1


   * Intake and output showed a +1536


* Portable view of the chest radiologist impression: 


   1.  Increased density within the right upper chest either due to pneumonia or

 changes of aspiration.


   2.  Mild atelectasis within both lung bases.


   3.  Increased central lung markings possibly due to acute or chronic 

pulmonary vascular congestion


* Surgery postponed until Monday.





2/12/21


* Continues to have left hip pain with movement 2/2 fracture


* Planned surgical fixation on 2/15/21


* Down to 3L via NC


* UTI growing >100 CFUs aerococcus urinae 


* Started zosyn due to questionable aspiration PNA and UTI


* Given 40mg IVP lasix today


* Echo ordered due to history of prior CHF - Diastolic HF noted from 1/19 


* Labs:


   * WBC 14.20


   * Hgb 11.1


   * Potassium 3.9


   * BUN 34, Creatinine 1.8, GFR 36





2/13/2021


* Continues current treatment


* Hold parameters for cyclobenzaprine and gabapentin


* Cut down Gabapentin dose to 100 po BID due to oversedation and 


* Incentive spirometer as directed with beside pulmonary exercise


* Routine AM Labs


* Hold lasix for now


* Encourage to eat and drink fluids


* Lantus 10 units subQ BID


* DVT and GI prophylaxis: Heparin and H2B


* Code status: DNR/DNI


* LOS> 96hrs due to complexity of acute illness





2/14/2021


* Continues current treatment


* Continue hold parameters for cyclobenzaprine and gabapentin


* Incentive spirometer as directed with beside pulmonary exercise


* Routine AM Labs


* Continue to hold lasix 


* Encourage to eat and drink fluids


* May consider going up on Lantus to 13 units subQ BID since she is eating more 


* KCl 40 mEq po and 20 mEq IV x1


* D51/2 at 50 cc for hypernatremia


* Repeat BMP at 1600


* Hold Heparin dose tonight 


* DVT and GI prophylaxis: Heparin and H2B


* Code status: DNR/DNI


* LOS> 96hrs due to complexity of acute illness





2/15/2021


* Scheduled for surgery this afternoon


* Continues IS with pulmonary exercises


* Lantus increased to 13 units BID yesterday with medium SS insulin


* NPO today


* Labs today: 


   * WBC 9.64 


   * Hgb 10.7 


   * Sodium 148


   * Potasium 3.7 


   * BUN 28, Creatinine 1.6, GFR 30,


   *  Glucose 203 


   * Magnesium 2.5


* Resume diabetic diet after surgery


* Discuss VTE for tomorrow post-operatively


* Continue zosyn for UTI and possible PNA


* Continue current treatment plan








- Plan


Plan:: 


Closed left hip fracture


Fall


Nursing home resident


* Pain medications as ordered


* Bedrest until after surgery 


* Page until post surgical


* Flexeril as ordered


* PT/OT


* CM/SW for discharge planning


* Dr. Pruett, orthopedic surgeon consulted in ED


* Planning surgery on 2/15/21 (wait due to decreasing O2 saturations)


* Hold home Plavix 


* Heparin/SCDs for VTE; will hold next dose tonight for AM surgery


* IS


* Optimize for surgery


* Ice pack as needed


* Nursing staff to reposition every 2 hours


* High risk for cardio-pulmonary event for intermediate surgical risk; daughter 

  Juanis is aware





Acute respiratory failure, 3-4L NC


CHF exacerbation 


CHF (congestive heart failure), NYHA class II


Elevated Pro-BNP


Fluid overload


Hypoalbuminemia with Albumin of 2.6


Questionable pneumonia


* Review old echo


* New echocardiogram obtained


* Stop fluids - renal function improved


* Already on Zosyn for antibiotic coverage


* Incentive spirometer and bedside pulmonary exercise





UTI


Leukocytosis, slightly worse


* Zosyn Q8 HR


* WBC is 9.64 today


* Treat underlying cause





Elevated ALT measurement, continues to improve 


* Avoid tylenol


* Re-check labs and monitor





Acute on chronic renal failure, improved


hypokalemia with K of 3.4


Mild Hypernatremia with sodium of 150, slightly worse


Hypermagnesemia with Mg of 2.5


* IV fluids discontinued due to worsening heart failure


* Avoid nephrotoxic medications


* She is getting zosyn and a little too much protein supplements


* Cut down supplements and protein shakes


* Continue to hold lasix and encourage to drink fluids





DM2 (diabetes mellitus, type 2), remains uncontrolled


* Lantus (sub for Tresiba) - will cut dose by half due to minimal PO 

  intake-->increase LA to 13 units SubQ BID


* Low dose scale humalog (down from medium dose due to poor PO intake)--> 

  increased back to Medium dose 


* QID AC and bedtime blood glucose checks


* Consistent carbohydrate diet





Anemia


Afib


HTN (hypertension)


Chronic constipation


Urinary retention


Chronic back pain


History of TIA (transient ischemic attack)


Depression


* Review/reconcile home medications


* Telemetry


* PRN stool softeners





Resolved:


Hyperkalemia


Hyperbilirubinemia


Elevated AST (SGOT)





Code status: DNR/DNI


PCP: Dr. Vaughan


DVT prophylaxis: Heparin/SCDs


Social: Patient resides in [facility]


Disposition: Patient admitted inpatient with telemetry for surgical management 

of left hip fracture on 2/11/21.  Postponed until February 15, 2021





LOS >96 hrs due to delay in surgery for CHF/PNA/UTI treatment.  





<Hardik Peters T - Last Filed: 02/15/21 20:47>





- Patient Data


Vitals - Most Recent: 


                                Last Vital Signs











Temp  36.6 C   02/15/21 19:43


 


Pulse  82   02/15/21 19:43


 


Resp  20   02/15/21 19:43


 


BP  114/71   02/15/21 19:43


 


Pulse Ox  95   02/15/21 20:28











I&O - Last 24 Hours: 


                                 Intake & Output











 02/15/21 02/15/21 02/15/21





 06:59 14:59 22:59


 


Intake Total 825  770


 


Output Total 825  60


 


Balance 0  710











Lab Results Last 24 Hours: 


                         Laboratory Results - last 24 hr











  02/14/21 02/15/21 02/15/21 Range/Units





  20:39 06:17 06:20 


 


WBC    9.64  (3.98-10.04)  K/mm3


 


RBC    3.52 L  (3.98-5.22)  M/mm3


 


Hgb    10.7 L  (11.2-15.7)  gm/dl


 


Hct    34.5  (34.1-44.9)  %


 


MCV    98.0 H  (79.4-94.8)  fl


 


MCH    30.4  (25.6-32.2)  pg


 


MCHC    31.0 L  (32.2-35.5)  g/dl


 


RDW Std Deviation    52.0 H  (36.4-46.3)  fL


 


Plt Count    189  (182-369)  K/mm3


 


MPV    11.2  (9.4-12.3)  fl


 


Neut % (Auto)    69.9  (34.0-71.1)  %


 


Lymph % (Auto)    17.4 L  (19.3-51.7)  %


 


Mono % (Auto)    8.5  (4.7-12.5)  %


 


Eos % (Auto)    2.1  (0.7-5.8)  


 


Baso % (Auto)    0.4  (0.1-1.2)  %


 


Neut # (Auto)    6.74 H  (1.56-6.13)  K/mm3


 


Lymph # (Auto)    1.68  (1.18-3.74)  K/mm3


 


Mono # (Auto)    0.82 H  (0.24-0.36)  K/mm3


 


Eos # (Auto)    0.20  (0.04-0.36)  K/mm3


 


Baso # (Auto)    0.04  (0.01-0.08)  K/mm3


 


Manual Slide Review    Normal smear  


 


Sodium     (136-145)  mEq/L


 


Potassium     (3.5-5.1)  mEq/L


 


Chloride     ()  mEq/L


 


Carbon Dioxide     (21-32)  mEq/L


 


Anion Gap     (5-15)  


 


BUN     (7-18)  mg/dL


 


Creatinine     (0.55-1.02)  mg/dL


 


Est Cr Clr Drug Dosing     mL/min


 


Estimated GFR (MDRD)     (>60)  mL/min


 


BUN/Creatinine Ratio     (14-18)  


 


Glucose     ()  mg/dL


 


POC Glucose  367 H  223 H   ()  mg/dL


 


Calcium     (8.5-10.1)  mg/dL


 


Magnesium     (1.8-2.4)  mg/dl


 


Blood Type     


 


Gel Antibody Screen     














  02/15/21 02/15/21 02/15/21 Range/Units





  06:20 06:20 11:08 


 


WBC     (3.98-10.04)  K/mm3


 


RBC     (3.98-5.22)  M/mm3


 


Hgb     (11.2-15.7)  gm/dl


 


Hct     (34.1-44.9)  %


 


MCV     (79.4-94.8)  fl


 


MCH     (25.6-32.2)  pg


 


MCHC     (32.2-35.5)  g/dl


 


RDW Std Deviation     (36.4-46.3)  fL


 


Plt Count     (182-369)  K/mm3


 


MPV     (9.4-12.3)  fl


 


Neut % (Auto)     (34.0-71.1)  %


 


Lymph % (Auto)     (19.3-51.7)  %


 


Mono % (Auto)     (4.7-12.5)  %


 


Eos % (Auto)     (0.7-5.8)  


 


Baso % (Auto)     (0.1-1.2)  %


 


Neut # (Auto)     (1.56-6.13)  K/mm3


 


Lymph # (Auto)     (1.18-3.74)  K/mm3


 


Mono # (Auto)     (0.24-0.36)  K/mm3


 


Eos # (Auto)     (0.04-0.36)  K/mm3


 


Baso # (Auto)     (0.01-0.08)  K/mm3


 


Manual Slide Review     


 


Sodium  148 H    (136-145)  mEq/L


 


Potassium  3.7    (3.5-5.1)  mEq/L


 


Chloride  110 H    ()  mEq/L


 


Carbon Dioxide  28    (21-32)  mEq/L


 


Anion Gap  13.7    (5-15)  


 


BUN  28 H    (7-18)  mg/dL


 


Creatinine  1.6 H    (0.55-1.02)  mg/dL


 


Est Cr Clr Drug Dosing  17.12    mL/min


 


Estimated GFR (MDRD)  30    (>60)  mL/min


 


BUN/Creatinine Ratio  17.5    (14-18)  


 


Glucose  203 H    ()  mg/dL


 


POC Glucose    236 H  ()  mg/dL


 


Calcium  9.1    (8.5-10.1)  mg/dL


 


Magnesium  2.5 H    (1.8-2.4)  mg/dl


 


Blood Type   O NEGATIVE   


 


Gel Antibody Screen   Negative   














  02/15/21 02/15/21 Range/Units





  16:09 17:27 


 


WBC    (3.98-10.04)  K/mm3


 


RBC    (3.98-5.22)  M/mm3


 


Hgb    (11.2-15.7)  gm/dl


 


Hct    (34.1-44.9)  %


 


MCV    (79.4-94.8)  fl


 


MCH    (25.6-32.2)  pg


 


MCHC    (32.2-35.5)  g/dl


 


RDW Std Deviation    (36.4-46.3)  fL


 


Plt Count    (182-369)  K/mm3


 


MPV    (9.4-12.3)  fl


 


Neut % (Auto)    (34.0-71.1)  %


 


Lymph % (Auto)    (19.3-51.7)  %


 


Mono % (Auto)    (4.7-12.5)  %


 


Eos % (Auto)    (0.7-5.8)  


 


Baso % (Auto)    (0.1-1.2)  %


 


Neut # (Auto)    (1.56-6.13)  K/mm3


 


Lymph # (Auto)    (1.18-3.74)  K/mm3


 


Mono # (Auto)    (0.24-0.36)  K/mm3


 


Eos # (Auto)    (0.04-0.36)  K/mm3


 


Baso # (Auto)    (0.01-0.08)  K/mm3


 


Manual Slide Review    


 


Sodium    (136-145)  mEq/L


 


Potassium    (3.5-5.1)  mEq/L


 


Chloride    ()  mEq/L


 


Carbon Dioxide    (21-32)  mEq/L


 


Anion Gap    (5-15)  


 


BUN    (7-18)  mg/dL


 


Creatinine    (0.55-1.02)  mg/dL


 


Est Cr Clr Drug Dosing    mL/min


 


Estimated GFR (MDRD)    (>60)  mL/min


 


BUN/Creatinine Ratio    (14-18)  


 


Glucose    ()  mg/dL


 


POC Glucose  277 H  333 H  ()  mg/dL


 


Calcium    (8.5-10.1)  mg/dL


 


Magnesium    (1.8-2.4)  mg/dl


 


Blood Type    


 


Gel Antibody Screen    











Med Orders - Current: 


                               Current Medications





Acetaminophen (Tylenol)  650 mg PO Q4H PRN


   PRN Reason: Fever


   Last Admin: 02/14/21 21:00 Dose:  650 mg


   Documented by: 


Albuterol/Ipratropium (Duoneb 3.0-0.5 Mg/3 Ml)  3 ml NEB Q4H PRN


   PRN Reason: Shortness Of Breath/wheezing


Calcium Carbonate/Glycine (Tums)  500 mg PO Q4H PRN


   PRN Reason: Heartburn


Cyclobenzaprine HCl (Flexeril)  10 mg PO TID PRN


   PRN Reason: muscle spasms 


   Last Admin: 02/09/21 12:36 Dose:  10 mg


   Documented by: 


Docusate Sodium (Colace)  100 mg PO BID [facility]


   Last Admin: 02/15/21 08:39 Dose:  100 mg


   Documented by: 


Famotidine (Pepcid)  20 mg PO Q48H [facility]


   Last Admin: 02/15/21 08:39 Dose:  20 mg


   Documented by: 


Furosemide (Lasix)  40 mg PO DAILY [facility]


   Last Admin: 02/15/21 08:40 Dose:  40 mg


   Documented by: 


Gabapentin (Neurontin)  100 mg PO BID [facility]


   Last Admin: 02/15/21 08:39 Dose:  100 mg


   Documented by: 


Hydralazine HCl (Apresoline)  10 mg IVPUSH Q4H PRN


   PRN Reason: Hypertension


Hydromorphone HCl (Dilaudid)  0.5 mg IVPUSH Q4H PRN


   PRN Reason: Pain (severe 7-10)


   Last Admin: 02/11/21 08:18 Dose:  0.5 mg


   Documented by: 


Promethazine HCl 12.5 mg/ (Sodium Chloride)  50.5 mls @ 100 mls/hr IV Q6H PRN


   PRN Reason: Nausea/Vomiting


Piperacillin Sod/Tazobactam (Sod 4.5 gm/ Sodium Chloride)  100 mls @ 25 mls/hr 

IV Q12H [facility]


   Last Admin: 02/15/21 08:36 Dose:  25 mls/hr


   Documented by: 


Potassium Chloride/Dextrose/Sod Cl (D5 1/2 Ns W/ 10 Meq/L Kcl)  1,000 mls @ 50 

mls/hr IV ASDIRECTED [facility]


   Last Admin: 02/15/21 09:08 Dose:  50 mls/hr


   Documented by: 


Insulin Glargine (Lantus)  13 unit SUBCUT BID [facility]


   Last Admin: 02/15/21 10:31 Dose:  13 units


   Documented by: 


Insulin Human Lispro (Humalog)  0 unit SUBCUT QIDACANDBED [facility]; Protocol


   Last Admin: 02/15/21 18:48 Dose:  8 units


   Documented by: 


Magnesium Hydroxide (Milk Of Magnesia)  30 ml PO DAILY PRN


   PRN Reason: Constipation


   Last Admin: 02/12/21 16:09 Dose:  30 ml


   Documented by: 


Metoprolol Succinate (Toprol Xl)  50 mg PO DAILY [facility]


   Last Admin: 02/15/21 10:57 Dose:  50 mg


   Documented by: 


Oxycodone HCl (Oxycodone)  5 mg PO Q6H PRN


   PRN Reason: Pain (moderate 4-6)


   Last Admin: 02/13/21 06:56 Dose:  5 mg


   Documented by: 





Discontinued Medications





Acetaminophen (Tylenol)  650 mg PO Q6H PRN


   PRN Reason: Pain (Mild 1-3)/fever


   Last Admin: 02/09/21 12:34 Dose:  650 mg


   Documented by: 


Bupivacaine HCl (Marcaine 0.5%) Confirm Administered Dose 0 ml .ROUTE .STK-MED 

ONE


   Stop: 02/11/21 09:56


Bupivacaine HCl (Sensorcaine-Mpf 0.25%) Confirm Administered Dose 0 ml .ROUTE 

.STK-MED ONE


   Stop: 02/15/21 12:57


Bupivacaine HCl (Sensorcaine-Mpf 0.25%) Confirm Administered Dose 30 ml .ROUTE 

.STK-MED ONE


   Stop: 02/15/21 13:29


Calcium Carbonate/Glycine (Tums)  400 mg PO Q4H PRN


   PRN Reason: Heartburn


Dexamethasone (Dexamethasone) Confirm Administered Dose 20 mg .ROUTE .STK-MED 

ONE


   Stop: 02/15/21 13:46


Dexmedetomidine HCl (Precedex) Confirm Administered Dose 200 mcg .ROUTE .STK-MED

 ONE


   Stop: 02/15/21 12:35


Docusate Sodium (Colace)  100 mg PO BID PRN


   PRN Reason: Constipation


   Last Admin: 02/11/21 15:57 Dose:  100 mg


   Documented by: 


Famotidine (Pepcid)  20 mg PO DAILY [facility]


   Last Admin: 02/09/21 09:57 Dose:  20 mg


   Documented by: 


Fentanyl (Sublimaze) Confirm Administered Dose 0 mcg .ROUTE .STK-MED ONE


   Stop: 02/11/21 10:46


Fentanyl (Sublimaze) Confirm Administered Dose 100 mcg .ROUTE .STK-MED ONE


   Stop: 02/15/21 12:53


Furosemide (Lasix)  40 mg IVPUSH NOW ONE


   Stop: 02/08/21 21:42


   Last Admin: 02/08/21 22:00 Dose:  40 mg


   Documented by: 


Furosemide (Lasix)  20 mg PO DAILY [facility]


   Last Admin: 02/09/21 13:04 Dose:  Not Given


   Documented by: 


Furosemide (Lasix)  20 mg IVPUSH ONETIME ONE


   Stop: 02/09/21 18:01


   Last Admin: 02/09/21 18:47 Dose:  20 mg


   Documented by: 


Furosemide (Lasix)  40 mg IVPUSH NOW ONE


   Stop: 02/11/21 11:15


   Last Admin: 02/11/21 11:22 Dose:  40 mg


   Documented by: 


Furosemide (Lasix)  40 mg IVPUSH NOW ONE


   Stop: 02/12/21 08:13


   Last Admin: 02/12/21 08:29 Dose:  40 mg


   Documented by: 


Furosemide (Lasix)  40 mg PO DAILY [facility]


   Last Admin: 02/13/21 09:04 Dose:  40 mg


   Documented by: 


Gabapentin (Neurontin)  600 mg PO BID [facility]


   Last Admin: 02/09/21 08:10 Dose:  600 mg


   Documented by: 


Gabapentin (Neurontin)  600 mg PO BID [facility]


   Last Admin: 02/13/21 09:04 Dose:  600 mg


   Documented by: 


Heparin Sodium (Porcine) (Heparin Sodium)  5,000 units SUBCUT Q8H [facility]


   Stop: 02/10/21 23:00


   Last Admin: 02/10/21 21:18 Dose:  5,000 units


   Documented by: 


Heparin Sodium (Porcine) (Heparin Sodium)  5,000 units SUBCUT Q8H [facility]


   Last Admin: 02/14/21 10:58 Dose:  5,000 units


   Documented by: 


Hydromorphone HCl (Dilaudid)  0.25 mg IVPUSH ONETIME ONE


   Stop: 02/08/21 20:02


   Last Admin: 02/08/21 20:10 Dose:  0.25 mg


   Documented by: 


Hydromorphone HCl (Dilaudid)  0.25 mg IVPUSH ONETIME ONE


   Stop: 02/08/21 22:22


   Last Admin: 02/08/21 23:17 Dose:  0.25 mg


   Documented by: 


Sodium Chloride (Normal Saline)  1,000 mls @ 65 mls/hr IV ASDIRECTED [facility]


Lactated Ringer's (Ringers, Lactated)  1,000 mls @ 50 mls/hr IV ASDIRECTED [facility]


   Last Admin: 02/09/21 14:18 Dose:  50 mls/hr


   Documented by: 


Lactated Ringer's (Ringers, Lactated)  1,000 mls @ 65 mls/hr IV ASDIRECTED [facility]


   Last Admin: 02/10/21 06:46 Dose:  65 mls/hr


   Documented by: 


Lactated Ringer's (Ringers, Lactated)  1,000 mls @ 100 mls/hr IV ASDIRECTED [facility]


   Stop: 02/10/21 19:29


   Last Admin: 02/10/21 09:44 Dose:  100 mls/hr


   Documented by: 


Lactated Ringer's (Ringers, Lactated)  1,000 mls @ 75 mls/hr IV ASDIRECTED [facility]


   Last Admin: 02/11/21 07:37 Dose:  75 mls/hr


   Documented by: 


Piperacillin Sod/Tazobactam (Sod 4.5 gm/ Sodium Chloride)  100 mls @ 25 mls/hr 

IV Q8H [facility]


Piperacillin Sod/Tazobactam (Sod 4.5 gm/ Sodium Chloride)  100 mls @ 200 mls/hr 

IV ONETIME ONE


   Stop: 02/12/21 08:29


   Last Admin: 02/12/21 08:35 Dose:  200 mls/hr


   Documented by: 


Sodium Chloride (Normal Saline)  500 mls @ 45 mls/hr IV ASDIRECTED [facility]


   Stop: 02/14/21 07:37


   Last Admin: 02/13/21 21:56 Dose:  45 mls/hr


   Documented by: 


Potassium Chloride 20 meq/ (Dextrose/Water)  1,010 mls @ 50 mls/hr IV ASDIRECTED

 [facility]


   Stop: 02/15/21 08:41


Lidocaine HCl (Xylocaine-Mpf 1%) Confirm Administered Dose 4 mls @ as directed 

.ROUTE .STK-MED ONE


   Stop: 02/15/21 12:54


Insulin Glargine (Lantus)  15 unit SUBCUT DAILY [facility]


   Last Admin: 02/12/21 08:01 Dose:  Not Given


   Documented by: 


Insulin Glargine (Lantus)  7.5 unit SUBCUT ONETIME ONE


   Stop: 02/12/21 09:01


   Last Admin: 02/12/21 08:16 Dose:  7.5 units


   Documented by: 


Insulin Glargine (Lantus)  15 unit SUBCUT DAILY [facility]


Insulin Glargine (Lantus)  7.5 unit SUBCUT DAILY [facility]


   Last Admin: 02/13/21 09:06 Dose:  7.5 units


   Documented by: 


Insulin Glargine (Lantus)  10 unit SUBCUT BID [facility]


   Last Admin: 02/14/21 08:36 Dose:  10 units


   Documented by: 


Insulin Glargine (Lantus)  10 unit SUBCUT NOW STA


   Stop: 02/13/21 19:06


   Last Admin: 02/13/21 19:28 Dose:  10 unit


   Documented by: 


Insulin Glargine (Lantus)  5 unit SUBCUT NOW STA


   Stop: 02/14/21 21:12


   Last Admin: 02/14/21 22:33 Dose:  5 units


   Documented by: 


Insulin Human Lispro (Humalog)  0 unit SUBCUT QIDACANDBED [facility]; Protocol


   Last Admin: 02/13/21 14:23 Dose:  Not Given


   Documented by: 


Insulin Human Lispro (Humalog)  0 unit SUBCUT QIDACANDBED [facility]; Protocol


Ketamine HCl (Ketalar) Confirm Administered Dose 0 mg .ROUTE .STK-MED ONE


   Stop: 02/11/21 10:47


Ketamine HCl (Ketalar) Confirm Administered Dose 500 mg .ROUTE .STK-MED ONE


   Stop: 02/15/21 12:54


Lidocaine HCl (Xylocaine 1%)  10 ml INJECT ONETIME ONE


   Stop: 02/08/21 18:58


   Last Admin: 02/09/21 00:57 Dose:  Not Given


   Documented by: 


Lidocaine/Epinephrine (Xylocaine-Mpf 2%-Epi 1:200,000) Confirm Administered Dose

 20 ml .ROUTE .STK-MED ONE


   Stop: 02/11/21 10:13


Metoprolol Tartrate (Lopressor)  5 mg IVPUSH Q5M PRN


   PRN Reason: Hypertension


   Stop: 02/15/21 18:00


   Last Admin: 02/15/21 16:21 Dose:  1 mg


   Documented by: 


Midazolam HCl (Versed 1 Mg/Ml) Confirm Administered Dose 0 mg .ROUTE .STK-MED 

ONE


   Stop: 02/11/21 10:46


Midazolam HCl (Versed 1 Mg/Ml) Confirm Administered Dose 2 mg .ROUTE .STK-MED 

ONE


   Stop: 02/15/21 12:54


Non-Formulary Medication (Insulin Degludec [Tresiba])  15 unit SQ DAILY [facility]


Ondansetron HCl (Zofran)  4 mg IVPUSH ONETIME ONE


   Stop: 02/08/21 18:48


   Last Admin: 02/08/21 18:51 Dose:  4 mg


   Documented by: 


Ondansetron HCl (Zofran) Confirm Administered Dose 4 mg .ROUTE .STK-MED ONE


   Stop: 02/08/21 19:51


   Last Admin: 02/08/21 20:11 Dose:  Not Given


   Documented by: 


Ondansetron HCl (Zofran)  4 mg IVPUSH ONETIME ONE


   Stop: 02/08/21 20:07


   Last Admin: 02/08/21 20:11 Dose:  4 mg


   Documented by: 


Potassium Chloride (Klor-Con M20)  40 meq PO DAILY [facility]


   Stop: 02/14/21 12:25


   Last Admin: 02/14/21 13:23 Dose:  Not Given


   Documented by: 


Potassium Chloride (Klor-Con M20)  40 meq PO ONETIME ONE


   Stop: 02/14/21 12:39


   Last Admin: 02/14/21 13:10 Dose:  40 meq


   Documented by: 


Propofol (Diprivan  20 Ml) Confirm Administered Dose 200 mg .ROUTE .STK-MED ONE


   Stop: 02/15/21 12:54


Ropivacaine (Naropin 0.5%) Confirm Administered Dose 30 ml .ROUTE .STK-MED ONE


   Stop: 02/11/21 09:57


Ropivacaine (Naropin 0.5%) Confirm Administered Dose 30 ml .ROUTE .STK-MED ONE


   Stop: 02/15/21 12:35


Sodium Polystyrene Sulfonate (Kayexalate)  15 gm PO ONETIME ONE


   Stop: 02/09/21 08:50


   Last Admin: 02/09/21 09:57 Dose:  15 gm


   Documented by: 


Spironolactone (Aldactone)  50 mg PO DAILY [facility]


   Last Admin: 02/09/21 08:10 Dose:  50 mg


   Documented by: 


Tamsulosin HCl (Flomax)  0.4 mg PO DAILY [facility]


   Last Admin: 02/09/21 17:44 Dose:  Not Given


   Documented by: 











Sepsis Event Note





- Focused Exam


Vital Signs: 


                                   Vital Signs











  Temp Temp Pulse Pulse Resp BP BP


 


 02/15/21 20:28       


 


 02/15/21 19:43  36.6 C   82   20  114/71 


 


 02/15/21 16:58  36.7 C   73   16  142/110 H 


 


 02/15/21 16:30   36.4 C   78  17   143/67 H


 


 02/15/21 16:25     83  20   113/96 H


 


 02/15/21 16:21    82    171/60 H 


 


 02/15/21 16:18     87  20   171/60 H


 


 02/15/21 16:15    90  90  19  153/56 H  153/56 H


 


 02/15/21 16:00     94  19   170/95 H


 


 02/15/21 15:45   36.7 C   95  20   171/60 H


 


 02/15/21 15:30     94  20   156/91 H


 


 02/15/21 15:15   37.1 C   71  19   145/53 H


 


 02/15/21 11:15  36.7 C   58 L   20  152/125 H 


 


 02/15/21 10:57    65    142/56 H 














  Pulse Ox Pulse Ox


 


 02/15/21 20:28   95


 


 02/15/21 19:43  94 L 


 


 02/15/21 16:58  87 L 


 


 02/15/21 16:30  94 L 


 


 02/15/21 16:25  92 L 


 


 02/15/21 16:21  


 


 02/15/21 16:18  93 L 


 


 02/15/21 16:15  95 


 


 02/15/21 16:00  95 


 


 02/15/21 15:45  96 


 


 02/15/21 15:30  94 L 


 


 02/15/21 15:15  95  95


 


 02/15/21 11:15  93 L 


 


 02/15/21 10:57  














- My Orders


Last 24 Hours: 


My Active Orders





02/14/21 21:00


Insulin Glarg,Human.Rec.Analog [LantUS]   13 unit SUBCUT BID 





02/15/21 09:00


Furosemide [Lasix]   40 mg PO DAILY 





02/15/21 20:10


BASIC METABOLIC PANEL,BMP [CHEM] Routine 





02/16/21 05:11


BMP [BASIC METABOLIC PANEL,BMP] [CHEM] AM 


CBC WITH AUTO DIFF [HEME] AM 


MAGNESIUM [CHEM] AM 





02/17/21 05:11


BMP [BASIC METABOLIC PANEL,BMP] [CHEM] AM 


CBC WITH AUTO DIFF [HEME] AM 


MAGNESIUM [CHEM] AM 














- Plan


Plan:: 


2040: Post surgical assessment. She is alert and fully awake. No complaints of 

pain and vitals are stable. Updated daughter Juanis about her post surgical 

progress.

## 2021-02-15 NOTE — PCM.PRNOTE
- Free Text/Narrative


Note: 


Postoperative pain control requested by the surgeon.


Patient's Dx: Left intertrochanteric hip fracture.


Surgery: Left Gamma nail intramedullary nailing.


Procedure: Left Fascia iliaca nerve block with supplementary pericapsular 

injection under U/S guidance 


Requesting surgeon: Dr. Bean Ct





Risks and benefits discussed with the patient and her POA previously as part of 

preoperative assessment, including Rt. leg weakness x 24 hrs., block failure, 

groin pain.


As the surgery is being postponed due to the patients general and respiratory 

deterioration, this block is being performed to help with pain coverage. 


Patient in PACU bay postoperatively, alert, awake and comfortable, monitors 

applied. Time out performed. Oxygen 4L via NC. Left groin area was prepped with 

Chloraprep x 1 and allowed to dry.  The Left femoral nerve and artery were 

identified under ultrasound prior to needle insertion. The probe then been moved

into longitudinal position and fascia iliaca covering iliopsoas muscle has been 

identified. 4" Stimuplex needle #20 G was inserted under US guidance. Needle 

penetration through fascia barbara and fascia iliaca observed.  Under direct 

visualization of needle tip the injection of 0.5% Ropivacaine with 1:200,000 

epinephrine (15 mls) and 10 ml of 2% Lidocaine with 1:200,000 epinephrine with 6

mg of Dexamethasone and 30 mcg of Dexmedetomidine in divided doses maintaining 

negative aspiration was completed without problems as the spread of local 

anesthetic been observed under fascia iliaca in cephalad direction. Then the 

probe and needle were redirected , and the femoral vessels and iliopsoas muscle 

were identified again at the groin area. Under direct visualization of needle 

tip the injection of 0.5% Ropivacaine with 1:200,000 epinephrine (15 mls) and 10

ml of 2% Lidocaine with 1:200,000 epinephrine with 6 mg of Dexamethasone and 30 

mcg of Dexmedetomidine in divided doses maintaining negative aspiration was 

completed without problems as the spread was observed below the iliopsoas muscle

and iliopubic eminence. No local anesthetic toxicity was noted. Patient has 

tolerated the procedure well.


Please see the attached U/S image


Time: 15:25 - 15:42

## 2021-02-15 NOTE — PCM.POSTAN
POST ANESTHESIA ASSESSMENT





- MENTAL STATUS


Mental Status: Alert, Oriented





- VITAL SIGNS


Vital Signs: 


                                Last Vital Signs











Temp  98.7 F   02/15/21 15:15


 


Pulse  71   02/15/21 15:15


 


Resp  19   02/15/21 15:15


 


BP  145/53 H  02/15/21 15:15


 


Pulse Ox  95   02/15/21 15:15














- RESPIRATORY


Respiratory Status: Respiratory Rate WNL, Airway Patent, O2 Saturation Stable, 

Supplemental Oxygen





- CARDIOVASCULAR


CV Status: Pulse Rate WNL, Blood Pressure Stable





- GASTROINTESTINAL


GI Status: No Symptoms





- PAIN


Pain Score: 0 (post SAB)





- POST OP HYDRATION


Hydration Status: Adequate & Stable

## 2021-02-16 NOTE — [id number]
Post Anesthesia Note





- EVALUATION WITHIN 48HRS OF ANESTHETIC


Vital Signs in Normal Range: Yes


Patient Participated in Evaluation: No


Respiratory Function Stable: Yes


Airway Patent: Yes


Cardiovascular Function Stable: Yes


Hydration Status Stable: Yes


Pain Control Satisfactory: No


Nausea and Vomiting Control Satisfactory: Yes


Vital Signs: 


                                Last Vital Signs











Temp  36.4 C   02/16/21 04:00


 


Pulse  52 L  02/16/21 04:00


 


Resp  16   02/16/21 04:00


 


BP  123/39 L  02/16/21 04:00


 


Pulse Ox  96   02/16/21 06:12














- COMMENTS/OBSERVATIONS


Free Text/Narrative:: 





After visiting with night shift nurse, patient did not sleep well last night.


Patient more awake, brushing teeth etc... as compared to last week, but pain 

management is being worked on.





Nurse encouraged to update Dr. Pruett/ stacy Maldonado regarding patient's status.


Patient sitting up in chair when assessed this am.


Patient appears very tired, and no conversing noted at this time.





Ingrid VILLARREAL

## 2021-02-16 NOTE — PCM.PN
- General Info


Date of Service: 02/16/21


Admission Dx/Problem (Free Text): 


                           Admission Diagnosis/Problem





Admission Diagnosis/Problem      Hip fracture requiring operative repair








Subjective Update: 


In to see Liliana.  She is sitting up in the chair and is quite sleepy.  Per leana roach she was up very late last night and has been doing quite well moving.  

Page catheter was removed at 6 AM this morning.  She reports right hip pain due

to sitting up in the chair.  Otherwise she is doing quite well.  Discussed 

anticoagulation with orthopedics and will start 81 mg twice daily aspirin and 

resume patient's home Plavix dose.  Hopeful for return to SNF tomorrow pending 

PT/OT evaluation.





Functional Status: Reports: Pain Controlled, Tolerating Diet, Ambulating, 

Urinating





- Review of Systems


General: Reports: No Symptoms, Weakness, Fatigue.  Denies: Fever, Malaise, 

Chills


HEENT: Reports: No Symptoms.  Denies: Headaches, Sore Throat


Pulmonary: Reports: No Symptoms.  Denies: Shortness of Breath, Cough, Sputum, 

Wheezing


Cardiovascular: Reports: No Symptoms.  Denies: Chest Pain, Palpitations, Dyspnea

on Exertion


Gastrointestinal: Reports: No Symptoms.  Denies: Abdominal Pain, Constipation, 

Diarrhea, Nausea, Vomiting


Genitourinary: Reports: No Symptoms


Musculoskeletal: Reports: Joint Pain (right hip)


Skin: Reports: No Symptoms.  Denies: Cyanosis


Neurological: Reports: Pre-Existing Deficit, Difficulty Walking, Weakness, Gait 

Disturbance.  Denies: Confusion


Psychiatric: Reports: No Symptoms





- Patient Data


Vitals - Most Recent: 


                                Last Vital Signs











Temp  97.5 F   02/16/21 04:00


 


Pulse  52 L  02/16/21 04:00


 


Resp  16   02/16/21 04:00


 


BP  123/39 L  02/16/21 04:00


 


Pulse Ox  96   02/16/21 06:12











Weight - Most Recent: 162 lb 3.2 oz


I&O - Last 24 Hours: 


                                 Intake & Output











 02/15/21 02/16/21 02/16/21





 22:59 06:59 14:59


 


Intake Total 770 1148 


 


Output Total 60 625 


 


Balance 710 523 











Lab Results Last 24 Hours: 


                         Laboratory Results - last 24 hr











  02/15/21 02/15/21 02/15/21 Range/Units





  11:08 16:09 17:27 


 


WBC     (3.98-10.04)  K/mm3


 


RBC     (3.98-5.22)  M/mm3


 


Hgb     (11.2-15.7)  gm/dl


 


Hct     (34.1-44.9)  %


 


MCV     (79.4-94.8)  fl


 


MCH     (25.6-32.2)  pg


 


MCHC     (32.2-35.5)  g/dl


 


RDW Std Deviation     (36.4-46.3)  fL


 


Plt Count     (182-369)  K/mm3


 


MPV     (9.4-12.3)  fl


 


Neut % (Auto)     (34.0-71.1)  %


 


Lymph % (Auto)     (19.3-51.7)  %


 


Mono % (Auto)     (4.7-12.5)  %


 


Eos % (Auto)     (0.7-5.8)  


 


Baso % (Auto)     (0.1-1.2)  %


 


Neut # (Auto)     (1.56-6.13)  K/mm3


 


Lymph # (Auto)     (1.18-3.74)  K/mm3


 


Mono # (Auto)     (0.24-0.36)  K/mm3


 


Eos # (Auto)     (0.04-0.36)  K/mm3


 


Baso # (Auto)     (0.01-0.08)  K/mm3


 


Manual Slide Review     


 


Sodium     (136-145)  mEq/L


 


Potassium     (3.5-5.1)  mEq/L


 


Chloride     ()  mEq/L


 


Carbon Dioxide     (21-32)  mEq/L


 


Anion Gap     (5-15)  


 


BUN     (7-18)  mg/dL


 


Creatinine     (0.55-1.02)  mg/dL


 


Est Cr Clr Drug Dosing     mL/min


 


Estimated GFR (MDRD)     (>60)  mL/min


 


BUN/Creatinine Ratio     (14-18)  


 


Glucose     ()  mg/dL


 


POC Glucose  236 H  277 H  333 H  ()  mg/dL


 


Calcium     (8.5-10.1)  mg/dL


 


Magnesium     (1.8-2.4)  mg/dl














  02/15/21 02/16/21 02/16/21 Range/Units





  20:10 04:43 04:43 


 


WBC   14.01 H   (3.98-10.04)  K/mm3


 


RBC   3.40 L   (3.98-5.22)  M/mm3


 


Hgb   10.2 L   (11.2-15.7)  gm/dl


 


Hct   32.4 L   (34.1-44.9)  %


 


MCV   95.3 H   (79.4-94.8)  fl


 


MCH   30.0   (25.6-32.2)  pg


 


MCHC   31.5 L   (32.2-35.5)  g/dl


 


RDW Std Deviation   49.5 H   (36.4-46.3)  fL


 


Plt Count   222   (182-369)  K/mm3


 


MPV   11.5   (9.4-12.3)  fl


 


Neut % (Auto)   87.1 H   (34.0-71.1)  %


 


Lymph % (Auto)   7.6 L   (19.3-51.7)  %


 


Mono % (Auto)   3.7 L   (4.7-12.5)  %


 


Eos % (Auto)   0.1 L   (0.7-5.8)  


 


Baso % (Auto)   0.1   (0.1-1.2)  %


 


Neut # (Auto)   12.22 H   (1.56-6.13)  K/mm3


 


Lymph # (Auto)   1.06 L   (1.18-3.74)  K/mm3


 


Mono # (Auto)   0.52 H   (0.24-0.36)  K/mm3


 


Eos # (Auto)   0.01 L   (0.04-0.36)  K/mm3


 


Baso # (Auto)   0.01   (0.01-0.08)  K/mm3


 


Manual Slide Review   Normal smear   


 


Sodium  136  D   138  (136-145)  mEq/L


 


Potassium  4.0   4.3  (3.5-5.1)  mEq/L


 


Chloride  100   101  ()  mEq/L


 


Carbon Dioxide  25   25  (21-32)  mEq/L


 


Anion Gap  15.0   16.3 H  (5-15)  


 


BUN  27 H   30 H  (7-18)  mg/dL


 


Creatinine  1.8 H   1.7 H  (0.55-1.02)  mg/dL


 


Est Cr Clr Drug Dosing  15.22   16.11  mL/min


 


Estimated GFR (MDRD)  26   28  (>60)  mL/min


 


BUN/Creatinine Ratio  15.0   17.6  (14-18)  


 


Glucose  473 H   303 H  ()  mg/dL


 


POC Glucose     ()  mg/dL


 


Calcium  8.6   8.9  (8.5-10.1)  mg/dL


 


Magnesium    2.2  (1.8-2.4)  mg/dl











Med Orders - Current: 


                               Current Medications





Acetaminophen (Tylenol)  650 mg PO Q4H PRN


   PRN Reason: Pain/Fever


   Last Admin: 02/16/21 00:46 Dose:  650 mg


   Documented by: 


Albuterol/Ipratropium (Duoneb 3.0-0.5 Mg/3 Ml)  3 ml NEB Q4H PRN


   PRN Reason: Shortness Of Breath/wheezing


Calcium Carbonate/Glycine (Tums)  500 mg PO Q4H PRN


   PRN Reason: Heartburn


Cyclobenzaprine HCl (Flexeril)  10 mg PO TID PRN


   PRN Reason: muscle spasms 


   Last Admin: 02/16/21 00:47 Dose:  10 mg


   Documented by: 


Docusate Sodium (Colace)  100 mg PO BID [facility]


   Last Admin: 02/15/21 21:03 Dose:  100 mg


   Documented by: 


Famotidine (Pepcid)  20 mg PO Q48H [facility]


   Last Admin: 02/15/21 08:39 Dose:  20 mg


   Documented by: 


Furosemide (Lasix)  40 mg PO DAILY [facility]


   Last Admin: 02/15/21 08:40 Dose:  40 mg


   Documented by: 


Gabapentin (Neurontin)  100 mg PO BID [facility]


   Last Admin: 02/15/21 21:03 Dose:  100 mg


   Documented by: 


Hydralazine HCl (Apresoline)  10 mg IVPUSH Q4H PRN


   PRN Reason: Hypertension


Hydromorphone HCl (Dilaudid)  0.5 mg IVPUSH Q4H PRN


   PRN Reason: Pain (severe 7-10)


   Last Admin: 02/16/21 02:51 Dose:  0.5 mg


   Documented by: 


Promethazine HCl 12.5 mg/ (Sodium Chloride)  50.5 mls @ 100 mls/hr IV Q6H PRN


   PRN Reason: Nausea/Vomiting


Piperacillin Sod/Tazobactam (Sod 4.5 gm/ Sodium Chloride)  100 mls @ 25 mls/hr 

IV Q12H [facility]


   Last Admin: 02/15/21 21:02 Dose:  25 mls/hr


   Documented by: 


Insulin Glargine (Lantus)  15 unit SUBCUT BID [facility]


Insulin Human Lispro (Humalog)  0 unit SUBCUT QIDACANDBED [facility]; Protocol


   Last Admin: 02/15/21 21:04 Dose:  10 units


   Documented by: 


Magnesium Hydroxide (Milk Of Magnesia)  30 ml PO DAILY PRN


   PRN Reason: Constipation


   Last Admin: 02/12/21 16:09 Dose:  30 ml


   Documented by: 


Metoprolol Succinate (Toprol Xl)  50 mg PO DAILY [facility]


   Last Admin: 02/15/21 10:57 Dose:  50 mg


   Documented by: 


Oxycodone HCl (Oxycodone)  5 mg PO Q6H PRN


   PRN Reason: Pain (moderate 4-6)


   Last Admin: 02/16/21 00:47 Dose:  5 mg


   Documented by: 





Discontinued Medications





Acetaminophen (Tylenol)  650 mg PO Q6H PRN


   PRN Reason: Pain (Mild 1-3)/fever


   Last Admin: 02/09/21 12:34 Dose:  650 mg


   Documented by: 


Acetaminophen (Tylenol)  650 mg PO Q4H PRN


   PRN Reason: Fever


   Last Admin: 02/14/21 21:00 Dose:  650 mg


   Documented by: 


Bupivacaine HCl (Marcaine 0.5%) Confirm Administered Dose 0 ml .ROUTE .STK-MED 

ONE


   Stop: 02/11/21 09:56


Bupivacaine HCl (Sensorcaine-Mpf 0.25%) Confirm Administered Dose 0 ml .ROUTE 

.STK-MED ONE


   Stop: 02/15/21 12:57


Bupivacaine HCl (Sensorcaine-Mpf 0.25%) Confirm Administered Dose 30 ml .ROUTE 

.STK-MED ONE


   Stop: 02/15/21 13:29


Calcium Carbonate/Glycine (Tums)  400 mg PO Q4H PRN


   PRN Reason: Heartburn


Dexamethasone (Dexamethasone) Confirm Administered Dose 20 mg .ROUTE .STK-MED 

ONE


   Stop: 02/15/21 13:46


Dexmedetomidine HCl (Precedex) Confirm Administered Dose 200 mcg .ROUTE .STK-MED

ONE


   Stop: 02/15/21 12:35


Docusate Sodium (Colace)  100 mg PO BID PRN


   PRN Reason: Constipation


   Last Admin: 02/11/21 15:57 Dose:  100 mg


   Documented by: 


Famotidine (Pepcid)  20 mg PO DAILY [facility]


   Last Admin: 02/09/21 09:57 Dose:  20 mg


   Documented by: 


Fentanyl (Sublimaze) Confirm Administered Dose 0 mcg .ROUTE .STK-MED ONE


   Stop: 02/11/21 10:46


Fentanyl (Sublimaze) Confirm Administered Dose 100 mcg .ROUTE .STK-MED ONE


   Stop: 02/15/21 12:53


Furosemide (Lasix)  40 mg IVPUSH NOW ONE


   Stop: 02/08/21 21:42


   Last Admin: 02/08/21 22:00 Dose:  40 mg


   Documented by: 


Furosemide (Lasix)  20 mg PO DAILY [facility]


   Last Admin: 02/09/21 13:04 Dose:  Not Given


   Documented by: 


Furosemide (Lasix)  20 mg IVPUSH ONETIME ONE


   Stop: 02/09/21 18:01


   Last Admin: 02/09/21 18:47 Dose:  20 mg


   Documented by: 


Furosemide (Lasix)  40 mg IVPUSH NOW ONE


   Stop: 02/11/21 11:15


   Last Admin: 02/11/21 11:22 Dose:  40 mg


   Documented by: 


Furosemide (Lasix)  40 mg IVPUSH NOW ONE


   Stop: 02/12/21 08:13


   Last Admin: 02/12/21 08:29 Dose:  40 mg


   Documented by: 


Furosemide (Lasix)  40 mg PO DAILY [facility]


   Last Admin: 02/13/21 09:04 Dose:  40 mg


   Documented by: 


Gabapentin (Neurontin)  600 mg PO BID [facility]


   Last Admin: 02/09/21 08:10 Dose:  600 mg


   Documented by: 


Gabapentin (Neurontin)  600 mg PO BID [facility]


   Last Admin: 02/13/21 09:04 Dose:  600 mg


   Documented by: 


Heparin Sodium (Porcine) (Heparin Sodium)  5,000 units SUBCUT Q8H [facility]


   Stop: 02/10/21 23:00


   Last Admin: 02/10/21 21:18 Dose:  5,000 units


   Documented by: 


Heparin Sodium (Porcine) (Heparin Sodium)  5,000 units SUBCUT Q8H [facility]


   Last Admin: 02/14/21 10:58 Dose:  5,000 units


   Documented by: 


Hydromorphone HCl (Dilaudid)  0.25 mg IVPUSH ONETIME ONE


   Stop: 02/08/21 20:02


   Last Admin: 02/08/21 20:10 Dose:  0.25 mg


   Documented by: 


Hydromorphone HCl (Dilaudid)  0.25 mg IVPUSH ONETIME ONE


   Stop: 02/08/21 22:22


   Last Admin: 02/08/21 23:17 Dose:  0.25 mg


   Documented by: 


Sodium Chloride (Normal Saline)  1,000 mls @ 65 mls/hr IV ASDIRECTED [facility]


Lactated Ringer's (Ringers, Lactated)  1,000 mls @ 50 mls/hr IV ASDIRECTED [facility]


   Last Admin: 02/09/21 14:18 Dose:  50 mls/hr


   Documented by: 


Lactated Ringer's (Ringers, Lactated)  1,000 mls @ 65 mls/hr IV ASDIRECTED [facility]


   Last Admin: 02/10/21 06:46 Dose:  65 mls/hr


   Documented by: 


Lactated Ringer's (Ringers, Lactated)  1,000 mls @ 100 mls/hr IV ASDIRECTED [facility]


   Stop: 02/10/21 19:29


   Last Admin: 02/10/21 09:44 Dose:  100 mls/hr


   Documented by: 


Lactated Ringer's (Ringers, Lactated)  1,000 mls @ 75 mls/hr IV ASDIRECTED [facility]


   Last Admin: 02/11/21 07:37 Dose:  75 mls/hr


   Documented by: 


Piperacillin Sod/Tazobactam (Sod 4.5 gm/ Sodium Chloride)  100 mls @ 25 mls/hr 

IV Q8H [facility]


Piperacillin Sod/Tazobactam (Sod 4.5 gm/ Sodium Chloride)  100 mls @ 200 mls/hr 

IV ONETIME ONE


   Stop: 02/12/21 08:29


   Last Admin: 02/12/21 08:35 Dose:  200 mls/hr


   Documented by: 


Sodium Chloride (Normal Saline)  500 mls @ 45 mls/hr IV ASDIRECTED [facility]


   Stop: 02/14/21 07:37


   Last Admin: 02/13/21 21:56 Dose:  45 mls/hr


   Documented by: 


Potassium Chloride 20 meq/ (Dextrose/Water)  1,010 mls @ 50 mls/hr IV ASDIRECTED

[facility]


   Stop: 02/15/21 08:41


Potassium Chloride/Dextrose/Sod Cl (D5 1/2 Ns W/ 10 Meq/L Kcl)  1,000 mls @ 50 

mls/hr IV ASDIRECTED [facility]


   Last Admin: 02/15/21 09:08 Dose:  50 mls/hr


   Documented by: 


Lidocaine HCl (Xylocaine-Mpf 1%) Confirm Administered Dose 4 mls @ as directed 

.ROUTE .STK-MED ONE


   Stop: 02/15/21 12:54


Insulin Glargine (Lantus)  15 unit SUBCUT DAILY [facility]


   Last Admin: 02/12/21 08:01 Dose:  Not Given


   Documented by: 


Insulin Glargine (Lantus)  7.5 unit SUBCUT ONETIME ONE


   Stop: 02/12/21 09:01


   Last Admin: 02/12/21 08:16 Dose:  7.5 units


   Documented by: 


Insulin Glargine (Lantus)  15 unit SUBCUT DAILY [facility]


Insulin Glargine (Lantus)  7.5 unit SUBCUT DAILY [facility]


   Last Admin: 02/13/21 09:06 Dose:  7.5 units


   Documented by: 


Insulin Glargine (Lantus)  10 unit SUBCUT BID [facility]


   Last Admin: 02/14/21 08:36 Dose:  10 units


   Documented by: 


Insulin Glargine (Lantus)  10 unit SUBCUT NOW [facility]


   Stop: 02/13/21 19:06


   Last Admin: 02/13/21 19:28 Dose:  10 unit


   Documented by: 


Insulin Glargine (Lantus)  13 unit SUBCUT BID [facility]


   Last Admin: 02/15/21 21:03 Dose:  13 units


   Documented by: 


Insulin Glargine (Lantus)  5 unit SUBCUT NOW [facility]


   Stop: 02/14/21 21:12


   Last Admin: 02/14/21 22:33 Dose:  5 units


   Documented by: 


Insulin Human Lispro (Humalog)  0 unit SUBCUT QIDACANDBED [facility]; Protocol


   Last Admin: 02/13/21 14:23 Dose:  Not Given


   Documented by: 


Insulin Human Lispro (Humalog)  0 unit SUBCUT QIDACANDBED [facility]; Protocol


Ketamine HCl (Ketalar) Confirm Administered Dose 0 mg .ROUTE .STK-MED ONE


   Stop: 02/11/21 10:47


Ketamine HCl (Ketalar) Confirm Administered Dose 500 mg .ROUTE .STK-MED ONE


   Stop: 02/15/21 12:54


Lidocaine HCl (Xylocaine 1%)  10 ml INJECT ONETIME ONE


   Stop: 02/08/21 18:58


   Last Admin: 02/09/21 00:57 Dose:  Not Given


   Documented by: 


Lidocaine/Epinephrine (Xylocaine-Mpf 2%-Epi 1:200,000) Confirm Administered Dose

20 ml .ROUTE .STK-MED ONE


   Stop: 02/11/21 10:13


Metoprolol Tartrate (Lopressor)  5 mg IVPUSH Q5M PRN


   PRN Reason: Hypertension


   Stop: 02/15/21 18:00


   Last Admin: 02/15/21 16:21 Dose:  1 mg


   Documented by: 


Midazolam HCl (Versed 1 Mg/Ml) Confirm Administered Dose 0 mg .ROUTE .STK-MED 

ONE


   Stop: 02/11/21 10:46


Midazolam HCl (Versed 1 Mg/Ml) Confirm Administered Dose 2 mg .ROUTE .STK-MED 

ONE


   Stop: 02/15/21 12:54


Non-Formulary Medication (Insulin Degludec [Tresiba])  15 unit SQ DAILY [facility]


Ondansetron HCl (Zofran)  4 mg IVPUSH ONETIME ONE


   Stop: 02/08/21 18:48


   Last Admin: 02/08/21 18:51 Dose:  4 mg


   Documented by: 


Ondansetron HCl (Zofran) Confirm Administered Dose 4 mg .ROUTE .STK-MED ONE


   Stop: 02/08/21 19:51


   Last Admin: 02/08/21 20:11 Dose:  Not Given


   Documented by: 


Ondansetron HCl (Zofran)  4 mg IVPUSH ONETIME ONE


   Stop: 02/08/21 20:07


   Last Admin: 02/08/21 20:11 Dose:  4 mg


   Documented by: 


Potassium Chloride (Klor-Con M20)  40 meq PO DAILY [facility]


   Stop: 02/14/21 12:25


   Last Admin: 02/14/21 13:23 Dose:  Not Given


   Documented by: 


Potassium Chloride (Klor-Con M20)  40 meq PO ONETIME ONE


   Stop: 02/14/21 12:39


   Last Admin: 02/14/21 13:10 Dose:  40 meq


   Documented by: 


Propofol (Diprivan  20 Ml) Confirm Administered Dose 200 mg .ROUTE .STK-MED ONE


   Stop: 02/15/21 12:54


Ropivacaine (Naropin 0.5%) Confirm Administered Dose 30 ml .ROUTE .STK-MED ONE


   Stop: 02/11/21 09:57


Ropivacaine (Naropin 0.5%) Confirm Administered Dose 30 ml .ROUTE .STK-MED ONE


   Stop: 02/15/21 12:35


Sodium Polystyrene Sulfonate (Kayexalate)  15 gm PO ONETIME ONE


   Stop: 02/09/21 08:50


   Last Admin: 02/09/21 09:57 Dose:  15 gm


   Documented by: 


Spironolactone (Aldactone)  50 mg PO DAILY [facility]


   Last Admin: 02/09/21 08:10 Dose:  50 mg


   Documented by: 


Tamsulosin HCl (Flomax)  0.4 mg PO DAILY [facility]


   Last Admin: 02/09/21 17:44 Dose:  Not Given


   Documented by: 











- Exam


Quality Assessment: Supplemental Oxygen, DVT Prophylaxis


General: Alert, Oriented, Cooperative, No Acute Distress


HEENT: Pupils Equal, Pupils Reactive, Mucous Membr. Moist/Pink


Neck: Supple, Trachea Midline


Lungs: Clear to Auscultation, Normal Respiratory Effort, Decreased Breath Sounds


Cardiovascular: Regular Rate, Regular Rhythm


GI/Abdominal Exam: Normal Bowel Sounds, Soft, Non-Tender, No Distention


 (Female) Exam: Deferred


Back Exam: Normal Inspection, Decreased Range of Motion


Extremities: Leg Pain, Limited Range of Motion, Other (Bandage in place on right

 leg )


Peripheral Pulses: 2+: Radial (L), Radial (R), Dorsalis Pedis (L), Dorsalis 

Pedis (R)


Skin: Warm, Dry, Intact


Wound/Incisions: Dressing Dry and Intact


Neurological: No New Focal Deficit


Psy/Mental Status: Alert





Sepsis Event Note





- Evaluation


Sepsis Screening Result: No Definite Risk





- Focused Exam


Vital Signs: 


                                   Vital Signs











  Temp Temp Pulse Pulse Resp BP BP


 


 02/16/21 06:12       


 


 02/16/21 04:00   97.5 F   52 L  16   123/39 L


 


 02/16/21 00:00   97.5 F   59 L  18   136/96 H


 


 02/15/21 22:00       


 


 02/15/21 21:00       


 


 02/15/21 20:28       


 


 02/15/21 19:43  97.9 F   82   20  114/71 














  Pulse Ox Pulse Ox


 


 02/16/21 06:12   96


 


 02/16/21 04:00  100 


 


 02/16/21 00:00  92 L 


 


 02/15/21 22:00  94 L 


 


 02/15/21 21:00   94 L


 


 02/15/21 20:28   95


 


 02/15/21 19:43  94 L 














- Problem List & Annotations


(1) Closed left hip fracture


SNOMED Code(s): 390260403


   Code(s): S72.002A - FRACTURE OF UNSP PART OF NECK OF LEFT FEMUR, INIT   

Status: Acute   Priority: High   Current Visit: Yes   


Qualifiers: 


   Encounter type: initial encounter   Qualified Code(s): S72.002A - Fracture of

 unspecified part of neck of left femur, initial encounter for closed fracture  

 





(2) Anemia


SNOMED Code(s): 201199671


   Code(s): D64.9 - ANEMIA, UNSPECIFIED   Status: Chronic   Priority: Medium   

Current Visit: No   Onset Date: 06/05/19   


Qualifiers: 


   Anemia type: due to chronic kidney disease   Chronic kidney disease stage: 

stage 4 (severe)   Qualified Code(s): N18.4 - Chronic kidney disease, stage 4 

(severe); D63.1 - Anemia in chronic kidney disease   


Annotation/Comment:: anemia  sec  to  anticoagulation   with  xarolto / plavix 

and asa   asa  held   mild  bleeding  noted   from  hemmhroids   





(3) CHF (congestive heart failure), NYHA class II


SNOMED Code(s): 287448308, 835021374


   Code(s): I50.9 - HEART FAILURE, UNSPECIFIED   Status: Chronic   Priority: 

Medium   Current Visit: No   Onset Date: 06/06/19   


Qualifiers: 


   Congestive heart failure type: unspecified   Qualified Code(s): I50.9 - Heart

 failure, unspecified   





(4) Fall


SNOMED Code(s): 0528948, 223269103


   Code(s): W19.XXXA - UNSPECIFIED FALL, INITIAL ENCOUNTER   Status: Acute   

Priority: High   Current Visit: Yes   


Qualifiers: 


   Encounter type: subsequent encounter   Qualified Code(s): W19.XXXD - 

Unspecified fall, subsequent encounter   





(5) DM2 (diabetes mellitus, type 2)


SNOMED Code(s): 59786618


   Code(s): E11.9 - TYPE 2 DIABETES MELLITUS WITHOUT COMPLICATIONS   Status: 

Chronic   Priority: High   Current Visit: No   Onset Date: 06/07/19   


Qualifiers: 


   Diabetes mellitus long term insulin use: unspecified long term insulin use 

status   Diabetes mellitus complication status: with unspecified complications 





(6) Afib


SNOMED Code(s): 69662415


   Code(s): I48.91 - UNSPECIFIED ATRIAL FIBRILLATION   Status: Chronic   

Priority: Low   Current Visit: No   


Qualifiers: 


   Atrial fibrillation type: paroxysmal   Qualified Code(s): I48.0 - Paroxysmal 

atrial fibrillation   





(7) HTN (hypertension)


SNOMED Code(s): 29672981


   Code(s): I10 - ESSENTIAL (PRIMARY) HYPERTENSION   Status: Chronic   Priority:

 Medium   Current Visit: No   


Qualifiers: 


   Hypertension type: unspecified   Qualified Code(s): I10 - Essential (primary)

 hypertension   





(8) Chronic constipation


SNOMED Code(s): 017039928


   Code(s): K59.09 - OTHER CONSTIPATION   Status: Chronic   Priority: Low   

Current Visit: No   





(9) Urinary retention


SNOMED Code(s): 776172870


   Code(s): R33.9 - RETENTION OF URINE, UNSPECIFIED   Status: Chronic   

Priority: Low   Current Visit: No   





(10) Chronic back pain


SNOMED Code(s): 242692251


   Code(s): M54.9 - DORSALGIA, UNSPECIFIED; G89.29 - OTHER CHRONIC PAIN   

Status: Chronic   Priority: Low   Current Visit: No   


Qualifiers: 


   Back pain location: back pain in unspecified location   Back pain laterality:

 unspecified   Qualified Code(s): M54.9 - Dorsalgia, unspecified; G89.29 - Other

 chronic pain   





(11) History of TIA (transient ischemic attack)


SNOMED Code(s): 875107174


   Code(s): Z86.73 - PRSNL HX OF TIA (TIA), AND CEREB INFRC W/O RESID DEFICITS  

 Status: Chronic   Priority: Low   Current Visit: No   





(12) Depression


SNOMED Code(s): 31923290


   Code(s): F32.9 - MAJOR DEPRESSIVE DISORDER, SINGLE EPISODE, UNSPECIFIED   

Status: Chronic   Priority: Low   Current Visit: No   


Qualifiers: 


   Depression Type: other depression   Qualified Code(s): F32.89 - Other 

specified depressive episodes   





(13) Nursing home resident


SNOMED Code(s): 810253685


   Code(s): Z59.3 - PROBLEMS RELATED TO LIVING IN RESIDENTIAL INSTITUTION   

Status: Chronic   Priority: Low   Current Visit: No   





(14) Hyperbilirubinemia


SNOMED Code(s): 99359296


   Code(s): E80.6 - OTHER DISORDERS OF BILIRUBIN METABOLISM   Status: Acute   

Priority: Medium   Current Visit: Yes   





(15) Elevated AST (SGOT)


SNOMED Code(s): 954264772


   Code(s): R74.01 - ELEVATION OF LEVELS OF LIVER TRANSAMINASE LEVELS   Status: 

Acute   Priority: Medium   Current Visit: Yes   





(16) Elevated ALT measurement


SNOMED Code(s): 115612290


   Code(s): R74.01 - ELEVATION OF LEVELS OF LIVER TRANSAMINASE LEVELS   Status: 

Acute   Priority: Medium   Current Visit: Yes   





(17) Acute on chronic renal failure


SNOMED Code(s): 559330242


   Code(s): N17.9 - ACUTE KIDNEY FAILURE, UNSPECIFIED; N18.9 - CHRONIC KIDNEY 

DISEASE, UNSPECIFIED   Status: Acute   Priority: High   Current Visit: Yes   


Qualifiers: 


   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 

disease stage: stage 4 (severe)   Qualified Code(s): N17.2 - Acute kidney 

failure with medullary necrosis; N18.4 - Chronic kidney disease, stage 4 

(severe)   





(18) Hyperkalemia


SNOMED Code(s): 17397061


   Code(s): E87.5 - HYPERKALEMIA   Status: Resolved   Priority: High   Current 

Visit: Yes   





(19) Fluid overload


SNOMED Code(s): 12683421


   Code(s): E87.70 - FLUID OVERLOAD, UNSPECIFIED   Status: Resolved   Priority: 

High   Current Visit: Yes   


Qualifiers: 


   Hypervolemia type: unspecified   Qualified Code(s): E87.70 - Fluid overload, 

unspecified   





(20) Acute respiratory failure


SNOMED Code(s): 76705554


   Code(s): J96.00 - ACUTE RESPIRATORY FAILURE, UNSP W HYPOXIA OR HYPERCAPNIA   

Status: Acute   Current Visit: Yes   





(21) Elevated brain natriuretic peptide (BNP) level


SNOMED Code(s): 690232654, 410138404


   Code(s): R79.89 - OTHER SPECIFIED ABNORMAL FINDINGS OF BLOOD CHEMISTRY   

Status: Acute   Current Visit: Yes   





(22) Pneumonia


SNOMED Code(s): 244203819


   Code(s): J18.9 - PNEUMONIA, UNSPECIFIED ORGANISM   Status: Acute   Priority: 

High   Current Visit: Yes   


Qualifiers: 


   Pneumonia type: due to unspecified organism   Laterality: right   Lung 

location: upper lobe of lung   Qualified Code(s): J18.9 - Pneumonia, unspecified

 organism   


Annotation/Comment::  very  slow   improvmetna nd  continued  atelectasis and 

pleural  effusion  left    





(23) UTI (urinary tract infection)


SNOMED Code(s): 67610536


   Code(s): N39.0 - URINARY TRACT INFECTION, SITE NOT SPECIFIED   Status: Acute 

  Priority: High   Current Visit: Yes   


Qualifiers: 


   Urinary tract infection type: acute cystitis   Hematuria presence: without 

hematuria   Qualified Code(s): N30.00 - Acute cystitis without hematuria   





(24) Status post-operative repair of closed fracture of left hip


SNOMED Code(s): 726418376


   Code(s): Z98.890 - OTHER SPECIFIED POSTPROCEDURAL STATES; Z87.81 - PERSONAL 

HISTORY OF (HEALED) TRAUMATIC FRACTURE   Status: Acute   Current Visit: Yes   





- Problem List Review


Problem List Initiated/Reviewed/Updated: Yes





- My Orders


Last 24 Hours: 


My Active Orders





02/15/21 17:13


BETH Hose [Antiembolic Hose] [OM.PC] Routine 





02/15/21 18:58


Patient Status [ADT] Routine 





02/16/21 Breakfast


Consistent Carbohydrate Diet [DIET] 


Thickened Liquids [DIET] 














- Assessment


Assessment:: 


Assessment - Day of admission 2/9/21 (admitted late 2/8/21)


* 88 Yo female who [facility] resulting in left hip and elbow 

  pain


* Unknown why she fell. Hit head. Skin tear on left elbow


* Left leg externally rotated and shortened


* 12-lead EKG shows NSR at 81 BPM with a first degree AV block.


* Head CT shows air-fluid levels in right maxillary sinus and senescent change. 

  Nothing acute


* Left hip X-ray suspicious for fracture


* Left elbow x-ray shows nothing acute


* 4cm skin tear on left elbow with skin missing repaired with steri-strips and 

  gauze


* Left Hip CT shows acute complex proximal left femoral fracture 


   * Predominant fracture line appears to be predominantly basocervical oblique 

     however a fracture line extends superolaterally anteriorly transcervically.

      


   * Fracture lines involving the greater trochanter as well.


   * There is some angulation and overriding of the fracture fragments.


* CXR shows nothing acute


* Dr. Pruett, orthopedic surgeon consulted by ED - plans for surgery on 2/11/21 

  due to patient being on Plavix\


* Given Dilaudid for pain and Zofran for nausea


* Labs:


   * WBC 16.81 --> 18.22


   * hemoglobin 13.6 --> 13.9 


   * platelet 232 --> 189 


   * neutrophils 13.83 --> 16.37 


   * INR 0.96 


   * sodium 143 --> 140 


   * potassium 4.7 --> 5.5 


   * anion gap 14.7 --> 17.5 


   * BUN 25 --> 29 


   * creatinine 2.1 --> 2.3 


   * GFR 20 ---> 20 


   * glucose 91 --> 267


   * magnesium 2.4 


   * total bilirubin 0.5 --> 1.3 


   * AST 38 ---> 192 


   * ALT 32 --> 327 


   * alkaline phosphatase 77 --> 113 


   * troponin less than 0.017 


   * proBNP 136 --> 335 


   * albumin 3.6 --> 3.6 


   * UA negative but trace leukocyte esterase, 5-10 hyaline casts, and many 

     bacteria noted 


   * SARS Covid 2 RNA negative 


   * MRSA negative 


   * urine culture pending





2/10/21


* Continued left hip pain with movement.  No pain when laying still.


* Left elbow bandage noted to be saturated with drainage.  Nursing will change


* Up to 3 L of oxygen via nasal cannula.  Has been utilizing I-S.


* Renal function has been getting progressively worse.  Will increase IV fluids 

  and monitor.


* Transaminitis is improving and bilirubin is trending downward.


* Continue plan for surgery tomorrow.


* Labs today:


   * WBC 14.15 


   * hemoglobin 12.0 


   * platelet 158


   * neutrophils 10.94 


   * sodium 142 


   * potassium 4.1 


   * anion gap 13.1 


   * BUN 37, creatinine 2.6, GFR 17 


   * bilirubin 1.2 


   * , , alk phos 87 


   * albumin 3.0


   


2/11/21


* Continued left hip pain with movement.  No pain when laying still.


* Left elbow bandage dry and intact


* Up to 6 liters of oxygen per nasal cannula.  O2 saturations ranged from 88 to 

  90%.  Has been utilizing I-S.


* IV fluids stopped as patient is having worsening O2 saturations consistent 

  with congestive heart failure.


* Transaminitis is improving and bilirubin is trending downward.


* Surgery postponed until Monday


* Labs today:


   * WBC 14.15-->13.02


   * hemoglobin 12.0-->10.7


   * sodium 142-->143


   * potassium 4.1 


   * BUN 37, creatinine 2.6, GFR 17-->BUN 34, Creat 2.1


   * Intake and output showed a +1536


* Portable view of the chest radiologist impression: 


   1.  Increased density within the right upper chest either due to pneumonia or

 changes of aspiration.


   2.  Mild atelectasis within both lung bases.


   3.  Increased central lung markings possibly due to acute or chronic 

pulmonary vascular congestion


* Surgery postponed until Monday.





2/12/21


* Continues to have left hip pain with movement 2/2 fracture


* Planned surgical fixation on 2/15/21


* Down to 3L via NC


* UTI growing >100 CFUs aerococcus urinae 


* Started zosyn due to questionable aspiration PNA and UTI


* Given 40mg IVP lasix today


* Echo ordered due to history of prior CHF - Diastolic HF noted from 1/19 


* Labs:


   * WBC 14.20


   * Hgb 11.1


   * Potassium 3.9


   * BUN 34, Creatinine 1.8, GFR 36





2/13/2021


* Continues current treatment


* Hold parameters for cyclobenzaprine and gabapentin


* Cut down Gabapentin dose to 100 po BID due to oversedation and 


* Incentive spirometer as directed with beside pulmonary exercise


* Routine AM Labs


* Hold lasix for now


* Encourage to eat and drink fluids


* Lantus 10 units subQ BID


* DVT and GI prophylaxis: Heparin and H2B


* Code status: DNR/DNI


* LOS> 96hrs due to complexity of acute illness





2/14/2021


* Continues current treatment


* Continue hold parameters for cyclobenzaprine and gabapentin


* Incentive spirometer as directed with beside pulmonary exercise


* Routine AM Labs


* Continue to hold lasix 


* Encourage to eat and drink fluids


* May consider going up on Lantus to 13 units subQ BID since she is eating more 


* KCl 40 mEq po and 20 mEq IV x1


* D51/2 at 50 cc for hypernatremia


* Repeat BMP at 1600


* Hold Heparin dose tonight 


* DVT and GI prophylaxis: Heparin and H2B


* Code status: DNR/DNI


* LOS> 96hrs due to complexity of acute illness





2/15/2021


* Scheduled for surgery this afternoon


* Continues IS with pulmonary exercises


* Lantus increased to 13 units BID yesterday with medium SS insulin


* NPO today


* Labs today: 


   * WBC 9.64 


   * Hgb 10.7 


   * Sodium 148


   * Potasium 3.7 


   * BUN 28, Creatinine 1.6, GFR 30,


   *  Glucose 203 


   * Magnesium 2.5


* Resume diabetic diet after surgery


* Discuss VTE for tomorrow post-operatively


* Continue zosyn for UTI and possible PNA


* Continue current treatment plan





2/16/2021


* Continue IS and pulmonary exercises


* Resume diabetic diet


* Start 81mg ASA BID and resume home plavix


* Continue zosyn - discontinue tomorrow


* There was confusion about thickened liquids - confirmed with dietary patient 

  is thin liquids at [facility] 


* Labs today:


   * WBC 14.01 


   * hemoglobin 10.2 


   * neutrophils 12.12 


   * sodium 138


   * potassium 4.3 


   * anion gap 16.3 


   * BUN 30, creatinine 1.3, GFR 28


* Echo obtained on 2/12/2021:


   * 1.  Left ventricular ejection fraction, by visual estimation, is 60 to 65% 


   * 2.  Normal left ventricular systolic function 


   * 3.  Mild proximal septal hypertrophy.


   * 4.  Normal pattern of LV diastolic filling.


   * 5.  Normal right ventricular systolic function.


   * 6.  There is mild aortic valve sclerosis without stenosis.


   * 7.  Trace mitral valve regurgitation.


   * 8.  Right ventricular systolic pressure is moderately to severely elevated 

     at 46.6 mmHg.


   * 9.  No regional wall motion abnormalities.


* Continue current treatment plan


* Likely to discharge tomorrow pending continued improvement. 








- Plan


Plan:: 


Status post-operative repair of closed fracture of left hip


Closed left hip fracture


Fall


Nursing home resident


* Pain medications as ordered


* Up to chair


* Page removed 2/16/21


* Flexeril as ordered


* PT/OT


* CM/SW for discharge planning


* Dr. Pruett, orthopedic surgeon consulted in ED


* S/P left femur IM hip nailing gamma short


* Resume home plavix 


* Plavix/ASA, SCDs for VTE


* IS


* Polar care


* Nursing staff to reposition every 2 hours


* High risk for cardio-pulmonary event for intermediate surgical risk; daughter 

  Juanis is aware





Acute respiratory failure, 3-4L NC


CHF exacerbation 


CHF (congestive heart failure), NYHA class II


Elevated Pro-BNP


Fluid overload


Hypoalbuminemia with Albumin of 2.6


Questionable pneumonia


* Review old echo


* New echocardiogram obtained


* Stop fluids - renal function improved


* Already on Zosyn for antibiotic coverage


* Incentive spirometer and bedside pulmonary exercise





UTI


Leukocytosis, slightly worse


* Zosyn Q8 HR


* WBC is 14.01 today (post surgically)


* Treat underlying cause





Elevated ALT measurement, continues to improve 


* Avoid tylenol


* Re-check labs and monitor





Acute on chronic renal failure, improved


* IV fluids discontinued due to worsening heart failure


* Avoid nephrotoxic medications


* She is getting zosyn and a little too much protein supplements


* Cut down supplements and protein shakes


* Continue to hold lasix and encourage to drink fluids





DM2 (diabetes mellitus, type 2), remains uncontrolled


* Lantus (sub for Tresiba) - will cut dose by half due to minimal PO 

  intake-->increase LA to 13 units SubQ BID


* Low dose scale humalog (down from medium dose due to poor PO intake)--> 

  increased back to Medium dose 


* QID AC and bedtime blood glucose checks


* Consistent carbohydrate diet





Anemia


Afib


HTN (hypertension)


Chronic constipation


Urinary retention


Chronic back pain


History of TIA (transient ischemic attack)


Depression


* Review/reconcile home medications


* Telemetry


* PRN stool softeners





Resolved:


Hyperkalemia


Hyperbilirubinemia


Elevated AST (SGOT)


Hypokalemia


Hypernatremia


Hypermagnesemia





Code status: DNR/DNI


PCP: Dr. Vaughan


DVT prophylaxis: Heparin/SCDs


Social: Patient resides in [facility]


Disposition: Patient admitted inpatient with telemetry for surgical management 

of left hip fracture on 2/11/21.  Postponed until February 15, 2021





LOS >96 hrs due to delay in surgery for CHF/PNA/UTI treatment.

## 2021-02-16 NOTE — CR
Left hip: AP, and frog leg lateral views left hip were obtained utilizing C-arm 
device.  Six images were obtained.



Comparison: Prior left hip exam of 02/08/21.



Left hip shows placement of short intramedullary ronna and compression screw.  
Fluoroscopy time is given as 61.6 seconds.



Impression:

1.  Operative change as noted above.



Diagnostic code #2

MTDD

## 2021-02-16 NOTE — PCM.SURGPN
- General Info


Date of Service: 02/16/21


POD#: 1


Functional Status: Reports: Other (The pt noted noted to have improved mobility 

today.)





- Patient Data


Vitals - Most Recent: 


                                Last Vital Signs











Temp  97.6 F   02/16/21 11:44


 


Pulse  56 L  02/16/21 15:43


 


Resp  18   02/16/21 15:43


 


BP  114/65   02/16/21 15:43


 


Pulse Ox  92 L  02/16/21 16:15











Weight - Most Recent: 162 lb 3.2 oz


I&O - Last 24 Hours: 


                                 Intake & Output











 02/16/21 02/16/21 02/16/21





 06:59 14:59 22:59


 


Intake Total 1148 120 500


 


Output Total 625  


 


Balance 523 120 500











Lab Results Last 24 Hrs: 


                         Laboratory Results - last 24 hr











  02/15/21 02/15/21 02/15/21 Range/Units





  11:08 16:09 17:27 


 


WBC     (3.98-10.04)  K/mm3


 


RBC     (3.98-5.22)  M/mm3


 


Hgb     (11.2-15.7)  gm/dl


 


Hct     (34.1-44.9)  %


 


MCV     (79.4-94.8)  fl


 


MCH     (25.6-32.2)  pg


 


MCHC     (32.2-35.5)  g/dl


 


RDW Std Deviation     (36.4-46.3)  fL


 


Plt Count     (182-369)  K/mm3


 


MPV     (9.4-12.3)  fl


 


Neut % (Auto)     (34.0-71.1)  %


 


Lymph % (Auto)     (19.3-51.7)  %


 


Mono % (Auto)     (4.7-12.5)  %


 


Eos % (Auto)     (0.7-5.8)  


 


Baso % (Auto)     (0.1-1.2)  %


 


Neut # (Auto)     (1.56-6.13)  K/mm3


 


Lymph # (Auto)     (1.18-3.74)  K/mm3


 


Mono # (Auto)     (0.24-0.36)  K/mm3


 


Eos # (Auto)     (0.04-0.36)  K/mm3


 


Baso # (Auto)     (0.01-0.08)  K/mm3


 


Manual Slide Review     


 


Sodium     (136-145)  mEq/L


 


Potassium     (3.5-5.1)  mEq/L


 


Chloride     ()  mEq/L


 


Carbon Dioxide     (21-32)  mEq/L


 


Anion Gap     (5-15)  


 


BUN     (7-18)  mg/dL


 


Creatinine     (0.55-1.02)  mg/dL


 


Est Cr Clr Drug Dosing     mL/min


 


Estimated GFR (MDRD)     (>60)  mL/min


 


BUN/Creatinine Ratio     (14-18)  


 


Glucose     ()  mg/dL


 


POC Glucose  236 H  277 H  333 H  ()  mg/dL


 


Calcium     (8.5-10.1)  mg/dL


 


Magnesium     (1.8-2.4)  mg/dl














  02/15/21 02/16/21 02/16/21 Range/Units





  20:10 04:43 04:43 


 


WBC   14.01 H   (3.98-10.04)  K/mm3


 


RBC   3.40 L   (3.98-5.22)  M/mm3


 


Hgb   10.2 L   (11.2-15.7)  gm/dl


 


Hct   32.4 L   (34.1-44.9)  %


 


MCV   95.3 H   (79.4-94.8)  fl


 


MCH   30.0   (25.6-32.2)  pg


 


MCHC   31.5 L   (32.2-35.5)  g/dl


 


RDW Std Deviation   49.5 H   (36.4-46.3)  fL


 


Plt Count   222   (182-369)  K/mm3


 


MPV   11.5   (9.4-12.3)  fl


 


Neut % (Auto)   87.1 H   (34.0-71.1)  %


 


Lymph % (Auto)   7.6 L   (19.3-51.7)  %


 


Mono % (Auto)   3.7 L   (4.7-12.5)  %


 


Eos % (Auto)   0.1 L   (0.7-5.8)  


 


Baso % (Auto)   0.1   (0.1-1.2)  %


 


Neut # (Auto)   12.22 H   (1.56-6.13)  K/mm3


 


Lymph # (Auto)   1.06 L   (1.18-3.74)  K/mm3


 


Mono # (Auto)   0.52 H   (0.24-0.36)  K/mm3


 


Eos # (Auto)   0.01 L   (0.04-0.36)  K/mm3


 


Baso # (Auto)   0.01   (0.01-0.08)  K/mm3


 


Manual Slide Review   Normal smear   


 


Sodium  136  D   138  (136-145)  mEq/L


 


Potassium  4.0   4.3  (3.5-5.1)  mEq/L


 


Chloride  100   101  ()  mEq/L


 


Carbon Dioxide  25   25  (21-32)  mEq/L


 


Anion Gap  15.0   16.3 H  (5-15)  


 


BUN  27 H   30 H  (7-18)  mg/dL


 


Creatinine  1.8 H   1.7 H  (0.55-1.02)  mg/dL


 


Est Cr Clr Drug Dosing  15.22   16.11  mL/min


 


Estimated GFR (MDRD)  26   28  (>60)  mL/min


 


BUN/Creatinine Ratio  15.0   17.6  (14-18)  


 


Glucose  473 H   303 H  ()  mg/dL


 


POC Glucose     ()  mg/dL


 


Calcium  8.6   8.9  (8.5-10.1)  mg/dL


 


Magnesium    2.2  (1.8-2.4)  mg/dl














  02/16/21 02/16/21 02/16/21 Range/Units





  06:45 12:20 16:52 


 


WBC     (3.98-10.04)  K/mm3


 


RBC     (3.98-5.22)  M/mm3


 


Hgb     (11.2-15.7)  gm/dl


 


Hct     (34.1-44.9)  %


 


MCV     (79.4-94.8)  fl


 


MCH     (25.6-32.2)  pg


 


MCHC     (32.2-35.5)  g/dl


 


RDW Std Deviation     (36.4-46.3)  fL


 


Plt Count     (182-369)  K/mm3


 


MPV     (9.4-12.3)  fl


 


Neut % (Auto)     (34.0-71.1)  %


 


Lymph % (Auto)     (19.3-51.7)  %


 


Mono % (Auto)     (4.7-12.5)  %


 


Eos % (Auto)     (0.7-5.8)  


 


Baso % (Auto)     (0.1-1.2)  %


 


Neut # (Auto)     (1.56-6.13)  K/mm3


 


Lymph # (Auto)     (1.18-3.74)  K/mm3


 


Mono # (Auto)     (0.24-0.36)  K/mm3


 


Eos # (Auto)     (0.04-0.36)  K/mm3


 


Baso # (Auto)     (0.01-0.08)  K/mm3


 


Manual Slide Review     


 


Sodium     (136-145)  mEq/L


 


Potassium     (3.5-5.1)  mEq/L


 


Chloride     ()  mEq/L


 


Carbon Dioxide     (21-32)  mEq/L


 


Anion Gap     (5-15)  


 


BUN     (7-18)  mg/dL


 


Creatinine     (0.55-1.02)  mg/dL


 


Est Cr Clr Drug Dosing     mL/min


 


Estimated GFR (MDRD)     (>60)  mL/min


 


BUN/Creatinine Ratio     (14-18)  


 


Glucose     ()  mg/dL


 


POC Glucose  332 H  350 H  274 H  ()  mg/dL


 


Calcium     (8.5-10.1)  mg/dL


 


Magnesium     (1.8-2.4)  mg/dl











Med Orders - Current: 


                               Current Medications





Acetaminophen (Tylenol)  650 mg PO Q4H PRN


   PRN Reason: Pain/Fever


   Last Admin: 02/16/21 12:23 Dose:  650 mg


   Documented by: 


Albuterol/Ipratropium (Duoneb 3.0-0.5 Mg/3 Ml)  3 ml NEB Q4H PRN


   PRN Reason: Shortness Of Breath/wheezing


Aspirin (Aspirin)  81 mg PO BID [facility]


   Stop: 03/23/21 09:01


   Last Admin: 02/16/21 08:16 Dose:  81 mg


   Documented by: 


Calcium Carbonate/Glycine (Tums)  500 mg PO Q4H PRN


   PRN Reason: Heartburn


Clopidogrel Bisulfate (Plavix)  75 mg PO DAILY [facility]


   Last Admin: 02/16/21 08:16 Dose:  75 mg


   Documented by: 


Cyclobenzaprine HCl (Flexeril)  10 mg PO TID PRN


   PRN Reason: muscle spasms 


   Last Admin: 02/16/21 00:47 Dose:  10 mg


   Documented by: 


Docusate Sodium (Colace)  100 mg PO BID [facility]


   Last Admin: 02/16/21 08:16 Dose:  100 mg


   Documented by: 


Famotidine (Pepcid)  20 mg PO Q48H [facility]


   Last Admin: 02/15/21 08:39 Dose:  20 mg


   Documented by: 


Furosemide (Lasix)  40 mg PO DAILY [facility]


   Last Admin: 02/16/21 08:16 Dose:  40 mg


   Documented by: 


Gabapentin (Neurontin)  100 mg PO BID [facility]


   Last Admin: 02/16/21 08:16 Dose:  100 mg


   Documented by: 


Hydralazine HCl (Apresoline)  10 mg IVPUSH Q4H PRN


   PRN Reason: Hypertension


Hydromorphone HCl (Dilaudid)  0.5 mg IVPUSH Q4H PRN


   PRN Reason: Pain (severe 7-10)


   Last Admin: 02/16/21 02:51 Dose:  0.5 mg


   Documented by: 


Promethazine HCl 12.5 mg/ (Sodium Chloride)  50.5 mls @ 100 mls/hr IV Q6H PRN


   PRN Reason: Nausea/Vomiting


Piperacillin Sod/Tazobactam (Sod 4.5 gm/ Sodium Chloride)  100 mls @ 25 mls/hr 

IV Q12H [facility]


   Last Admin: 02/16/21 08:08 Dose:  25 mls/hr


   Documented by: 


Insulin Glargine (Lantus)  15 unit SUBCUT BID [facility]


   Last Admin: 02/16/21 08:21 Dose:  15 units


   Documented by: 


Insulin Human Lispro (Humalog)  0 unit SUBCUT QIDACANDBED [facility]; Protocol


   Last Admin: 02/16/21 17:35 Dose:  6 units


   Documented by: 


Magnesium Hydroxide (Milk Of Magnesia)  30 ml PO DAILY PRN


   PRN Reason: Constipation


   Last Admin: 02/12/21 16:09 Dose:  30 ml


   Documented by: 


Metoprolol Succinate (Toprol Xl)  50 mg PO DAILY [facility]


   Last Admin: 02/16/21 08:16 Dose:  50 mg


   Documented by: 


Oxycodone HCl (Oxycodone)  5 mg PO Q6H PRN


   PRN Reason: Pain (moderate 4-6)


   Last Admin: 02/16/21 12:26 Dose:  5 mg


   Documented by: 


Polyethylene Glycol (Miralax)  17 gm PO ASDIRECTED PRN


   PRN Reason: Constipation





Discontinued Medications





Acetaminophen (Tylenol)  650 mg PO Q6H PRN


   PRN Reason: Pain (Mild 1-3)/fever


   Last Admin: 02/09/21 12:34 Dose:  650 mg


   Documented by: 


Acetaminophen (Tylenol)  650 mg PO Q4H PRN


   PRN Reason: Fever


   Last Admin: 02/14/21 21:00 Dose:  650 mg


   Documented by: 


Bupivacaine HCl (Marcaine 0.5%) Confirm Administered Dose 0 ml .ROUTE .STK-MED 

ONE


   Stop: 02/11/21 09:56


Bupivacaine HCl (Sensorcaine-Mpf 0.25%) Confirm Administered Dose 0 ml .ROUTE 

.STK-MED ONE


   Stop: 02/15/21 12:57


Bupivacaine HCl (Sensorcaine-Mpf 0.25%) Confirm Administered Dose 30 ml .ROUTE 

.STK-MED ONE


   Stop: 02/15/21 13:29


   Last Admin: 02/15/21 15:00 Dose:  15 ml


   Documented by: 


Calcium Carbonate/Glycine (Tums)  400 mg PO Q4H PRN


   PRN Reason: Heartburn


Dexamethasone (Dexamethasone) Confirm Administered Dose 20 mg .ROUTE .STK-MED 

ONE


   Stop: 02/15/21 13:46


Dexmedetomidine HCl (Precedex) Confirm Administered Dose 200 mcg .ROUTE .STK-MED

ONE


   Stop: 02/15/21 12:35


Docusate Sodium (Colace)  100 mg PO BID PRN


   PRN Reason: Constipation


   Last Admin: 02/11/21 15:57 Dose:  100 mg


   Documented by: 


Famotidine (Pepcid)  20 mg PO DAILY [facility]


   Last Admin: 02/09/21 09:57 Dose:  20 mg


   Documented by: 


Fentanyl (Sublimaze) Confirm Administered Dose 0 mcg .ROUTE .ST-MED ONE


   Stop: 02/11/21 10:46


Fentanyl (Sublimaze) Confirm Administered Dose 100 mcg .ROUTE .STK-MED ONE


   Stop: 02/15/21 12:53


Furosemide (Lasix)  40 mg IVPUSH NOW ONE


   Stop: 02/08/21 21:42


   Last Admin: 02/08/21 22:00 Dose:  40 mg


   Documented by: 


Furosemide (Lasix)  20 mg PO DAILY [facility]


   Last Admin: 02/09/21 13:04 Dose:  Not Given


   Documented by: 


Furosemide (Lasix)  20 mg IVPUSH ONETIME ONE


   Stop: 02/09/21 18:01


   Last Admin: 02/09/21 18:47 Dose:  20 mg


   Documented by: 


Furosemide (Lasix)  40 mg IVPUSH NOW ONE


   Stop: 02/11/21 11:15


   Last Admin: 02/11/21 11:22 Dose:  40 mg


   Documented by: 


Furosemide (Lasix)  40 mg IVPUSH NOW ONE


   Stop: 02/12/21 08:13


   Last Admin: 02/12/21 08:29 Dose:  40 mg


   Documented by: 


Furosemide (Lasix)  40 mg PO DAILY [facility]


   Last Admin: 02/13/21 09:04 Dose:  40 mg


   Documented by: 


Gabapentin (Neurontin)  600 mg PO BID [facility]


   Last Admin: 02/09/21 08:10 Dose:  600 mg


   Documented by: 


Gabapentin (Neurontin)  600 mg PO BID [facility]


   Last Admin: 02/13/21 09:04 Dose:  600 mg


   Documented by: 


Heparin Sodium (Porcine) (Heparin Sodium)  5,000 units SUBCUT Q8H [facility]


   Stop: 02/10/21 23:00


   Last Admin: 02/10/21 21:18 Dose:  5,000 units


   Documented by: 


Heparin Sodium (Porcine) (Heparin Sodium)  5,000 units SUBCUT Q8H [facility]


   Last Admin: 02/14/21 10:58 Dose:  5,000 units


   Documented by: 


Hydromorphone HCl (Dilaudid)  0.25 mg IVPUSH ONETIME ONE


   Stop: 02/08/21 20:02


   Last Admin: 02/08/21 20:10 Dose:  0.25 mg


   Documented by: 


Hydromorphone HCl (Dilaudid)  0.25 mg IVPUSH ONETIME ONE


   Stop: 02/08/21 22:22


   Last Admin: 02/08/21 23:17 Dose:  0.25 mg


   Documented by: 


Sodium Chloride (Normal Saline)  1,000 mls @ 65 mls/hr IV ASDIRECTED [facility]


Lactated Ringer's (Ringers, Lactated)  1,000 mls @ 50 mls/hr IV ASDIRECTED [facility]


   Last Admin: 02/09/21 14:18 Dose:  50 mls/hr


   Documented by: 


Lactated Ringer's (Ringers, Lactated)  1,000 mls @ 65 mls/hr IV ASDIRECTED [facility]


   Last Admin: 02/10/21 06:46 Dose:  65 mls/hr


   Documented by: 


Lactated Ringer's (Ringers, Lactated)  1,000 mls @ 100 mls/hr IV ASDIRECTED [facility]


   Stop: 02/10/21 19:29


   Last Admin: 02/10/21 09:44 Dose:  100 mls/hr


   Documented by: 


Lactated Ringer's (Ringers, Lactated)  1,000 mls @ 75 mls/hr IV ASDIRECTED [facility]


   Last Admin: 02/11/21 07:37 Dose:  75 mls/hr


   Documented by: 


Piperacillin Sod/Tazobactam (Sod 4.5 gm/ Sodium Chloride)  100 mls @ 25 mls/hr 

IV Q8H [facility]


Piperacillin Sod/Tazobactam (Sod 4.5 gm/ Sodium Chloride)  100 mls @ 200 mls/hr 

IV ONETIME ONE


   Stop: 02/12/21 08:29


   Last Admin: 02/12/21 08:35 Dose:  200 mls/hr


   Documented by: 


Sodium Chloride (Normal Saline)  500 mls @ 45 mls/hr IV ASDIRECTED [facility]


   Stop: 02/14/21 07:37


   Last Admin: 02/13/21 21:56 Dose:  45 mls/hr


   Documented by: 


Potassium Chloride 20 meq/ (Dextrose/Water)  1,010 mls @ 50 mls/hr IV ASDIRECTED

[facility]


   Stop: 02/15/21 08:41


Potassium Chloride/Dextrose/Sod Cl (D5 1/2 Ns W/ 10 Meq/L Kcl)  1,000 mls @ 50 

mls/hr IV ASDIRECTED [facility]


   Last Admin: 02/15/21 09:08 Dose:  50 mls/hr


   Documented by: 


Lidocaine HCl (Xylocaine-Mpf 1%) Confirm Administered Dose 4 mls @ as directed 

.ROUTE .STK-MED ONE


   Stop: 02/15/21 12:54


Insulin Glargine (Lantus)  15 unit SUBCUT DAILY [facility]


   Last Admin: 02/12/21 08:01 Dose:  Not Given


   Documented by: 


Insulin Glargine (Lantus)  7.5 unit SUBCUT ONETIME ONE


   Stop: 02/12/21 09:01


   Last Admin: 02/12/21 08:16 Dose:  7.5 units


   Documented by: 


Insulin Glargine (Lantus)  15 unit SUBCUT DAILY [facility]


Insulin Glargine (Lantus)  7.5 unit SUBCUT DAILY [facility]


   Last Admin: 02/13/21 09:06 Dose:  7.5 units


   Documented by: 


Insulin Glargine (Lantus)  10 unit SUBCUT BID [facility]


   Last Admin: 02/14/21 08:36 Dose:  10 units


   Documented by: 


Insulin Glargine (Lantus)  10 unit SUBCUT NOW [facility]A


   Stop: 02/13/21 19:06


   Last Admin: 02/13/21 19:28 Dose:  10 unit


   Documented by: 


Insulin Glargine (Lantus)  13 unit SUBCUT BID [facility]


   Last Admin: 02/15/21 21:03 Dose:  13 units


   Documented by: 


Insulin Glargine (Lantus)  5 unit SUBCUT NOW STA


   Stop: 02/14/21 21:12


   Last Admin: 02/14/21 22:33 Dose:  5 units


   Documented by: 


Insulin Human Lispro (Humalog)  0 unit SUBCUT QIDACANDBED [facility]; Protocol


   Last Admin: 02/13/21 14:23 Dose:  Not Given


   Documented by: 


Insulin Human Lispro (Humalog)  0 unit SUBCUT QIDACANDBED [facility]; Protocol


Ketamine HCl (Ketalar) Confirm Administered Dose 0 mg .ROUTE .STK-MED ONE


   Stop: 02/11/21 10:47


Ketamine HCl (Ketalar) Confirm Administered Dose 500 mg .ROUTE .STK-MED ONE


   Stop: 02/15/21 12:54


Lidocaine HCl (Xylocaine 1%)  10 ml INJECT ONETIME ONE


   Stop: 02/08/21 18:58


   Last Admin: 02/09/21 00:57 Dose:  Not Given


   Documented by: 


Lidocaine/Epinephrine (Xylocaine-Mpf 2%-Epi 1:200,000) Confirm Administered Dose

20 ml .ROUTE .STK-MED ONE


   Stop: 02/11/21 10:13


Metoprolol Tartrate (Lopressor)  5 mg IVPUSH Q5M PRN


   PRN Reason: Hypertension


   Stop: 02/15/21 18:00


   Last Admin: 02/15/21 16:21 Dose:  1 mg


   Documented by: 


Midazolam HCl (Versed 1 Mg/Ml) Confirm Administered Dose 0 mg .ROUTE .STK-MED ON

E


   Stop: 02/11/21 10:46


Midazolam HCl (Versed 1 Mg/Ml) Confirm Administered Dose 2 mg .ROUTE .STK-MED 

ONE


   Stop: 02/15/21 12:54


Non-Formulary Medication (Insulin Degludec [Tresiba])  15 unit SQ DAILY [facility]


Ondansetron HCl (Zofran)  4 mg IVPUSH ONETIME ONE


   Stop: 02/08/21 18:48


   Last Admin: 02/08/21 18:51 Dose:  4 mg


   Documented by: 


Ondansetron HCl (Zofran) Confirm Administered Dose 4 mg .ROUTE .STK-MED ONE


   Stop: 02/08/21 19:51


   Last Admin: 02/08/21 20:11 Dose:  Not Given


   Documented by: 


Ondansetron HCl (Zofran)  4 mg IVPUSH ONETIME ONE


   Stop: 02/08/21 20:07


   Last Admin: 02/08/21 20:11 Dose:  4 mg


   Documented by: 


Potassium Chloride (Klor-Con M20)  40 meq PO DAILY [facility]


   Stop: 02/14/21 12:25


   Last Admin: 02/14/21 13:23 Dose:  Not Given


   Documented by: 


Potassium Chloride (Klor-Con M20)  40 meq PO ONETIME ONE


   Stop: 02/14/21 12:39


   Last Admin: 02/14/21 13:10 Dose:  40 meq


   Documented by: 


Propofol (Diprivan  20 Ml) Confirm Administered Dose 200 mg .ROUTE .STK-MED ONE


   Stop: 02/15/21 12:54


Ropivacaine (Naropin 0.5%) Confirm Administered Dose 30 ml .ROUTE .STK-MED ONE


   Stop: 02/11/21 09:57


Ropivacaine (Naropin 0.5%) Confirm Administered Dose 30 ml .ROUTE .STK-MED ONE


   Stop: 02/15/21 12:35


Sodium Polystyrene Sulfonate (Kayexalate)  15 gm PO ONETIME ONE


   Stop: 02/09/21 08:50


   Last Admin: 02/09/21 09:57 Dose:  15 gm


   Documented by: 


Spironolactone (Aldactone)  50 mg PO DAILY [facility]


   Last Admin: 02/09/21 08:10 Dose:  50 mg


   Documented by: 


Tamsulosin HCl (Flomax)  0.4 mg PO DAILY [facility]


   Last Admin: 02/09/21 17:44 Dose:  Not Given


   Documented by: 











- Exam


Wound/Incisions: Dressing Dry and Intact


Lungs: Normal Respiratory Effort


Extremities: Other (Left thigh soft.  )





Sepsis Event Note





- Evaluation


Sepsis Screening Result: No Definite Risk





- Focused Exam


Vital Signs: 


                                   Vital Signs











  Temp Temp Pulse Pulse Resp BP BP


 


 02/16/21 16:15       


 


 02/16/21 15:43    53 L   18  114/65 


 


 02/16/21 11:44   97.6 F   51 L  18   110/31 L


 


 02/16/21 11:26  97.5 F   51 L    


 


 02/16/21 11:16      18  110/31 L 


 


 02/16/21 08:24  97.5 F      


 


 02/16/21 08:19    65   18  127/93 H 


 


 02/16/21 08:16    65    127/93 H 


 


 02/16/21 08:00   97.5 F   65  18   127/93 H


 


 02/16/21 06:12       














  Pulse Ox Pulse Ox


 


 02/16/21 16:15   92 L


 


 02/16/21 15:43  84 L 


 


 02/16/21 11:44  96 


 


 02/16/21 11:26  96 


 


 02/16/21 11:16  


 


 02/16/21 08:24  


 


 02/16/21 08:19  92 L 


 


 02/16/21 08:16  


 


 02/16/21 08:00  92 L 


 


 02/16/21 06:12   96














- Problem List Review


Problem List Initiated/Reviewed/Updated: Yes





- My Orders


Last 24 Hours: 


                               Active Orders 24 hr











 Category Date Time Status


 


 Patient Status [ADT] Routine ADT  02/15/21 18:58 Active


 


 Up to Chair [RC] ASDIRECTED Care  02/16/21 10:38 Active


 


 Consistent Carbohydrate Diet [DIET] Diet  02/16/21 Breakfast Active


 


 BMP [BASIC METABOLIC PANEL,BMP] [CHEM] AM Lab  02/17/21 05:11 Ordered


 


 CBC WITH AUTO DIFF [HEME] AM Lab  02/17/21 05:11 Ordered


 


 MAGNESIUM [CHEM] AM Lab  02/17/21 05:11 Ordered


 


 Acetaminophen [TylenoL] Med  02/16/21 00:36 Active





 650 mg PO Q4H PRN   


 


 Aspirin Med  02/16/21 09:00 Active





 81 mg PO BID   


 


 Clopidogrel [Plavix] Med  02/16/21 09:00 Active





 75 mg PO DAILY   


 


 Insulin Glarg,Human.Rec.Analog [LantUS] Med  02/16/21 09:00 Active





 15 unit SUBCUT BID   


 


 polyethylene glycoL 3350 [MiraLAX] Med  02/16/21 07:43 Active





 17 gm PO ASDIRECTED PRN   


 


 BETH Hose [Antiembolic Hose] [OM.PC] Routine Oth  02/15/21 17:13 Ordered


 


 Weight bearing status [OM.PC] Routine Oth  02/15/21 18:47 Ordered








                                Medication Orders





Acetaminophen (Tylenol)  650 mg PO Q4H PRN


   PRN Reason: Pain/Fever


   Last Admin: 02/16/21 12:23  Dose: 650 mg


   Documented by: SARAH


   Admin: 02/16/21 00:46  Dose: 650 mg


   Documented by: NOAH


Albuterol/Ipratropium (Duoneb 3.0-0.5 Mg/3 Ml)  3 ml NEB Q4H PRN


   PRN Reason: Shortness Of Breath/wheezing


Aspirin (Aspirin)  81 mg PO BID [facility]


   Stop: 03/23/21 09:01


   Last Admin: 02/16/21 08:16  Dose: 81 mg


   Documented by: SARAH


Calcium Carbonate/Glycine (Tums)  500 mg PO Q4H PRN


   PRN Reason: Heartburn


Clopidogrel Bisulfate (Plavix)  75 mg PO DAILY [facility]


   Last Admin: 02/16/21 08:16  Dose: 75 mg


   Documented by: SARAH


Cyclobenzaprine HCl (Flexeril)  10 mg PO TID PRN


   PRN Reason: muscle spasms 


   Last Admin: 02/16/21 00:47  Dose: 10 mg


   Documented by: NOAH


   Admin: 02/09/21 12:36  Dose: 10 mg


   Documented by: SARAH


Docusate Sodium (Colace)  100 mg PO BID [facility]


   Last Admin: 02/16/21 08:16  Dose: 100 mg


   Documented by: SARAH


   Admin: 02/15/21 21:03  Dose: 100 mg


   Documented by: NOAH


   Admin: 02/15/21 08:39  Dose: 100 mg


   Documented by: ZAHRA


   Admin: 02/14/21 20:36  Dose: 100 mg


   Documented by: TOÑO


   Admin: 02/14/21 08:43  Dose: 100 mg


   Documented by: MIRI


   Admin: 02/13/21 20:11  Dose: 100 mg


   Documented by: TENISHA


   Admin: 02/13/21 09:05  Dose: 100 mg


   Documented by: ADARSH


   Admin: 02/12/21 21:15  Dose: 100 mg


   Documented by: TENISHA


Famotidine (Pepcid)  20 mg PO Q48H [facility]


   Last Admin: 02/15/21 08:39  Dose: 20 mg


   Documented by: ZAHRA


   Admin: 02/13/21 09:08  Dose: 20 mg


   Documented by: ADARSH


   Admin: 02/11/21 08:23 Dose:  Not Given


   Documented by: SARAH


Furosemide (Lasix)  40 mg PO DAILY [facility]


   Last Admin: 02/16/21 08:16  Dose: 40 mg


   Documented by: SARAH


   Admin: 02/15/21 08:40  Dose: 40 mg


   Documented by: ZAHRA


Gabapentin (Neurontin)  100 mg PO BID [facility]


   Last Admin: 02/16/21 08:16  Dose: 100 mg


   Documented by: SARAH


   Admin: 02/15/21 21:03  Dose: 100 mg


   Documented by: NOAH


   Admin: 02/15/21 08:39  Dose: 100 mg


   Documented by: ZAHRA


   Admin: 02/14/21 20:36  Dose: 100 mg


   Documented by: TOÑO


   Admin: 02/14/21 09:10  Dose: 100 mg


   Documented by: MIRI


   Admin: 02/13/21 20:11  Dose: 100 mg


   Documented by: TENISHA


Hydralazine HCl (Apresoline)  10 mg IVPUSH Q4H PRN


   PRN Reason: Hypertension


Hydromorphone HCl (Dilaudid)  0.5 mg IVPUSH Q4H PRN


   PRN Reason: Pain (severe 7-10)


   Last Admin: 02/16/21 02:51  Dose: 0.5 mg


   Documented by: NOAH


   Admin: 02/11/21 08:18  Dose: 0.5 mg


   Documented by: SARAH


   Admin: 02/10/21 09:54  Dose: 0.5 mg


   Documented by: STEVEN


   Admin: 02/09/21 05:13  Dose: 0.5 mg


   Documented by: CATRINA


   Admin: 02/09/21 01:09  Dose: 0.5 mg


   Documented by: DAYANA


Promethazine HCl 12.5 mg/ (Sodium Chloride)  50.5 mls @ 100 mls/hr IV Q6H PRN


   PRN Reason: Nausea/Vomiting


Piperacillin Sod/Tazobactam (Sod 4.5 gm/ Sodium Chloride)  100 mls @ 25 mls/hr 

IV Q12H [facility]


   Last Admin: 02/16/21 08:08  Dose: 25 mls/hr


   Documented by: SARAH


   Infusion: 02/16/21 01:02  Dose: 25 mls/hr


   Documented by: SARAH


   Admin: 02/15/21 21:02  Dose: 25 mls/hr


   Documented by: NOAH


   Infusion: 02/15/21 12:36  Dose: 25 mls/hr


   Documented by: NOAH


   Admin: 02/15/21 08:36  Dose: 25 mls/hr


   Documented by: ZAHRA


   Infusion: 02/15/21 00:30  Dose: 25 mls/hr


   Documented by: ZAHRA


   Admin: 02/14/21 20:30  Dose: 25 mls/hr


   Documented by: TOÑO


   Infusion: 02/14/21 12:14  Dose: 25 mls/hr


   Documented by: TOÑO


   Admin: 02/14/21 08:14  Dose: 25 mls/hr


   Documented by: MIRI


   Infusion: 02/14/21 00:11  Dose: 25 mls/hr


   Documented by: MIRI


   Admin: 02/13/21 20:11  Dose: 25 mls/hr


   Documented by: TENISHA


   Infusion: 02/13/21 13:04  Dose: 25 mls/hr


   Documented by: TENISHA


   Admin: 02/13/21 09:04  Dose: 25 mls/hr


   Documented by: ADARSH


   Infusion: 02/13/21 01:15  Dose: 25 mls/hr


   Documented by: ADARSH


   Admin: 02/12/21 21:15  Dose: 25 mls/hr


   Documented by: TENISHA


Insulin Glargine (Lantus)  15 unit SUBCUT BID [facility]


   Last Admin: 02/16/21 08:21  Dose: 15 units


   Documented by: PROEAMA


Insulin Human Lispro (Humalog)  0 unit SUBCUT QIDACANDBED [facility]; Protocol


   Last Admin: 02/16/21 17:35  Dose: 6 units


   Documented by: MERON


   Admin: 02/16/21 12:34  Dose: 10 units


   Documented by: SARAH


   Admin: 02/16/21 08:05  Dose: 8 units


   Documented by: SARAH


   Admin: 02/15/21 21:04  Dose: 10 units


   Documented by: NOAH


   Admin: 02/15/21 18:48  Dose: 8 units


   Documented by: DAVID


   Admin: 02/15/21 12:02 Dose:  Not Given


   Documented by: ZAHRA


   Admin: 02/15/21 07:42 Dose:  Not Given


   Documented by: ZAHRA


   Admin: 02/14/21 22:35  Dose: 10 units


   Documented by: TOÑO


   Admin: 02/14/21 18:00  Dose: 8 units


   Documented by: MIRI


   Admin: 02/14/21 11:06  Dose: 10 units


   Documented by: MIRI


   Admin: 02/14/21 10:45 Dose:  Not Given


   Documented by: MIRI


   Admin: 02/13/21 22:26  Dose: 10 units


   Documented by: TENISHA


   Admin: 02/13/21 19:03  Dose: 10 units


   Documented by: ADARSH


   Admin: 02/13/21 12:58  Dose: 10 units


   Documented by: ADARSH


Magnesium Hydroxide (Milk Of Magnesia)  30 ml PO DAILY PRN


   PRN Reason: Constipation


   Last Admin: 02/12/21 16:09  Dose: 30 ml


   Documented by: ADARSH


Metoprolol Succinate (Toprol Xl)  50 mg PO DAILY RYLEY


   Last Admin: 02/16/21 08:16  Dose: 50 mg


   Documented by: SARAH


   Admin: 02/15/21 10:57  Dose: 50 mg


   Documented by: ZAHRA


   Admin: 02/14/21 08:43  Dose: 50 mg


   Documented by: MIRI


   Admin: 02/13/21 09:05  Dose: 50 mg


   Documented by: ADARSH


   Admin: 02/12/21 08:17  Dose: 50 mg


   Documented by: SARAH


   Admin: 02/11/21 08:23  Dose: 50 mg


   Documented by: SARAH


   Admin: 02/10/21 08:57  Dose: 50 mg


   Documented by: STEVEN


   Admin: 02/09/21 08:11  Dose: 50 mg


   Documented by: SARAH


Oxycodone HCl (Oxycodone)  5 mg PO Q6H PRN


   PRN Reason: Pain (moderate 4-6)


   Last Admin: 02/16/21 12:26  Dose: 5 mg


   Documented by: SARAH


   Admin: 02/16/21 00:47  Dose: 5 mg


   Documented by: NOAH


   Admin: 02/15/21 21:07  Dose: 5 mg


   Documented by: NOAH


   Admin: 02/13/21 06:56  Dose: 5 mg


   Documented by: TENISHA


   Admin: 02/12/21 08:18  Dose: 5 mg


   Documented by: SARAH


   Admin: 02/11/21 21:48  Dose: 5 mg


   Documented by: NOAH


   Admin: 02/11/21 01:36  Dose: 5 mg


   Documented by: MERON


   Admin: 02/10/21 08:56  Dose: 5 mg


   Documented by: STEVEN


   Admin: 02/09/21 20:48  Dose: 5 mg


   Documented by: MERON


   Admin: 02/09/21 12:35  Dose: 5 mg


   Documented by: SARAH


Polyethylene Glycol (Miralax)  17 gm PO ASDIRECTED PRN


   PRN Reason: Constipation











- Assessment


Assessment           (Free Text/Narrative):: 





POD#1 - cephalomedullary nail placement for left hip fracture





- Plan


Plan                        (Free Text/Narrative):: 





1.  Progress with therapies as tolerated.


2.  WBAT LLE.


3.  Pt has resumed use of Plavix and is using 81mg ASA PO BID.


4.  Further orders per Hospitalist service.





The pt was evaluated by Dr. Pruett today.

## 2021-02-17 NOTE — PCM.DCSUM1
**Discharge Summary





- Hospital Course


HPI Initial Comments: 


This is an 88 yo female who presents to ED via [facility] on 2/8/2021 

after a fall resulting in a left elbow and hip injury.  Patient states that she 

thinks her hip gave out or possibly her blood sugar dropped after she got up 

from a chair to walk to the bathroom and she fell.  This occurred at the skilled

nursing facility where she resides.  She reports she did hit her head and she is

on Plavix.  Denies any loss of consciousness. She does have a large skin tear to

her left elbow.  Left leg is noted to be externally rotated and shortened.





In the ED temp is 97.4.  Pulse is 58.  Respirations 16.  Blood pressure 127/55. 

Pulse ox 78%.  Twelve-lead EKG is obtained showing a sinus rhythm at 81 bpm with

a first-degree AV block.  No signs of ischemia.  Labs are changed showing WBC 

does elevated at 16.81.  Hemoglobin is 13.6.  Platelets are good at 232,000.  

Neutrophils are elevated at 13.83.  INR 0.96.  Sodium 143.  Potassium 4.7.  

Chloride 105.  Carbon dioxide 28.  Anion gap 14.7.  BUN is elevated 25.  

Creatinine elevated 2.1.  GFR is 22.  Glucose 91.  Calcium 9.5.  Bilirubin 0.5. 

AST is 38, ALT 32, alkaline phosphatase 77.  Protein 7.1.  Albumin 3.6.  

Magnesium is 2.4.  Troponin is less than 0.017.  proBNP is 136.  SARS Covid 2 

RNA is negative.  UA shows trace leukocyte esterase, 5-10 hyaline casts, many 

bacteria.  Head CT is obtained showing air-fluid levels in the right maxillary 

sinus, please rule out any symptoms of acute sinusitis.  There is also senescent

change but nothing acute noted.  Elbow x-ray is obtained and shows no acute 

fracture.  Patient does have a 4 cm skin tear to the left elbow which is covered

using Steri-Strips and gauze.  She is given Zofran for nausea and Dilaudid for 

pain.  CT of the left hip is obtained showing acute complex proximal left 

femoral fracture.  Predominant fracture line appears to be predominantly 

basocervical oblique however a fracture line extends superior laterally 

anteriorly transcervically.  Fracture lines involving the greater trochanter as 

well.  There is some angulation and overriding of the fracture fragments.  

Patient is noted to require 3 L of oxygen in the ER and it is reported she 

normally wears 2 L at home.  She is given 40 mg of IV push Lasix.  Case 

discussed with Dr. Pruett, orthopedic surgeon, who recommends patient be admitted 

for surgical repair on Thursday, due to her taking Plavix.





She carries a history of heart failure, hypertension, A. fib, anemia, chronic 

constipation, dysphagia, chronic renal sufficiency, urinary retention, chronic 

back pain, TIA, cerebral infarct, neuralgia, depression, vascular dementia, type

II DM.  She was never a smoker.  Her PCP is Dr. Vaughan.  She is a DNR/DNI.  

She subsequently admitted to the medical floor inpatient for management of her 

hip fracture.





Diagnosis: Stroke: No





- Discharge Data


Discharge Date: 02/17/21 (Admit date: 2/8/21)


Discharge Disposition: DC/Tfer to SNF 03


Condition: Good





- Referral to Home Health


Primary Care Physician: 


Ru Vaughan MD








- Discharge Diagnosis/Problem(s)


(1) Closed left hip fracture


SNOMED Code(s): 870430021


   ICD Code: S72.002A - FRACTURE OF UNSP PART OF NECK OF LEFT FEMUR, INIT   

Status: Resolved   Priority: High   Current Visit: Yes   


Qualifiers: 


   Encounter type: initial encounter   Qualified Code(s): S72.002A - Fracture of

unspecified part of neck of left femur, initial encounter for closed fracture   





(2) Anemia


SNOMED Code(s): 295175906


   ICD Code: D64.9 - ANEMIA, UNSPECIFIED   Status: Chronic   Priority: Medium   

Current Visit: No   Onset Date: 06/05/19   Problem Details: anemia  sec  to  

anticoagulation   with  xarolto / plavix and asa   asa  held   mild  bleeding  

noted   from  hemmhroids   


Qualifiers: 


   Anemia type: due to chronic kidney disease   Chronic kidney disease stage: 

stage 4 (severe)   Qualified Code(s): N18.4 - Chronic kidney disease, stage 4 

(severe); D63.1 - Anemia in chronic kidney disease   





(3) CHF (congestive heart failure), NYHA class II


SNOMED Code(s): 393945566, 711889728


   ICD Code: I50.9 - HEART FAILURE, UNSPECIFIED   Status: Chronic   Priority: 

Medium   Current Visit: No   Onset Date: 06/06/19   


Qualifiers: 


   Congestive heart failure type: unspecified   Qualified Code(s): I50.9 - Heart

failure, unspecified   





(4) Fall


SNOMED Code(s): 2273736, 665966501


   ICD Code: W19.XXXA - UNSPECIFIED FALL, INITIAL ENCOUNTER   Status: Acute   

Priority: High   Current Visit: Yes   


Qualifiers: 


   Encounter type: subsequent encounter   Qualified Code(s): W19.XXXD - 

Unspecified fall, subsequent encounter   





(5) DM2 (diabetes mellitus, type 2)


SNOMED Code(s): 55543587


   ICD Code: E11.9 - TYPE 2 DIABETES MELLITUS WITHOUT COMPLICATIONS   Status: 

Chronic   Priority: High   Current Visit: No   Onset Date: 06/07/19   


Qualifiers: 


   Diabetes mellitus long term insulin use: unspecified long term insulin use 

status   Diabetes mellitus complication status: with unspecified complications 





(6) Afib


SNOMED Code(s): 50388162


   ICD Code: I48.91 - UNSPECIFIED ATRIAL FIBRILLATION   Status: Chronic   

Priority: Low   Current Visit: No   


Qualifiers: 


   Atrial fibrillation type: paroxysmal   Qualified Code(s): I48.0 - Paroxysmal 

atrial fibrillation   





(7) HTN (hypertension)


SNOMED Code(s): 95267456


   ICD Code: I10 - ESSENTIAL (PRIMARY) HYPERTENSION   Status: Chronic   Priori

ty: Medium   Current Visit: No   


Qualifiers: 


   Hypertension type: unspecified   Qualified Code(s): I10 - Essential (primary)

hypertension   





(8) Chronic constipation


SNOMED Code(s): 372367674


   ICD Code: K59.09 - OTHER CONSTIPATION   Status: Chronic   Priority: Low   

Current Visit: No   





(9) Urinary retention


SNOMED Code(s): 290537379


   ICD Code: R33.9 - RETENTION OF URINE, UNSPECIFIED   Status: Chronic   

Priority: Low   Current Visit: No   





(10) Chronic back pain


SNOMED Code(s): 678546135


   ICD Code: M54.9 - DORSALGIA, UNSPECIFIED; G89.29 - OTHER CHRONIC PAIN   

Status: Chronic   Priority: Low   Current Visit: No   


Qualifiers: 


   Back pain location: back pain in unspecified location   Back pain laterality:

unspecified   Qualified Code(s): M54.9 - Dorsalgia, unspecified; G89.29 - Other 

chronic pain   





(11) History of TIA (transient ischemic attack)


SNOMED Code(s): 873659026


   ICD Code: Z86.73 - PRSNL HX OF TIA (TIA), AND CEREB INFRC W/O RESID DEFICITS 

 Status: Chronic   Priority: Low   Current Visit: No   





(12) Depression


SNOMED Code(s): 71185788


   ICD Code: F32.9 - MAJOR DEPRESSIVE DISORDER, SINGLE EPISODE, UNSPECIFIED   

Status: Chronic   Priority: Low   Current Visit: No   


Qualifiers: 


   Depression Type: other depression   Qualified Code(s): F32.89 - Other sp

ecified depressive episodes   





(13) Nursing home resident


SNOMED Code(s): 816194216


   ICD Code: Z59.3 - PROBLEMS RELATED TO LIVING IN RESIDENTIAL INSTITUTION   

Status: Chronic   Priority: Low   Current Visit: No   





(14) Hyperbilirubinemia


SNOMED Code(s): 03742390


   ICD Code: E80.6 - OTHER DISORDERS OF BILIRUBIN METABOLISM   Status: Acute   

Priority: Medium   Current Visit: Yes   





(15) Elevated AST (SGOT)


SNOMED Code(s): 218743033


   ICD Code: R74.01 - ELEVATION OF LEVELS OF LIVER TRANSAMINASE LEVELS   Status:

Acute   Priority: Medium   Current Visit: Yes   





(16) Elevated ALT measurement


SNOMED Code(s): 257006621


   ICD Code: R74.01 - ELEVATION OF LEVELS OF LIVER TRANSAMINASE LEVELS   Status:

Acute   Priority: Medium   Current Visit: Yes   





(17) Acute on chronic renal failure


SNOMED Code(s): 195329267


   ICD Code: N17.9 - ACUTE KIDNEY FAILURE, UNSPECIFIED; N18.9 - CHRONIC KIDNEY 

DISEASE, UNSPECIFIED   Status: Acute   Priority: High   Current Visit: Yes   


Qualifiers: 


   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 

disease stage: stage 4 (severe)   Qualified Code(s): N17.2 - Acute kidney 

failure with medullary necrosis; N18.4 - Chronic kidney disease, stage 4 

(severe)   





(18) Hyperkalemia


SNOMED Code(s): 05068233


   ICD Code: E87.5 - HYPERKALEMIA   Status: Resolved   Priority: High   Current 

Visit: Yes   





(19) Fluid overload


SNOMED Code(s): 49546368


   ICD Code: E87.70 - FLUID OVERLOAD, UNSPECIFIED   Status: Resolved   Priority:

High   Current Visit: Yes   


Qualifiers: 


   Hypervolemia type: unspecified   Qualified Code(s): E87.70 - Fluid overload, 

unspecified   





(20) Acute respiratory failure


SNOMED Code(s): 64467994


   ICD Code: J96.00 - ACUTE RESPIRATORY FAILURE, UNSP W HYPOXIA OR HYPERCAPNIA  

Status: Acute   Current Visit: Yes   





(21) Elevated brain natriuretic peptide (BNP) level


SNOMED Code(s): 457985625, 845026547


   ICD Code: R79.89 - OTHER SPECIFIED ABNORMAL FINDINGS OF BLOOD CHEMISTRY   

Status: Acute   Current Visit: Yes   





(22) Pneumonia


SNOMED Code(s): 705781577


   ICD Code: J18.9 - PNEUMONIA, UNSPECIFIED ORGANISM   Status: Resolved   

Priority: High   Current Visit: Yes   Problem Details:  very  slow   improvmetna

nd  continued  atelectasis and pleural  effusion  left    


Qualifiers: 


   Pneumonia type: due to unspecified organism   Laterality: right   Lung 

location: upper lobe of lung   Qualified Code(s): J18.9 - Pneumonia, unspecified

organism   





(23) UTI (urinary tract infection)


SNOMED Code(s): 25327308


   ICD Code: N39.0 - URINARY TRACT INFECTION, SITE NOT SPECIFIED   Status: 

Resolved   Priority: High   Current Visit: Yes   


Qualifiers: 


   Urinary tract infection type: acute cystitis   Hematuria presence: without 

hematuria   Qualified Code(s): N30.00 - Acute cystitis without hematuria   





(24) Status post-operative repair of closed fracture of left hip


SNOMED Code(s): 472483644


   ICD Code: Z98.890 - OTHER SPECIFIED POSTPROCEDURAL STATES; Z87.81 - PERSONAL 

HISTORY OF (HEALED) TRAUMATIC FRACTURE   Status: Acute   Current Visit: Yes   





(25) Hypokalemia


SNOMED Code(s): 49783432


   ICD Code: E87.6 - HYPOKALEMIA   Status: Acute   Priority: Medium   Current 

Visit: No   Problem Details:     





- Patient Summary/Data


Consults: 


                                  Consultations





02/08/21 22:10


OT Evaluation and Treatment [CONS] Routine 


PT Evaluation and Treatment [CONS] Routine 





02/09/21 08:51


Consult to Case Management/ [CONS] Routine 





02/09/21 08:55


Consult to Physician [CONS] Routine 











Labs Pending at D/C: 


None





Recommended Follow-up Testing/Procedures: 


Follow-up with primary care provider within 5-7 days of discharge, sooner if 

needed.


   -Recommend repeat CBC, CMP, magnesium at that visit. Consider repeat CXR. 





Follow-up with Dr. Pruett, orthopedics, as scheduled. 





Hospital Course: 


This is an 89-year-old female who presented to ED on 2/9/2021 after a fall at 

[facility] where she resides.  She is noted have a skin tear on her

 left elbow which was secured with Steri-Strips and covered in gauze.  She is 

noted to have an acute complex proximal left femoral fracture and orthopedics 

was consulted in the ER.  Original plan was for patient to undergo surgery on 

2/11/21 as the patient is on Plavix and that need to be held.  She was requiring

 3 L of oxygen and an I-S was ordered.  LFTs and bilirubin were noted to be 

elevated but these were trending downward.  After admission it was noted that 

her renal function was getting progressively worse.  Her BNP had been normal x2 

and decision was made to give patient IV fluids for hydration.  Unfortunately 

patient's oxygen saturations dropped significantly and she was requiring 6 L per

 nasal cannula.  Surgery was then postponed until 2/15/2021.  Prior echo was 

reviewed and showed diastolic heart failure.  Chest x-rays obtained showing an 

increased density within the right upper chest due to either pneumonia or 

changes of aspiration, atelectasis within both lung bases, and increased central

 lung markings possibly due to pulmonary vascular congestion. Follow-up echo was

 ordered and patient was given Lasix with good urine output. Echo obtained on 

2/12/2021: 1.  Left ventricular ejection fraction, by visual estimation, is 60 

to 65%  2.  Normal left ventricular systolic function. 3.  Mild proximal septal 

hypertrophy. 4.  Normal pattern of LV diastolic filling. 5.  Normal right 

ventricular systolic function. 6.  There is mild aortic valve sclerosis without 

stenosis. 7.  Trace mitral valve regurgitation. 8.  Right ventricular systolic 

pressure is moderately to severely elevated at 46.6 mmHg. 9.  No regional wall 

motion abnormalities.  Patient was started on Zosyn due to concerns over 

aspiration. Urine culture has been ordered on admission and was growing >100 

CFUs of Aerococcus Urinae.  Her insulin was cut as patient was somewhat sedated 

and not eating well prior to surgery. She underwent Femur intramedullary nailing

 hip gamma short on 2/15/21.  Postsurgically she has continued to do quite well.

  She has continued to require oxygen at 2 to 3 L and this will be continued at 

SNF.  Per the patient's daughter this is not new for the patient as she has 

required oxygen in the past.  She was started on 81 mg ASA twice daily and her 

home Plavix was resumed postsurgically.  This was discussed with orthopedics 

prior to resuming.  There was some confusion about whether or not the patient 

was requiring thickened liquids as she had apparently been on it in the past.  

Higher to surgery she was somewhat sedated from pain medications and reportedly 

had an episode of coughing.  Since then there have been no concerns and per 

dietitian patient has been advanced to thin liquids at SNF prior to admission.  

She will therefore be discharged on diabetic diet with thin liquids.  She 

completed her pneumonia and UTI treatment while here and will not require 

antibiotic at discharge.  She has been working with PT/OT while here and this 

should be continued at SNF.  Per the daughter patient had difficulty with 

mobility prior to admission and was requiring assistance to transfer.  She is 

now alert and back to her baseline.  We discharged on 81 mg aspirin twice daily 

as mentioned prior.  This should continue for a total of 35 days per surgery 

recommendation.  She will also be discharged on 100 mg p.o. twice daily Colace, 

10 mg 3 times daily as needed Flexeril, 5 mg p.o. every 8 hours as needed 

oxycodone and 650 mg p.o. every 4 hours as needed Tylenol for pain.  Recommend 

follow-up with primary care provider within 5 to 7 days of discharge, sooner if 

needed.  Recommend repeat CBC, CMP, magnesium, and consider repeat follow-up marcus

st x-ray at that time.  Recommend follow-up with orthopedics as scheduled.  She 

was advised to continue to utilize her incentive spirometer and Acapella for the

 next 1 to 2 weeks or until symptoms resolve.  She was advised to contact her 

primary care provider or return to the emergency room should symptoms return or 

worsen.  Home medications were otherwise continued.  Recommend she check her 

blood sugars 4 times daily before meals and bedtime.  She was discharged back to

 [facility] today.








- Patient Instructions


Diet: Diabetic Diet


Activity: As Tolerated, Full Weight Bearing


Driving: Do Not Drive


Wound/Incision Care: Do NOT Change Dressing


Notify Provider of: Fever, Increased Pain, Nausea and/or Vomiting


Other/Special Instructions: Follow-up with primary care provider within 5-7 days

 of discharge, sooner if needed.  Follow-up with orhtopedics as scheduled.  

Resume home medicaitons as directed.  Continue PT/OT at SNF.  Continue to 

utilize Incentive spirometer (clear blue device you inhale through) and acapella

 (green tube you blow through) for 1-2 weeks or until symptoms resolve.  Should 

symptoms return or worsen contact primary care provider or return to the 

Emergency Department.





- Discharge Plan


*PRESCRIPTION DRUG MONITORING PROGRAM REVIEWED*: No


*COPY OF PRESCRIPTION DRUG MONITORING REPORT IN PATIENT FLAVIO: No


Prescriptions/Med Rec: 


Aspirin 81 mg PO BID #67 tab.chew


Docusate Sodium [Colace] 100 mg PO BID #20 cap


Cyclobenzaprine [Flexeril] 10 mg PO TID PRN #10 tablet


 PRN Reason: muscle spasms 


oxyCODONE 5 mg PO Q8H PRN #15 tablet


 PRN Reason: Pain (Moderate 4-6)


Acetaminophen [Tylenol] 650 mg PO Q4H PRN #30 tablet


 PRN Reason: Pain/Fever


Home Medications: 


                                    Home Meds





Clopidogrel [Plavix] 75 mg PO DAILY 11/25/15 [History]


Gabapentin [Neurontin] 600 mg PO BID 11/25/15 [History]


Insulin Aspart [Novolog Flexpen] 13 unit SQ TID 11/25/15 [History]


Acetaminophen 500 mg PO BEDTIME 08/17/16 [History]


Calcium Carbonate [Tums] 400 mg PO Q4H PRN 08/17/16 [History]


Vitamin E 400 unit PO DAILY 08/17/16 [History]


Furosemide [Lasix] 20 mg PO ASDIRECTED PRN 06/04/19 [History]


Furosemide [Lasix] 20 mg PO DAILY 06/04/19 [History]


Insulin Degludec [Tresiba] 20 unit SQ BEDTIME 06/04/19 [History]


MO/Pet,Wh/Phenylephrine/Shk Lv [Preparation H Oint] 1 appful RECTAL ASDIRECTED 

PRN 06/04/19 [History]


Metoprolol Succinate 50 mg PO DAILY 06/04/19 [History]


polyethylene glycoL 3350 [MiraLAX] 17 gm PO ASDIRECTED PRN 06/04/19 [History]


Albuterol/Ipratropium [DuoNeb 3.0-0.5 MG/3 ML] 3 ml NEB QIDRT #120 neb 06/11/19 

[Rx]


Spironolactone [Aldactone] 50 mg PO DAILY #30 tablet 06/11/19 [Rx]


Insulin Aspart [NovoLOG] 1 dose SUBCUT TID 10/06/20 [History]


Acetaminophen [Tylenol] 650 mg PO Q4H PRN #30 tablet 02/17/21 [Rx]


Aspirin 81 mg PO BID #67 tab.chew 02/17/21 [Rx]


Cyclobenzaprine [Flexeril] 10 mg PO TID PRN #10 tablet 02/17/21 [Rx]


Docusate Sodium [Colace] 100 mg PO BID #20 cap 02/17/21 [Rx]


oxyCODONE 5 mg PO Q8H PRN #15 tablet 02/17/21 [Rx]








Oxygen Therapy Mode: Nasal Cannula


Oxygen Flow Rate (L/min): 3 (2-3L as needed)


Maintain SpO2% greater than: 90


Patient Handouts:  Heart Failure Action Plan, Hip Fracture Treated With ORIF


Referrals: 


Vikas Pruett MD [Physician] - 02/24/21 12:30 pm (Patient will be seeing 

Alberta Smith PA-C.)


Ru Vaughan MD [Primary Care Provider] - 02/24/21 4:00 pm (Hospital 

follow-up appointment.)





- Discharge Summary/Plan Comment


DC Time >30 min.: Yes (45 minutes )





- General Info


Date of Service: 02/17/21


Admission Dx/Problem (Free Text: 


                           Admission Diagnosis/Problem





Admission Diagnosis/Problem      Hip fracture requiring operative repair








Functional Status: Reports: Pain Controlled, Tolerating Diet, Urinating.  

Denies: Ambulating (baseline ), New Symptoms





- Review of Systems


General: Reports: Weakness (improving ).  Denies: Fever, Fatigue, Malaise, 

Chills


HEENT: Reports: No Symptoms.  Denies: Headaches, Sore Throat


Pulmonary: Reports: No Symptoms.  Denies: Shortness of Breath, Cough, Sputum, 

Wheezing


Cardiovascular: Reports: No Symptoms.  Denies: Chest Pain, Palpitations, Dyspnea

on Exertion


Gastrointestinal: Reports: No Symptoms.  Denies: Abdominal Pain, Constipation, 

Diarrhea, Nausea, Vomiting


Genitourinary: Reports: No Symptoms.  Denies: Pain


Musculoskeletal: Reports: Leg Pain (Bilateral )


Skin: Reports: No Symptoms.  Denies: Cyanosis


Neurological: Reports: Pre-Existing Deficit, Difficulty Walking, Weakness, Gait 

Disturbance.  Denies: Confusion, Numbness, Tingling


Psychiatric: Reports: No Symptoms





- Patient Data


Vitals - Most Recent: 


                                Last Vital Signs











Temp  98.1 F   02/17/21 05:32


 


Pulse  65   02/17/21 05:32


 


Resp  18   02/17/21 05:32


 


BP  124/51 L  02/17/21 05:32


 


Pulse Ox  93 L  02/17/21 06:10











Weight - Most Recent: 161 lb 3.2 oz


I&O - Last 24 hours: 


                                 Intake & Output











 02/16/21 02/17/21 02/17/21





 22:59 06:59 14:59


 


Intake Total 820 500 


 


Balance 820 500 











Lab Results - Last 24 hrs: 


                         Laboratory Results - last 24 hr











  02/16/21 02/16/21 02/16/21 Range/Units





  06:45 12:20 16:52 


 


WBC     (3.98-10.04)  K/mm3


 


RBC     (3.98-5.22)  M/mm3


 


Hgb     (11.2-15.7)  gm/dl


 


Hct     (34.1-44.9)  %


 


MCV     (79.4-94.8)  fl


 


MCH     (25.6-32.2)  pg


 


MCHC     (32.2-35.5)  g/dl


 


RDW Std Deviation     (36.4-46.3)  fL


 


Plt Count     (182-369)  K/mm3


 


MPV     (9.4-12.3)  fl


 


Neut % (Auto)     (34.0-71.1)  %


 


Lymph % (Auto)     (19.3-51.7)  %


 


Mono % (Auto)     (4.7-12.5)  %


 


Eos % (Auto)     (0.7-5.8)  


 


Baso % (Auto)     (0.1-1.2)  %


 


Neut # (Auto)     (1.56-6.13)  K/mm3


 


Lymph # (Auto)     (1.18-3.74)  K/mm3


 


Mono # (Auto)     (0.24-0.36)  K/mm3


 


Eos # (Auto)     (0.04-0.36)  K/mm3


 


Baso # (Auto)     (0.01-0.08)  K/mm3


 


Manual Slide Review     


 


Sodium     (136-145)  mEq/L


 


Potassium     (3.5-5.1)  mEq/L


 


Chloride     ()  mEq/L


 


Carbon Dioxide     (21-32)  mEq/L


 


Anion Gap     (5-15)  


 


BUN     (7-18)  mg/dL


 


Creatinine     (0.55-1.02)  mg/dL


 


Est Cr Clr Drug Dosing     mL/min


 


Estimated GFR (MDRD)     (>60)  mL/min


 


BUN/Creatinine Ratio     (14-18)  


 


Glucose     ()  mg/dL


 


POC Glucose  332 H  350 H  274 H  ()  mg/dL


 


Calcium     (8.5-10.1)  mg/dL


 


Magnesium     (1.8-2.4)  mg/dl














  02/16/21 02/17/21 02/17/21 Range/Units





  20:30 05:01 05:01 


 


WBC   13.27 H   (3.98-10.04)  K/mm3


 


RBC   3.14 L   (3.98-5.22)  M/mm3


 


Hgb   9.4 L   (11.2-15.7)  gm/dl


 


Hct   29.7 L   (34.1-44.9)  %


 


MCV   94.6   (79.4-94.8)  fl


 


MCH   29.9   (25.6-32.2)  pg


 


MCHC   31.6 L   (32.2-35.5)  g/dl


 


RDW Std Deviation   49.3 H   (36.4-46.3)  fL


 


Plt Count   212   (182-369)  K/mm3


 


MPV   11.3   (9.4-12.3)  fl


 


Neut % (Auto)   74.8 H   (34.0-71.1)  %


 


Lymph % (Auto)   14.8 L   (19.3-51.7)  %


 


Mono % (Auto)   5.0   (4.7-12.5)  %


 


Eos % (Auto)   3.0   (0.7-5.8)  


 


Baso % (Auto)   0.2   (0.1-1.2)  %


 


Neut # (Auto)   9.93 H   (1.56-6.13)  K/mm3


 


Lymph # (Auto)   1.97   (1.18-3.74)  K/mm3


 


Mono # (Auto)   0.66 H   (0.24-0.36)  K/mm3


 


Eos # (Auto)   0.40 H   (0.04-0.36)  K/mm3


 


Baso # (Auto)   0.02   (0.01-0.08)  K/mm3


 


Manual Slide Review   Normal smear   


 


Sodium    141  (136-145)  mEq/L


 


Potassium    3.3 L  (3.5-5.1)  mEq/L


 


Chloride    102  ()  mEq/L


 


Carbon Dioxide    26  (21-32)  mEq/L


 


Anion Gap    16.3 H  (5-15)  


 


BUN    42 H  (7-18)  mg/dL


 


Creatinine    2.1 H  (0.55-1.02)  mg/dL


 


Est Cr Clr Drug Dosing    13.04  mL/min


 


Estimated GFR (MDRD)    22  (>60)  mL/min


 


BUN/Creatinine Ratio    20.0 H  (14-18)  


 


Glucose  405 H   171 H  ()  mg/dL


 


POC Glucose     ()  mg/dL


 


Calcium    8.8  (8.5-10.1)  mg/dL


 


Magnesium    2.4  (1.8-2.4)  mg/dl














  02/17/21 Range/Units





  05:36 


 


WBC   (3.98-10.04)  K/mm3


 


RBC   (3.98-5.22)  M/mm3


 


Hgb   (11.2-15.7)  gm/dl


 


Hct   (34.1-44.9)  %


 


MCV   (79.4-94.8)  fl


 


MCH   (25.6-32.2)  pg


 


MCHC   (32.2-35.5)  g/dl


 


RDW Std Deviation   (36.4-46.3)  fL


 


Plt Count   (182-369)  K/mm3


 


MPV   (9.4-12.3)  fl


 


Neut % (Auto)   (34.0-71.1)  %


 


Lymph % (Auto)   (19.3-51.7)  %


 


Mono % (Auto)   (4.7-12.5)  %


 


Eos % (Auto)   (0.7-5.8)  


 


Baso % (Auto)   (0.1-1.2)  %


 


Neut # (Auto)   (1.56-6.13)  K/mm3


 


Lymph # (Auto)   (1.18-3.74)  K/mm3


 


Mono # (Auto)   (0.24-0.36)  K/mm3


 


Eos # (Auto)   (0.04-0.36)  K/mm3


 


Baso # (Auto)   (0.01-0.08)  K/mm3


 


Manual Slide Review   


 


Sodium   (136-145)  mEq/L


 


Potassium   (3.5-5.1)  mEq/L


 


Chloride   ()  mEq/L


 


Carbon Dioxide   (21-32)  mEq/L


 


Anion Gap   (5-15)  


 


BUN   (7-18)  mg/dL


 


Creatinine   (0.55-1.02)  mg/dL


 


Est Cr Clr Drug Dosing   mL/min


 


Estimated GFR (MDRD)   (>60)  mL/min


 


BUN/Creatinine Ratio   (14-18)  


 


Glucose   ()  mg/dL


 


POC Glucose  177 H  ()  mg/dL


 


Calcium   (8.5-10.1)  mg/dL


 


Magnesium   (1.8-2.4)  mg/dl











Med Orders - Current: 


                               Current Medications





Acetaminophen (Tylenol)  650 mg PO Q4H PRN


   PRN Reason: Pain/Fever


   Last Admin: 02/16/21 23:08 Dose:  650 mg


   Documented by: 


Albuterol/Ipratropium (Duoneb 3.0-0.5 Mg/3 Ml)  3 ml NEB Q4H PRN


   PRN Reason: Shortness Of Breath/wheezing


Aspirin (Aspirin)  81 mg PO BID [facility]


   Stop: 03/23/21 09:01


   Last Admin: 02/16/21 20:08 Dose:  81 mg


   Documented by: 


Calcium Carbonate/Glycine (Tums)  500 mg PO Q4H PRN


   PRN Reason: Heartburn


Clopidogrel Bisulfate (Plavix)  75 mg PO DAILY [facility]


   Last Admin: 02/16/21 08:16 Dose:  75 mg


   Documented by: 


Cyclobenzaprine HCl (Flexeril)  10 mg PO TID PRN


   PRN Reason: muscle spasms 


   Last Admin: 02/16/21 20:09 Dose:  10 mg


   Documented by: 


Docusate Sodium (Colace)  100 mg PO BID [facility]


   Last Admin: 02/16/21 20:09 Dose:  100 mg


   Documented by: 


Famotidine (Pepcid)  20 mg PO Q48H [facility]


   Last Admin: 02/15/21 08:39 Dose:  20 mg


   Documented by: 


Furosemide (Lasix)  40 mg PO DAILY [facility]


   Last Admin: 02/16/21 08:16 Dose:  40 mg


   Documented by: 


Gabapentin (Neurontin)  100 mg PO BID [facility]


   Last Admin: 02/16/21 20:08 Dose:  100 mg


   Documented by: 


Hydralazine HCl (Apresoline)  10 mg IVPUSH Q4H PRN


   PRN Reason: Hypertension


Hydromorphone HCl (Dilaudid)  0.5 mg IVPUSH Q4H PRN


   PRN Reason: Pain (severe 7-10)


   Last Admin: 02/16/21 02:51 Dose:  0.5 mg


   Documented by: 


Promethazine HCl 12.5 mg/ (Sodium Chloride)  50.5 mls @ 100 mls/hr IV Q6H PRN


   PRN Reason: Nausea/Vomiting


Piperacillin Sod/Tazobactam (Sod 4.5 gm/ Sodium Chloride)  100 mls @ 25 mls/hr 

IV Q12H [facility]


   Last Admin: 02/16/21 20:08 Dose:  25 mls/hr


   Documented by: 


Insulin Glargine (Lantus)  15 unit SUBCUT BID [facility]


   Last Admin: 02/16/21 21:09 Dose:  15 units


   Documented by: 


Insulin Human Lispro (Humalog)  0 unit SUBCUT QIDACANDBED [facility]; Protocol


   Last Admin: 02/16/21 21:10 Dose:  10 units


   Documented by: 


Magnesium Hydroxide (Milk Of Magnesia)  30 ml PO DAILY PRN


   PRN Reason: Constipation


   Last Admin: 02/12/21 16:09 Dose:  30 ml


   Documented by: 


Metoprolol Succinate (Toprol Xl)  50 mg PO DAILY [facility]


   Last Admin: 02/16/21 08:16 Dose:  50 mg


   Documented by: 


Oxycodone HCl (Oxycodone)  5 mg PO Q6H PRN


   PRN Reason: Pain (moderate 4-6)


   Last Admin: 02/16/21 23:09 Dose:  5 mg


   Documented by: 


Polyethylene Glycol (Miralax)  17 gm PO ASDIRECTED PRN


   PRN Reason: Constipation


Potassium Chloride (Klor-Con M20)  40 meq PO ONETIME ONE


   Stop: 02/17/21 09:01





Discontinued Medications





Acetaminophen (Tylenol)  650 mg PO Q6H PRN


   PRN Reason: Pain (Mild 1-3)/fever


   Last Admin: 02/09/21 12:34 Dose:  650 mg


   Documented by: 


Acetaminophen (Tylenol)  650 mg PO Q4H PRN


   PRN Reason: Fever


   Last Admin: 02/14/21 21:00 Dose:  650 mg


   Documented by: 


Bupivacaine HCl (Marcaine 0.5%) Confirm Administered Dose 0 ml .ROUTE .STK-MED 

ONE


   Stop: 02/11/21 09:56


Bupivacaine HCl (Sensorcaine-Mpf 0.25%) Confirm Administered Dose 0 ml .ROUTE 

.STK-MED ONE


   Stop: 02/15/21 12:57


Bupivacaine HCl (Sensorcaine-Mpf 0.25%) Confirm Administered Dose 30 ml .ROUTE 

.STK-MED ONE


   Stop: 02/15/21 13:29


   Last Admin: 02/15/21 15:00 Dose:  15 ml


   Documented by: 


Calcium Carbonate/Glycine (Tums)  400 mg PO Q4H PRN


   PRN Reason: Heartburn


Dexamethasone (Dexamethasone) Confirm Administered Dose 20 mg .ROUTE .STK-MED 

ONE


   Stop: 02/15/21 13:46


Dexmedetomidine HCl (Precedex) Confirm Administered Dose 200 mcg .ROUTE .STK-MED

ONE


   Stop: 02/15/21 12:35


Docusate Sodium (Colace)  100 mg PO BID PRN


   PRN Reason: Constipation


   Last Admin: 02/11/21 15:57 Dose:  100 mg


   Documented by: 


Famotidine (Pepcid)  20 mg PO DAILY [facility]


   Last Admin: 02/09/21 09:57 Dose:  20 mg


   Documented by: 


Fentanyl (Sublimaze) Confirm Administered Dose 0 mcg .ROUTE .STK-MED ONE


   Stop: 02/11/21 10:46


Fentanyl (Sublimaze) Confirm Administered Dose 100 mcg .ROUTE .STK-MED ONE


   Stop: 02/15/21 12:53


Furosemide (Lasix)  40 mg IVPUSH NOW ONE


   Stop: 02/08/21 21:42


   Last Admin: 02/08/21 22:00 Dose:  40 mg


   Documented by: 


Furosemide (Lasix)  20 mg PO DAILY [facility]


   Last Admin: 02/09/21 13:04 Dose:  Not Given


   Documented by: 


Furosemide (Lasix)  20 mg IVPUSH ONETIME ONE


   Stop: 02/09/21 18:01


   Last Admin: 02/09/21 18:47 Dose:  20 mg


   Documented by: 


Furosemide (Lasix)  40 mg IVPUSH NOW ONE


   Stop: 02/11/21 11:15


   Last Admin: 02/11/21 11:22 Dose:  40 mg


   Documented by: 


Furosemide (Lasix)  40 mg IVPUSH NOW ONE


   Stop: 02/12/21 08:13


   Last Admin: 02/12/21 08:29 Dose:  40 mg


   Documented by: 


Furosemide (Lasix)  40 mg PO DAILY [facility]


   Last Admin: 02/13/21 09:04 Dose:  40 mg


   Documented by: 


Gabapentin (Neurontin)  600 mg PO BID [facility]


   Last Admin: 02/09/21 08:10 Dose:  600 mg


   Documented by: 


Gabapentin (Neurontin)  600 mg PO BID [facility]


   Last Admin: 02/13/21 09:04 Dose:  600 mg


   Documented by: 


Heparin Sodium (Porcine) (Heparin Sodium)  5,000 units SUBCUT Q8H [facility]


   Stop: 02/10/21 23:00


   Last Admin: 02/10/21 21:18 Dose:  5,000 units


   Documented by: 


Heparin Sodium (Porcine) (Heparin Sodium)  5,000 units SUBCUT Q8H [facility]


   Last Admin: 02/14/21 10:58 Dose:  5,000 units


   Documented by: 


Hydromorphone HCl (Dilaudid)  0.25 mg IVPUSH ONETIME ONE


   Stop: 02/08/21 20:02


   Last Admin: 02/08/21 20:10 Dose:  0.25 mg


   Documented by: 


Hydromorphone HCl (Dilaudid)  0.25 mg IVPUSH ONETIME ONE


   Stop: 02/08/21 22:22


   Last Admin: 02/08/21 23:17 Dose:  0.25 mg


   Documented by: 


Sodium Chloride (Normal Saline)  1,000 mls @ 65 mls/hr IV ASDIRECTED [facility]


Lactated Ringer's (Ringers, Lactated)  1,000 mls @ 50 mls/hr IV ASDIRECTED [facility]


   Last Admin: 02/09/21 14:18 Dose:  50 mls/hr


   Documented by: 


Lactated Ringer's (Ringers, Lactated)  1,000 mls @ 65 mls/hr IV ASDIRECTED [facility]


   Last Admin: 02/10/21 06:46 Dose:  65 mls/hr


   Documented by: 


Lactated Ringer's (Ringers, Lactated)  1,000 mls @ 100 mls/hr IV ASDIRECTED [facility]


   Stop: 02/10/21 19:29


   Last Admin: 02/10/21 09:44 Dose:  100 mls/hr


   Documented by: 


Lactated Ringer's (Ringers, Lactated)  1,000 mls @ 75 mls/hr IV ASDIRECTED [facility]


   Last Admin: 02/11/21 07:37 Dose:  75 mls/hr


   Documented by: 


Piperacillin Sod/Tazobactam (Sod 4.5 gm/ Sodium Chloride)  100 mls @ 25 mls/hr 

IV Q8H [facility]


Piperacillin Sod/Tazobactam (Sod 4.5 gm/ Sodium Chloride)  100 mls @ 200 mls/hr 

IV ONETIME ONE


   Stop: 02/12/21 08:29


   Last Admin: 02/12/21 08:35 Dose:  200 mls/hr


   Documented by: 


Sodium Chloride (Normal Saline)  500 mls @ 45 mls/hr IV ASDIRECTED [facility]


   Stop: 02/14/21 07:37


   Last Admin: 02/13/21 21:56 Dose:  45 mls/hr


   Documented by: 


Potassium Chloride 20 meq/ (Dextrose/Water)  1,010 mls @ 50 mls/hr IV ASDIRECTED

[facility]


   Stop: 02/15/21 08:41


Potassium Chloride/Dextrose/Sod Cl (D5 1/2 Ns W/ 10 Meq/L Kcl)  1,000 mls @ 50 

mls/hr IV ASDIRECTED [facility]


   Last Admin: 02/15/21 09:08 Dose:  50 mls/hr


   Documented by: 


Lidocaine HCl (Xylocaine-Mpf 1%) Confirm Administered Dose 4 mls @ as directed 

.ROUTE .STK-MED ONE


   Stop: 02/15/21 12:54


Insulin Glargine (Lantus)  15 unit SUBCUT DAILY [facility]


   Last Admin: 02/12/21 08:01 Dose:  Not Given


   Documented by: 


Insulin Glargine (Lantus)  7.5 unit SUBCUT ONETIME ONE


   Stop: 02/12/21 09:01


   Last Admin: 02/12/21 08:16 Dose:  7.5 units


   Documented by: 


Insulin Glargine (Lantus)  15 unit SUBCUT DAILY [facility]


Insulin Glargine (Lantus)  7.5 unit SUBCUT DAILY [facility]


   Last Admin: 02/13/21 09:06 Dose:  7.5 units


   Documented by: 


Insulin Glargine (Lantus)  10 unit SUBCUT BID [facility]


   Last Admin: 02/14/21 08:36 Dose:  10 units


   Documented by: 


Insulin Glargine (Lantus)  10 unit SUBCUT NOW STA


   Stop: 02/13/21 19:06


   Last Admin: 02/13/21 19:28 Dose:  10 unit


   Documented by: 


Insulin Glargine (Lantus)  13 unit SUBCUT BID [facility]


   Last Admin: 02/15/21 21:03 Dose:  13 units


   Documented by: 


Insulin Glargine (Lantus)  5 unit SUBCUT NOW STA


   Stop: 02/14/21 21:12


   Last Admin: 02/14/21 22:33 Dose:  5 units


   Documented by: 


Insulin Human Lispro (Humalog)  0 unit SUBCUT QIDACANDBED [facility]; Protocol


   Last Admin: 02/13/21 14:23 Dose:  Not Given


   Documented by: 


Insulin Human Lispro (Humalog)  0 unit SUBCUT QIDACANDBED [facility]; Protocol


Ketamine HCl (Ketalar) Confirm Administered Dose 0 mg .ROUTE .STK-MED ONE


   Stop: 02/11/21 10:47


Ketamine HCl (Ketalar) Confirm Administered Dose 500 mg .ROUTE .STK-MED ONE


   Stop: 02/15/21 12:54


Lidocaine HCl (Xylocaine 1%)  10 ml INJECT ONETIME ONE


   Stop: 02/08/21 18:58


   Last Admin: 02/09/21 00:57 Dose:  Not Given


   Documented by: 


Lidocaine/Epinephrine (Xylocaine-Mpf 2%-Epi 1:200,000) Confirm Administered Dose

20 ml .ROUTE .STK-MED ONE


   Stop: 02/11/21 10:13


Metoprolol Tartrate (Lopressor)  5 mg IVPUSH Q5M PRN


   PRN Reason: Hypertension


   Stop: 02/15/21 18:00


   Last Admin: 02/15/21 16:21 Dose:  1 mg


   Documented by: 


Midazolam HCl (Versed 1 Mg/Ml) Confirm Administered Dose 0 mg .ROUTE .STK-MED 

ONE


   Stop: 02/11/21 10:46


Midazolam HCl (Versed 1 Mg/Ml) Confirm Administered Dose 2 mg .ROUTE .STK-MED 

ONE


   Stop: 02/15/21 12:54


Non-Formulary Medication (Insulin Degludec [Tresiba])  15 unit SQ DAILY [facility]


Ondansetron HCl (Zofran)  4 mg IVPUSH ONETIME ONE


   Stop: 02/08/21 18:48


   Last Admin: 02/08/21 18:51 Dose:  4 mg


   Documented by: 


Ondansetron HCl (Zofran) Confirm Administered Dose 4 mg .ROUTE .STK-MED ONE


   Stop: 02/08/21 19:51


   Last Admin: 02/08/21 20:11 Dose:  Not Given


   Documented by: 


Ondansetron HCl (Zofran)  4 mg IVPUSH ONETIME ONE


   Stop: 02/08/21 20:07


   Last Admin: 02/08/21 20:11 Dose:  4 mg


   Documented by: 


Potassium Chloride (Klor-Con M20)  40 meq PO DAILY [facility]


   Stop: 02/14/21 12:25


   Last Admin: 02/14/21 13:23 Dose:  Not Given


   Documented by: 


Potassium Chloride (Klor-Con M20)  40 meq PO ONETIME ONE


   Stop: 02/14/21 12:39


   Last Admin: 02/14/21 13:10 Dose:  40 meq


   Documented by: 


Propofol (Diprivan  20 Ml) Confirm Administered Dose 200 mg .ROUTE .STK-MED ONE


   Stop: 02/15/21 12:54


Ropivacaine (Naropin 0.5%) Confirm Administered Dose 30 ml .ROUTE .STK-MED ONE


   Stop: 02/11/21 09:57


Ropivacaine (Naropin 0.5%) Confirm Administered Dose 30 ml .ROUTE .STK-MED ONE


   Stop: 02/15/21 12:35


Sodium Polystyrene Sulfonate (Kayexalate)  15 gm PO ONETIME ONE


   Stop: 02/09/21 08:50


   Last Admin: 02/09/21 09:57 Dose:  15 gm


   Documented by: 


Spironolactone (Aldactone)  50 mg PO DAILY [facility]


   Last Admin: 02/09/21 08:10 Dose:  50 mg


   Documented by: 


Tamsulosin HCl (Flomax)  0.4 mg PO DAILY [facility]


   Last Admin: 02/09/21 17:44 Dose:  Not Given


   Documented by: 











- Exam


Quality Assessment: Reports: Supplemental Oxygen (3L), DVT Prophylaxis.  Denies:

 Urine Catheter


General: Reports: Alert, Oriented, Cooperative, No Acute Distress


HEENT: Reports: Pupils Equal, Pupils Reactive, Mucous Membr. Moist/Pink


Neck: Reports: Supple, Trachea Midline


Lungs: Reports: Clear to Auscultation, Normal Respiratory Effort


Cardiovascular: Reports: Regular Rate, Regular Rhythm


GI/Abdominal Exam: Normal Bowel Sounds, Soft, Non-Tender, No Distention


 (Female) Exam: Deferred


Rectal (Female) Exam: Normal Exam, Normal Rectal Tone


Back Exam: Reports: Normal Inspection, Full Range of Motion


Extremities: Normal Inspection, Normal Range of Motion, Non-Tender, No Pedal 

Edema, Normal Capillary Refill


Skin: Reports: Warm, Dry, Intact


Neurological: Reports: No New Focal Deficit


Psy/Mental Status: Reports: Alert, Normal Affect, Normal Mood

## 2021-02-17 NOTE — PCM.SURGPN
- General Info


Date of Service: 02/17/21


POD#: 2


Functional Status: Reports: Pain Controlled, Tolerating Diet, Other (The pt was 

resting comfortably in chair.)





- Patient Data


Vitals - Most Recent: 


                                Last Vital Signs











Temp  98.1 F   02/17/21 05:32


 


Pulse  59 L  02/17/21 08:34


 


Resp  18   02/17/21 05:32


 


BP  124/51 L  02/17/21 05:32


 


Pulse Ox  98   02/17/21 08:58











Weight - Most Recent: 161 lb 3.2 oz


I&O - Last 24 Hours: 


                                 Intake & Output











 02/16/21 02/17/21 02/17/21





 22:59 06:59 14:59


 


Intake Total 820 500 


 


Balance 820 500 











Lab Results Last 24 Hrs: 


                         Laboratory Results - last 24 hr











  02/16/21 02/16/21 02/16/21 Range/Units





  06:45 12:20 16:52 


 


WBC     (3.98-10.04)  K/mm3


 


RBC     (3.98-5.22)  M/mm3


 


Hgb     (11.2-15.7)  gm/dl


 


Hct     (34.1-44.9)  %


 


MCV     (79.4-94.8)  fl


 


MCH     (25.6-32.2)  pg


 


MCHC     (32.2-35.5)  g/dl


 


RDW Std Deviation     (36.4-46.3)  fL


 


Plt Count     (182-369)  K/mm3


 


MPV     (9.4-12.3)  fl


 


Neut % (Auto)     (34.0-71.1)  %


 


Lymph % (Auto)     (19.3-51.7)  %


 


Mono % (Auto)     (4.7-12.5)  %


 


Eos % (Auto)     (0.7-5.8)  


 


Baso % (Auto)     (0.1-1.2)  %


 


Neut # (Auto)     (1.56-6.13)  K/mm3


 


Lymph # (Auto)     (1.18-3.74)  K/mm3


 


Mono # (Auto)     (0.24-0.36)  K/mm3


 


Eos # (Auto)     (0.04-0.36)  K/mm3


 


Baso # (Auto)     (0.01-0.08)  K/mm3


 


Manual Slide Review     


 


Sodium     (136-145)  mEq/L


 


Potassium     (3.5-5.1)  mEq/L


 


Chloride     ()  mEq/L


 


Carbon Dioxide     (21-32)  mEq/L


 


Anion Gap     (5-15)  


 


BUN     (7-18)  mg/dL


 


Creatinine     (0.55-1.02)  mg/dL


 


Est Cr Clr Drug Dosing     mL/min


 


Estimated GFR (MDRD)     (>60)  mL/min


 


BUN/Creatinine Ratio     (14-18)  


 


Glucose     ()  mg/dL


 


POC Glucose  332 H  350 H  274 H  ()  mg/dL


 


Calcium     (8.5-10.1)  mg/dL


 


Magnesium     (1.8-2.4)  mg/dl














  02/16/21 02/17/21 02/17/21 Range/Units





  20:30 05:01 05:01 


 


WBC   13.27 H   (3.98-10.04)  K/mm3


 


RBC   3.14 L   (3.98-5.22)  M/mm3


 


Hgb   9.4 L   (11.2-15.7)  gm/dl


 


Hct   29.7 L   (34.1-44.9)  %


 


MCV   94.6   (79.4-94.8)  fl


 


MCH   29.9   (25.6-32.2)  pg


 


MCHC   31.6 L   (32.2-35.5)  g/dl


 


RDW Std Deviation   49.3 H   (36.4-46.3)  fL


 


Plt Count   212   (182-369)  K/mm3


 


MPV   11.3   (9.4-12.3)  fl


 


Neut % (Auto)   74.8 H   (34.0-71.1)  %


 


Lymph % (Auto)   14.8 L   (19.3-51.7)  %


 


Mono % (Auto)   5.0   (4.7-12.5)  %


 


Eos % (Auto)   3.0   (0.7-5.8)  


 


Baso % (Auto)   0.2   (0.1-1.2)  %


 


Neut # (Auto)   9.93 H   (1.56-6.13)  K/mm3


 


Lymph # (Auto)   1.97   (1.18-3.74)  K/mm3


 


Mono # (Auto)   0.66 H   (0.24-0.36)  K/mm3


 


Eos # (Auto)   0.40 H   (0.04-0.36)  K/mm3


 


Baso # (Auto)   0.02   (0.01-0.08)  K/mm3


 


Manual Slide Review   Normal smear   


 


Sodium    141  (136-145)  mEq/L


 


Potassium    3.3 L  (3.5-5.1)  mEq/L


 


Chloride    102  ()  mEq/L


 


Carbon Dioxide    26  (21-32)  mEq/L


 


Anion Gap    16.3 H  (5-15)  


 


BUN    42 H  (7-18)  mg/dL


 


Creatinine    2.1 H  (0.55-1.02)  mg/dL


 


Est Cr Clr Drug Dosing    13.04  mL/min


 


Estimated GFR (MDRD)    22  (>60)  mL/min


 


BUN/Creatinine Ratio    20.0 H  (14-18)  


 


Glucose  405 H   171 H  ()  mg/dL


 


POC Glucose     ()  mg/dL


 


Calcium    8.8  (8.5-10.1)  mg/dL


 


Magnesium    2.4  (1.8-2.4)  mg/dl














  02/17/21 Range/Units





  05:36 


 


WBC   (3.98-10.04)  K/mm3


 


RBC   (3.98-5.22)  M/mm3


 


Hgb   (11.2-15.7)  gm/dl


 


Hct   (34.1-44.9)  %


 


MCV   (79.4-94.8)  fl


 


MCH   (25.6-32.2)  pg


 


MCHC   (32.2-35.5)  g/dl


 


RDW Std Deviation   (36.4-46.3)  fL


 


Plt Count   (182-369)  K/mm3


 


MPV   (9.4-12.3)  fl


 


Neut % (Auto)   (34.0-71.1)  %


 


Lymph % (Auto)   (19.3-51.7)  %


 


Mono % (Auto)   (4.7-12.5)  %


 


Eos % (Auto)   (0.7-5.8)  


 


Baso % (Auto)   (0.1-1.2)  %


 


Neut # (Auto)   (1.56-6.13)  K/mm3


 


Lymph # (Auto)   (1.18-3.74)  K/mm3


 


Mono # (Auto)   (0.24-0.36)  K/mm3


 


Eos # (Auto)   (0.04-0.36)  K/mm3


 


Baso # (Auto)   (0.01-0.08)  K/mm3


 


Manual Slide Review   


 


Sodium   (136-145)  mEq/L


 


Potassium   (3.5-5.1)  mEq/L


 


Chloride   ()  mEq/L


 


Carbon Dioxide   (21-32)  mEq/L


 


Anion Gap   (5-15)  


 


BUN   (7-18)  mg/dL


 


Creatinine   (0.55-1.02)  mg/dL


 


Est Cr Clr Drug Dosing   mL/min


 


Estimated GFR (MDRD)   (>60)  mL/min


 


BUN/Creatinine Ratio   (14-18)  


 


Glucose   ()  mg/dL


 


POC Glucose  177 H  ()  mg/dL


 


Calcium   (8.5-10.1)  mg/dL


 


Magnesium   (1.8-2.4)  mg/dl











Med Orders - Current: 


                               Current Medications





Acetaminophen (Tylenol)  650 mg PO Q4H PRN


   PRN Reason: Pain/Fever


   Last Admin: 02/16/21 23:08 Dose:  650 mg


   Documented by: 


Albuterol/Ipratropium (Duoneb 3.0-0.5 Mg/3 Ml)  3 ml NEB Q4H PRN


   PRN Reason: Shortness Of Breath/wheezing


Aspirin (Aspirin)  81 mg PO BID [facility]


   Stop: 03/23/21 09:01


   Last Admin: 02/16/21 20:08 Dose:  81 mg


   Documented by: 


Calcium Carbonate/Glycine (Tums)  500 mg PO Q4H PRN


   PRN Reason: Heartburn


Clopidogrel Bisulfate (Plavix)  75 mg PO DAILY [facility]


   Last Admin: 02/16/21 08:16 Dose:  75 mg


   Documented by: 


Cyclobenzaprine HCl (Flexeril)  10 mg PO TID PRN


   PRN Reason: muscle spasms 


   Last Admin: 02/16/21 20:09 Dose:  10 mg


   Documented by: 


Docusate Sodium (Colace)  100 mg PO BID [facility]


   Last Admin: 02/16/21 20:09 Dose:  100 mg


   Documented by: 


Famotidine (Pepcid)  20 mg PO Q48H [facility]


   Last Admin: 02/15/21 08:39 Dose:  20 mg


   Documented by: 


Furosemide (Lasix)  40 mg PO DAILY [facility]


   Last Admin: 02/16/21 08:16 Dose:  40 mg


   Documented by: 


Gabapentin (Neurontin)  100 mg PO BID [facility]


   Last Admin: 02/16/21 20:08 Dose:  100 mg


   Documented by: 


Hydralazine HCl (Apresoline)  10 mg IVPUSH Q4H PRN


   PRN Reason: Hypertension


Hydromorphone HCl (Dilaudid)  0.5 mg IVPUSH Q4H PRN


   PRN Reason: Pain (severe 7-10)


   Last Admin: 02/16/21 02:51 Dose:  0.5 mg


   Documented by: 


Promethazine HCl 12.5 mg/ (Sodium Chloride)  50.5 mls @ 100 mls/hr IV Q6H PRN


   PRN Reason: Nausea/Vomiting


Piperacillin Sod/Tazobactam (Sod 4.5 gm/ Sodium Chloride)  100 mls @ 25 mls/hr 

IV Q12H [facility]


   Last Admin: 02/17/21 08:14 Dose:  25 mls/hr


   Documented by: 


Insulin Glargine (Lantus)  15 unit SUBCUT BID [facility]


   Last Admin: 02/17/21 08:13 Dose:  15 units


   Documented by: 


Insulin Human Lispro (Humalog)  0 unit SUBCUT QIDACANDBED [facility]; Protocol


   Last Admin: 02/17/21 08:13 Dose:  2 units


   Documented by: 


Magnesium Hydroxide (Milk Of Magnesia)  30 ml PO DAILY PRN


   PRN Reason: Constipation


   Last Admin: 02/12/21 16:09 Dose:  30 ml


   Documented by: 


Metoprolol Succinate (Toprol Xl)  50 mg PO DAILY [facility]


   Last Admin: 02/16/21 08:16 Dose:  50 mg


   Documented by: 


Oxycodone HCl (Oxycodone)  5 mg PO Q6H PRN


   PRN Reason: Pain (moderate 4-6)


   Last Admin: 02/16/21 23:09 Dose:  5 mg


   Documented by: 


Polyethylene Glycol (Miralax)  17 gm PO ASDIRECTED PRN


   PRN Reason: Constipation





Discontinued Medications





Acetaminophen (Tylenol)  650 mg PO Q6H PRN


   PRN Reason: Pain (Mild 1-3)/fever


   Last Admin: 02/09/21 12:34 Dose:  650 mg


   Documented by: 


Acetaminophen (Tylenol)  650 mg PO Q4H PRN


   PRN Reason: Fever


   Last Admin: 02/14/21 21:00 Dose:  650 mg


   Documented by: 


Bupivacaine HCl (Marcaine 0.5%) Confirm Administered Dose 0 ml .ROUTE .STK-MED 

ONE


   Stop: 02/11/21 09:56


Bupivacaine HCl (Sensorcaine-Mpf 0.25%) Confirm Administered Dose 0 ml .ROUTE 

.STK-MED ONE


   Stop: 02/15/21 12:57


Bupivacaine HCl (Sensorcaine-Mpf 0.25%) Confirm Administered Dose 30 ml .ROUTE 

.STK-MED ONE


   Stop: 02/15/21 13:29


   Last Admin: 02/15/21 15:00 Dose:  15 ml


   Documented by: 


Calcium Carbonate/Glycine (Tums)  400 mg PO Q4H PRN


   PRN Reason: Heartburn


Dexamethasone (Dexamethasone) Confirm Administered Dose 20 mg .ROUTE .STK-MED 

ONE


   Stop: 02/15/21 13:46


Dexmedetomidine HCl (Precedex) Confirm Administered Dose 200 mcg .ROUTE .STK-MED

ONE


   Stop: 02/15/21 12:35


Docusate Sodium (Colace)  100 mg PO BID PRN


   PRN Reason: Constipation


   Last Admin: 02/11/21 15:57 Dose:  100 mg


   Documented by: 


Famotidine (Pepcid)  20 mg PO DAILY [facility]


   Last Admin: 02/09/21 09:57 Dose:  20 mg


   Documented by: 


Fentanyl (Sublimaze) Confirm Administered Dose 0 mcg .ROUTE .STK-MED ONE


   Stop: 02/11/21 10:46


Fentanyl (Sublimaze) Confirm Administered Dose 100 mcg .ROUTE .STK-MED ONE


   Stop: 02/15/21 12:53


Furosemide (Lasix)  40 mg IVPUSH NOW ONE


   Stop: 02/08/21 21:42


   Last Admin: 02/08/21 22:00 Dose:  40 mg


   Documented by: 


Furosemide (Lasix)  20 mg PO DAILY [facility]


   Last Admin: 02/09/21 13:04 Dose:  Not Given


   Documented by: 


Furosemide (Lasix)  20 mg IVPUSH ONETIME ONE


   Stop: 02/09/21 18:01


   Last Admin: 02/09/21 18:47 Dose:  20 mg


   Documented by: 


Furosemide (Lasix)  40 mg IVPUSH NOW ONE


   Stop: 02/11/21 11:15


   Last Admin: 02/11/21 11:22 Dose:  40 mg


   Documented by: 


Furosemide (Lasix)  40 mg IVPUSH NOW ONE


   Stop: 02/12/21 08:13


   Last Admin: 02/12/21 08:29 Dose:  40 mg


   Documented by: 


Furosemide (Lasix)  40 mg PO DAILY [facility]


   Last Admin: 02/13/21 09:04 Dose:  40 mg


   Documented by: 


Gabapentin (Neurontin)  600 mg PO BID [facility]


   Last Admin: 02/09/21 08:10 Dose:  600 mg


   Documented by: 


Gabapentin (Neurontin)  600 mg PO BID [facility]


   Last Admin: 02/13/21 09:04 Dose:  600 mg


   Documented by: 


Heparin Sodium (Porcine) (Heparin Sodium)  5,000 units SUBCUT Q8H [facility]


   Stop: 02/10/21 23:00


   Last Admin: 02/10/21 21:18 Dose:  5,000 units


   Documented by: 


Heparin Sodium (Porcine) (Heparin Sodium)  5,000 units SUBCUT Q8H [facility]


   Last Admin: 02/14/21 10:58 Dose:  5,000 units


   Documented by: 


Hydromorphone HCl (Dilaudid)  0.25 mg IVPUSH ONETIME ONE


   Stop: 02/08/21 20:02


   Last Admin: 02/08/21 20:10 Dose:  0.25 mg


   Documented by: 


Hydromorphone HCl (Dilaudid)  0.25 mg IVPUSH ONETIME ONE


   Stop: 02/08/21 22:22


   Last Admin: 02/08/21 23:17 Dose:  0.25 mg


   Documented by: 


Sodium Chloride (Normal Saline)  1,000 mls @ 65 mls/hr IV ASDIRECTED [facility]


Lactated Ringer's (Ringers, Lactated)  1,000 mls @ 50 mls/hr IV ASDIRECTED [facility]


   Last Admin: 02/09/21 14:18 Dose:  50 mls/hr


   Documented by: 


Lactated Ringer's (Ringers, Lactated)  1,000 mls @ 65 mls/hr IV ASDIRECTED [facility]


   Last Admin: 02/10/21 06:46 Dose:  65 mls/hr


   Documented by: 


Lactated Ringer's (Ringers, Lactated)  1,000 mls @ 100 mls/hr IV ASDIRECTED [facility]


   Stop: 02/10/21 19:29


   Last Admin: 02/10/21 09:44 Dose:  100 mls/hr


   Documented by: 


Lactated Ringer's (Ringers, Lactated)  1,000 mls @ 75 mls/hr IV ASDIRECTED [facility]


   Last Admin: 02/11/21 07:37 Dose:  75 mls/hr


   Documented by: 


Piperacillin Sod/Tazobactam (Sod 4.5 gm/ Sodium Chloride)  100 mls @ 25 mls/hr 

IV Q8H [facility]


Piperacillin Sod/Tazobactam (Sod 4.5 gm/ Sodium Chloride)  100 mls @ 200 mls/hr 

IV ONETIME ONE


   Stop: 02/12/21 08:29


   Last Admin: 02/12/21 08:35 Dose:  200 mls/hr


   Documented by: 


Sodium Chloride (Normal Saline)  500 mls @ 45 mls/hr IV ASDIRECTED [facility]


   Stop: 02/14/21 07:37


   Last Admin: 02/13/21 21:56 Dose:  45 mls/hr


   Documented by: 


Potassium Chloride 20 meq/ (Dextrose/Water)  1,010 mls @ 50 mls/hr IV ASDIRECTED

[facility]


   Stop: 02/15/21 08:41


Potassium Chloride/Dextrose/Sod Cl (D5 1/2 Ns W/ 10 Meq/L Kcl)  1,000 mls @ 50 

mls/hr IV ASDIRECTED [facility]


   Last Admin: 02/15/21 09:08 Dose:  50 mls/hr


   Documented by: 


Lidocaine HCl (Xylocaine-Mpf 1%) Confirm Administered Dose 4 mls @ as directed 

.ROUTE .STK-MED ONE


   Stop: 02/15/21 12:54


Insulin Glargine (Lantus)  15 unit SUBCUT DAILY [facility]


   Last Admin: 02/12/21 08:01 Dose:  Not Given


   Documented by: 


Insulin Glargine (Lantus)  7.5 unit SUBCUT ONETIME ONE


   Stop: 02/12/21 09:01


   Last Admin: 02/12/21 08:16 Dose:  7.5 units


   Documented by: 


Insulin Glargine (Lantus)  15 unit SUBCUT DAILY [facility]


Insulin Glargine (Lantus)  7.5 unit SUBCUT DAILY [facility]


   Last Admin: 02/13/21 09:06 Dose:  7.5 units


   Documented by: 


Insulin Glargine (Lantus)  10 unit SUBCUT BID [facility]


   Last Admin: 02/14/21 08:36 Dose:  10 units


   Documented by: 


Insulin Glargine (Lantus)  10 unit SUBCUT NOW [facility]


   Stop: 02/13/21 19:06


   Last Admin: 02/13/21 19:28 Dose:  10 unit


   Documented by: 


Insulin Glargine (Lantus)  13 unit SUBCUT BID [facility]


   Last Admin: 02/15/21 21:03 Dose:  13 units


   Documented by: 


Insulin Glargine (Lantus)  5 unit SUBCUT NOW [facility]


   Stop: 02/14/21 21:12


   Last Admin: 02/14/21 22:33 Dose:  5 units


   Documented by: 


Insulin Human Lispro (Humalog)  0 unit SUBCUT QIDACANDBED [facility]; Protocol


   Last Admin: 02/13/21 14:23 Dose:  Not Given


   Documented by: 


Insulin Human Lispro (Humalog)  0 unit SUBCUT QIDACANDBED [facility]; Protocol


Ketamine HCl (Ketalar) Confirm Administered Dose 0 mg .ROUTE .STK-MED ONE


   Stop: 02/11/21 10:47


Ketamine HCl (Ketalar) Confirm Administered Dose 500 mg .ROUTE .STK-MED ONE


   Stop: 02/15/21 12:54


Lidocaine HCl (Xylocaine 1%)  10 ml INJECT ONETIME ONE


   Stop: 02/08/21 18:58


   Last Admin: 02/09/21 00:57 Dose:  Not Given


   Documented by: 


Lidocaine/Epinephrine (Xylocaine-Mpf 2%-Epi 1:200,000) Confirm Administered Dose

20 ml .ROUTE .STK-MED ONE


   Stop: 02/11/21 10:13


Metoprolol Tartrate (Lopressor)  5 mg IVPUSH Q5M PRN


   PRN Reason: Hypertension


   Stop: 02/15/21 18:00


   Last Admin: 02/15/21 16:21 Dose:  1 mg


   Documented by: 


Midazolam HCl (Versed 1 Mg/Ml) Confirm Administered Dose 0 mg .ROUTE .STK-MED 

ONE


   Stop: 02/11/21 10:46


Midazolam HCl (Versed 1 Mg/Ml) Confirm Administered Dose 2 mg .ROUTE .STK-MED 

ONE


   Stop: 02/15/21 12:54


Non-Formulary Medication (Insulin Degludec [Tresiba])  15 unit SQ DAILY [facility]


Ondansetron HCl (Zofran)  4 mg IVPUSH ONETIME ONE


   Stop: 02/08/21 18:48


   Last Admin: 02/08/21 18:51 Dose:  4 mg


   Documented by: 


Ondansetron HCl (Zofran) Confirm Administered Dose 4 mg .ROUTE .STK-MED ONE


   Stop: 02/08/21 19:51


   Last Admin: 02/08/21 20:11 Dose:  Not Given


   Documented by: 


Ondansetron HCl (Zofran)  4 mg IVPUSH ONETIME ONE


   Stop: 02/08/21 20:07


   Last Admin: 02/08/21 20:11 Dose:  4 mg


   Documented by: 


Potassium Chloride (Klor-Con M20)  40 meq PO DAILY [facility]


   Stop: 02/14/21 12:25


   Last Admin: 02/14/21 13:23 Dose:  Not Given


   Documented by: 


Potassium Chloride (Klor-Con M20)  40 meq PO ONETIME ONE


   Stop: 02/14/21 12:39


   Last Admin: 02/14/21 13:10 Dose:  40 meq


   Documented by: 


Potassium Chloride (Klor-Con M20)  40 meq PO ONETIME ONE


   Stop: 02/17/21 09:01


Propofol (Diprivan  20 Ml) Confirm Administered Dose 200 mg .ROUTE .STK-MED ONE


   Stop: 02/15/21 12:54


Ropivacaine (Naropin 0.5%) Confirm Administered Dose 30 ml .ROUTE .STK-MED ONE


   Stop: 02/11/21 09:57


Ropivacaine (Naropin 0.5%) Confirm Administered Dose 30 ml .ROUTE .STK-MED ONE


   Stop: 02/15/21 12:35


Sodium Polystyrene Sulfonate (Kayexalate)  15 gm PO ONETIME ONE


   Stop: 02/09/21 08:50


   Last Admin: 02/09/21 09:57 Dose:  15 gm


   Documented by: 


Spironolactone (Aldactone)  50 mg PO DAILY [facility]


   Last Admin: 02/09/21 08:10 Dose:  50 mg


   Documented by: 


Tamsulosin HCl (Flomax)  0.4 mg PO DAILY [facility]


   Last Admin: 02/09/21 17:44 Dose:  Not Given


   Documented by: 











- Exam


Wound/Incisions: Dressing Dry and Intact


General: Alert, Cooperative, No Acute Distress


Lungs: Normal Respiratory Effort


Extremities: Other (Pt was able to sense light touch at BLE.  Concepcion's negative.)





Sepsis Event Note





- Evaluation


Sepsis Screening Result: No Definite Risk





- Focused Exam


Vital Signs: 


                                   Vital Signs











  Temp Pulse Resp BP Pulse Ox Pulse Ox


 


 02/17/21 08:58       98


 


 02/17/21 08:34   59 L    92 L 


 


 02/17/21 06:10       93 L


 


 02/17/21 05:32  98.1 F  65  18  124/51 L  93 L 


 


 02/16/21 22:00      97 














- Problem List Review


Problem List Initiated/Reviewed/Updated: Yes





- My Orders


Last 24 Hours: 


                               Active Orders 24 hr











 Category Date Time Status


 


 Up to Chair [RC] ASDIRECTED Care  02/16/21 10:38 Active


 


 CORONAVIRUS COVID-19 MENA [MOLEC] Stat Lab  02/17/21 08:55 Ordered


 


 Aspirin Med  02/16/21 09:00 Active





 81 mg PO BID   


 


 Clopidogrel [Plavix] Med  02/16/21 09:00 Active





 75 mg PO DAILY   


 


 Insulin Glarg,Human.Rec.Analog [LantUS] Med  02/16/21 09:00 Active





 15 unit SUBCUT BID   


 


 Ice Therapy [OM.PC] Routine Oth  02/16/21 19:47 Ordered








                                Medication Orders





Acetaminophen (Tylenol)  650 mg PO Q4H PRN


   PRN Reason: Pain/Fever


   Last Admin: 02/16/21 23:08  Dose: 650 mg


   Documented by: NOAH


   Admin: 02/16/21 12:23  Dose: 650 mg


   Documented by: SARAH


   Admin: 02/16/21 00:46  Dose: 650 mg


   Documented by: NOAH


Albuterol/Ipratropium (Duoneb 3.0-0.5 Mg/3 Ml)  3 ml NEB Q4H PRN


   PRN Reason: Shortness Of Breath/wheezing


Aspirin (Aspirin)  81 mg PO BID [facility]


   Stop: 03/23/21 09:01


   Last Admin: 02/16/21 20:08  Dose: 81 mg


   Documented by: NOAH


   Admin: 02/16/21 08:16  Dose: 81 mg


   Documented by: SARAH


Calcium Carbonate/Glycine (Tums)  500 mg PO Q4H PRN


   PRN Reason: Heartburn


Clopidogrel Bisulfate (Plavix)  75 mg PO DAILY [facility]


   Last Admin: 02/16/21 08:16  Dose: 75 mg


   Documented by: SARAH


Cyclobenzaprine HCl (Flexeril)  10 mg PO TID PRN


   PRN Reason: muscle spasms 


   Last Admin: 02/16/21 20:09  Dose: 10 mg


   Documented by: NOAH


   Admin: 02/16/21 00:47  Dose: 10 mg


   Documented by: NOAH


   Admin: 02/09/21 12:36  Dose: 10 mg


   Documented by: SARAH


Docusate Sodium (Colace)  100 mg PO BID [facility]


   Last Admin: 02/16/21 20:09  Dose: 100 mg


   Documented by: NOAH


   Admin: 02/16/21 08:16  Dose: 100 mg


   Documented by: SARAH


   Admin: 02/15/21 21:03  Dose: 100 mg


   Documented by: NOAH


   Admin: 02/15/21 08:39  Dose: 100 mg


   Documented by: ZAHRA


   Admin: 02/14/21 20:36  Dose: 100 mg


   Documented by: TOÑO


   Admin: 02/14/21 08:43  Dose: 100 mg


   Documented by: MIRI


   Admin: 02/13/21 20:11  Dose: 100 mg


   Documented by: TENISHA


   Admin: 02/13/21 09:05  Dose: 100 mg


   Documented by: ADARSH


   Admin: 02/12/21 21:15  Dose: 100 mg


   Documented by: TENISHA


Famotidine (Pepcid)  20 mg PO Q48H [facility]


   Last Admin: 02/15/21 08:39  Dose: 20 mg


   Documented by: ZAHRA


   Admin: 02/13/21 09:08  Dose: 20 mg


   Documented by: ADARSH


   Admin: 02/11/21 08:23 Dose:  Not Given


   Documented by: SARAH


Furosemide (Lasix)  40 mg PO DAILY [facility]


   Last Admin: 02/16/21 08:16  Dose: 40 mg


   Documented by: SARAH


   Admin: 02/15/21 08:40  Dose: 40 mg


   Documented by: ZAHRA


Gabapentin (Neurontin)  100 mg PO BID [facility]


   Last Admin: 02/16/21 20:08  Dose: 100 mg


   Documented by: NOAH


   Admin: 02/16/21 08:16  Dose: 100 mg


   Documented by: SARAH


   Admin: 02/15/21 21:03  Dose: 100 mg


   Documented by: NOAH


   Admin: 02/15/21 08:39  Dose: 100 mg


   Documented by: ZAHRA


   Admin: 02/14/21 20:36  Dose: 100 mg


   Documented by: TOÑO


   Admin: 02/14/21 09:10  Dose: 100 mg


   Documented by: MIRI


   Admin: 02/13/21 20:11  Dose: 100 mg


   Documented by: TENISHA


Hydralazine HCl (Apresoline)  10 mg IVPUSH Q4H PRN


   PRN Reason: Hypertension


Hydromorphone HCl (Dilaudid)  0.5 mg IVPUSH Q4H PRN


   PRN Reason: Pain (severe 7-10)


   Last Admin: 02/16/21 02:51  Dose: 0.5 mg


   Documented by: NOAH


   Admin: 02/11/21 08:18  Dose: 0.5 mg


   Documented by: SARAH


   Admin: 02/10/21 09:54  Dose: 0.5 mg


   Documented by: STEVEN


   Admin: 02/09/21 05:13  Dose: 0.5 mg


   Documented by: CATRINA


   Admin: 02/09/21 01:09  Dose: 0.5 mg


   Documented by: DAYANA


Promethazine HCl 12.5 mg/ (Sodium Chloride)  50.5 mls @ 100 mls/hr IV Q6H PRN


   PRN Reason: Nausea/Vomiting


Piperacillin Sod/Tazobactam (Sod 4.5 gm/ Sodium Chloride)  100 mls @ 25 mls/hr 

IV Q12H [facility]


   Last Admin: 02/17/21 08:14  Dose: 25 mls/hr


   Documented by: STEVEN


   Infusion: 02/17/21 00:08  Dose: 25 mls/hr


   Documented by: STEVEN


   Admin: 02/16/21 20:08  Dose: 25 mls/hr


   Documented by: NOAH


   Infusion: 02/16/21 12:08  Dose: 25 mls/hr


   Documented by: NOAH


   Admin: 02/16/21 08:08  Dose: 25 mls/hr


   Documented by: SARAH


   Infusion: 02/16/21 01:02  Dose: 25 mls/hr


   Documented by: SARAH


   Admin: 02/15/21 21:02  Dose: 25 mls/hr


   Documented by: NOAH


   Infusion: 02/15/21 12:36  Dose: 25 mls/hr


   Documented by: NOAH


   Admin: 02/15/21 08:36  Dose: 25 mls/hr


   Documented by: ZAHRA


   Infusion: 02/15/21 00:30  Dose: 25 mls/hr


   Documented by: ZAHRA


   Admin: 02/14/21 20:30  Dose: 25 mls/hr


   Documented by: TOÑO


   Infusion: 02/14/21 12:14  Dose: 25 mls/hr


   Documented by: TOÑO


   Admin: 02/14/21 08:14  Dose: 25 mls/hr


   Documented by: MIRI


   Infusion: 02/14/21 00:11  Dose: 25 mls/hr


   Documented by: MIRI


   Admin: 02/13/21 20:11  Dose: 25 mls/hr


   Documented by: TENISHA


   Infusion: 02/13/21 13:04  Dose: 25 mls/hr


   Documented by: TENISHA


   Admin: 02/13/21 09:04  Dose: 25 mls/hr


   Documented by: ADARSH


   Infusion: 02/13/21 01:15  Dose: 25 mls/hr


   Documented by: ADARSH


   Admin: 02/12/21 21:15  Dose: 25 mls/hr


   Documented by: TENISHA


Insulin Glargine (Lantus)  15 unit SUBCUT BID [facility]


   Last Admin: 02/17/21 08:13  Dose: 15 units


   Documented by: STEVEN


   Admin: 02/16/21 21:09  Dose: 15 units


   Documented by: NOAH


   Admin: 02/16/21 08:21  Dose: 15 units


   Documented by: SARAH


Insulin Human Lispro (Humalog)  0 unit SUBCUT QIDACANDBED [facility]; Protocol


   Last Admin: 02/17/21 08:13  Dose: 2 units


   Documented by: STEVEN


   Admin: 02/16/21 21:10  Dose: 10 units


   Documented by: NOAH


   Admin: 02/16/21 17:35  Dose: 6 units


   Documented by: MERON


   Admin: 02/16/21 12:34  Dose: 10 units


   Documented by: SARAH


   Admin: 02/16/21 08:05  Dose: 8 units


   Documented by: SARAH


   Admin: 02/15/21 21:04  Dose: 10 units


   Documented by: NOAH


   Admin: 02/15/21 18:48  Dose: 8 units


   Documented by: DAVID


   Admin: 02/15/21 12:02 Dose:  Not Given


   Documented by: ZAHRA


   Admin: 02/15/21 07:42 Dose:  Not Given


   Documented by: ZAHRA


   Admin: 02/14/21 22:35  Dose: 10 units


   Documented by: TOÑO


   Admin: 02/14/21 18:00  Dose: 8 units


   Documented by: MIRI


   Admin: 02/14/21 11:06  Dose: 10 units


   Documented by: MIRI


   Admin: 02/14/21 10:45 Dose:  Not Given


   Documented by: MIRI


   Admin: 02/13/21 22:26  Dose: 10 units


   Documented by: TENISHA


   Admin: 02/13/21 19:03  Dose: 10 units


   Documented by: ADARSH


   Admin: 02/13/21 12:58  Dose: 10 units


   Documented by: ADARSH


Magnesium Hydroxide (Milk Of Magnesia)  30 ml PO DAILY PRN


   PRN Reason: Constipation


   Last Admin: 02/12/21 16:09  Dose: 30 ml


   Documented by: ADARSH


Metoprolol Succinate (Toprol Xl)  50 mg PO DAILY [facility]


   Last Admin: 02/16/21 08:16  Dose: 50 mg


   Documented by: SARAH


   Admin: 02/15/21 10:57  Dose: 50 mg


   Documented by: ZAHRA


   Admin: 02/14/21 08:43  Dose: 50 mg


   Documented by: MIRI


   Admin: 02/13/21 09:05  Dose: 50 mg


   Documented by: ADARSH


   Admin: 02/12/21 08:17  Dose: 50 mg


   Documented by: SARAH


   Admin: 02/11/21 08:23  Dose: 50 mg


   Documented by: SARAH


   Admin: 02/10/21 08:57  Dose: 50 mg


   Documented by: STEVEN


   Admin: 02/09/21 08:11  Dose: 50 mg


   Documented by: SARAH


Oxycodone HCl (Oxycodone)  5 mg PO Q6H PRN


   PRN Reason: Pain (moderate 4-6)


   Last Admin: 02/16/21 23:09  Dose: 5 mg


   Documented by: NOAH


   Admin: 02/16/21 18:43  Dose: 5 mg


   Documented by: MERON


   Admin: 02/16/21 12:26  Dose: 5 mg


   Documented by: SARAH


   Admin: 02/16/21 00:47  Dose: 5 mg


   Documented by: NOAH


   Admin: 02/15/21 21:07  Dose: 5 mg


   Documented by: NOAH


   Admin: 02/13/21 06:56  Dose: 5 mg


   Documented by: TENISHA


   Admin: 02/12/21 08:18  Dose: 5 mg


   Documented by: SARAH


   Admin: 02/11/21 21:48  Dose: 5 mg


   Documented by: NOAH


   Admin: 02/11/21 01:36  Dose: 5 mg


   Documented by: MERON


   Admin: 02/10/21 08:56  Dose: 5 mg


   Documented by: STEVEN


   Admin: 02/09/21 20:48  Dose: 5 mg


   Documented by: MERON


   Admin: 02/09/21 12:35  Dose: 5 mg


   Documented by: PROEAMA


Polyethylene Glycol (Miralax)  17 gm PO ASDIRECTED PRN


   PRN Reason: Constipation











- Assessment


Assessment           (Free Text/Narrative):: 





POD#2 - cephalomedullary nail placement for left hip fracture





- Plan


Plan                        (Free Text/Narrative):: 





1.  Pt will d/c to [facility] today.


2.  Continue with PT and OT.


3.  WBAT.


4.  81mg ASA PO BID for VTE prophylaxis.





The pt's case was discussed with Dr. Pruett.

## 2021-02-21 NOTE — OR
DATE OF OPERATION:  02/15/2021

 

SURGEON:  Vikas Pruett MD

 

OPERATION PERFORMED:  Left hip cephalomedullary nailing.

 

PREOPERATIVE DIAGNOSIS:

Left hip intertrochanteric fracture.

 

POSTOPERATIVE DIAGNOSIS:

Left hip intertrochanteric fracture.

 

ANESTHESIA:

Local MAC with spinal.

 

ANESTHESIA PROVIDER:

Zayra Santiago.

 

ASSISTANT:

Alberta Smith PA-C.

 

ESTIMATED BLOOD LOSS:

25 mL.

 

COMPLICATIONS:

None.

 

CONDITION:

Stable.

 

IMPLANT:

[place] short gamma nail.

 

DESCRIPTION OF PROCEDURE:

The patient was identified in the preoperative holding area.  Proper site was

marked and identified by the surgeon.  The patient was taken back to the

operating theater where after adequate anesthesia, the patient was placed on the

traction table.  Right lower extremity was placed in the traction boot, but no

traction was applied and was placed in a dependent position.  The left lower

extremity was placed in the traction boot and gross traction was applied and

this was placed in an abducted position with the patella facing superiorly.

Left hip was then sterilely prepped and draped in the usual sterile fashion.  OR

time-out was performed.  The patient received 2 g of IV Ancef.  C-arm

fluoroscopy showed it to be anatomically reduced.  At this time, a standard

superior incision was made.  A guide pin was placed in the center-center

position in the greater trochanter and the opening reamer for a short gamma nail

was utilized.  The short gamma nail was then placed and impacted into proper

position.  A small stab incision was made laterally and the guide pin was placed

up in through the head and the neck in a center-center position and was found to

be adequate on both AP and lateral C-arm fluoroscopy views.  The step reamer was

found to be good for a 90 mm lag screw in the head.  The lag screw was then

placed and the set screw was placed superiorly.  An 8th of a turn back off the

set screw was done.  At this time, attention was turned distally.  The guide was

placed for the static interlock screw.  A small incision was made.  The drill

was then utilized and the distal interlock screw was placed.  C-arm fluoroscopy

showed the fracture to be anatomically reduced.  All screws were in proper

position.  Adequate saline was irrigated through all wounds.  0 Vicryl was used

for closure of the gluteal fascia, 2-0 Vicryl was used subcutaneously and Prineo

and staples were used for the skin.  The patient had a sterile soft dressing

applied and was sent to the PACU in stable condition.

 

DD:  02/21/2021 17:15:20

DT:  02/21/2021 17:59:02  MMODAL

Job #:  [id number]

## 2021-02-21 NOTE — PCM.OPNOTE
- General Post-Op/Procedure Note


Date of Surgery/Procedure: 02/15/21


Operative Procedure(s): left hip cephallomedullary nailing


Pre Op Diagnosis: left hip intertrochanteric fracture


Post-Op Diagnosis: Same


Anesthesia Technique: Local, MAC, Spinal


Primary Surgeon: Vikas Pruett


Anesthesia Provider: Estuardo Sterling


Assistant: Alberta Smith


EBL in mLs: 25


Complications: None


Condition: Good

## 2021-04-26 NOTE — PCM.HP.2
H&P History of Present Illness





- General


Date of Service: 04/26/21


Source of Information: Patient, Old Records, Provider, RN, RN Notes Reviewed


History Limitations: Reports: No Limitations





- History of Present Illness


Initial Comments - Free Text/Narative: 


This is an 89-year-old female who presents to our ED on 4/26/2021 with concern 

over GI bleed.  Patient resides at [facility] and reportedly had 

melena and coffee-ground emesis starting at around 7 AM today.  Patient 

presented via [facility].  Patient does have a history of CVA, TIA, and

A. fib and is on Plavix and a baby aspirin for this.  No history of any prior GI

bleeds.  Patient has baseline hypoxemia requiring oxygen and is on 2 L in the 

ED.





In the emergency room temp was 36.8 Celsius.  Pulse 98.  Respirations 16.  Blood

pressure 141/62.  Pulse ox 98%.  Twelve-lead EKG is obtained showing a sinus 

rhythm with Q waves in III and aVF.  There is low voltage noted in the 

precordial leads.  Compared with prior twelve-lead EKG from February of this 

year with no acute changes noted.  Labs are obtained showing an elevated WBC at 

14.03.  Hemoglobin is 9.7.  Platelet 246,000.  Neutrophils are elevated 85.4%.  

INR is 1.02.  aPTT is 21.0.  Sodium is 137.  Potassium is high at 5.6.  Chloride

104.  Carbon oxide 25.  Anion gap 13.6.  BUN is quite high at 57.  Creatinine 

1.7.  GFR 28.  Glucose is 306.  Calcium 8.9.  Total bilirubin 0.6.  AST is 11, 

ALT 20, alkaline phosphatase 91.  Troponin less than 0.017.  Protein 6.7.  

Albumin is low at 3.2.  SARS Covid 2 RNA is negative.  Blood type is O- and 

antibody screen is negative.  While in the ED she is noted to have an episode of

emesis with approximately 10 mL of coffee-ground emesis with streaks of blood.  

She is given Protonix and Zofran via IV.





She carries a history of A. fib, heart failure, hypertension, anemia, recurrent 

pneumonia, hypoxemia requiring O2, chronic constipation, dysphagia, chronic 

renal insufficiency, urinary retention, chronic back pain, TIA, cerebral 

infarct, neuralgia, vascular dementia, depression, type II DM.  She is not a 

smoker.  Her PCP is Dr. Kauffman.  She is subsequently admitted to the medical 

floor for management of her GI bleed.  She is a DNR/DNI.  Her daughter is 

present at bedside.








- Related Data


Allergies/Adverse Reactions: 


                                    Allergies











Allergy/AdvReac Type Severity Reaction Status Date / Time


 


levofloxacin [From Levaquin] Allergy  Itching Verified 04/26/21 08:55











Home Medications: 


                                    Home Meds





Clopidogrel [Plavix] 75 mg PO DAILY 11/25/15 [History]


Gabapentin [Neurontin] 600 mg PO BID 11/25/15 [History]


Insulin Aspart [Novolog Flexpen] 13 unit SQ TID 11/25/15 [History]


Acetaminophen 500 mg PO BEDTIME 08/17/16 [History]


Vitamin E 400 unit PO DAILY 08/17/16 [History]


Furosemide [Lasix] 20 mg PO DAILY 06/04/19 [History]


Metoprolol Succinate 50 mg PO DAILY 06/04/19 [History]


polyethylene glycoL 3350 [MiraLAX] 17 gm PO ASDIRECTED PRN 06/04/19 [History]


Albuterol/Ipratropium [DuoNeb 3.0-0.5 MG/3 ML] 3 ml NEB QIDRT #120 neb 06/11/19 

[Rx]


Spironolactone [Aldactone] 50 mg PO DAILY #30 tablet 06/11/19 [Rx]


Insulin Aspart [NovoLOG] 1 dose SUBCUT TID 10/06/20 [History]


Acetaminophen [Tylenol] 650 mg PO Q4H PRN #30 tablet 02/17/21 [Rx]


Docusate Sodium [Colace] 100 mg PO BID #20 cap 02/17/21 [Rx]


Aspirin 81 mg PO DAILY 04/26/21 [History]











Past Medical History


HEENT History: Reports: Impaired Vision


Other HEENT History: dysphagia


Cardiovascular History: Reports: Afib, Arrhythmia, Heart Failure, Hypertension


Other Cardiovascular History: Afib, anemia


Respiratory History: Reports: Pneumonia, Recurrent, Other (See Below)


Other Respiratory History: hypoxemia requiring home O2


Gastrointestinal History: Reports: Chronic Constipation


Other Gastrointestinal History: dysphagia


Genitourinary History: Reports: Chronic Renal Insuffiency, Retention, Urinary


OB/GYN History: Reports: Pregnancy


Musculoskeletal History: Reports: Back Pain, Chronic


Other Musculoskeletal History: muscle weakness


Neurological History: Reports: TIA, Other (See Below)


Other Neuro History: cerebral infarct; neuralgia


Psychiatric History: Reports: Dementia, Depression


Other Psychiatric History: vascular dementia


Endocrine/Metabolic History: Reports: Diabetes, Type II


Hematologic History: Reports: Anemia


Oncologic (Cancer) History: Reports: Other (See Below)


Other Oncologic History: unknown skin cancer


Dermatologic History: Reports: Other (See Below)


Other Dermatologic History: previous supspected skin cancer





- Infectious Disease History


Infectious Disease History: Reports: Chicken Pox, Influenza, Measles, Mumps





- Past Surgical History


HEENT Surgical History: Reports: None


Cardiovascular Surgical History: Reports: None


GI Surgical History: Reports: Cholecystectomy


Female  Surgical History: Reports: None


Oncologic Surgical History: Reports: None





Social & Family History





- Family History


Family Medical History: No Pertinent Family History





- Tobacco Use


Tobacco Use Status *Q: Never Tobacco User





- Caffeine Use


Caffeine Use: Reports: Coffee





- Recreational Drug Use


Recreational Drug Use: No





H&P Review of Systems





- Review of Systems:


Review Of Systems: See Below


General: Reports: No Symptoms, Malaise, Weakness, Fatigue.  Denies: Fever, 

Chills


HEENT: Reports: No Symptoms.  Denies: Headaches, Sore Throat


Pulmonary: Reports: No Symptoms.  Denies: Shortness of Breath, Wheezing, 

Pleuritic Chest Pain, Cough, Sputum


Cardiovascular: Reports: No Symptoms.  Denies: Chest Pain, Palpitations, Dyspnea

 on Exertion, Edema, Blood Pressure Problem


Gastrointestinal: Reports: Hematemesis, Melena, Nausea, Vomiting.  Denies: 

Abdominal Pain, Constipation, Diarrhea


Genitourinary: Reports: No Symptoms.  Denies: Pain


Musculoskeletal: Reports: No Symptoms


Skin: Reports: No Symptoms.  Denies: Cyanosis


Psychiatric: Reports: No Symptoms.  Denies: Confusion


Neurological: Reports: No Symptoms, Weakness.  Denies: Confusion, Dizziness, 

Headache, Numbness, Pre-Existing Deficit, Tingling, Difficulty Walking, Gait 

Disturbance


Hematologic/Lymphatic: Reports: Anemia


Immunologic: Reports: No Symptoms





Exam





- Exam


Exam: See Below





- Vital Signs


Vital Signs: 


                                Last Vital Signs











Temp  98.2 F   04/26/21 08:38


 


Pulse  98   04/26/21 08:38


 


Resp  16   04/26/21 08:38


 


BP  141/62 H  04/26/21 08:38


 


Pulse Ox  98   04/26/21 08:38











Weight: 158 lb 11.2 oz





- Exam


Quality Assessment: Supplemental Oxygen (2L ), DVT Prophylaxis.  No: Urinary 

Catheter


General: Alert, Oriented, Cooperative.  No: Mild Distress


HEENT: Conjunctiva Clear, EACs Clear, Mucosa Moist & Pink, Posterior Pharynx 

Clear


Neck: Supple, Trachea Midline


Lungs: Clear to Auscultation, Normal Respiratory Effort


Cardiovascular: Regular Rate, Regular Rhythm


GI/Abdominal Exam: Normal Bowel Sounds, Soft, Non-Tender, No Distention


 (Female) Exam: Deferred


Rectal (Female) Exam: Deferred


Extremities: Normal Inspection, Normal Range of Motion, Non-Tender, No Pedal 

Edema, Normal Capillary Refill


Peripheral Pulses: 1+: Dorsalis Pedis (L), Dorsalis Pedis (R), 2+: Radial (L), 

Radial (R)


Skin: Warm, Dry, Intact


Neurological: Cranial Nerves Intact (Grossly )


Neuro Extensive - Mental Status: Alert, Oriented x3, Normal Mood/Affect





- Patient Data


Lab Results Last 24 hrs: 


                         Laboratory Results - last 24 hr











  04/26/21 04/26/21 04/26/21 Range/Units





  08:49 09:08 09:08 


 


WBC   14.03 H   (3.98-10.04)  K/mm3


 


RBC   3.19 L   (3.98-5.22)  M/mm3


 


Hgb   9.7 L   (11.2-15.7)  gm/dl


 


Hct   31.4 L   (34.1-44.9)  %


 


MCV   98.4 H D   (79.4-94.8)  fl


 


MCH   30.4   (25.6-32.2)  pg


 


MCHC   30.9 L   (32.2-35.5)  g/dl


 


RDW Std Deviation   50.5 H   (36.4-46.3)  fL


 


Plt Count   246   (182-369)  K/mm3


 


MPV   10.1   (9.4-12.3)  fl


 


Neut % (Auto)   85.4 H   (34.0-71.1)  %


 


Lymph % (Auto)   10.4 L   (19.3-51.7)  %


 


Mono % (Auto)   3.3 L   (4.7-12.5)  %


 


Eos % (Auto)   0.1 L   (0.7-5.8)  


 


Baso % (Auto)   0.2   (0.1-1.2)  %


 


Neut # (Auto)   11.97 H   (1.56-6.13)  K/mm3


 


Lymph # (Auto)   1.46   (1.18-3.74)  K/mm3


 


Mono # (Auto)   0.47 H   (0.24-0.36)  K/mm3


 


Eos # (Auto)   0.02 L   (0.04-0.36)  K/mm3


 


Baso # (Auto)   0.03   (0.01-0.08)  K/mm3


 


Manual Slide Review   Normal smear   


 


PT    10.9  (9.7-12.0)  SECONDS


 


INR    1.02  


 


APTT    21.0 L  (21.7-31.4)  SECONDS


 


Sodium     (136-145)  mEq/L


 


Potassium     (3.5-5.1)  mEq/L


 


Chloride     ()  mEq/L


 


Carbon Dioxide     (21-32)  mEq/L


 


Anion Gap     (5-15)  


 


BUN     (7-18)  mg/dL


 


Creatinine     (0.55-1.02)  mg/dL


 


Est Cr Clr Drug Dosing     mL/min


 


Estimated GFR (MDRD)     (>60)  mL/min


 


BUN/Creatinine Ratio     (14-18)  


 


Glucose     ()  mg/dL


 


Calcium     (8.5-10.1)  mg/dL


 


Total Bilirubin     (0.2-1.0)  mg/dL


 


AST     (15-37)  U/L


 


ALT     (14-59)  U/L


 


Alkaline Phosphatase     ()  U/L


 


Troponin I     (0.00-0.056)  ng/mL


 


Total Protein     (6.4-8.2)  g/dl


 


Albumin     (3.4-5.0)  g/dl


 


Globulin     gm/dL


 


Albumin/Globulin Ratio     (1-2)  


 


SARS-CoV-2 RNA (MENA)  Negative    (NEGATIVE)  


 


Blood Type     


 


Gel Antibody Screen     














  04/26/21 04/26/21 04/26/21 Range/Units





  09:08 09:08 09:08 


 


WBC     (3.98-10.04)  K/mm3


 


RBC     (3.98-5.22)  M/mm3


 


Hgb     (11.2-15.7)  gm/dl


 


Hct     (34.1-44.9)  %


 


MCV     (79.4-94.8)  fl


 


MCH     (25.6-32.2)  pg


 


MCHC     (32.2-35.5)  g/dl


 


RDW Std Deviation     (36.4-46.3)  fL


 


Plt Count     (182-369)  K/mm3


 


MPV     (9.4-12.3)  fl


 


Neut % (Auto)     (34.0-71.1)  %


 


Lymph % (Auto)     (19.3-51.7)  %


 


Mono % (Auto)     (4.7-12.5)  %


 


Eos % (Auto)     (0.7-5.8)  


 


Baso % (Auto)     (0.1-1.2)  %


 


Neut # (Auto)     (1.56-6.13)  K/mm3


 


Lymph # (Auto)     (1.18-3.74)  K/mm3


 


Mono # (Auto)     (0.24-0.36)  K/mm3


 


Eos # (Auto)     (0.04-0.36)  K/mm3


 


Baso # (Auto)     (0.01-0.08)  K/mm3


 


Manual Slide Review     


 


PT     (9.7-12.0)  SECONDS


 


INR     


 


APTT     (21.7-31.4)  SECONDS


 


Sodium  137    (136-145)  mEq/L


 


Potassium  5.6 H D    (3.5-5.1)  mEq/L


 


Chloride  104    ()  mEq/L


 


Carbon Dioxide  25    (21-32)  mEq/L


 


Anion Gap  13.6    (5-15)  


 


BUN  57 H    (7-18)  mg/dL


 


Creatinine  1.7 H    (0.55-1.02)  mg/dL


 


Est Cr Clr Drug Dosing  16.11    mL/min


 


Estimated GFR (MDRD)  28    (>60)  mL/min


 


BUN/Creatinine Ratio  33.5 H    (14-18)  


 


Glucose  306 H    ()  mg/dL


 


Calcium  8.9    (8.5-10.1)  mg/dL


 


Total Bilirubin  0.6    (0.2-1.0)  mg/dL


 


AST  11 L    (15-37)  U/L


 


ALT  20    (14-59)  U/L


 


Alkaline Phosphatase  91    ()  U/L


 


Troponin I    < 0.017  (0.00-0.056)  ng/mL


 


Total Protein  6.7    (6.4-8.2)  g/dl


 


Albumin  3.2 L    (3.4-5.0)  g/dl


 


Globulin  3.5    gm/dL


 


Albumin/Globulin Ratio  0.9 L    (1-2)  


 


SARS-CoV-2 RNA (MENA)     (NEGATIVE)  


 


Blood Type   O NEGATIVE   


 


Gel Antibody Screen   Negative   











Result Diagrams: 


                                 04/26/21 14:05





                                 04/26/21 09:08





Sepsis Event Note





- Evaluation


Sepsis Screening Result: No Definite Risk





- Focused Exam


Vital Signs: 


                                   Vital Signs











  Temp Pulse Resp BP Pulse Ox


 


 04/26/21 08:38  98.2 F  98  16  141/62 H  98














- Problem List


(1) GI bleed


SNOMED Code(s): 10897271


   ICD Code: K92.2 - GASTROINTESTINAL HEMORRHAGE, UNSPECIFIED   Status: Acute   

Priority: High   Current Visit: Yes   


Qualifiers: 


   GI bleed type/associated pathology: melena   Qualified Code(s): K92.1 - 

Melena   





(2) CHF (congestive heart failure), NYHA class II


SNOMED Code(s): 767103538, 587582923


   ICD Code: I50.9 - HEART FAILURE, UNSPECIFIED   Status: Chronic   Priority: 

Medium   Current Visit: No   


Qualifiers: 


   Congestive heart failure type: combined   Congestive heart failure 

chronicity: acute on chronic   Qualified Code(s): I50.43 - Acute on chronic 

combined systolic (congestive) and diastolic (congestive) heart failure   





(3) Leukocytosis


SNOMED Code(s): 585292369, 420645158


   ICD Code: D72.829 - ELEVATED WHITE BLOOD CELL COUNT, UNSPECIFIED   Status: 

Acute   Priority: High   Current Visit: Yes   


Qualifiers: 


   Leukocytosis type: unspecified   Qualified Code(s): D72.829 - Elevated white 

blood cell count, unspecified   





(4) Afib


SNOMED Code(s): 66902161


   ICD Code: I48.91 - UNSPECIFIED ATRIAL FIBRILLATION   Status: Chronic   

Priority: Medium   Current Visit: No   


Qualifiers: 


   Atrial fibrillation type: paroxysmal   Qualified Code(s): I48.0 - Paroxysmal 

atrial fibrillation   





(5) Anemia


SNOMED Code(s): 890756556


   ICD Code: D64.9 - ANEMIA, UNSPECIFIED   Status: Acute   Priority: Medium   

Current Visit: Yes   


Qualifiers: 


   Anemia type: due to chronic kidney disease   Chronic kidney disease stage: 

stage 4 (severe)   Qualified Code(s): N18.4 - Chronic kidney disease, stage 4 

(severe); D63.1 - Anemia in chronic kidney disease   





(6) Chronic back pain


SNOMED Code(s): 149411829


   ICD Code: M54.9 - DORSALGIA, UNSPECIFIED; G89.29 - OTHER CHRONIC PAIN   

Status: Chronic   Priority: Low   Current Visit: No   


Qualifiers: 


   Back pain location: back pain in unspecified location   Back pain laterality:

 unspecified   Qualified Code(s): M54.9 - Dorsalgia, unspecified; G89.29 - Other

 chronic pain   





(7) Chronic constipation


SNOMED Code(s): 410028652


   ICD Code: K59.09 - OTHER CONSTIPATION   Status: Chronic   Priority: Low   

Current Visit: No   





(8) DM2 (diabetes mellitus, type 2)


SNOMED Code(s): 48959867


   ICD Code: E11.9 - TYPE 2 DIABETES MELLITUS WITHOUT COMPLICATIONS   Status: 

Chronic   Priority: Medium   Current Visit: Yes   


Qualifiers: 


   Diabetes mellitus long term insulin use: unspecified long term insulin use 

status   Diabetes mellitus complication status: with other specified complicat

ion   Qualified Code(s): E11.69 - Type 2 diabetes mellitus with other specified 

complication   





(9) Depression


SNOMED Code(s): 24827599


   ICD Code: F32.9 - MAJOR DEPRESSIVE DISORDER, SINGLE EPISODE, UNSPECIFIED   

Status: Chronic   Priority: Low   Current Visit: No   


Qualifiers: 


   Depression Type: other depression   Qualified Code(s): F32.89 - Other 

specified depressive episodes   





(10) HTN (hypertension)


SNOMED Code(s): 90678326


   ICD Code: I10 - ESSENTIAL (PRIMARY) HYPERTENSION   Status: Chronic   

Priority: Medium   Current Visit: No   


Qualifiers: 


   Hypertension type: unspecified   Qualified Code(s): I10 - Essential (primary)

 hypertension   





(11) History of TIA (transient ischemic attack)


SNOMED Code(s): 348003096


   ICD Code: Z86.73 - PRSNL HX OF TIA (TIA), AND CEREB INFRC W/O RESID DEFICITS 

  Status: Chronic   Priority: Low   Current Visit: No   





(12) Nursing home resident


SNOMED Code(s): 616106227


   ICD Code: Z59.3 - PROBLEMS RELATED TO LIVING IN RESIDENTIAL INSTITUTION   

Status: Chronic   Priority: Low   Current Visit: No   





(13) Generalized weakness


SNOMED Code(s): 23112176


   ICD Code: R53.1 - WEAKNESS   Status: Acute   Priority: High   Current Visit: 

Yes   





(14) Hyperkalemia


SNOMED Code(s): 78981492


   ICD Code: E87.5 - HYPERKALEMIA   Status: Acute   Priority: High   Current 

Visit: Yes   





(15) CKD (chronic kidney disease) stage 4, GFR 15-29 ml/min


SNOMED Code(s): 450636053


   ICD Code: N18.4 - CHRONIC KIDNEY DISEASE, STAGE 4 (SEVERE)   Status: Chronic 

  Priority: Medium   Current Visit: Yes   





(16) Dysphagia


SNOMED Code(s): 28427448, 941932396


   ICD Code: R13.10 - DYSPHAGIA, UNSPECIFIED   Status: Chronic   Priority: Low  

 Current Visit: No   


Qualifiers: 


   Dysphagia type: unspecified   Qualified Code(s): R13.10 - Dysphagia, 

unspecified   





(17) Recurrent pneumonia


SNOMED Code(s): 521454765


   ICD Code: J18.9 - PNEUMONIA, UNSPECIFIED ORGANISM   Status: Chronic   

Priority: Low   Current Visit: No   





(18) Chronic respiratory failure with hypoxia, on home oxygen therapy


SNOMED Code(s): 298239560


   ICD Code: J96.11 - CHRONIC RESPIRATORY FAILURE WITH HYPOXIA; Z99.81 - 

DEPENDENCE ON SUPPLEMENTAL OXYGEN   Status: Chronic   Priority: Medium   Current

 Visit: Yes   





(19) History of CVA (cerebrovascular accident)


SNOMED Code(s): 847608230


   ICD Code: Z86.73 - PRSNL HX OF TIA (TIA), AND CEREB INFRC W/O RESID DEFICITS 

  Status: Chronic   Priority: Medium   Current Visit: No   





(20) Vascular dementia


SNOMED Code(s): 294535664


   ICD Code: F01.50 - VASCULAR DEMENTIA WITHOUT BEHAVIORAL DISTURBANCE   Status:

 Chronic   Priority: Medium   Current Visit: No   


Qualifiers: 


   Dementia behavioral disturbance: without behavioral disturbance   Qualified 

Code(s): F01.50 - Vascular dementia without behavioral disturbance   





(21) Urinary retention


SNOMED Code(s): 820112994


   ICD Code: R33.9 - RETENTION OF URINE, UNSPECIFIED   Status: Chronic   

Priority: Low   Current Visit: No   





(22) UTI (urinary tract infection)


SNOMED Code(s): 83224341


   ICD Code: N39.0 - URINARY TRACT INFECTION, SITE NOT SPECIFIED   Status: Acute

   Priority: High   Current Visit: Yes   


Qualifiers: 


   Urinary tract infection type: acute cystitis   Hematuria presence: with 

hematuria   Qualified Code(s): N30.01 - Acute cystitis with hematuria   





(23) Hyperglycemia due to type 2 diabetes mellitus


SNOMED Code(s): 794491100092653, 061211015852863


   ICD Code: E11.65 - TYPE 2 DIABETES MELLITUS WITH HYPERGLYCEMIA   Status: 

Acute   Priority: High   Current Visit: Yes   


Qualifiers: 


   Diabetes mellitus long term insulin use: with long term use   Qualified 

Code(s): E11.65 - Type 2 diabetes mellitus with hyperglycemia; Z79.4 - Long term

 (current) use of insulin   


Problem List Initiated/Reviewed/Updated: Yes


Orders Last 24hrs: 


                               Active Orders 24 hr











 Category Date Time Status


 


 EKG Documentation Completion [RC] STAT Care  04/26/21 09:08 Active











Assessment/Plan Comment:: 


Assessment - day of admission 4/26/2021


* 89-year-old female presents via [facility] to ED for GI bleed


* Patient resides at [facility].


* Noted to have melena and coffee-ground emesis around 7 AM this morning.


* Patient is on Plavix and aspirin for prior CVA, TIA, and A. fib.


* Also carries history of: heart failure, HTN, anemia, recurrent pneumonia, 

  hypoxemia requiring home O2, chronic constipation, dysphagia, CKD stage IV, 

  urinary retention, chronic back pain, neuralgia, vascular dementia, 

  depression, type II DM.


* Twelve-lead EKG shows sinus rhythm with Q waves in III and aVF.  Low voltage 

  in precordial leads.  No acute change from 2/2021


* Labs obtained in ED:


   * WBC 14.03


   * Hemoglobin 9.7, hematocrit 31.4


   * Platelet 246,000


   * Neutrophils elevated at 85.4%


   * INR 1.02


   * APTT 21.0


   * Sodium 137


   * Potassium 5.6


   * Carbon dioxide 25


   * Anion gap 13.6


   * BUN 57, creatinine 1.7, GFR 28


   * Glucose 306


   * Calcium 8.9


   * Total bilirubin 0.6


   * AST 11, ALT 20, alkaline phosphatase 91


   * Troponin less than 0.017


   * Albumin 3.2


   * SARS Covid 2 RNA negative


   * Blood type O negative; gel antibody screen negative


* Given 4 mg IV push of Zofran and 40 mg IV push Protonix in ED


* Noted to have episode of coffee-ground emesis with blood streaks, 

  approximately 10 mL in ED.


* Admitted to medical surgical floor for management of GI bleed and 

  hyperkalemia.


* Labs on floor:


   * CRP less than 0.2


   * Magnesium 2.2


   * UA: Slightly cloudy with 2+ glucose, 1+ ketones, 3+ occult blood, positive 

     nitrate, 1+ leukocyte esterase, 10-20 RBC, 20-30 WBC, 5-10 squamous 

     epithelial cells, few amorphous sediment, many bacteria


   * Blood cultures ordered


* Started on 1 g Rocephin.





PLAN:


GI bleed


Anemia


Generalized weakness


Nursing home resident


* IVP Protonix BID


* General surgery consultation - Dr. Chapin contacted in ED


* Trend Hgb


* Transfuse if Hgb <7


* Telemetry


* Hold ASA and Plavix for now


* PT/OT consultation


* CM/SW consultation


* D5LR for 1 bag 


* Regular diet per Dr. Chapin (consistent carbohydrate)





UTI (urinary tract infection)


Urinary retention


Leukocytosis


* Urine culture


* Start Rocephin 1gm daily


* Blood cultures X2


* Procalcitonin


* Daily labs including CRP


* Monitor for fever





Hyperkalemia


CKD (chronic kidney disease) stage 4, GFR 15-29 ml/min


* Albuterol neb now


* Given 5 units Type R insulin now


* No need for D50 as glucose is already >300


* No need for calcium as 12-lead EKG shows no changes from 2/2021


* Telemetry


* IV fluids as ordered


* Hold spironolactone


* Urine sodium/creatine





CHF (congestive heart failure), NYHA class II


Afib


HTN (hypertension)


* No acute concerns


* Hold ASA and Plavix as above


* Telemetry


* No acute concerns


* Continue home HTN meds 





Chronic back pain


* Heating pad PRN


* Pain medications PRN


* No acute concerns





Chronic constipation


* Continue home meds


* Monitor BMs


* No acute concerns





DM2 (diabetes mellitus, type 2)


Hyperglycemia due to type 2 diabetes mellitus


* Blood glucose checks QID AC and Bedtime


* Low dose SS inulin


* A1C 7.0 on 2/9/2021


* Consistent carb diet 


* 5 units type R insulin now due to hyperkalemia as above





Chronic respiratory failure with hypoxia, on home oxygen therapy


Recurrent pneumonia


Dysphagia


* No acute concerns


* O2 with goal saturations >92%


* RT/RN to adjust as needed


* Home scheduled Duonebs


* Monitor swallowing - Per daughter patient was on thickened liquids awhile ago 

  but has been regular/thin most recently. 





History of TIA (transient ischemic attack)


History of CVA (cerebrovascular accident)


Vascular dementia


* No acute concerns


* Let me sleep protocol





Depression


* No acute concerns





Code Status: DNR/DNI (confirmed with patient and daughter on 4/26/2021)


PCP: Dr. Vaughan


VTE prophylaxis: SCDs and BETH hose. Pharmacological prophylaxis contraindicated 

due to GI bleed.


Social: Patient resides at [facility]


Disposition: Patient admitted to medical surgical floor for management of her 

acute GI bleed and hyperkalemia.  Found to have UTI as well.  Likely length of 

stay 3 to 4 days.





- Mortality Measure


Prognosis:: Poor (Overall poor prognosis given advanced age, multiple comorbid 

conditions.)

## 2021-04-26 NOTE — PCM.CONS
H&P History of Present Illness





- General


Date of Service: 04/26/21


Admit Problem/Dx: 


                           Admission Diagnosis/Problem





Admission Diagnosis/Problem      GI bleed not requiring more than 4 units of


                                 blood in 24 hours, ICU, or surgery








Source of Information: Patient, Family, Provider


History Limitations: Reports: No Limitations





- History of Present Illness


Initial Comments - Free Text/Narative: 





Mrs. Rashid is an 89 yo woman who presented to the ER today with melena and 

coffee ground emesis which started today. She has a history of CVA and takes 

aspirin and plavix. She has no prior history of GI bleed or peptic ulcer 

disease. Daughter reports history of atrial fibrillation, but patient is not on 

anticoagulation and is in normal sinus rhythm on exam. ALso reported is history 

of hiatal hernia, and on review the patient does have issues with dysphagia. On 

arrival in the ER, she is hemodynamically normal with Hgb of 9.7g/dL which is 

close to her baseline based on prior lab work. 


She is feeling better after getting to her room in the hospital. DNR/DNI status 

confirmed. 





- Related Data


Allergies/Adverse Reactions: 


                                    Allergies











Allergy/AdvReac Type Severity Reaction Status Date / Time


 


levofloxacin [From Levaquin] Allergy  Itching Verified 04/26/21 08:55











Home Medications: 


                                    Home Meds





Clopidogrel [Plavix] 75 mg PO DAILY 11/25/15 [History]


Gabapentin [Neurontin] 600 mg PO BID 11/25/15 [History]


Insulin Aspart [Novolog Flexpen] 13 unit SQ TID 11/25/15 [History]


Acetaminophen 500 mg PO BEDTIME 08/17/16 [History]


Vitamin E 400 unit PO DAILY 08/17/16 [History]


Furosemide [Lasix] 20 mg PO DAILY 06/04/19 [History]


Metoprolol Succinate 50 mg PO DAILY 06/04/19 [History]


polyethylene glycoL 3350 [MiraLAX] 17 gm PO ASDIRECTED PRN 06/04/19 [History]


Albuterol/Ipratropium [DuoNeb 3.0-0.5 MG/3 ML] 3 ml NEB QIDRT #120 neb 06/11/19 

[Rx]


Spironolactone [Aldactone] 50 mg PO DAILY #30 tablet 06/11/19 [Rx]


Insulin Aspart [NovoLOG] 1 dose SUBCUT TID 10/06/20 [History]


Acetaminophen [Tylenol] 650 mg PO Q4H PRN #30 tablet 02/17/21 [Rx]


Docusate Sodium [Colace] 100 mg PO BID #20 cap 02/17/21 [Rx]


Aspirin 81 mg PO DAILY 04/26/21 [History]











Past Medical History


HEENT History: Reports: Impaired Vision


Other HEENT History: dysphagia


Cardiovascular History: Reports: Afib, Arrhythmia, Heart Failure, Hypertension


Other Cardiovascular History: Afib, anemia


Respiratory History: Reports: Pneumonia, Recurrent, Other (See Below)


Other Respiratory History: hypoxemia requiring home O2


Gastrointestinal History: Reports: Chronic Constipation


Other Gastrointestinal History: dysphagia


Genitourinary History: Reports: Chronic Renal Insuffiency, Retention, Urinary


OB/GYN History: Reports: Pregnancy


Musculoskeletal History: Reports: Back Pain, Chronic


Other Musculoskeletal History: muscle weakness


Neurological History: Reports: TIA, Other (See Below)


Other Neuro History: cerebral infarct; neuralgia


Psychiatric History: Reports: Dementia, Depression


Other Psychiatric History: vascular dementia


Endocrine/Metabolic History: Reports: Diabetes, Type II


Hematologic History: Reports: Anemia


Oncologic (Cancer) History: Reports: Other (See Below)


Other Oncologic History: unknown skin cancer


Dermatologic History: Reports: Other (See Below)


Other Dermatologic History: previous supspected skin cancer





- Infectious Disease History


Infectious Disease History: Reports: Chicken Pox, Influenza, Measles, Mumps





- Past Surgical History


HEENT Surgical History: Reports: None


Cardiovascular Surgical History: Reports: None


GI Surgical History: Reports: Cholecystectomy


Female  Surgical History: Reports: None


Oncologic Surgical History: Reports: None





Social & Family History





- Family History


Family Medical History: No Pertinent Family History





- Tobacco Use


Tobacco Use Status *Q: Never Tobacco User





- Caffeine Use


Caffeine Use: Reports: Coffee





- Recreational Drug Use


Recreational Drug Use: No





H&P Review of Systems





- Review of Systems:


Review Of Systems: See Below


General: Reports: No Symptoms


HEENT: Reports: No Symptoms


Pulmonary: Reports: No Symptoms


Cardiovascular: Reports: No Symptoms


Gastrointestinal: Reports: Black Stool, Nausea, Vomiting


Genitourinary: Reports: No Symptoms


Musculoskeletal: Reports: No Symptoms


Skin: Reports: Bruising


Psychiatric: Reports: No Symptoms


Neurological: Reports: No Symptoms


Hematologic/Lymphatic: Reports: Easy Bleeding


Immunologic: Reports: No Symptoms





Exam





- Exam


Exam: See Below





- Vital Signs


Vital Signs: 


                                Last Vital Signs











Temp  36.8 C   04/26/21 08:38


 


Pulse  80   04/26/21 12:06


 


Resp  16   04/26/21 12:06


 


BP  144/66 H  04/26/21 12:06


 


Pulse Ox  95   04/26/21 12:06











Weight: 71.985 kg





- Exam


Quality Assessment: Supplemental Oxygen


General: Alert, Oriented, Cooperative


HEENT: Conjunctiva Clear


Neck: Trachea Midline


Lungs: Normal Respiratory Effort


Cardiovascular: Regular Rate, Regular Rhythm


GI/Abdominal Exam: Soft, Non-Tender


Extremities: Other (LE compression garments. ecchymosis at dorsum of right hand)


Skin: Warm, Dry


Neuro Extensive - Mental Status: Alert, Normal Mood/Affect


Psychiatric: Normal Mood





- Patient Data


Lab Results Last 24 hrs: 


                         Laboratory Results - last 24 hr











  04/26/21 04/26/21 04/26/21 Range/Units





  08:49 09:08 09:08 


 


WBC   14.03 H   (3.98-10.04)  K/mm3


 


RBC   3.19 L   (3.98-5.22)  M/mm3


 


Hgb   9.7 L   (11.2-15.7)  gm/dl


 


Hct   31.4 L   (34.1-44.9)  %


 


MCV   98.4 H D   (79.4-94.8)  fl


 


MCH   30.4   (25.6-32.2)  pg


 


MCHC   30.9 L   (32.2-35.5)  g/dl


 


RDW Std Deviation   50.5 H   (36.4-46.3)  fL


 


Plt Count   246   (182-369)  K/mm3


 


MPV   10.1   (9.4-12.3)  fl


 


Neut % (Auto)   85.4 H   (34.0-71.1)  %


 


Lymph % (Auto)   10.4 L   (19.3-51.7)  %


 


Mono % (Auto)   3.3 L   (4.7-12.5)  %


 


Eos % (Auto)   0.1 L   (0.7-5.8)  


 


Baso % (Auto)   0.2   (0.1-1.2)  %


 


Neut # (Auto)   11.97 H   (1.56-6.13)  K/mm3


 


Lymph # (Auto)   1.46   (1.18-3.74)  K/mm3


 


Mono # (Auto)   0.47 H   (0.24-0.36)  K/mm3


 


Eos # (Auto)   0.02 L   (0.04-0.36)  K/mm3


 


Baso # (Auto)   0.03   (0.01-0.08)  K/mm3


 


Manual Slide Review   Normal smear   


 


PT    10.9  (9.7-12.0)  SECONDS


 


INR    1.02  


 


APTT    21.0 L  (21.7-31.4)  SECONDS


 


Sodium     (136-145)  mEq/L


 


Potassium     (3.5-5.1)  mEq/L


 


Chloride     ()  mEq/L


 


Carbon Dioxide     (21-32)  mEq/L


 


Anion Gap     (5-15)  


 


BUN     (7-18)  mg/dL


 


Creatinine     (0.55-1.02)  mg/dL


 


Est Cr Clr Drug Dosing     mL/min


 


Estimated GFR (MDRD)     (>60)  mL/min


 


BUN/Creatinine Ratio     (14-18)  


 


Glucose     ()  mg/dL


 


Calcium     (8.5-10.1)  mg/dL


 


Magnesium     (1.8-2.4)  mg/dl


 


Total Bilirubin     (0.2-1.0)  mg/dL


 


AST     (15-37)  U/L


 


ALT     (14-59)  U/L


 


Alkaline Phosphatase     ()  U/L


 


Troponin I     (0.00-0.056)  ng/mL


 


C-Reactive Protein     (<1.0)  mg/dL


 


Total Protein     (6.4-8.2)  g/dl


 


Albumin     (3.4-5.0)  g/dl


 


Globulin     gm/dL


 


Albumin/Globulin Ratio     (1-2)  


 


Urine Color     (Yellow)  


 


Urine Appearance     (Clear)  


 


Urine pH     (5.0-8.0)  


 


Ur Specific Gravity     (1.005-1.030)  


 


Urine Protein     (Negative)  


 


Urine Glucose (UA)     (Negative)  


 


Urine Ketones     (Negative)  


 


Urine Occult Blood     (Negative)  


 


Urine Nitrite     (Negative)  


 


Urine Bilirubin     (Negative)  


 


Urine Urobilinogen     (0.2-1.0)  


 


Ur Leukocyte Esterase     (Negative)  


 


Urine RBC     (0-5)  /hpf


 


Urine WBC     (0-5)  /hpf


 


Ur Squamous Epith Cells     (0-5)  /hpf


 


Amorphous Sediment     (NOT SEEN)  /hpf


 


Urine Bacteria     (FEW)  /hpf


 


Urine Mucus     (FEW)  /hpf


 


SARS-CoV-2 RNA (MENA)  Negative    (NEGATIVE)  


 


Blood Type     


 


Gel Antibody Screen     














  04/26/21 04/26/21 04/26/21 Range/Units





  09:08 09:08 09:08 


 


WBC     (3.98-10.04)  K/mm3


 


RBC     (3.98-5.22)  M/mm3


 


Hgb     (11.2-15.7)  gm/dl


 


Hct     (34.1-44.9)  %


 


MCV     (79.4-94.8)  fl


 


MCH     (25.6-32.2)  pg


 


MCHC     (32.2-35.5)  g/dl


 


RDW Std Deviation     (36.4-46.3)  fL


 


Plt Count     (182-369)  K/mm3


 


MPV     (9.4-12.3)  fl


 


Neut % (Auto)     (34.0-71.1)  %


 


Lymph % (Auto)     (19.3-51.7)  %


 


Mono % (Auto)     (4.7-12.5)  %


 


Eos % (Auto)     (0.7-5.8)  


 


Baso % (Auto)     (0.1-1.2)  %


 


Neut # (Auto)     (1.56-6.13)  K/mm3


 


Lymph # (Auto)     (1.18-3.74)  K/mm3


 


Mono # (Auto)     (0.24-0.36)  K/mm3


 


Eos # (Auto)     (0.04-0.36)  K/mm3


 


Baso # (Auto)     (0.01-0.08)  K/mm3


 


Manual Slide Review     


 


PT     (9.7-12.0)  SECONDS


 


INR     


 


APTT     (21.7-31.4)  SECONDS


 


Sodium  137    (136-145)  mEq/L


 


Potassium  5.6 H D    (3.5-5.1)  mEq/L


 


Chloride  104    ()  mEq/L


 


Carbon Dioxide  25    (21-32)  mEq/L


 


Anion Gap  13.6    (5-15)  


 


BUN  57 H    (7-18)  mg/dL


 


Creatinine  1.7 H    (0.55-1.02)  mg/dL


 


Est Cr Clr Drug Dosing  16.11    mL/min


 


Estimated GFR (MDRD)  28    (>60)  mL/min


 


BUN/Creatinine Ratio  33.5 H    (14-18)  


 


Glucose  306 H    ()  mg/dL


 


Calcium  8.9    (8.5-10.1)  mg/dL


 


Magnesium     (1.8-2.4)  mg/dl


 


Total Bilirubin  0.6    (0.2-1.0)  mg/dL


 


AST  11 L    (15-37)  U/L


 


ALT  20    (14-59)  U/L


 


Alkaline Phosphatase  91    ()  U/L


 


Troponin I    < 0.017  (0.00-0.056)  ng/mL


 


C-Reactive Protein     (<1.0)  mg/dL


 


Total Protein  6.7    (6.4-8.2)  g/dl


 


Albumin  3.2 L    (3.4-5.0)  g/dl


 


Globulin  3.5    gm/dL


 


Albumin/Globulin Ratio  0.9 L    (1-2)  


 


Urine Color     (Yellow)  


 


Urine Appearance     (Clear)  


 


Urine pH     (5.0-8.0)  


 


Ur Specific Gravity     (1.005-1.030)  


 


Urine Protein     (Negative)  


 


Urine Glucose (UA)     (Negative)  


 


Urine Ketones     (Negative)  


 


Urine Occult Blood     (Negative)  


 


Urine Nitrite     (Negative)  


 


Urine Bilirubin     (Negative)  


 


Urine Urobilinogen     (0.2-1.0)  


 


Ur Leukocyte Esterase     (Negative)  


 


Urine RBC     (0-5)  /hpf


 


Urine WBC     (0-5)  /hpf


 


Ur Squamous Epith Cells     (0-5)  /hpf


 


Amorphous Sediment     (NOT SEEN)  /hpf


 


Urine Bacteria     (FEW)  /hpf


 


Urine Mucus     (FEW)  /hpf


 


SARS-CoV-2 RNA (MENA)     (NEGATIVE)  


 


Blood Type   O NEGATIVE   


 


Gel Antibody Screen   Negative   














  04/26/21 04/26/21 Range/Units





  09:08 09:24 


 


WBC    (3.98-10.04)  K/mm3


 


RBC    (3.98-5.22)  M/mm3


 


Hgb    (11.2-15.7)  gm/dl


 


Hct    (34.1-44.9)  %


 


MCV    (79.4-94.8)  fl


 


MCH    (25.6-32.2)  pg


 


MCHC    (32.2-35.5)  g/dl


 


RDW Std Deviation    (36.4-46.3)  fL


 


Plt Count    (182-369)  K/mm3


 


MPV    (9.4-12.3)  fl


 


Neut % (Auto)    (34.0-71.1)  %


 


Lymph % (Auto)    (19.3-51.7)  %


 


Mono % (Auto)    (4.7-12.5)  %


 


Eos % (Auto)    (0.7-5.8)  


 


Baso % (Auto)    (0.1-1.2)  %


 


Neut # (Auto)    (1.56-6.13)  K/mm3


 


Lymph # (Auto)    (1.18-3.74)  K/mm3


 


Mono # (Auto)    (0.24-0.36)  K/mm3


 


Eos # (Auto)    (0.04-0.36)  K/mm3


 


Baso # (Auto)    (0.01-0.08)  K/mm3


 


Manual Slide Review    


 


PT    (9.7-12.0)  SECONDS


 


INR    


 


APTT    (21.7-31.4)  SECONDS


 


Sodium    (136-145)  mEq/L


 


Potassium    (3.5-5.1)  mEq/L


 


Chloride    ()  mEq/L


 


Carbon Dioxide    (21-32)  mEq/L


 


Anion Gap    (5-15)  


 


BUN    (7-18)  mg/dL


 


Creatinine    (0.55-1.02)  mg/dL


 


Est Cr Clr Drug Dosing    mL/min


 


Estimated GFR (MDRD)    (>60)  mL/min


 


BUN/Creatinine Ratio    (14-18)  


 


Glucose    ()  mg/dL


 


Calcium    (8.5-10.1)  mg/dL


 


Magnesium  2.2   (1.8-2.4)  mg/dl


 


Total Bilirubin    (0.2-1.0)  mg/dL


 


AST    (15-37)  U/L


 


ALT    (14-59)  U/L


 


Alkaline Phosphatase    ()  U/L


 


Troponin I    (0.00-0.056)  ng/mL


 


C-Reactive Protein  < 0.2   (<1.0)  mg/dL


 


Total Protein    (6.4-8.2)  g/dl


 


Albumin    (3.4-5.0)  g/dl


 


Globulin    gm/dL


 


Albumin/Globulin Ratio    (1-2)  


 


Urine Color   Light yellow  (Yellow)  


 


Urine Appearance   Slt cloudy H  (Clear)  


 


Urine pH   7.0  (5.0-8.0)  


 


Ur Specific Gravity   1.020  (1.005-1.030)  


 


Urine Protein   Negative  (Negative)  


 


Urine Glucose (UA)   2+ H  (Negative)  


 


Urine Ketones   1+ H  (Negative)  


 


Urine Occult Blood   3+ H  (Negative)  


 


Urine Nitrite   Positive H  (Negative)  


 


Urine Bilirubin   Negative  (Negative)  


 


Urine Urobilinogen   0.2  (0.2-1.0)  


 


Ur Leukocyte Esterase   1+ H  (Negative)  


 


Urine RBC   10-20 H  (0-5)  /hpf


 


Urine WBC   20-30 H  (0-5)  /hpf


 


Ur Squamous Epith Cells   5-10 H  (0-5)  /hpf


 


Amorphous Sediment   Few H  (NOT SEEN)  /hpf


 


Urine Bacteria   Many H  (FEW)  /hpf


 


Urine Mucus   Not seen  (FEW)  /hpf


 


SARS-CoV-2 RNA (MENA)    (NEGATIVE)  


 


Blood Type    


 


Gel Antibody Screen    











Result Diagrams: 


                                 04/26/21 09:08





                                 04/26/21 09:08





Sepsis Event Note





- Evaluation


Sepsis Screening Result: No Definite Risk





- Focused Exam


Vital Signs: 


                                   Vital Signs











  Temp Pulse Resp BP Pulse Ox


 


 04/26/21 12:06   80  16  144/66 H  95


 


 04/26/21 08:38  36.8 C  98  16  141/62 H  98














*Q Meaningful Use (ADM)





- VTE *Q


VTE Pharmacological Contraindications *Q: Active Hemorrhage





Consult PN Assessment/Plan


Procedures: 


Procedures





AGENT NOS ASSAY W/OPTIC (06/04/19)


AIRWAY INHALATION TREATMENT (06/04/19)


APPLY LONG ARM SPLINT (08/17/16)


ASSAY GLUCOSE BLOOD QUANT (02/08/21)


ASSAY OF BETA-2 PROTEIN (06/04/19)


ASSAY OF FERRITIN (10/06/20)


ASSAY OF FREE THYROXINE (11/10/18)


ASSAY OF IRON (06/04/19)


ASSAY OF LACTIC ACID (10/06/20)


ASSAY OF MAGNESIUM (02/08/21)


ASSAY OF NATRIURETIC PEPTIDE (02/08/21)


ASSAY OF PHOSPHORUS (06/04/19)


ASSAY OF TRANSFERRIN (06/04/19)


ASSAY OF TROPONIN QUANT (02/08/21)


ASSAY OF VANCOMYCIN (06/04/19)


ASSAY THYROID STIM HORMONE (02/08/21)


AUTOMATED RETICULOCYTE COUNT (06/04/19)


BL SMEAR W/DIFF WBC COUNT (06/04/19)


BLOOD CULTURE FOR BACTERIA (10/06/20)


BLOOD GASES ANY COMBINATION (02/08/21)


BLOOD TYPING SEROLOGIC ABO (02/08/21)


BLOOD TYPING SEROLOGIC RH(D) (02/08/21)


C DIFF AMPLIFIED PROBE (06/04/19)


C-REACTIVE PROTEIN (10/06/20)


CHEST WALL MANIPULATION (06/04/19)


CHEST WALL MANIPULATION (06/04/19)


CHEST WALL MANIPULATION (01/08/19)


CHEST WALL MANIPULATION (01/08/19)


CHEST WALL MANIPULATION (11/25/15)


CHEST WALL MANIPULATION (11/25/15)


CHEST X-RAY 1 VIEW FRONTAL (04/10/16)


CHEST X-RAY 2VW FRONTAL&LATL (11/25/15)


CHYLMD PNEUM DNA AMP PROBE (01/08/19)


COMPLETE CBC AUTOMATED (06/04/19)


COMPLETE CBC W/AUTO DIFF WBC (02/08/21)


COMPREHEN METABOLIC PANEL (02/08/21)


CREATINE MB FRACTION (01/08/19)


CT ANGIOGRAPHY CHEST (01/08/19)


CT HEAD/BRAIN W/O DYE (02/08/21)


CT LOWER EXTREMITY W/O DYE (02/08/21)


CT THORAX DX C- (11/25/15)


CULTURE SCREEN ONLY (01/08/19)


DETECT AGENT NOS DNA AMP (01/08/19)


ELECTROCARDIOGRAM TRACING (02/08/21)


EMERGENCY DEPT VISIT (02/08/21)


EVALUATE PT USE OF INHALER (11/25/15)


FIBRIN DEGRADATION QUANT (10/06/20)


FLUOROSCOPY <1 HR PHYS/QHP (02/08/21)


GAIT TRAINING THERAPY (01/08/19)


GLUCOSE BLOOD TEST (02/08/21)


GLYCOSYLATED HEMOGLOBIN TEST (02/08/21)


HYDRATE IV INFUSION ADD-ON (01/08/19)


INFLUENZA ASSAY W/OPTIC (01/08/19)


INSERT TEMP BLADDER CATH (02/08/21)


LACTATE (LD) (LDH) ENZYME (10/06/20)


M.PNEUMON DNA AMP PROBE (01/08/19)


MEASURE BLOOD OXYGEN LEVEL (02/08/21)


MEASURE BLOOD OXYGEN LEVEL (02/08/21)


MEASURE BLOOD OXYGEN LEVEL (01/08/19)


MEASURE BLOOD OXYGEN LEVEL (01/08/19)


MEASURE BLOOD OXYGEN LEVEL (11/10/18)


MEASURE BLOOD OXYGEN LEVEL (11/10/18)


MEASURE BLOOD OXYGEN LEVEL (11/25/15)


MEASURE BLOOD OXYGEN LEVEL (11/25/15)


METABOLIC PANEL TOTAL CA (02/08/21)


MICROBE SUSCEPTIBLE DIFFUSE (02/08/21)


MICROBE SUSCEPTIBLE DISK (02/08/21)


MR-STAPH DNA AMP PROBE (02/08/21)


MYCOPLASMA ANTIBODY (06/04/19)


OCCULT BLD FECES 1-3 TESTS (06/04/19)


OT EVAL LOW COMPLEX 30 MIN (02/08/21)


OT EVALUATION (11/25/15)


PROCALCITONIN (PCT) (02/08/21)


PROTHROMBIN TIME (02/08/21)


PT EVAL LOW COMPLEX 20 MIN (11/10/18)


PT EVAL MOD COMPLEX 30 MIN (02/08/21)


PT EVALUATION (11/25/15)


RBC ANTIBODY SCREEN (02/08/21)


RBC SED RATE AUTOMATED (11/25/15)


RESP VIRUS 6-11 TARGETS (01/08/19)


ROUTINE VENIPUNCTURE (02/08/21)


RPR S/N/AX/GEN/TRNK2.6-7.5CM (08/17/16)


SELF CARE MNGMENT TRAINING (02/08/21)


STREP A AG IA (01/08/19)


THER/PROPH/DIAG INJ IV PUSH (02/08/21)


THER/PROPH/DIAG IV INF INIT (06/04/19)


THERAPEUTIC ACTIVITIES (02/08/21)


THERAPEUTIC EXERCISES (02/08/21)


THROMBOPLASTIN TIME PARTIAL (02/08/21)


TTE W/DOPPLER COMPLETE (02/08/21)


TX/PRO/DX INJ NEW DRUG ADDON (02/08/21)


TX/PRO/DX INJ SAME DRUG ADON (02/08/21)


ULTRASOUND THERAPY (07/10/14)


URINALYSIS AUTO W/SCOPE (02/08/21)


URINE BACTERIA CULTURE (02/08/21)


URINE CULTURE/COLONY COUNT (02/08/21)


VANOMYCIN DNA AMP PROBE (11/25/15)


VENT MGMT INPAT INIT DAY (11/25/15)


VITAMIN D 25 HYDROXY (11/10/18)


WITHDRAWAL OF ARTERIAL BLOOD (02/08/21)


X-RAY EXAM CHEST 1 VIEW (02/08/21)


X-RAY EXAM CHEST 2 VIEWS (06/04/19)


X-RAY EXAM HIP UNI 2-3 VIEWS (02/08/21)


X-RAY EXAM L-S SPINE 2/3 VWS (04/10/16)


X-RAY EXAM OF ELBOW (02/08/21)


X-RAY EXAM OF FEMUR 2/> (02/08/21)


X-RAY EXAM OF FOREARM (06/04/19)








Problem List Initiated/Reviewed/Updated: Yes


Plan: 


GI bleed, likely gastric source, in patient with reported history of hiatal 

hernia who takes aspirin and plavix given history of CVA. She is elderly and fra

il, but appears to be in no distress with normal vitals and Hgb 9.7 g/dL, close 

to baseline. 


Recommendations include IV protonix 40 mg bid while hospitalized. 


Regular diet.


Hold aspirin and plavix for now. 


Trend H/H per primary team. 


Usually bleeding from gastritis or ulcer will stop with these measures alone. 

Monitor for resolution of GI bleed. Transfuse for Hgb <7 g/dL or symptomatic 

anemia. Will hold off on EGD for now; plan may change as clinical picture 

dictates.

## 2021-04-26 NOTE — EDM.PDOC
ED HPI GENERAL MEDICAL PROBLEM





- General


Chief Complaint: Gastrointestinal Problem


Stated Complaint: [facility]


Time Seen by Provider: 04/26/21 08:58





- History of Present Illness


INITIAL COMMENTS - FREE TEXT/NARRATIVE: 





89-year-old female brought in by EMS from the nursing home with chief complaint 

of coffee-ground emesis and black tarry stools per rectum.





This apparently started around 7:00 this morning.  According to the patient 

however she may have had black tarry stools yesterday but she is not certain of 

this.  She denies any significant pain at this time.  Patient currently is on 

Plavix a month and a half ago she had a hip replacement and this is why she is 

in a skilled nursing facility.





- Related Data


                                    Allergies











Allergy/AdvReac Type Severity Reaction Status Date / Time


 


levofloxacin [From Levaquin] Allergy  Itching Verified 04/26/21 08:55











Home Meds: 


                                    Home Meds





Clopidogrel [Plavix] 75 mg PO DAILY 11/25/15 [History]


Gabapentin [Neurontin] 600 mg PO BID 11/25/15 [History]


Insulin Aspart [Novolog Flexpen] 13 unit SQ TID 11/25/15 [History]


Acetaminophen 500 mg PO BEDTIME 08/17/16 [History]


Vitamin E 400 unit PO DAILY 08/17/16 [History]


Furosemide [Lasix] 20 mg PO DAILY 06/04/19 [History]


Metoprolol Succinate 50 mg PO DAILY 06/04/19 [History]


polyethylene glycoL 3350 [MiraLAX] 17 gm PO ASDIRECTED PRN 06/04/19 [History]


Albuterol/Ipratropium [DuoNeb 3.0-0.5 MG/3 ML] 3 ml NEB QIDRT #120 neb 06/11/19 

[Rx]


Spironolactone [Aldactone] 50 mg PO DAILY #30 tablet 06/11/19 [Rx]


Insulin Aspart [NovoLOG] 1 dose SUBCUT TID 10/06/20 [History]


Acetaminophen [Tylenol] 650 mg PO Q4H PRN #30 tablet 02/17/21 [Rx]


Docusate Sodium [Colace] 100 mg PO BID #20 cap 02/17/21 [Rx]


Aspirin 81 mg PO DAILY 04/26/21 [History]


Calcium Carbonate [Tums] 200 mg PO Q4HR PRN 04/26/21 [History]


Insulin Degludec [Tresiba] 20 unit SQ DAILY 04/26/21 [History]


Phenyleph/Mineral Oil/Petrolat [Preparation H Ointment] 0.25 RECTAL ASDIRECTED 

PRN 04/26/21 [History]











Past Medical History


HEENT History: Reports: Impaired Vision


Other HEENT History: dysphagia


Cardiovascular History: Reports: Afib, Arrhythmia, Heart Failure, Hypertension


Other Cardiovascular History: Afib, anemia


Respiratory History: Reports: Pneumonia, Recurrent, Other (See Below)


Other Respiratory History: hypoxemia requiring home O2


Gastrointestinal History: Reports: Chronic Constipation


Other Gastrointestinal History: dysphagia


Genitourinary History: Reports: Chronic Renal Insuffiency, Retention, Urinary


OB/GYN History: Reports: Pregnancy


Musculoskeletal History: Reports: Back Pain, Chronic


Other Musculoskeletal History: muscle weakness


Neurological History: Reports: TIA, Other (See Below)


Other Neuro History: cerebral infarct; neuralgia


Psychiatric History: Reports: Dementia, Depression


Other Psychiatric History: vascular dementia


Endocrine/Metabolic History: Reports: Diabetes, Type II


Hematologic History: Reports: Anemia


Oncologic (Cancer) History: Reports: Other (See Below)


Other Oncologic History: unknown skin cancer


Dermatologic History: Reports: Other (See Below)


Other Dermatologic History: previous supspected skin cancer





- Infectious Disease History


Infectious Disease History: Reports: Chicken Pox, Influenza, Measles, Mumps





- Past Surgical History


HEENT Surgical History: Reports: None


Cardiovascular Surgical History: Reports: None


GI Surgical History: Reports: Cholecystectomy


Female  Surgical History: Reports: None


Oncologic Surgical History: Reports: None





Social & Family History





- Family History


Family Medical History: No Pertinent Family History





- Tobacco Use


Tobacco Use Status *Q: Never Tobacco User





- Caffeine Use


Caffeine Use: Reports: Coffee





- Recreational Drug Use


Recreational Drug Use: No





ED ROS GENERAL





- Review of Systems


Review Of Systems: See Below


Constitutional: Reports: No Symptoms


HEENT: Reports: No Symptoms


Respiratory: Reports: No Symptoms


Cardiovascular: Reports: No Symptoms


Endocrine: Reports: No Symptoms


GI/Abdominal: Reports: Black Stool, Melena (Coffee-ground emesis first noted th

is morning).  Denies: Abdominal Pain


: Reports: No Symptoms


Musculoskeletal: Reports: No Symptoms


Neurological: Reports: No Symptoms





ED EXAM, GI/ABD





- Physical Exam


Exam: See Below


Exam Limited By: No Limitations


General Appearance: Alert, No Apparent Distress


Eyes: Bilateral: Normal Appearance


Ears: Normal External Exam, Normal Canal, Hearing Grossly Normal, Normal TMs


Nose: Normal Inspection, Normal Mucosa, No Blood


Throat/Mouth: Normal Inspection, Normal Lips, Normal Gums, Normal Oropharynx, 

Normal Voice, No Airway Compromise


Head: Atraumatic, Normocephalic


Neck: Normal Inspection, Supple, Non-Tender, Full Range of Motion.  No: 

Lymphadenopathy (L), Lymphadenopathy (R)


Respiratory/Chest: No Respiratory Distress, Lungs Clear, Normal Breath Sounds


Cardiovascular: Regular Rate, Rhythm, No Edema, No Murmur


GI/Abdominal Exam: Normal Bowel Sounds, Soft, Non-Tender


Rectal (Female) Exam: Heme + Stool, Hemorrhoids (Bright red blood over one of 

the hemorrhoids this was cleared prior to digital rectal exam)


Back Exam: Normal Inspection.  No: CVA Tenderness (L), CVA Tenderness (R)


Extremities: Pedal Edema (ttrace).  No: Normal Inspection, No Pedal Edema


Neurological: Alert, Oriented, Normal Cognition





Course





- Vital Signs


Last Recorded V/S: 


                                Last Vital Signs











Temp  36.7 C   04/26/21 15:39


 


Pulse  90   04/26/21 15:39


 


Resp  22 H  04/26/21 18:00


 


BP  128/31 L  04/26/21 15:39


 


Pulse Ox  93 L  04/26/21 15:39














- Orders/Labs/Meds


Orders: 


                                Medication Orders





Acetaminophen (Acetaminophen 325 Mg Tab)  650 mg PO Q4H PRN


   PRN Reason: Pain (Mild 1-3)/fever


Albuterol/Ipratropium (Albuterol/Ipratropium 3.0-0.5 Mg/3 Ml Neb Soln)  3 ml NEB

QIDRT [facility]


   Last Admin: 04/26/21 15:11  Dose: 3 ml


   Documented by: PRIYA


Docusate Sodium (Docusate Sodium 100 Mg Cap)  100 mg PO BID [facility]


Furosemide (Furosemide 40 Mg Tab)  20 mg PO DAILY [facility]


Gabapentin (Gabapentin 300 Mg Cap)  600 mg PO BID [facility]


Ceftriaxone Sodium 1 gm/ (Sodium Chloride)  100 mls @ 200 mls/hr IV Q24H [facility]


   Last Admin: 04/26/21 14:09  Dose: 200 mls/hr


   Documented by: OLIVERIO


Dextrose/Sodium Chloride (Dextrose 5%-Normal Saline)  1,000 mls @ 75 mls/hr IV 

ASDIRECTED [facility]


   Stop: 04/27/21 04:04


   Last Admin: 04/26/21 16:08  Dose: 75 mls/hr


   Documented by: STEVEN


Insulin Human Lispro (Insulin Lispro 100 Unit/Ml)  0 unit SUBCUT QIDACANDBED 

[facility]; Protocol


   Last Admin: 04/26/21 18:07  Dose: 3 unit


   Documented by: STEVEN


Metoprolol Succinate (Metoprolol Succinate 50 Mg Tab.Er)  50 mg PO DAILY [facility]


Ondansetron HCl (Ondansetron 4 Mg/2 Ml Sdv)  4 mg IV Q6H PRN


   PRN Reason: Nausea/Vomiting


Pantoprazole Sodium (Pantoprazole 40 Mg Vial)  40 mg IV Q12HR [facility]


Polyethylene Glycol (Polyethylene Glycol 3350 Powder 17 Gm Packet)  17 gm PO 

ASDIRECTED PRN


   PRN Reason: Constipation








Labs: 


                                Laboratory Tests











  04/26/21 04/26/21 04/26/21 Range/Units





  08:49 09:08 09:08 


 


WBC   14.03 H   (3.98-10.04)  K/mm3


 


RBC   3.19 L   (3.98-5.22)  M/mm3


 


Hgb   9.7 L   (11.2-15.7)  gm/dl


 


Hct   31.4 L   (34.1-44.9)  %


 


MCV   98.4 H D   (79.4-94.8)  fl


 


MCH   30.4   (25.6-32.2)  pg


 


MCHC   30.9 L   (32.2-35.5)  g/dl


 


RDW Std Deviation   50.5 H   (36.4-46.3)  fL


 


Plt Count   246   (182-369)  K/mm3


 


MPV   10.1   (9.4-12.3)  fl


 


Neut % (Auto)   85.4 H   (34.0-71.1)  %


 


Lymph % (Auto)   10.4 L   (19.3-51.7)  %


 


Mono % (Auto)   3.3 L   (4.7-12.5)  %


 


Eos % (Auto)   0.1 L   (0.7-5.8)  


 


Baso % (Auto)   0.2   (0.1-1.2)  %


 


Neut # (Auto)   11.97 H   (1.56-6.13)  K/mm3


 


Lymph # (Auto)   1.46   (1.18-3.74)  K/mm3


 


Mono # (Auto)   0.47 H   (0.24-0.36)  K/mm3


 


Eos # (Auto)   0.02 L   (0.04-0.36)  K/mm3


 


Baso # (Auto)   0.03   (0.01-0.08)  K/mm3


 


Manual Slide Review   Normal smear   


 


PT    10.9  (9.7-12.0)  SECONDS


 


INR    1.02  


 


APTT    21.0 L  (21.7-31.4)  SECONDS


 


Sodium     (136-145)  mEq/L


 


Potassium     (3.5-5.1)  mEq/L


 


Chloride     ()  mEq/L


 


Carbon Dioxide     (21-32)  mEq/L


 


Anion Gap     (5-15)  


 


BUN     (7-18)  mg/dL


 


Creatinine     (0.55-1.02)  mg/dL


 


Est Cr Clr Drug Dosing     mL/min


 


Estimated GFR (MDRD)     (>60)  mL/min


 


BUN/Creatinine Ratio     (14-18)  


 


Glucose     ()  mg/dL


 


Calcium     (8.5-10.1)  mg/dL


 


Magnesium     (1.8-2.4)  mg/dl


 


Total Bilirubin     (0.2-1.0)  mg/dL


 


AST     (15-37)  U/L


 


ALT     (14-59)  U/L


 


Alkaline Phosphatase     ()  U/L


 


Troponin I     (0.00-0.056)  ng/mL


 


C-Reactive Protein     (<1.0)  mg/dL


 


Total Protein     (6.4-8.2)  g/dl


 


Albumin     (3.4-5.0)  g/dl


 


Globulin     gm/dL


 


Albumin/Globulin Ratio     (1-2)  


 


Urine Color     (Yellow)  


 


Urine Appearance     (Clear)  


 


Urine pH     (5.0-8.0)  


 


Ur Specific Gravity     (1.005-1.030)  


 


Urine Protein     (Negative)  


 


Urine Glucose (UA)     (Negative)  


 


Urine Ketones     (Negative)  


 


Urine Occult Blood     (Negative)  


 


Urine Nitrite     (Negative)  


 


Urine Bilirubin     (Negative)  


 


Urine Urobilinogen     (0.2-1.0)  


 


Ur Leukocyte Esterase     (Negative)  


 


Urine RBC     (0-5)  /hpf


 


Urine WBC     (0-5)  /hpf


 


Ur Squamous Epith Cells     (0-5)  /hpf


 


Amorphous Sediment     (NOT SEEN)  /hpf


 


Urine Bacteria     (FEW)  /hpf


 


Urine Mucus     (FEW)  /hpf


 


Ur Random Creatinine     (30.0-125.0)  mg/dL


 


Ur Random Sodium     ()  mEq/L


 


SARS-CoV-2 RNA (MENA)  Negative    (NEGATIVE)  


 


Blood Type     


 


Gel Antibody Screen     














  04/26/21 04/26/21 04/26/21 Range/Units





  09:08 09:08 09:08 


 


WBC     (3.98-10.04)  K/mm3


 


RBC     (3.98-5.22)  M/mm3


 


Hgb     (11.2-15.7)  gm/dl


 


Hct     (34.1-44.9)  %


 


MCV     (79.4-94.8)  fl


 


MCH     (25.6-32.2)  pg


 


MCHC     (32.2-35.5)  g/dl


 


RDW Std Deviation     (36.4-46.3)  fL


 


Plt Count     (182-369)  K/mm3


 


MPV     (9.4-12.3)  fl


 


Neut % (Auto)     (34.0-71.1)  %


 


Lymph % (Auto)     (19.3-51.7)  %


 


Mono % (Auto)     (4.7-12.5)  %


 


Eos % (Auto)     (0.7-5.8)  


 


Baso % (Auto)     (0.1-1.2)  %


 


Neut # (Auto)     (1.56-6.13)  K/mm3


 


Lymph # (Auto)     (1.18-3.74)  K/mm3


 


Mono # (Auto)     (0.24-0.36)  K/mm3


 


Eos # (Auto)     (0.04-0.36)  K/mm3


 


Baso # (Auto)     (0.01-0.08)  K/mm3


 


Manual Slide Review     


 


PT     (9.7-12.0)  SECONDS


 


INR     


 


APTT     (21.7-31.4)  SECONDS


 


Sodium  137    (136-145)  mEq/L


 


Potassium  5.6 H D    (3.5-5.1)  mEq/L


 


Chloride  104    ()  mEq/L


 


Carbon Dioxide  25    (21-32)  mEq/L


 


Anion Gap  13.6    (5-15)  


 


BUN  57 H    (7-18)  mg/dL


 


Creatinine  1.7 H    (0.55-1.02)  mg/dL


 


Est Cr Clr Drug Dosing  16.11    mL/min


 


Estimated GFR (MDRD)  28    (>60)  mL/min


 


BUN/Creatinine Ratio  33.5 H    (14-18)  


 


Glucose  306 H    ()  mg/dL


 


Calcium  8.9    (8.5-10.1)  mg/dL


 


Magnesium     (1.8-2.4)  mg/dl


 


Total Bilirubin  0.6    (0.2-1.0)  mg/dL


 


AST  11 L    (15-37)  U/L


 


ALT  20    (14-59)  U/L


 


Alkaline Phosphatase  91    ()  U/L


 


Troponin I    < 0.017  (0.00-0.056)  ng/mL


 


C-Reactive Protein     (<1.0)  mg/dL


 


Total Protein  6.7    (6.4-8.2)  g/dl


 


Albumin  3.2 L    (3.4-5.0)  g/dl


 


Globulin  3.5    gm/dL


 


Albumin/Globulin Ratio  0.9 L    (1-2)  


 


Urine Color     (Yellow)  


 


Urine Appearance     (Clear)  


 


Urine pH     (5.0-8.0)  


 


Ur Specific Gravity     (1.005-1.030)  


 


Urine Protein     (Negative)  


 


Urine Glucose (UA)     (Negative)  


 


Urine Ketones     (Negative)  


 


Urine Occult Blood     (Negative)  


 


Urine Nitrite     (Negative)  


 


Urine Bilirubin     (Negative)  


 


Urine Urobilinogen     (0.2-1.0)  


 


Ur Leukocyte Esterase     (Negative)  


 


Urine RBC     (0-5)  /hpf


 


Urine WBC     (0-5)  /hpf


 


Ur Squamous Epith Cells     (0-5)  /hpf


 


Amorphous Sediment     (NOT SEEN)  /hpf


 


Urine Bacteria     (FEW)  /hpf


 


Urine Mucus     (FEW)  /hpf


 


Ur Random Creatinine     (30.0-125.0)  mg/dL


 


Ur Random Sodium     ()  mEq/L


 


SARS-CoV-2 RNA (MENA)     (NEGATIVE)  


 


Blood Type   O NEGATIVE   


 


Gel Antibody Screen   Negative   














  04/26/21 04/26/21 04/26/21 Range/Units





  09:08 09:24 09:24 


 


WBC     (3.98-10.04)  K/mm3


 


RBC     (3.98-5.22)  M/mm3


 


Hgb     (11.2-15.7)  gm/dl


 


Hct     (34.1-44.9)  %


 


MCV     (79.4-94.8)  fl


 


MCH     (25.6-32.2)  pg


 


MCHC     (32.2-35.5)  g/dl


 


RDW Std Deviation     (36.4-46.3)  fL


 


Plt Count     (182-369)  K/mm3


 


MPV     (9.4-12.3)  fl


 


Neut % (Auto)     (34.0-71.1)  %


 


Lymph % (Auto)     (19.3-51.7)  %


 


Mono % (Auto)     (4.7-12.5)  %


 


Eos % (Auto)     (0.7-5.8)  


 


Baso % (Auto)     (0.1-1.2)  %


 


Neut # (Auto)     (1.56-6.13)  K/mm3


 


Lymph # (Auto)     (1.18-3.74)  K/mm3


 


Mono # (Auto)     (0.24-0.36)  K/mm3


 


Eos # (Auto)     (0.04-0.36)  K/mm3


 


Baso # (Auto)     (0.01-0.08)  K/mm3


 


Manual Slide Review     


 


PT     (9.7-12.0)  SECONDS


 


INR     


 


APTT     (21.7-31.4)  SECONDS


 


Sodium     (136-145)  mEq/L


 


Potassium     (3.5-5.1)  mEq/L


 


Chloride     ()  mEq/L


 


Carbon Dioxide     (21-32)  mEq/L


 


Anion Gap     (5-15)  


 


BUN     (7-18)  mg/dL


 


Creatinine     (0.55-1.02)  mg/dL


 


Est Cr Clr Drug Dosing     mL/min


 


Estimated GFR (MDRD)     (>60)  mL/min


 


BUN/Creatinine Ratio     (14-18)  


 


Glucose     ()  mg/dL


 


Calcium     (8.5-10.1)  mg/dL


 


Magnesium  2.2    (1.8-2.4)  mg/dl


 


Total Bilirubin     (0.2-1.0)  mg/dL


 


AST     (15-37)  U/L


 


ALT     (14-59)  U/L


 


Alkaline Phosphatase     ()  U/L


 


Troponin I     (0.00-0.056)  ng/mL


 


C-Reactive Protein  < 0.2    (<1.0)  mg/dL


 


Total Protein     (6.4-8.2)  g/dl


 


Albumin     (3.4-5.0)  g/dl


 


Globulin     gm/dL


 


Albumin/Globulin Ratio     (1-2)  


 


Urine Color   Light yellow   (Yellow)  


 


Urine Appearance   Slt cloudy H   (Clear)  


 


Urine pH   7.0   (5.0-8.0)  


 


Ur Specific Gravity   1.020   (1.005-1.030)  


 


Urine Protein   Negative   (Negative)  


 


Urine Glucose (UA)   2+ H   (Negative)  


 


Urine Ketones   1+ H   (Negative)  


 


Urine Occult Blood   3+ H   (Negative)  


 


Urine Nitrite   Positive H   (Negative)  


 


Urine Bilirubin   Negative   (Negative)  


 


Urine Urobilinogen   0.2   (0.2-1.0)  


 


Ur Leukocyte Esterase   1+ H   (Negative)  


 


Urine RBC   10-20 H   (0-5)  /hpf


 


Urine WBC   20-30 H   (0-5)  /hpf


 


Ur Squamous Epith Cells   5-10 H   (0-5)  /hpf


 


Amorphous Sediment   Few H   (NOT SEEN)  /hpf


 


Urine Bacteria   Many H   (FEW)  /hpf


 


Urine Mucus   Not seen   (FEW)  /hpf


 


Ur Random Creatinine    59.7  (30.0-125.0)  mg/dL


 


Ur Random Sodium    78  ()  mEq/L


 


SARS-CoV-2 RNA (MENA)     (NEGATIVE)  


 


Blood Type     


 


Gel Antibody Screen     











Meds: 


Medications











Generic Name Dose Route Start Last Admin





  Trade Name Freq  PRN Reason Stop Dose Admin


 


Acetaminophen  650 mg  04/26/21 12:08 





  Acetaminophen 325 Mg Tab  PO  





  Q4H PRN  





  Pain (Mild 1-3)/fever  


 


Albuterol/Ipratropium  3 ml  04/26/21 16:00  04/26/21 15:11





  Albuterol/Ipratropium 3.0-0.5 Mg/3 Ml Neb Soln  NEB   3 ml





  QIDRT RYLEY   Administration


 


Docusate Sodium  100 mg  04/26/21 21:00 





  Docusate Sodium 100 Mg Cap  PO  





  BID RYLEY  


 


Furosemide  20 mg  04/27/21 09:00 





  Furosemide 40 Mg Tab  PO  





  DAILY RYLEY  


 


Gabapentin  600 mg  04/26/21 21:00 





  Gabapentin 300 Mg Cap  PO  





  BID RYLEY  


 


Ceftriaxone Sodium 1 gm/  100 mls @ 200 mls/hr  04/26/21 14:00  04/26/21 14:09





  Sodium Chloride  IV   200 mls/hr





  Q24H RYLEY   Administration


 


Dextrose/Sodium Chloride  1,000 mls @ 75 mls/hr  04/26/21 14:45  04/26/21 16:08





  Dextrose 5%-Normal Saline  IV  04/27/21 04:04  75 mls/hr





  ASDIRECTED RYLEY   Administration


 


Insulin Human Lispro  0 unit  04/26/21 17:00  04/26/21 18:07





  Insulin Lispro 100 Unit/Ml  SUBCUT   3 unit





  QIDACANDBED RYLEY   Administration





  Protocol  


 


Metoprolol Succinate  50 mg  04/27/21 09:00 





  Metoprolol Succinate 50 Mg Tab.Er  PO  





  DAILY RYLEY  


 


Ondansetron HCl  4 mg  04/26/21 12:08 





  Ondansetron 4 Mg/2 Ml Sdv  IV  





  Q6H PRN  





  Nausea/Vomiting  


 


Pantoprazole Sodium  40 mg  04/26/21 21:00 





  Pantoprazole 40 Mg Vial  IV  





  Q12HR [facility]  


 


Polyethylene Glycol  17 gm  04/26/21 12:06 





  Polyethylene Glycol 3350 Powder 17 Gm Packet  PO  





  ASDIRECTED PRN  





  Constipation  














Discontinued Medications














Generic Name Dose Route Start Last Admin





  Trade Name Freq  PRN Reason Stop Dose Admin


 


Albuterol  2.5 mg  04/26/21 12:41  04/26/21 15:06





  Albuterol 0.083% 2.5 Mg/3 Ml Neb Soln  NEB  04/26/21 12:42  Not Given





  ONETIME ONE  


 


Dextrose/Lactated Ringer's  1,000 mls @ 75 mls/hr  04/26/21 12:30 





  Dextrose 5%-Lactated Ringers  IV  04/27/21 01:49 





  ASDIRECTED [facility]  


 


Insulin Human Regular  5 unit  04/26/21 13:00  04/26/21 14:09





  Insulin Regular, Human 100 Units/Ml 3 Ml Vial  SUBCUT  04/26/21 13:01  5 unit





  ONETIME ONE   Administration


 


Ondansetron HCl  4 mg  04/26/21 10:56  04/26/21 11:03





  Ondansetron 4 Mg/2 Ml Sdv  IVPUSH  04/26/21 10:57  4 mg





  ONETIME ONE   Administration


 


Pantoprazole Sodium  40 mg  04/26/21 10:56  04/26/21 11:03





  Pantoprazole 40 Mg Vial  IVPUSH  04/26/21 10:57  40 mg





  ONETIME ONE   Administration














- Re-Assessments/Exams


Free Text/Narrative Re-Assessment/Exam: 





04/26/21 21:01


The case was reviewed with Dr. Chapin on-call surgeon who recommended 

hospitalist admission.  He would be available if needed but really urged medical

management.  Case was reviewed with Dr. De Dios who assumed care





Departure





- Departure


Time of Disposition: 11:13


Disposition: Admitted As Inpatient 66


Clinical Impression: 


Gastrointestinal hemorrhage


Qualifiers:


 GI bleed type/associated pathology: melena Qualified Code(s): K92.1 - Melena








- Discharge Information





Sepsis Event Note (ED)





- Evaluation


Sepsis Screening Result: No Definite Risk

## 2021-04-27 NOTE — PCM.PN
- General Info


Date of Service: 04/27/21


Admission Dx/Problem (Free Text): 


GI Bleed 





Functional Status: Reports: Pain Controlled, Tolerating Diet, Ambulating 

(minimal - baseline ), Urinating.  Denies: New Symptoms





- Review of Systems


General: Reports: Weakness, Fatigue, Malaise.  Denies: Fever, Chills


HEENT: Reports: No Symptoms.  Denies: Headaches, Sore Throat


Pulmonary: Reports: No Symptoms.  Denies: Shortness of Breath, Pleuritic Chest 

Pain, Cough, Sputum, Wheezing


Cardiovascular: Reports: No Symptoms.  Denies: Chest Pain, Palpitations, Dyspnea

on Exertion, Lightheadedness


Gastrointestinal: Reports: Melena.  Denies: Abdominal Pain, Constipation, 

Diarrhea, Nausea, Vomiting


Genitourinary: Reports: No Symptoms.  Denies: Pain


Musculoskeletal: Reports: No Symptoms


Skin: Reports: No Symptoms.  Denies: Cyanosis


Neurological: Reports: Pre-Existing Deficit, Difficulty Walking, Weakness, Gait 

Disturbance.  Denies: Confusion


Psychiatric: Reports: No Symptoms





- Patient Data


Vitals - Most Recent: 


                                Last Vital Signs











Temp  98.1 F   04/26/21 15:39


 


Pulse  85   04/26/21 19:40


 


Resp  14   04/27/21 01:00


 


BP  119/54 L  04/26/21 19:40


 


Pulse Ox  98   04/27/21 04:55











Weight - Most Recent: 143 lb 8.335 oz


I&O - Last 24 Hours: 


                                 Intake & Output











 04/26/21 04/27/21 04/27/21





 22:59 06:59 14:59


 


Intake Total 0 300 


 


Balance 0 300 











Lab Results Last 24 Hours: 


                         Laboratory Results - last 24 hr











  04/26/21 04/26/21 04/26/21 Range/Units





  08:49 09:08 09:08 


 


WBC   14.03 H   (3.98-10.04)  K/mm3


 


RBC   3.19 L   (3.98-5.22)  M/mm3


 


Hgb   9.7 L   (11.2-15.7)  gm/dl


 


Hct   31.4 L   (34.1-44.9)  %


 


MCV   98.4 H D   (79.4-94.8)  fl


 


MCH   30.4   (25.6-32.2)  pg


 


MCHC   30.9 L   (32.2-35.5)  g/dl


 


RDW Std Deviation   50.5 H   (36.4-46.3)  fL


 


Plt Count   246   (182-369)  K/mm3


 


MPV   10.1   (9.4-12.3)  fl


 


Neut % (Auto)   85.4 H   (34.0-71.1)  %


 


Lymph % (Auto)   10.4 L   (19.3-51.7)  %


 


Mono % (Auto)   3.3 L   (4.7-12.5)  %


 


Eos % (Auto)   0.1 L   (0.7-5.8)  


 


Baso % (Auto)   0.2   (0.1-1.2)  %


 


Neut # (Auto)   11.97 H   (1.56-6.13)  K/mm3


 


Lymph # (Auto)   1.46   (1.18-3.74)  K/mm3


 


Mono # (Auto)   0.47 H   (0.24-0.36)  K/mm3


 


Eos # (Auto)   0.02 L   (0.04-0.36)  K/mm3


 


Baso # (Auto)   0.03   (0.01-0.08)  K/mm3


 


Manual Slide Review   Normal smear   


 


PT    10.9  (9.7-12.0)  SECONDS


 


INR    1.02  


 


APTT    21.0 L  (21.7-31.4)  SECONDS


 


Sodium     (136-145)  mEq/L


 


Potassium     (3.5-5.1)  mEq/L


 


Chloride     ()  mEq/L


 


Carbon Dioxide     (21-32)  mEq/L


 


Anion Gap     (5-15)  


 


BUN     (7-18)  mg/dL


 


Creatinine     (0.55-1.02)  mg/dL


 


Est Cr Clr Drug Dosing     mL/min


 


Estimated GFR (MDRD)     (>60)  mL/min


 


BUN/Creatinine Ratio     (14-18)  


 


Glucose     ()  mg/dL


 


POC Glucose     (70-99)  mg/dL


 


Lactic Acid     (0.4-2.0)  mmol/L


 


Calcium     (8.5-10.1)  mg/dL


 


Magnesium     (1.8-2.4)  mg/dl


 


Iron     ()  ug/dL


 


TIBC     (100-400)  ug/dL


 


% Saturation     (20-55)  %


 


Transferrin     (202-364)  mg/dL


 


Total Bilirubin     (0.2-1.0)  mg/dL


 


AST     (15-37)  U/L


 


ALT     (14-59)  U/L


 


Alkaline Phosphatase     ()  U/L


 


Troponin I     (0.00-0.056)  ng/mL


 


C-Reactive Protein     (<1.0)  mg/dL


 


Total Protein     (6.4-8.2)  g/dl


 


Albumin     (3.4-5.0)  g/dl


 


Globulin     gm/dL


 


Albumin/Globulin Ratio     (1-2)  


 


Urine Color     (Yellow)  


 


Urine Appearance     (Clear)  


 


Urine pH     (5.0-8.0)  


 


Ur Specific Gravity     (1.005-1.030)  


 


Urine Protein     (Negative)  


 


Urine Glucose (UA)     (Negative)  


 


Urine Ketones     (Negative)  


 


Urine Occult Blood     (Negative)  


 


Urine Nitrite     (Negative)  


 


Urine Bilirubin     (Negative)  


 


Urine Urobilinogen     (0.2-1.0)  


 


Ur Leukocyte Esterase     (Negative)  


 


Urine RBC     (0-5)  /hpf


 


Urine WBC     (0-5)  /hpf


 


Ur Squamous Epith Cells     (0-5)  /hpf


 


Amorphous Sediment     (NOT SEEN)  /hpf


 


Urine Bacteria     (FEW)  /hpf


 


Urine Mucus     (FEW)  /hpf


 


Ur Random Creatinine     (30.0-125.0)  mg/dL


 


Ur Random Sodium     ()  mEq/L


 


SARS-CoV-2 RNA (MENA)  Negative    (NEGATIVE)  


 


MRSA (PCR)     


 


Blood Type     


 


Gel Antibody Screen     














  04/26/21 04/26/21 04/26/21 Range/Units





  09:08 09:08 09:08 


 


WBC     (3.98-10.04)  K/mm3


 


RBC     (3.98-5.22)  M/mm3


 


Hgb     (11.2-15.7)  gm/dl


 


Hct     (34.1-44.9)  %


 


MCV     (79.4-94.8)  fl


 


MCH     (25.6-32.2)  pg


 


MCHC     (32.2-35.5)  g/dl


 


RDW Std Deviation     (36.4-46.3)  fL


 


Plt Count     (182-369)  K/mm3


 


MPV     (9.4-12.3)  fl


 


Neut % (Auto)     (34.0-71.1)  %


 


Lymph % (Auto)     (19.3-51.7)  %


 


Mono % (Auto)     (4.7-12.5)  %


 


Eos % (Auto)     (0.7-5.8)  


 


Baso % (Auto)     (0.1-1.2)  %


 


Neut # (Auto)     (1.56-6.13)  K/mm3


 


Lymph # (Auto)     (1.18-3.74)  K/mm3


 


Mono # (Auto)     (0.24-0.36)  K/mm3


 


Eos # (Auto)     (0.04-0.36)  K/mm3


 


Baso # (Auto)     (0.01-0.08)  K/mm3


 


Manual Slide Review     


 


PT     (9.7-12.0)  SECONDS


 


INR     


 


APTT     (21.7-31.4)  SECONDS


 


Sodium  137    (136-145)  mEq/L


 


Potassium  5.6 H D    (3.5-5.1)  mEq/L


 


Chloride  104    ()  mEq/L


 


Carbon Dioxide  25    (21-32)  mEq/L


 


Anion Gap  13.6    (5-15)  


 


BUN  57 H    (7-18)  mg/dL


 


Creatinine  1.7 H    (0.55-1.02)  mg/dL


 


Est Cr Clr Drug Dosing  16.11    mL/min


 


Estimated GFR (MDRD)  28    (>60)  mL/min


 


BUN/Creatinine Ratio  33.5 H    (14-18)  


 


Glucose  306 H    ()  mg/dL


 


POC Glucose     (70-99)  mg/dL


 


Lactic Acid     (0.4-2.0)  mmol/L


 


Calcium  8.9    (8.5-10.1)  mg/dL


 


Magnesium     (1.8-2.4)  mg/dl


 


Iron     ()  ug/dL


 


TIBC     (100-400)  ug/dL


 


% Saturation     (20-55)  %


 


Transferrin     (202-364)  mg/dL


 


Total Bilirubin  0.6    (0.2-1.0)  mg/dL


 


AST  11 L    (15-37)  U/L


 


ALT  20    (14-59)  U/L


 


Alkaline Phosphatase  91    ()  U/L


 


Troponin I    < 0.017  (0.00-0.056)  ng/mL


 


C-Reactive Protein     (<1.0)  mg/dL


 


Total Protein  6.7    (6.4-8.2)  g/dl


 


Albumin  3.2 L    (3.4-5.0)  g/dl


 


Globulin  3.5    gm/dL


 


Albumin/Globulin Ratio  0.9 L    (1-2)  


 


Urine Color     (Yellow)  


 


Urine Appearance     (Clear)  


 


Urine pH     (5.0-8.0)  


 


Ur Specific Gravity     (1.005-1.030)  


 


Urine Protein     (Negative)  


 


Urine Glucose (UA)     (Negative)  


 


Urine Ketones     (Negative)  


 


Urine Occult Blood     (Negative)  


 


Urine Nitrite     (Negative)  


 


Urine Bilirubin     (Negative)  


 


Urine Urobilinogen     (0.2-1.0)  


 


Ur Leukocyte Esterase     (Negative)  


 


Urine RBC     (0-5)  /hpf


 


Urine WBC     (0-5)  /hpf


 


Ur Squamous Epith Cells     (0-5)  /hpf


 


Amorphous Sediment     (NOT SEEN)  /hpf


 


Urine Bacteria     (FEW)  /hpf


 


Urine Mucus     (FEW)  /hpf


 


Ur Random Creatinine     (30.0-125.0)  mg/dL


 


Ur Random Sodium     ()  mEq/L


 


SARS-CoV-2 RNA (MENA)     (NEGATIVE)  


 


MRSA (PCR)     


 


Blood Type   O NEGATIVE   


 


Gel Antibody Screen   Negative   














  04/26/21 04/26/21 04/26/21 Range/Units





  09:08 09:24 09:24 


 


WBC     (3.98-10.04)  K/mm3


 


RBC     (3.98-5.22)  M/mm3


 


Hgb     (11.2-15.7)  gm/dl


 


Hct     (34.1-44.9)  %


 


MCV     (79.4-94.8)  fl


 


MCH     (25.6-32.2)  pg


 


MCHC     (32.2-35.5)  g/dl


 


RDW Std Deviation     (36.4-46.3)  fL


 


Plt Count     (182-369)  K/mm3


 


MPV     (9.4-12.3)  fl


 


Neut % (Auto)     (34.0-71.1)  %


 


Lymph % (Auto)     (19.3-51.7)  %


 


Mono % (Auto)     (4.7-12.5)  %


 


Eos % (Auto)     (0.7-5.8)  


 


Baso % (Auto)     (0.1-1.2)  %


 


Neut # (Auto)     (1.56-6.13)  K/mm3


 


Lymph # (Auto)     (1.18-3.74)  K/mm3


 


Mono # (Auto)     (0.24-0.36)  K/mm3


 


Eos # (Auto)     (0.04-0.36)  K/mm3


 


Baso # (Auto)     (0.01-0.08)  K/mm3


 


Manual Slide Review     


 


PT     (9.7-12.0)  SECONDS


 


INR     


 


APTT     (21.7-31.4)  SECONDS


 


Sodium     (136-145)  mEq/L


 


Potassium     (3.5-5.1)  mEq/L


 


Chloride     ()  mEq/L


 


Carbon Dioxide     (21-32)  mEq/L


 


Anion Gap     (5-15)  


 


BUN     (7-18)  mg/dL


 


Creatinine     (0.55-1.02)  mg/dL


 


Est Cr Clr Drug Dosing     mL/min


 


Estimated GFR (MDRD)     (>60)  mL/min


 


BUN/Creatinine Ratio     (14-18)  


 


Glucose     ()  mg/dL


 


POC Glucose     (70-99)  mg/dL


 


Lactic Acid     (0.4-2.0)  mmol/L


 


Calcium     (8.5-10.1)  mg/dL


 


Magnesium  2.2    (1.8-2.4)  mg/dl


 


Iron     ()  ug/dL


 


TIBC     (100-400)  ug/dL


 


% Saturation     (20-55)  %


 


Transferrin     (202-364)  mg/dL


 


Total Bilirubin     (0.2-1.0)  mg/dL


 


AST     (15-37)  U/L


 


ALT     (14-59)  U/L


 


Alkaline Phosphatase     ()  U/L


 


Troponin I     (0.00-0.056)  ng/mL


 


C-Reactive Protein  < 0.2    (<1.0)  mg/dL


 


Total Protein     (6.4-8.2)  g/dl


 


Albumin     (3.4-5.0)  g/dl


 


Globulin     gm/dL


 


Albumin/Globulin Ratio     (1-2)  


 


Urine Color   Light yellow   (Yellow)  


 


Urine Appearance   Slt cloudy H   (Clear)  


 


Urine pH   7.0   (5.0-8.0)  


 


Ur Specific Gravity   1.020   (1.005-1.030)  


 


Urine Protein   Negative   (Negative)  


 


Urine Glucose (UA)   2+ H   (Negative)  


 


Urine Ketones   1+ H   (Negative)  


 


Urine Occult Blood   3+ H   (Negative)  


 


Urine Nitrite   Positive H   (Negative)  


 


Urine Bilirubin   Negative   (Negative)  


 


Urine Urobilinogen   0.2   (0.2-1.0)  


 


Ur Leukocyte Esterase   1+ H   (Negative)  


 


Urine RBC   10-20 H   (0-5)  /hpf


 


Urine WBC   20-30 H   (0-5)  /hpf


 


Ur Squamous Epith Cells   5-10 H   (0-5)  /hpf


 


Amorphous Sediment   Few H   (NOT SEEN)  /hpf


 


Urine Bacteria   Many H   (FEW)  /hpf


 


Urine Mucus   Not seen   (FEW)  /hpf


 


Ur Random Creatinine    59.7  (30.0-125.0)  mg/dL


 


Ur Random Sodium    78  ()  mEq/L


 


SARS-CoV-2 RNA (MENA)     (NEGATIVE)  


 


MRSA (PCR)     


 


Blood Type     


 


Gel Antibody Screen     














  04/26/21 04/26/21 04/26/21 Range/Units





  14:05 14:05 14:05 


 


WBC     (3.98-10.04)  K/mm3


 


RBC     (3.98-5.22)  M/mm3


 


Hgb  9.3 L    (11.2-15.7)  gm/dl


 


Hct  30.0 L    (34.1-44.9)  %


 


MCV     (79.4-94.8)  fl


 


MCH     (25.6-32.2)  pg


 


MCHC     (32.2-35.5)  g/dl


 


RDW Std Deviation     (36.4-46.3)  fL


 


Plt Count     (182-369)  K/mm3


 


MPV     (9.4-12.3)  fl


 


Neut % (Auto)     (34.0-71.1)  %


 


Lymph % (Auto)     (19.3-51.7)  %


 


Mono % (Auto)     (4.7-12.5)  %


 


Eos % (Auto)     (0.7-5.8)  


 


Baso % (Auto)     (0.1-1.2)  %


 


Neut # (Auto)     (1.56-6.13)  K/mm3


 


Lymph # (Auto)     (1.18-3.74)  K/mm3


 


Mono # (Auto)     (0.24-0.36)  K/mm3


 


Eos # (Auto)     (0.04-0.36)  K/mm3


 


Baso # (Auto)     (0.01-0.08)  K/mm3


 


Manual Slide Review     


 


PT     (9.7-12.0)  SECONDS


 


INR     


 


APTT     (21.7-31.4)  SECONDS


 


Sodium     (136-145)  mEq/L


 


Potassium   5.4 H   (3.5-5.1)  mEq/L


 


Chloride     ()  mEq/L


 


Carbon Dioxide     (21-32)  mEq/L


 


Anion Gap     (5-15)  


 


BUN     (7-18)  mg/dL


 


Creatinine     (0.55-1.02)  mg/dL


 


Est Cr Clr Drug Dosing     mL/min


 


Estimated GFR (MDRD)     (>60)  mL/min


 


BUN/Creatinine Ratio     (14-18)  


 


Glucose     ()  mg/dL


 


POC Glucose     (70-99)  mg/dL


 


Lactic Acid    1.9  (0.4-2.0)  mmol/L


 


Calcium     (8.5-10.1)  mg/dL


 


Magnesium     (1.8-2.4)  mg/dl


 


Iron     ()  ug/dL


 


TIBC     (100-400)  ug/dL


 


% Saturation     (20-55)  %


 


Transferrin     (202-364)  mg/dL


 


Total Bilirubin     (0.2-1.0)  mg/dL


 


AST     (15-37)  U/L


 


ALT     (14-59)  U/L


 


Alkaline Phosphatase     ()  U/L


 


Troponin I     (0.00-0.056)  ng/mL


 


C-Reactive Protein     (<1.0)  mg/dL


 


Total Protein     (6.4-8.2)  g/dl


 


Albumin     (3.4-5.0)  g/dl


 


Globulin     gm/dL


 


Albumin/Globulin Ratio     (1-2)  


 


Urine Color     (Yellow)  


 


Urine Appearance     (Clear)  


 


Urine pH     (5.0-8.0)  


 


Ur Specific Gravity     (1.005-1.030)  


 


Urine Protein     (Negative)  


 


Urine Glucose (UA)     (Negative)  


 


Urine Ketones     (Negative)  


 


Urine Occult Blood     (Negative)  


 


Urine Nitrite     (Negative)  


 


Urine Bilirubin     (Negative)  


 


Urine Urobilinogen     (0.2-1.0)  


 


Ur Leukocyte Esterase     (Negative)  


 


Urine RBC     (0-5)  /hpf


 


Urine WBC     (0-5)  /hpf


 


Ur Squamous Epith Cells     (0-5)  /hpf


 


Amorphous Sediment     (NOT SEEN)  /hpf


 


Urine Bacteria     (FEW)  /hpf


 


Urine Mucus     (FEW)  /hpf


 


Ur Random Creatinine     (30.0-125.0)  mg/dL


 


Ur Random Sodium     ()  mEq/L


 


SARS-CoV-2 RNA (MENA)     (NEGATIVE)  


 


MRSA (PCR)     


 


Blood Type     


 


Gel Antibody Screen     














  04/26/21 04/26/21 04/26/21 Range/Units





  17:28 20:00 20:06 


 


WBC     (3.98-10.04)  K/mm3


 


RBC     (3.98-5.22)  M/mm3


 


Hgb    8.6 L  (11.2-15.7)  gm/dl


 


Hct    27.3 L  (34.1-44.9)  %


 


MCV     (79.4-94.8)  fl


 


MCH     (25.6-32.2)  pg


 


MCHC     (32.2-35.5)  g/dl


 


RDW Std Deviation     (36.4-46.3)  fL


 


Plt Count     (182-369)  K/mm3


 


MPV     (9.4-12.3)  fl


 


Neut % (Auto)     (34.0-71.1)  %


 


Lymph % (Auto)     (19.3-51.7)  %


 


Mono % (Auto)     (4.7-12.5)  %


 


Eos % (Auto)     (0.7-5.8)  


 


Baso % (Auto)     (0.1-1.2)  %


 


Neut # (Auto)     (1.56-6.13)  K/mm3


 


Lymph # (Auto)     (1.18-3.74)  K/mm3


 


Mono # (Auto)     (0.24-0.36)  K/mm3


 


Eos # (Auto)     (0.04-0.36)  K/mm3


 


Baso # (Auto)     (0.01-0.08)  K/mm3


 


Manual Slide Review     


 


PT     (9.7-12.0)  SECONDS


 


INR     


 


APTT     (21.7-31.4)  SECONDS


 


Sodium     (136-145)  mEq/L


 


Potassium     (3.5-5.1)  mEq/L


 


Chloride     ()  mEq/L


 


Carbon Dioxide     (21-32)  mEq/L


 


Anion Gap     (5-15)  


 


BUN     (7-18)  mg/dL


 


Creatinine     (0.55-1.02)  mg/dL


 


Est Cr Clr Drug Dosing     mL/min


 


Estimated GFR (MDRD)     (>60)  mL/min


 


BUN/Creatinine Ratio     (14-18)  


 


Glucose     ()  mg/dL


 


POC Glucose  228 H    (70-99)  mg/dL


 


Lactic Acid     (0.4-2.0)  mmol/L


 


Calcium     (8.5-10.1)  mg/dL


 


Magnesium     (1.8-2.4)  mg/dl


 


Iron     ()  ug/dL


 


TIBC     (100-400)  ug/dL


 


% Saturation     (20-55)  %


 


Transferrin     (202-364)  mg/dL


 


Total Bilirubin     (0.2-1.0)  mg/dL


 


AST     (15-37)  U/L


 


ALT     (14-59)  U/L


 


Alkaline Phosphatase     ()  U/L


 


Troponin I     (0.00-0.056)  ng/mL


 


C-Reactive Protein     (<1.0)  mg/dL


 


Total Protein     (6.4-8.2)  g/dl


 


Albumin     (3.4-5.0)  g/dl


 


Globulin     gm/dL


 


Albumin/Globulin Ratio     (1-2)  


 


Urine Color     (Yellow)  


 


Urine Appearance     (Clear)  


 


Urine pH     (5.0-8.0)  


 


Ur Specific Gravity     (1.005-1.030)  


 


Urine Protein     (Negative)  


 


Urine Glucose (UA)     (Negative)  


 


Urine Ketones     (Negative)  


 


Urine Occult Blood     (Negative)  


 


Urine Nitrite     (Negative)  


 


Urine Bilirubin     (Negative)  


 


Urine Urobilinogen     (0.2-1.0)  


 


Ur Leukocyte Esterase     (Negative)  


 


Urine RBC     (0-5)  /hpf


 


Urine WBC     (0-5)  /hpf


 


Ur Squamous Epith Cells     (0-5)  /hpf


 


Amorphous Sediment     (NOT SEEN)  /hpf


 


Urine Bacteria     (FEW)  /hpf


 


Urine Mucus     (FEW)  /hpf


 


Ur Random Creatinine     (30.0-125.0)  mg/dL


 


Ur Random Sodium     ()  mEq/L


 


SARS-CoV-2 RNA (MENA)     (NEGATIVE)  


 


MRSA (PCR)   Negative   


 


Blood Type     


 


Gel Antibody Screen     














  04/26/21 04/27/21 04/27/21 Range/Units





  22:38 02:47 02:47 


 


WBC   8.81   (3.98-10.04)  K/mm3


 


RBC   2.44 L   (3.98-5.22)  M/mm3


 


Hgb   7.4 L   (11.2-15.7)  gm/dl


 


Hct   24.0 L   (34.1-44.9)  %


 


MCV   98.4 H   (79.4-94.8)  fl


 


MCH   30.3   (25.6-32.2)  pg


 


MCHC   30.8 L   (32.2-35.5)  g/dl


 


RDW Std Deviation   49.5 H   (36.4-46.3)  fL


 


Plt Count   192   (182-369)  K/mm3


 


MPV   9.9   (9.4-12.3)  fl


 


Neut % (Auto)   61.3   (34.0-71.1)  %


 


Lymph % (Auto)   30.5   (19.3-51.7)  %


 


Mono % (Auto)   6.7   (4.7-12.5)  %


 


Eos % (Auto)   1.1   (0.7-5.8)  


 


Baso % (Auto)   0.1   (0.1-1.2)  %


 


Neut # (Auto)   5.39   (1.56-6.13)  K/mm3


 


Lymph # (Auto)   2.69   (1.18-3.74)  K/mm3


 


Mono # (Auto)   0.59 H   (0.24-0.36)  K/mm3


 


Eos # (Auto)   0.10   (0.04-0.36)  K/mm3


 


Baso # (Auto)   0.01   (0.01-0.08)  K/mm3


 


Manual Slide Review   Abnormal smear   


 


PT     (9.7-12.0)  SECONDS


 


INR     


 


APTT     (21.7-31.4)  SECONDS


 


Sodium    142  (136-145)  mEq/L


 


Potassium    4.3  (3.5-5.1)  mEq/L


 


Chloride    109 H  ()  mEq/L


 


Carbon Dioxide    26  (21-32)  mEq/L


 


Anion Gap    11.3  (5-15)  


 


BUN    45 H  (7-18)  mg/dL


 


Creatinine    1.7 H  (0.55-1.02)  mg/dL


 


Est Cr Clr Drug Dosing    16.11  mL/min


 


Estimated GFR (MDRD)    28  (>60)  mL/min


 


BUN/Creatinine Ratio    26.5 H  (14-18)  


 


Glucose    157 H  ()  mg/dL


 


POC Glucose  176 H    (70-99)  mg/dL


 


Lactic Acid     (0.4-2.0)  mmol/L


 


Calcium    8.3 L  (8.5-10.1)  mg/dL


 


Magnesium    2.2  (1.8-2.4)  mg/dl


 


Iron     ()  ug/dL


 


TIBC     (100-400)  ug/dL


 


% Saturation     (20-55)  %


 


Transferrin     (202-364)  mg/dL


 


Total Bilirubin     (0.2-1.0)  mg/dL


 


AST     (15-37)  U/L


 


ALT     (14-59)  U/L


 


Alkaline Phosphatase     ()  U/L


 


Troponin I     (0.00-0.056)  ng/mL


 


C-Reactive Protein     (<1.0)  mg/dL


 


Total Protein     (6.4-8.2)  g/dl


 


Albumin     (3.4-5.0)  g/dl


 


Globulin     gm/dL


 


Albumin/Globulin Ratio     (1-2)  


 


Urine Color     (Yellow)  


 


Urine Appearance     (Clear)  


 


Urine pH     (5.0-8.0)  


 


Ur Specific Gravity     (1.005-1.030)  


 


Urine Protein     (Negative)  


 


Urine Glucose (UA)     (Negative)  


 


Urine Ketones     (Negative)  


 


Urine Occult Blood     (Negative)  


 


Urine Nitrite     (Negative)  


 


Urine Bilirubin     (Negative)  


 


Urine Urobilinogen     (0.2-1.0)  


 


Ur Leukocyte Esterase     (Negative)  


 


Urine RBC     (0-5)  /hpf


 


Urine WBC     (0-5)  /hpf


 


Ur Squamous Epith Cells     (0-5)  /hpf


 


Amorphous Sediment     (NOT SEEN)  /hpf


 


Urine Bacteria     (FEW)  /hpf


 


Urine Mucus     (FEW)  /hpf


 


Ur Random Creatinine     (30.0-125.0)  mg/dL


 


Ur Random Sodium     ()  mEq/L


 


SARS-CoV-2 RNA (MENA)     (NEGATIVE)  


 


MRSA (PCR)     


 


Blood Type     


 


Gel Antibody Screen     














  04/27/21 04/27/21 Range/Units





  02:47 06:17 


 


WBC    (3.98-10.04)  K/mm3


 


RBC    (3.98-5.22)  M/mm3


 


Hgb    (11.2-15.7)  gm/dl


 


Hct    (34.1-44.9)  %


 


MCV    (79.4-94.8)  fl


 


MCH    (25.6-32.2)  pg


 


MCHC    (32.2-35.5)  g/dl


 


RDW Std Deviation    (36.4-46.3)  fL


 


Plt Count    (182-369)  K/mm3


 


MPV    (9.4-12.3)  fl


 


Neut % (Auto)    (34.0-71.1)  %


 


Lymph % (Auto)    (19.3-51.7)  %


 


Mono % (Auto)    (4.7-12.5)  %


 


Eos % (Auto)    (0.7-5.8)  


 


Baso % (Auto)    (0.1-1.2)  %


 


Neut # (Auto)    (1.56-6.13)  K/mm3


 


Lymph # (Auto)    (1.18-3.74)  K/mm3


 


Mono # (Auto)    (0.24-0.36)  K/mm3


 


Eos # (Auto)    (0.04-0.36)  K/mm3


 


Baso # (Auto)    (0.01-0.08)  K/mm3


 


Manual Slide Review    


 


PT    (9.7-12.0)  SECONDS


 


INR    


 


APTT    (21.7-31.4)  SECONDS


 


Sodium    (136-145)  mEq/L


 


Potassium    (3.5-5.1)  mEq/L


 


Chloride    ()  mEq/L


 


Carbon Dioxide    (21-32)  mEq/L


 


Anion Gap    (5-15)  


 


BUN    (7-18)  mg/dL


 


Creatinine    (0.55-1.02)  mg/dL


 


Est Cr Clr Drug Dosing    mL/min


 


Estimated GFR (MDRD)    (>60)  mL/min


 


BUN/Creatinine Ratio    (14-18)  


 


Glucose    ()  mg/dL


 


POC Glucose   148 H  (70-99)  mg/dL


 


Lactic Acid    (0.4-2.0)  mmol/L


 


Calcium    (8.5-10.1)  mg/dL


 


Magnesium    (1.8-2.4)  mg/dl


 


Iron  60   ()  ug/dL


 


TIBC  255   (100-400)  ug/dL


 


% Saturation  24   (20-55)  %


 


Transferrin  204   (202-364)  mg/dL


 


Total Bilirubin    (0.2-1.0)  mg/dL


 


AST    (15-37)  U/L


 


ALT    (14-59)  U/L


 


Alkaline Phosphatase    ()  U/L


 


Troponin I    (0.00-0.056)  ng/mL


 


C-Reactive Protein    (<1.0)  mg/dL


 


Total Protein    (6.4-8.2)  g/dl


 


Albumin    (3.4-5.0)  g/dl


 


Globulin    gm/dL


 


Albumin/Globulin Ratio    (1-2)  


 


Urine Color    (Yellow)  


 


Urine Appearance    (Clear)  


 


Urine pH    (5.0-8.0)  


 


Ur Specific Gravity    (1.005-1.030)  


 


Urine Protein    (Negative)  


 


Urine Glucose (UA)    (Negative)  


 


Urine Ketones    (Negative)  


 


Urine Occult Blood    (Negative)  


 


Urine Nitrite    (Negative)  


 


Urine Bilirubin    (Negative)  


 


Urine Urobilinogen    (0.2-1.0)  


 


Ur Leukocyte Esterase    (Negative)  


 


Urine RBC    (0-5)  /hpf


 


Urine WBC    (0-5)  /hpf


 


Ur Squamous Epith Cells    (0-5)  /hpf


 


Amorphous Sediment    (NOT SEEN)  /hpf


 


Urine Bacteria    (FEW)  /hpf


 


Urine Mucus    (FEW)  /hpf


 


Ur Random Creatinine    (30.0-125.0)  mg/dL


 


Ur Random Sodium    ()  mEq/L


 


SARS-CoV-2 RNA (MENA)    (NEGATIVE)  


 


MRSA (PCR)    


 


Blood Type    


 


Gel Antibody Screen    











[facility] Results Last 24 Hours: 


                                  Microbiology











 04/26/21 20:00 Stool Occult Blood ([facility]) - Final





 Stool / Feces 











Med Orders - Current: 


                               Current Medications





Acetaminophen (Acetaminophen 325 Mg Tab)  650 mg PO Q4H PRN


   PRN Reason: Pain (Mild 1-3)/fever


Albuterol/Ipratropium (Albuterol/Ipratropium 3.0-0.5 Mg/3 Ml Neb Soln)  3 ml NEB

QIDRT [facility]


   Last Admin: 04/27/21 04:59 Dose:  3 ml


   Documented by: 


Docusate Sodium (Docusate Sodium 100 Mg Cap)  100 mg PO BID [facility]


   Last Admin: 04/27/21 00:50 Dose:  Not Given


   Documented by: 


Furosemide (Furosemide 40 Mg Tab)  20 mg PO DAILY [facility]


Gabapentin (Gabapentin 300 Mg Cap)  600 mg PO BID [facility]


   Last Admin: 04/26/21 22:39 Dose:  600 mg


   Documented by: 


Ceftriaxone Sodium 1 gm/ (Sodium Chloride)  100 mls @ 200 mls/hr IV Q24H [facility]


   Last Admin: 04/26/21 14:09 Dose:  200 mls/hr


   Documented by: 


Insulin Human Lispro (Insulin Lispro 100 Unit/Ml)  0 unit SUBCUT QIDACANDBED 

[facility]; Protocol


   Last Admin: 04/27/21 06:43 Dose:  Not Given


   Documented by: 


Metoprolol Succinate (Metoprolol Succinate 50 Mg Tab.Er)  50 mg PO DAILY [facility]


Ondansetron HCl (Ondansetron 4 Mg/2 Ml Sdv)  4 mg IV Q6H PRN


   PRN Reason: Nausea/Vomiting


Pantoprazole Sodium (Pantoprazole 40 Mg Vial)  40 mg IV Q12HR [facility]


   Last Admin: 04/27/21 00:49 Dose:  Not Given


   Documented by: 


Polyethylene Glycol (Polyethylene Glycol 3350 Powder 17 Gm Packet)  17 gm PO 

ASDIRECTED PRN


   PRN Reason: Constipation





Discontinued Medications





Albuterol (Albuterol 0.083% 2.5 Mg/3 Ml Neb Soln)  2.5 mg NEB ONETIME ONE


   Stop: 04/26/21 12:42


   Last Admin: 04/26/21 15:06 Dose:  Not Given


   Documented by: 


Dextrose/Lactated Ringer's (Dextrose 5%-Lactated Ringers)  1,000 mls @ 75 mls/hr

IV ASDIRECTED [facility]


   Stop: 04/27/21 01:49


Dextrose/Sodium Chloride (Dextrose 5%-Normal Saline)  1,000 mls @ 75 mls/hr IV 

ASDIRECTED [facility]


   Stop: 04/27/21 04:04


   Last Admin: 04/26/21 16:08 Dose:  75 mls/hr


   Documented by: 


Insulin Human Regular (Insulin Regular, Human 100 Units/Ml 3 Ml Vial)  5 unit 

SUBCUT ONETIME ONE


   Stop: 04/26/21 13:01


   Last Admin: 04/26/21 14:09 Dose:  5 unit


   Documented by: 


Ondansetron HCl (Ondansetron 4 Mg/2 Ml Sdv)  4 mg IVPUSH ONETIME ONE


   Stop: 04/26/21 10:57


   Last Admin: 04/26/21 11:03 Dose:  4 mg


   Documented by: 


Pantoprazole Sodium (Pantoprazole 40 Mg Vial)  40 mg IVPUSH ONETIME ONE


   Stop: 04/26/21 10:57


   Last Admin: 04/26/21 11:03 Dose:  40 mg


   Documented by: 











- Exam


Quality Assessment: Supplemental Oxygen (2L - baseline ), DVT Prophylaxis


General: Alert, Oriented, Cooperative, No Acute Distress


HEENT: Pupils Equal, Pupils Reactive, Mucous Membr. Moist/Pink


Neck: Supple, Trachea Midline


Lungs: Clear to Auscultation, Normal Respiratory Effort


Cardiovascular: Regular Rate, Irregular Rhythm


GI/Abdominal Exam: Normal Bowel Sounds, Soft, Non-Tender, No Distention


 (Female) Exam: Deferred


Extremities: Normal Inspection, Normal Range of Motion, Non-Tender, No Pedal 

Edema, Normal Capillary Refill


Peripheral Pulses: 1+: Dorsalis Pedis (L), Dorsalis Pedis (R), 2+: Radial (L), 

Radial (R)


Skin: Warm, Dry, Intact


Neurological: No New Focal Deficit


Psy/Mental Status: Alert, Normal Affect, Normal Mood





- Patient Data


Lab Results Last 24 hrs: 


                         Laboratory Results - last 24 hr











  04/26/21 04/26/21 04/26/21 Range/Units





  08:49 09:08 09:08 


 


WBC   14.03 H   (3.98-10.04)  K/mm3


 


RBC   3.19 L   (3.98-5.22)  M/mm3


 


Hgb   9.7 L   (11.2-15.7)  gm/dl


 


Hct   31.4 L   (34.1-44.9)  %


 


MCV   98.4 H D   (79.4-94.8)  fl


 


MCH   30.4   (25.6-32.2)  pg


 


MCHC   30.9 L   (32.2-35.5)  g/dl


 


RDW Std Deviation   50.5 H   (36.4-46.3)  fL


 


Plt Count   246   (182-369)  K/mm3


 


MPV   10.1   (9.4-12.3)  fl


 


Neut % (Auto)   85.4 H   (34.0-71.1)  %


 


Lymph % (Auto)   10.4 L   (19.3-51.7)  %


 


Mono % (Auto)   3.3 L   (4.7-12.5)  %


 


Eos % (Auto)   0.1 L   (0.7-5.8)  


 


Baso % (Auto)   0.2   (0.1-1.2)  %


 


Neut # (Auto)   11.97 H   (1.56-6.13)  K/mm3


 


Lymph # (Auto)   1.46   (1.18-3.74)  K/mm3


 


Mono # (Auto)   0.47 H   (0.24-0.36)  K/mm3


 


Eos # (Auto)   0.02 L   (0.04-0.36)  K/mm3


 


Baso # (Auto)   0.03   (0.01-0.08)  K/mm3


 


Manual Slide Review   Normal smear   


 


PT    10.9  (9.7-12.0)  SECONDS


 


INR    1.02  


 


APTT    21.0 L  (21.7-31.4)  SECONDS


 


Sodium     (136-145)  mEq/L


 


Potassium     (3.5-5.1)  mEq/L


 


Chloride     ()  mEq/L


 


Carbon Dioxide     (21-32)  mEq/L


 


Anion Gap     (5-15)  


 


BUN     (7-18)  mg/dL


 


Creatinine     (0.55-1.02)  mg/dL


 


Est Cr Clr Drug Dosing     mL/min


 


Estimated GFR (MDRD)     (>60)  mL/min


 


BUN/Creatinine Ratio     (14-18)  


 


Glucose     ()  mg/dL


 


POC Glucose     (70-99)  mg/dL


 


Lactic Acid     (0.4-2.0)  mmol/L


 


Calcium     (8.5-10.1)  mg/dL


 


Magnesium     (1.8-2.4)  mg/dl


 


Iron     ()  ug/dL


 


TIBC     (100-400)  ug/dL


 


% Saturation     (20-55)  %


 


Transferrin     (202-364)  mg/dL


 


Total Bilirubin     (0.2-1.0)  mg/dL


 


AST     (15-37)  U/L


 


ALT     (14-59)  U/L


 


Alkaline Phosphatase     ()  U/L


 


Troponin I     (0.00-0.056)  ng/mL


 


C-Reactive Protein     (<1.0)  mg/dL


 


Total Protein     (6.4-8.2)  g/dl


 


Albumin     (3.4-5.0)  g/dl


 


Globulin     gm/dL


 


Albumin/Globulin Ratio     (1-2)  


 


Urine Color     (Yellow)  


 


Urine Appearance     (Clear)  


 


Urine pH     (5.0-8.0)  


 


Ur Specific Gravity     (1.005-1.030)  


 


Urine Protein     (Negative)  


 


Urine Glucose (UA)     (Negative)  


 


Urine Ketones     (Negative)  


 


Urine Occult Blood     (Negative)  


 


Urine Nitrite     (Negative)  


 


Urine Bilirubin     (Negative)  


 


Urine Urobilinogen     (0.2-1.0)  


 


Ur Leukocyte Esterase     (Negative)  


 


Urine RBC     (0-5)  /hpf


 


Urine WBC     (0-5)  /hpf


 


Ur Squamous Epith Cells     (0-5)  /hpf


 


Amorphous Sediment     (NOT SEEN)  /hpf


 


Urine Bacteria     (FEW)  /hpf


 


Urine Mucus     (FEW)  /hpf


 


Ur Random Creatinine     (30.0-125.0)  mg/dL


 


Ur Random Sodium     ()  mEq/L


 


SARS-CoV-2 RNA (MENA)  Negative    (NEGATIVE)  


 


MRSA (PCR)     


 


Blood Type     


 


Gel Antibody Screen     














  04/26/21 04/26/21 04/26/21 Range/Units





  09:08 09:08 09:08 


 


WBC     (3.98-10.04)  K/mm3


 


RBC     (3.98-5.22)  M/mm3


 


Hgb     (11.2-15.7)  gm/dl


 


Hct     (34.1-44.9)  %


 


MCV     (79.4-94.8)  fl


 


MCH     (25.6-32.2)  pg


 


MCHC     (32.2-35.5)  g/dl


 


RDW Std Deviation     (36.4-46.3)  fL


 


Plt Count     (182-369)  K/mm3


 


MPV     (9.4-12.3)  fl


 


Neut % (Auto)     (34.0-71.1)  %


 


Lymph % (Auto)     (19.3-51.7)  %


 


Mono % (Auto)     (4.7-12.5)  %


 


Eos % (Auto)     (0.7-5.8)  


 


Baso % (Auto)     (0.1-1.2)  %


 


Neut # (Auto)     (1.56-6.13)  K/mm3


 


Lymph # (Auto)     (1.18-3.74)  K/mm3


 


Mono # (Auto)     (0.24-0.36)  K/mm3


 


Eos # (Auto)     (0.04-0.36)  K/mm3


 


Baso # (Auto)     (0.01-0.08)  K/mm3


 


Manual Slide Review     


 


PT     (9.7-12.0)  SECONDS


 


INR     


 


APTT     (21.7-31.4)  SECONDS


 


Sodium  137    (136-145)  mEq/L


 


Potassium  5.6 H D    (3.5-5.1)  mEq/L


 


Chloride  104    ()  mEq/L


 


Carbon Dioxide  25    (21-32)  mEq/L


 


Anion Gap  13.6    (5-15)  


 


BUN  57 H    (7-18)  mg/dL


 


Creatinine  1.7 H    (0.55-1.02)  mg/dL


 


Est Cr Clr Drug Dosing  16.11    mL/min


 


Estimated GFR (MDRD)  28    (>60)  mL/min


 


BUN/Creatinine Ratio  33.5 H    (14-18)  


 


Glucose  306 H    ()  mg/dL


 


POC Glucose     (70-99)  mg/dL


 


Lactic Acid     (0.4-2.0)  mmol/L


 


Calcium  8.9    (8.5-10.1)  mg/dL


 


Magnesium     (1.8-2.4)  mg/dl


 


Iron     ()  ug/dL


 


TIBC     (100-400)  ug/dL


 


% Saturation     (20-55)  %


 


Transferrin     (202-364)  mg/dL


 


Total Bilirubin  0.6    (0.2-1.0)  mg/dL


 


AST  11 L    (15-37)  U/L


 


ALT  20    (14-59)  U/L


 


Alkaline Phosphatase  91    ()  U/L


 


Troponin I    < 0.017  (0.00-0.056)  ng/mL


 


C-Reactive Protein     (<1.0)  mg/dL


 


Total Protein  6.7    (6.4-8.2)  g/dl


 


Albumin  3.2 L    (3.4-5.0)  g/dl


 


Globulin  3.5    gm/dL


 


Albumin/Globulin Ratio  0.9 L    (1-2)  


 


Urine Color     (Yellow)  


 


Urine Appearance     (Clear)  


 


Urine pH     (5.0-8.0)  


 


Ur Specific Gravity     (1.005-1.030)  


 


Urine Protein     (Negative)  


 


Urine Glucose (UA)     (Negative)  


 


Urine Ketones     (Negative)  


 


Urine Occult Blood     (Negative)  


 


Urine Nitrite     (Negative)  


 


Urine Bilirubin     (Negative)  


 


Urine Urobilinogen     (0.2-1.0)  


 


Ur Leukocyte Esterase     (Negative)  


 


Urine RBC     (0-5)  /hpf


 


Urine WBC     (0-5)  /hpf


 


Ur Squamous Epith Cells     (0-5)  /hpf


 


Amorphous Sediment     (NOT SEEN)  /hpf


 


Urine Bacteria     (FEW)  /hpf


 


Urine Mucus     (FEW)  /hpf


 


Ur Random Creatinine     (30.0-125.0)  mg/dL


 


Ur Random Sodium     ()  mEq/L


 


SARS-CoV-2 RNA (MENA)     (NEGATIVE)  


 


MRSA (PCR)     


 


Blood Type   O NEGATIVE   


 


Gel Antibody Screen   Negative   














  04/26/21 04/26/21 04/26/21 Range/Units





  09:08 09:24 09:24 


 


WBC     (3.98-10.04)  K/mm3


 


RBC     (3.98-5.22)  M/mm3


 


Hgb     (11.2-15.7)  gm/dl


 


Hct     (34.1-44.9)  %


 


MCV     (79.4-94.8)  fl


 


MCH     (25.6-32.2)  pg


 


MCHC     (32.2-35.5)  g/dl


 


RDW Std Deviation     (36.4-46.3)  fL


 


Plt Count     (182-369)  K/mm3


 


MPV     (9.4-12.3)  fl


 


Neut % (Auto)     (34.0-71.1)  %


 


Lymph % (Auto)     (19.3-51.7)  %


 


Mono % (Auto)     (4.7-12.5)  %


 


Eos % (Auto)     (0.7-5.8)  


 


Baso % (Auto)     (0.1-1.2)  %


 


Neut # (Auto)     (1.56-6.13)  K/mm3


 


Lymph # (Auto)     (1.18-3.74)  K/mm3


 


Mono # (Auto)     (0.24-0.36)  K/mm3


 


Eos # (Auto)     (0.04-0.36)  K/mm3


 


Baso # (Auto)     (0.01-0.08)  K/mm3


 


Manual Slide Review     


 


PT     (9.7-12.0)  SECONDS


 


INR     


 


APTT     (21.7-31.4)  SECONDS


 


Sodium     (136-145)  mEq/L


 


Potassium     (3.5-5.1)  mEq/L


 


Chloride     ()  mEq/L


 


Carbon Dioxide     (21-32)  mEq/L


 


Anion Gap     (5-15)  


 


BUN     (7-18)  mg/dL


 


Creatinine     (0.55-1.02)  mg/dL


 


Est Cr Clr Drug Dosing     mL/min


 


Estimated GFR (MDRD)     (>60)  mL/min


 


BUN/Creatinine Ratio     (14-18)  


 


Glucose     ()  mg/dL


 


POC Glucose     (70-99)  mg/dL


 


Lactic Acid     (0.4-2.0)  mmol/L


 


Calcium     (8.5-10.1)  mg/dL


 


Magnesium  2.2    (1.8-2.4)  mg/dl


 


Iron     ()  ug/dL


 


TIBC     (100-400)  ug/dL


 


% Saturation     (20-55)  %


 


Transferrin     (202-364)  mg/dL


 


Total Bilirubin     (0.2-1.0)  mg/dL


 


AST     (15-37)  U/L


 


ALT     (14-59)  U/L


 


Alkaline Phosphatase     ()  U/L


 


Troponin I     (0.00-0.056)  ng/mL


 


C-Reactive Protein  < 0.2    (<1.0)  mg/dL


 


Total Protein     (6.4-8.2)  g/dl


 


Albumin     (3.4-5.0)  g/dl


 


Globulin     gm/dL


 


Albumin/Globulin Ratio     (1-2)  


 


Urine Color   Light yellow   (Yellow)  


 


Urine Appearance   Slt cloudy H   (Clear)  


 


Urine pH   7.0   (5.0-8.0)  


 


Ur Specific Gravity   1.020   (1.005-1.030)  


 


Urine Protein   Negative   (Negative)  


 


Urine Glucose (UA)   2+ H   (Negative)  


 


Urine Ketones   1+ H   (Negative)  


 


Urine Occult Blood   3+ H   (Negative)  


 


Urine Nitrite   Positive H   (Negative)  


 


Urine Bilirubin   Negative   (Negative)  


 


Urine Urobilinogen   0.2   (0.2-1.0)  


 


Ur Leukocyte Esterase   1+ H   (Negative)  


 


Urine RBC   10-20 H   (0-5)  /hpf


 


Urine WBC   20-30 H   (0-5)  /hpf


 


Ur Squamous Epith Cells   5-10 H   (0-5)  /hpf


 


Amorphous Sediment   Few H   (NOT SEEN)  /hpf


 


Urine Bacteria   Many H   (FEW)  /hpf


 


Urine Mucus   Not seen   (FEW)  /hpf


 


Ur Random Creatinine    59.7  (30.0-125.0)  mg/dL


 


Ur Random Sodium    78  ()  mEq/L


 


SARS-CoV-2 RNA (MENA)     (NEGATIVE)  


 


MRSA (PCR)     


 


Blood Type     


 


Gel Antibody Screen     














  04/26/21 04/26/21 04/26/21 Range/Units





  14:05 14:05 14:05 


 


WBC     (3.98-10.04)  K/mm3


 


RBC     (3.98-5.22)  M/mm3


 


Hgb  9.3 L    (11.2-15.7)  gm/dl


 


Hct  30.0 L    (34.1-44.9)  %


 


MCV     (79.4-94.8)  fl


 


MCH     (25.6-32.2)  pg


 


MCHC     (32.2-35.5)  g/dl


 


RDW Std Deviation     (36.4-46.3)  fL


 


Plt Count     (182-369)  K/mm3


 


MPV     (9.4-12.3)  fl


 


Neut % (Auto)     (34.0-71.1)  %


 


Lymph % (Auto)     (19.3-51.7)  %


 


Mono % (Auto)     (4.7-12.5)  %


 


Eos % (Auto)     (0.7-5.8)  


 


Baso % (Auto)     (0.1-1.2)  %


 


Neut # (Auto)     (1.56-6.13)  K/mm3


 


Lymph # (Auto)     (1.18-3.74)  K/mm3


 


Mono # (Auto)     (0.24-0.36)  K/mm3


 


Eos # (Auto)     (0.04-0.36)  K/mm3


 


Baso # (Auto)     (0.01-0.08)  K/mm3


 


Manual Slide Review     


 


PT     (9.7-12.0)  SECONDS


 


INR     


 


APTT     (21.7-31.4)  SECONDS


 


Sodium     (136-145)  mEq/L


 


Potassium   5.4 H   (3.5-5.1)  mEq/L


 


Chloride     ()  mEq/L


 


Carbon Dioxide     (21-32)  mEq/L


 


Anion Gap     (5-15)  


 


BUN     (7-18)  mg/dL


 


Creatinine     (0.55-1.02)  mg/dL


 


Est Cr Clr Drug Dosing     mL/min


 


Estimated GFR (MDRD)     (>60)  mL/min


 


BUN/Creatinine Ratio     (14-18)  


 


Glucose     ()  mg/dL


 


POC Glucose     (70-99)  mg/dL


 


Lactic Acid    1.9  (0.4-2.0)  mmol/L


 


Calcium     (8.5-10.1)  mg/dL


 


Magnesium     (1.8-2.4)  mg/dl


 


Iron     ()  ug/dL


 


TIBC     (100-400)  ug/dL


 


% Saturation     (20-55)  %


 


Transferrin     (202-364)  mg/dL


 


Total Bilirubin     (0.2-1.0)  mg/dL


 


AST     (15-37)  U/L


 


ALT     (14-59)  U/L


 


Alkaline Phosphatase     ()  U/L


 


Troponin I     (0.00-0.056)  ng/mL


 


C-Reactive Protein     (<1.0)  mg/dL


 


Total Protein     (6.4-8.2)  g/dl


 


Albumin     (3.4-5.0)  g/dl


 


Globulin     gm/dL


 


Albumin/Globulin Ratio     (1-2)  


 


Urine Color     (Yellow)  


 


Urine Appearance     (Clear)  


 


Urine pH     (5.0-8.0)  


 


Ur Specific Gravity     (1.005-1.030)  


 


Urine Protein     (Negative)  


 


Urine Glucose (UA)     (Negative)  


 


Urine Ketones     (Negative)  


 


Urine Occult Blood     (Negative)  


 


Urine Nitrite     (Negative)  


 


Urine Bilirubin     (Negative)  


 


Urine Urobilinogen     (0.2-1.0)  


 


Ur Leukocyte Esterase     (Negative)  


 


Urine RBC     (0-5)  /hpf


 


Urine WBC     (0-5)  /hpf


 


Ur Squamous Epith Cells     (0-5)  /hpf


 


Amorphous Sediment     (NOT SEEN)  /hpf


 


Urine Bacteria     (FEW)  /hpf


 


Urine Mucus     (FEW)  /hpf


 


Ur Random Creatinine     (30.0-125.0)  mg/dL


 


Ur Random Sodium     ()  mEq/L


 


SARS-CoV-2 RNA (MENA)     (NEGATIVE)  


 


MRSA (PCR)     


 


Blood Type     


 


Gel Antibody Screen     














  04/26/21 04/26/21 04/26/21 Range/Units





  17:28 20:00 20:06 


 


WBC     (3.98-10.04)  K/mm3


 


RBC     (3.98-5.22)  M/mm3


 


Hgb    8.6 L  (11.2-15.7)  gm/dl


 


Hct    27.3 L  (34.1-44.9)  %


 


MCV     (79.4-94.8)  fl


 


MCH     (25.6-32.2)  pg


 


MCHC     (32.2-35.5)  g/dl


 


RDW Std Deviation     (36.4-46.3)  fL


 


Plt Count     (182-369)  K/mm3


 


MPV     (9.4-12.3)  fl


 


Neut % (Auto)     (34.0-71.1)  %


 


Lymph % (Auto)     (19.3-51.7)  %


 


Mono % (Auto)     (4.7-12.5)  %


 


Eos % (Auto)     (0.7-5.8)  


 


Baso % (Auto)     (0.1-1.2)  %


 


Neut # (Auto)     (1.56-6.13)  K/mm3


 


Lymph # (Auto)     (1.18-3.74)  K/mm3


 


Mono # (Auto)     (0.24-0.36)  K/mm3


 


Eos # (Auto)     (0.04-0.36)  K/mm3


 


Baso # (Auto)     (0.01-0.08)  K/mm3


 


Manual Slide Review     


 


PT     (9.7-12.0)  SECONDS


 


INR     


 


APTT     (21.7-31.4)  SECONDS


 


Sodium     (136-145)  mEq/L


 


Potassium     (3.5-5.1)  mEq/L


 


Chloride     ()  mEq/L


 


Carbon Dioxide     (21-32)  mEq/L


 


Anion Gap     (5-15)  


 


BUN     (7-18)  mg/dL


 


Creatinine     (0.55-1.02)  mg/dL


 


Est Cr Clr Drug Dosing     mL/min


 


Estimated GFR (MDRD)     (>60)  mL/min


 


BUN/Creatinine Ratio     (14-18)  


 


Glucose     ()  mg/dL


 


POC Glucose  228 H    (70-99)  mg/dL


 


Lactic Acid     (0.4-2.0)  mmol/L


 


Calcium     (8.5-10.1)  mg/dL


 


Magnesium     (1.8-2.4)  mg/dl


 


Iron     ()  ug/dL


 


TIBC     (100-400)  ug/dL


 


% Saturation     (20-55)  %


 


Transferrin     (202-364)  mg/dL


 


Total Bilirubin     (0.2-1.0)  mg/dL


 


AST     (15-37)  U/L


 


ALT     (14-59)  U/L


 


Alkaline Phosphatase     ()  U/L


 


Troponin I     (0.00-0.056)  ng/mL


 


C-Reactive Protein     (<1.0)  mg/dL


 


Total Protein     (6.4-8.2)  g/dl


 


Albumin     (3.4-5.0)  g/dl


 


Globulin     gm/dL


 


Albumin/Globulin Ratio     (1-2)  


 


Urine Color     (Yellow)  


 


Urine Appearance     (Clear)  


 


Urine pH     (5.0-8.0)  


 


Ur Specific Gravity     (1.005-1.030)  


 


Urine Protein     (Negative)  


 


Urine Glucose (UA)     (Negative)  


 


Urine Ketones     (Negative)  


 


Urine Occult Blood     (Negative)  


 


Urine Nitrite     (Negative)  


 


Urine Bilirubin     (Negative)  


 


Urine Urobilinogen     (0.2-1.0)  


 


Ur Leukocyte Esterase     (Negative)  


 


Urine RBC     (0-5)  /hpf


 


Urine WBC     (0-5)  /hpf


 


Ur Squamous Epith Cells     (0-5)  /hpf


 


Amorphous Sediment     (NOT SEEN)  /hpf


 


Urine Bacteria     (FEW)  /hpf


 


Urine Mucus     (FEW)  /hpf


 


Ur Random Creatinine     (30.0-125.0)  mg/dL


 


Ur Random Sodium     ()  mEq/L


 


SARS-CoV-2 RNA (MENA)     (NEGATIVE)  


 


MRSA (PCR)   Negative   


 


Blood Type     


 


Gel Antibody Screen     














  04/26/21 04/27/21 04/27/21 Range/Units





  22:38 02:47 02:47 


 


WBC   8.81   (3.98-10.04)  K/mm3


 


RBC   2.44 L   (3.98-5.22)  M/mm3


 


Hgb   7.4 L   (11.2-15.7)  gm/dl


 


Hct   24.0 L   (34.1-44.9)  %


 


MCV   98.4 H   (79.4-94.8)  fl


 


MCH   30.3   (25.6-32.2)  pg


 


MCHC   30.8 L   (32.2-35.5)  g/dl


 


RDW Std Deviation   49.5 H   (36.4-46.3)  fL


 


Plt Count   192   (182-369)  K/mm3


 


MPV   9.9   (9.4-12.3)  fl


 


Neut % (Auto)   61.3   (34.0-71.1)  %


 


Lymph % (Auto)   30.5   (19.3-51.7)  %


 


Mono % (Auto)   6.7   (4.7-12.5)  %


 


Eos % (Auto)   1.1   (0.7-5.8)  


 


Baso % (Auto)   0.1   (0.1-1.2)  %


 


Neut # (Auto)   5.39   (1.56-6.13)  K/mm3


 


Lymph # (Auto)   2.69   (1.18-3.74)  K/mm3


 


Mono # (Auto)   0.59 H   (0.24-0.36)  K/mm3


 


Eos # (Auto)   0.10   (0.04-0.36)  K/mm3


 


Baso # (Auto)   0.01   (0.01-0.08)  K/mm3


 


Manual Slide Review   Abnormal smear   


 


PT     (9.7-12.0)  SECONDS


 


INR     


 


APTT     (21.7-31.4)  SECONDS


 


Sodium    142  (136-145)  mEq/L


 


Potassium    4.3  (3.5-5.1)  mEq/L


 


Chloride    109 H  ()  mEq/L


 


Carbon Dioxide    26  (21-32)  mEq/L


 


Anion Gap    11.3  (5-15)  


 


BUN    45 H  (7-18)  mg/dL


 


Creatinine    1.7 H  (0.55-1.02)  mg/dL


 


Est Cr Clr Drug Dosing    16.11  mL/min


 


Estimated GFR (MDRD)    28  (>60)  mL/min


 


BUN/Creatinine Ratio    26.5 H  (14-18)  


 


Glucose    157 H  ()  mg/dL


 


POC Glucose  176 H    (70-99)  mg/dL


 


Lactic Acid     (0.4-2.0)  mmol/L


 


Calcium    8.3 L  (8.5-10.1)  mg/dL


 


Magnesium    2.2  (1.8-2.4)  mg/dl


 


Iron     ()  ug/dL


 


TIBC     (100-400)  ug/dL


 


% Saturation     (20-55)  %


 


Transferrin     (202-364)  mg/dL


 


Total Bilirubin     (0.2-1.0)  mg/dL


 


AST     (15-37)  U/L


 


ALT     (14-59)  U/L


 


Alkaline Phosphatase     ()  U/L


 


Troponin I     (0.00-0.056)  ng/mL


 


C-Reactive Protein     (<1.0)  mg/dL


 


Total Protein     (6.4-8.2)  g/dl


 


Albumin     (3.4-5.0)  g/dl


 


Globulin     gm/dL


 


Albumin/Globulin Ratio     (1-2)  


 


Urine Color     (Yellow)  


 


Urine Appearance     (Clear)  


 


Urine pH     (5.0-8.0)  


 


Ur Specific Gravity     (1.005-1.030)  


 


Urine Protein     (Negative)  


 


Urine Glucose (UA)     (Negative)  


 


Urine Ketones     (Negative)  


 


Urine Occult Blood     (Negative)  


 


Urine Nitrite     (Negative)  


 


Urine Bilirubin     (Negative)  


 


Urine Urobilinogen     (0.2-1.0)  


 


Ur Leukocyte Esterase     (Negative)  


 


Urine RBC     (0-5)  /hpf


 


Urine WBC     (0-5)  /hpf


 


Ur Squamous Epith Cells     (0-5)  /hpf


 


Amorphous Sediment     (NOT SEEN)  /hpf


 


Urine Bacteria     (FEW)  /hpf


 


Urine Mucus     (FEW)  /hpf


 


Ur Random Creatinine     (30.0-125.0)  mg/dL


 


Ur Random Sodium     ()  mEq/L


 


SARS-CoV-2 RNA (MENA)     (NEGATIVE)  


 


MRSA (PCR)     


 


Blood Type     


 


Gel Antibody Screen     














  04/27/21 04/27/21 Range/Units





  02:47 06:17 


 


WBC    (3.98-10.04)  K/mm3


 


RBC    (3.98-5.22)  M/mm3


 


Hgb    (11.2-15.7)  gm/dl


 


Hct    (34.1-44.9)  %


 


MCV    (79.4-94.8)  fl


 


MCH    (25.6-32.2)  pg


 


MCHC    (32.2-35.5)  g/dl


 


RDW Std Deviation    (36.4-46.3)  fL


 


Plt Count    (182-369)  K/mm3


 


MPV    (9.4-12.3)  fl


 


Neut % (Auto)    (34.0-71.1)  %


 


Lymph % (Auto)    (19.3-51.7)  %


 


Mono % (Auto)    (4.7-12.5)  %


 


Eos % (Auto)    (0.7-5.8)  


 


Baso % (Auto)    (0.1-1.2)  %


 


Neut # (Auto)    (1.56-6.13)  K/mm3


 


Lymph # (Auto)    (1.18-3.74)  K/mm3


 


Mono # (Auto)    (0.24-0.36)  K/mm3


 


Eos # (Auto)    (0.04-0.36)  K/mm3


 


Baso # (Auto)    (0.01-0.08)  K/mm3


 


Manual Slide Review    


 


PT    (9.7-12.0)  SECONDS


 


INR    


 


APTT    (21.7-31.4)  SECONDS


 


Sodium    (136-145)  mEq/L


 


Potassium    (3.5-5.1)  mEq/L


 


Chloride    ()  mEq/L


 


Carbon Dioxide    (21-32)  mEq/L


 


Anion Gap    (5-15)  


 


BUN    (7-18)  mg/dL


 


Creatinine    (0.55-1.02)  mg/dL


 


Est Cr Clr Drug Dosing    mL/min


 


Estimated GFR (MDRD)    (>60)  mL/min


 


BUN/Creatinine Ratio    (14-18)  


 


Glucose    ()  mg/dL


 


POC Glucose   148 H  (70-99)  mg/dL


 


Lactic Acid    (0.4-2.0)  mmol/L


 


Calcium    (8.5-10.1)  mg/dL


 


Magnesium    (1.8-2.4)  mg/dl


 


Iron  60   ()  ug/dL


 


TIBC  255   (100-400)  ug/dL


 


% Saturation  24   (20-55)  %


 


Transferrin  204   (202-364)  mg/dL


 


Total Bilirubin    (0.2-1.0)  mg/dL


 


AST    (15-37)  U/L


 


ALT    (14-59)  U/L


 


Alkaline Phosphatase    ()  U/L


 


Troponin I    (0.00-0.056)  ng/mL


 


C-Reactive Protein    (<1.0)  mg/dL


 


Total Protein    (6.4-8.2)  g/dl


 


Albumin    (3.4-5.0)  g/dl


 


Globulin    gm/dL


 


Albumin/Globulin Ratio    (1-2)  


 


Urine Color    (Yellow)  


 


Urine Appearance    (Clear)  


 


Urine pH    (5.0-8.0)  


 


Ur Specific Gravity    (1.005-1.030)  


 


Urine Protein    (Negative)  


 


Urine Glucose (UA)    (Negative)  


 


Urine Ketones    (Negative)  


 


Urine Occult Blood    (Negative)  


 


Urine Nitrite    (Negative)  


 


Urine Bilirubin    (Negative)  


 


Urine Urobilinogen    (0.2-1.0)  


 


Ur Leukocyte Esterase    (Negative)  


 


Urine RBC    (0-5)  /hpf


 


Urine WBC    (0-5)  /hpf


 


Ur Squamous Epith Cells    (0-5)  /hpf


 


Amorphous Sediment    (NOT SEEN)  /hpf


 


Urine Bacteria    (FEW)  /hpf


 


Urine Mucus    (FEW)  /hpf


 


Ur Random Creatinine    (30.0-125.0)  mg/dL


 


Ur Random Sodium    ()  mEq/L


 


SARS-CoV-2 RNA (MENA)    (NEGATIVE)  


 


MRSA (PCR)    


 


Blood Type    


 


Gel Antibody Screen    











Result Diagrams: 


                                 04/27/21 09:22





                                 04/27/21 02:47


[facility] Results Last 24 hrs: 


                                  Microbiology











 04/26/21 20:00 Stool Occult Blood ([facility]) - Final





 Stool / Feces 














Sepsis Event Note





- Evaluation


Sepsis Screening Result: No Definite Risk





- Focused Exam


Vital Signs: 


                                   Vital Signs











  Pulse Resp BP Pulse Ox Pulse Ox


 


 04/27/21 04:55      98


 


 04/27/21 01:00   14   


 


 04/27/21 00:00   14   


 


 04/26/21 23:00   17   


 


 04/26/21 22:00   15   


 


 04/26/21 21:07      99


 


 04/26/21 21:00   24 H   


 


 04/26/21 20:00   17   


 


 04/26/21 19:40  85  18  119/54 L  97 














- Problem List & Annotations


(1) GI bleed


SNOMED Code(s): 38430687


   Code(s): K92.2 - GASTROINTESTINAL HEMORRHAGE, UNSPECIFIED   Status: Acute   

Priority: High   Current Visit: Yes   


Qualifiers: 


   GI bleed type/associated pathology: melena   Qualified Code(s): K92.1 - 

Melena   





(2) CHF (congestive heart failure), NYHA class II


SNOMED Code(s): 540721767, 718054744


   Code(s): I50.9 - HEART FAILURE, UNSPECIFIED   Status: Chronic   Priority: 

Medium   Current Visit: No   


Qualifiers: 


   Congestive heart failure type: combined   Congestive heart failure 

chronicity: acute on chronic   Qualified Code(s): I50.43 - Acute on chronic 

combined systolic (congestive) and diastolic (congestive) heart failure   





(3) Leukocytosis


SNOMED Code(s): 753327561, 676047447


   Code(s): D72.829 - ELEVATED WHITE BLOOD CELL COUNT, UNSPECIFIED   Status: 

Acute   Priority: High   Current Visit: Yes   


Qualifiers: 


   Leukocytosis type: unspecified   Qualified Code(s): D72.829 - Elevated white 

blood cell count, unspecified   





(4) Afib


SNOMED Code(s): 02594235


   Code(s): I48.91 - UNSPECIFIED ATRIAL FIBRILLATION   Status: Chronic   

Priority: Medium   Current Visit: No   


Qualifiers: 


   Atrial fibrillation type: paroxysmal   Qualified Code(s): I48.0 - Paroxysmal 

atrial fibrillation   





(5) Anemia


SNOMED Code(s): 843099455


   Code(s): D64.9 - ANEMIA, UNSPECIFIED   Status: Acute   Priority: Medium   

Current Visit: Yes   


Qualifiers: 


   Anemia type: due to chronic kidney disease   Chronic kidney disease stage: 

stage 4 (severe)   Qualified Code(s): N18.4 - Chronic kidney disease, stage 4 

(severe); D63.1 - Anemia in chronic kidney disease   





(6) Chronic back pain


SNOMED Code(s): 325450879


   Code(s): M54.9 - DORSALGIA, UNSPECIFIED; G89.29 - OTHER CHRONIC PAIN   

Status: Chronic   Priority: Low   Current Visit: No   


Qualifiers: 


   Back pain location: back pain in unspecified location   Back pain laterality:

unspecified   Qualified Code(s): M54.9 - Dorsalgia, unspecified; G89.29 - Other 

chronic pain   





(7) Chronic constipation


SNOMED Code(s): 321518113


   Code(s): K59.09 - OTHER CONSTIPATION   Status: Chronic   Priority: Low   

Current Visit: No   





(8) DM2 (diabetes mellitus, type 2)


SNOMED Code(s): 29839703


   Code(s): E11.9 - TYPE 2 DIABETES MELLITUS WITHOUT COMPLICATIONS   Status: 

Chronic   Priority: Medium   Current Visit: Yes   


Qualifiers: 


   Diabetes mellitus long term insulin use: unspecified long term insulin use 

status   Diabetes mellitus complication status: with other specified 

complication   Qualified Code(s): E11.69 - Type 2 diabetes mellitus with other 

specified complication   





(9) Depression


SNOMED Code(s): 54245782


   Code(s): F32.9 - MAJOR DEPRESSIVE DISORDER, SINGLE EPISODE, UNSPECIFIED   

Status: Chronic   Priority: Low   Current Visit: No   


Qualifiers: 


   Depression Type: other depression   Qualified Code(s): F32.89 - Other 

specified depressive episodes   





(10) HTN (hypertension)


SNOMED Code(s): 72033617


   Code(s): I10 - ESSENTIAL (PRIMARY) HYPERTENSION   Status: Chronic   Priority:

Medium   Current Visit: No   


Qualifiers: 


   Hypertension type: unspecified   Qualified Code(s): I10 - Essential (primary)

hypertension   





(11) History of TIA (transient ischemic attack)


SNOMED Code(s): 326614383


   Code(s): Z86.73 - PRSNL HX OF TIA (TIA), AND CEREB INFRC W/O RESID DEFICITS  

Status: Chronic   Priority: Low   Current Visit: No   





(12) Nursing home resident


SNOMED Code(s): 147200365


   Code(s): Z59.3 - PROBLEMS RELATED TO LIVING IN RESIDENTIAL INSTITUTION   

Status: Chronic   Priority: Low   Current Visit: No   





(13) Generalized weakness


SNOMED Code(s): 88160664


   Code(s): R53.1 - WEAKNESS   Status: Acute   Priority: High   Current Visit: 

Yes   





(14) Hyperkalemia


SNOMED Code(s): 13827159


   Code(s): E87.5 - HYPERKALEMIA   Status: Acute   Priority: High   Current 

Visit: Yes   





(15) CKD (chronic kidney disease) stage 4, GFR 15-29 ml/min


SNOMED Code(s): 958876726


   Code(s): N18.4 - CHRONIC KIDNEY DISEASE, STAGE 4 (SEVERE)   Status: Chronic  

Priority: Medium   Current Visit: Yes   





(16) Dysphagia


SNOMED Code(s): 23451960, 690361073


   Code(s): R13.10 - DYSPHAGIA, UNSPECIFIED   Status: Chronic   Priority: Low   

Current Visit: No   


Qualifiers: 


   Dysphagia type: unspecified   Qualified Code(s): R13.10 - Dysphagia, unspecif

ied   





(17) Recurrent pneumonia


SNOMED Code(s): 365965995


   Code(s): J18.9 - PNEUMONIA, UNSPECIFIED ORGANISM   Status: Chronic   

Priority: Low   Current Visit: No   





(18) Chronic respiratory failure with hypoxia, on home oxygen therapy


SNOMED Code(s): 729870692


   Code(s): J96.11 - CHRONIC RESPIRATORY FAILURE WITH HYPOXIA; Z99.81 - 

DEPENDENCE ON SUPPLEMENTAL OXYGEN   Status: Chronic   Priority: Medium   Current

Visit: Yes   





(19) History of CVA (cerebrovascular accident)


SNOMED Code(s): 568173981


   Code(s): Z86.73 - PRSNL HX OF TIA (TIA), AND CEREB INFRC W/O RESID DEFICITS  

Status: Chronic   Priority: Medium   Current Visit: No   





(20) Vascular dementia


SNOMED Code(s): 390873943


   Code(s): F01.50 - VASCULAR DEMENTIA WITHOUT BEHAVIORAL DISTURBANCE   Status: 

Chronic   Priority: Medium   Current Visit: No   


Qualifiers: 


   Dementia behavioral disturbance: without behavioral disturbance   Qualified 

Code(s): F01.50 - Vascular dementia without behavioral disturbance   





(21) Urinary retention


SNOMED Code(s): 849041464


   Code(s): R33.9 - RETENTION OF URINE, UNSPECIFIED   Status: Chronic   

Priority: Low   Current Visit: No   





(22) UTI (urinary tract infection)


SNOMED Code(s): 71321309


   Code(s): N39.0 - URINARY TRACT INFECTION, SITE NOT SPECIFIED   Status: Acute 

 Priority: High   Current Visit: Yes   


Qualifiers: 


   Urinary tract infection type: acute cystitis   Hematuria presence: with 

hematuria   Qualified Code(s): N30.01 - Acute cystitis with hematuria   





(23) Hyperglycemia due to type 2 diabetes mellitus


SNOMED Code(s): 467210334752327, 404948348261950


   Code(s): E11.65 - TYPE 2 DIABETES MELLITUS WITH HYPERGLYCEMIA   Status: Acute

  Priority: High   Current Visit: Yes   


Qualifiers: 


   Diabetes mellitus long term insulin use: with long term use   Qualified 

Code(s): E11.65 - Type 2 diabetes mellitus with hyperglycemia; Z79.4 - Long term

(current) use of insulin   





- Problem List Review


Problem List Initiated/Reviewed/Updated: Yes





- My Orders


Last 24 Hours: 


My Active Orders





04/26/21 09:08


PROCALCITONIN [REF] Routine 





04/26/21 09:24


CULTURE URINE [RM] Routine 





04/26/21 12:06


polyethylene glycoL 3350 [MiraLAX]   17 gm PO ASDIRECTED PRN 





04/26/21 12:07


RT Aerosol Therapy [RC] ASDIRECTED 





04/26/21 12:08


Patient Status [ADT] Routine 


Height and Weight [RC] 06 


Up With Assistance [RC] BID 


VTE/DVT Education [RC] 10,22 


Vital Signs [RC] Q4H 


Consult to Case Management/ [CONS] Routine 


Consult to Physician [CONS] Routine 


Consult to Spiritual Care [CONS] Routine 


OT Evaluation and Treatment [CONS] Routine 


PT Evaluation and Treatment [CONS] Routine 


Respiratory Care Assess and Treatment [CONS] Routine 


Acetaminophen [TylenoL]   650 mg PO Q4H PRN 


Ondansetron [Zofran]   4 mg IV Q6H PRN 


Resuscitation Status Routine 





04/26/21 12:09


Intake and Output [RC] 04,16 


Pulse Oximetry [RC] PRN 





04/26/21 12:10


Antiembolic Devices [RC] 10,22 


Antiembolic Hose [OM.PC] Per Unit Routine 


Sequential Compression Device [OM.PC] Per Unit Routine 





04/26/21 12:22


Accu Check [Blood Glucose Check, Bedside] [RC] QIDACANDBED 





04/26/21 12:41


RT Aerosol Therapy [RC] ASDIRECTED 





04/26/21 13:22


Blood Culture x2 Reflex Set [OM.PC] Stat 





04/26/21 13:52


Heat Therapy [OM.PC] Routine 


VTE Pharmacological Contraindications [AST] Click to Edit 





04/26/21 13:56


Let Patient Sleep Protocol [RC] BEDTIME 





04/26/21 14:00


cefTRIAXone [Rocephin] 1 gm   Sodium Chloride 0.9% [Normal Saline] 100 ml IV 

Q24H 





04/26/21 14:05


CULTURE BLOOD [BC] Stat 





04/26/21 14:12


CULTURE BLOOD [BC] Stat 





04/26/21 16:00


Albuterol/Ipratropium [DuoNeb 3.0-0.5 MG/3 ML]   3 ml NEB QIDRT 





04/26/21 Dinner


Consistent Carbohydrate Diet [DIET] 


Insulin Lispro [HumaLOG]   See Protocol  SUBCUT QIDACANDBED 





04/26/21 21:00


Docusate Sodium [Colace]   100 mg PO BID 


Gabapentin [Neurontin]   600 mg PO BID 


Pantoprazole [ProTONIX IV***]   40 mg IV Q12HR 





04/27/21 09:00


Furosemide [Lasix]   20 mg PO DAILY 


Metoprolol Succinate [Toprol XL]   50 mg PO DAILY 





04/28/21 05:11


BASIC METABOLIC PANEL,BMP [CHEM] AM 


CBC WITH AUTO DIFF [HEME] AM 


MAGNESIUM [CHEM] AM 





04/29/21 05:11


BASIC METABOLIC PANEL,BMP [CHEM] AM 


CBC WITH AUTO DIFF [HEME] AM 


MAGNESIUM [CHEM] AM 





04/30/21 05:11


BASIC METABOLIC PANEL,BMP [CHEM] AM 


CBC WITH AUTO DIFF [HEME] AM 


MAGNESIUM [CHEM] AM 














- Assessment


Assessment:: 


Assessment - day of admission 4/26/2021


* 89-year-old female presents via [facility] to ED for GI bleed


* Patient resides at [facility].


* Noted to have melena and coffee-ground emesis around 7 AM this morning.


* Patient is on Plavix and aspirin for prior CVA, TIA, and A. fib.


* Also carries history of: heart failure, HTN, anemia, recurrent pneumonia, 

  hypoxemia requiring home O2, chronic constipation, dysphagia, CKD stage IV, 

  urinary retention, chronic back pain, neuralgia, vascular dementia, 

  depression, type II DM.


* Twelve-lead EKG shows sinus rhythm with Q waves in III and aVF.  Low voltage 

  in precordial leads.  No acute change from 2/2021


* Labs obtained in ED:


   * WBC 14.03


   * Hemoglobin 9.7, hematocrit 31.4


   * Platelet 246,000


   * Neutrophils elevated at 85.4%


   * INR 1.02


   * APTT 21.0


   * Sodium 137


   * Potassium 5.6


   * Carbon dioxide 25


   * Anion gap 13.6


   * BUN 57, creatinine 1.7, GFR 28


   * Glucose 306


   * Calcium 8.9


   * Total bilirubin 0.6


   * AST 11, ALT 20, alkaline phosphatase 91


   * Troponin less than 0.017


   * Albumin 3.2


   * SARS Covid 2 RNA negative


   * Blood type O negative; gel antibody screen negative


* Given 4 mg IV push of Zofran and 40 mg IV push Protonix in ED


* Noted to have episode of coffee-ground emesis with blood streaks, 

  approximately 10 mL in ED.


* Admitted to medical surgical floor for management of GI bleed and 

  hyperkalemia.


* Labs on floor:


   * CRP less than 0.2


   * Magnesium 2.2


   * UA: Slightly cloudy with 2+ glucose, 1+ ketones, 3+ occult blood, positive 

     nitrate, 1+ leukocyte esterase, 10-20 RBC, 20-30 WBC, 5-10 squamous 

     epithelial cells, few amorphous sediment, many bacteria


   * Blood cultures ordered


* Started on 1 g Rocephin.





4/28/2021


* Hgb trend since admission: 9.7-->9.3-->8.6-->7.4-->8.8


* Patient remains pale


* 1 unit PRBC ordered overnight but patient refused transfusion


* Per overnight nurse patient reports she is ready to die and does not want 

  procedure 


* Potassium down to 4.3; BUN down from 57 to 45


* Labs otherwise stable


* Awaiting blood and urine cultures - >100CFUs of gram negative rods thus far


* Procalcitonin pending


* Occult stool positive


* Urine sodium 78, Urine creatinine 59.7.


* Iron panel: Iron 60, TIBC 255, percent saturation 24, transferrin 204


* Dr. Chapin, general surgeon, following 


* Increase sliding scale insulin to medium


* Clear liquid diet for now


* Continue Rocephin


* Continue current treatment plan








- Plan


Plan:: 


GI bleed


Anemia


Generalized weakness


Nursing home resident


* IVP Protonix BID


* General surgery consultation - Dr. Chapin contacted in ED


* Trend Hgb


* Transfuse if Hgb <7


* Telemetry


* Hold ASA and Plavix for now


* PT/OT consultation


* CM/SW consultation


* Clear liquid diet for now 





UTI (urinary tract infection)


Urinary retention


Leukocytosis


* Urine culture


* Continue Rocephin 1gm daily


* Blood cultures X2


* Procalcitonin


* Daily labs including CRP


* Monitor for fever





Hyperkalemia, Resolved 


CKD (chronic kidney disease) stage 4, GFR 15-29 ml/min


* Telemetry


* Hold spironolactone


* Urine sodium/creatine WNL





CHF (congestive heart failure), NYHA class II


Afib


HTN (hypertension)


* No acute concerns


* Hold ASA and Plavix as above


* Telemetry


* No acute concerns


* Continue home HTN meds 





Chronic back pain


* Heating pad PRN


* Pain medications PRN


* No acute concerns





Chronic constipation


* Continue home meds


* Monitor BMs


* No acute concerns





DM2 (diabetes mellitus, type 2)


Hyperglycemia due to type 2 diabetes mellitus


* Blood glucose checks QID AC and Bedtime


* Increase to medium dose SS insulin


* A1C 7.0 on 2/9/2021


* Consistent carb diet 





Chronic respiratory failure with hypoxia, on home oxygen therapy


Recurrent pneumonia


Dysphagia


* No acute concerns


* O2 with goal saturations >92%


* RT/RN to adjust as needed


* Home scheduled Duonebs


* Monitor swallowing - Per daughter patient was on thickened liquids awhile ago 

  but has been regular/thin most recently. 





History of TIA (transient ischemic attack)


History of CVA (cerebrovascular accident)


Vascular dementia


* No acute concerns


* Let me sleep protocol





Depression


* No acute concerns





Code Status: DNR/DNI (confirmed with patient and daughter on 4/26/2021)


PCP: Dr. Vaughan


VTE prophylaxis: SCDs and BETH hose. Pharmacological prophylaxis contraindicated 

due to GI bleed.


Social: Patient resides at [facility]


Disposition: Patient admitted to medical surgical floor for management of her 

acute GI bleed and hyperkalemia.  Found to have UTI as well.  Likely length of 

stay 3 to 4 days.

## 2021-04-27 NOTE — PCM.SN.2
- Free Text/Narrative


Note: 





GI bleed in 88 yo woman living in nursing home on aspirin and plavix





S: no acute events overnight. Patient refuses transfusion and wants to go home. 





O:


AF-HR 85, /55, RR 18, on nasal cannula 2 L SpO2 >90%


Resting comfortably


Appears pale


Hgb down to 7.4 from 9.7 yesterday





A:


GI bleed, likely gastric source, on antiplatelet therapy





P:


Agree with current measures; hold antiplatelet medication, continue IV protonix


If patient refuses transfusion, it may be more prudent to scope (EGD) early and 

intervene as necessary if patient consents. Keep NPO for now.


I am available to discuss treatment options and goals of care with family. Will 

see what Hgb is again at 9 AM.

## 2021-04-28 NOTE — PCM.PN
- General Info


Date of Service: 04/28/21


Admission Dx/Problem (Free Text): 


GI Bleed 





Functional Status: Reports: Pain Controlled, Tolerating Diet, Ambulating, 

Urinating.  Denies: New Symptoms





- Review of Systems


General: Reports: Weakness, Fatigue, Malaise.  Denies: No Symptoms, Fever, 

Chills


HEENT: Reports: No Symptoms.  Denies: Headaches, Sore Throat


Pulmonary: Reports: No Symptoms.  Denies: Shortness of Breath, Cough, Sputum, 

Wheezing


Cardiovascular: Reports: No Symptoms.  Denies: Chest Pain, Palpitations, Dyspnea

on Exertion


Gastrointestinal: Reports: No Symptoms.  Denies: Abdominal Pain, Constipation, 

Diarrhea, Nausea, Vomiting


Genitourinary: Reports: No Symptoms.  Denies: Pain


Musculoskeletal: Reports: No Symptoms


Skin: Reports: No Symptoms.  Denies: Cyanosis


Neurological: Reports: Pre-Existing Deficit (Difficulty walking at baseline ), 

Difficulty Walking, Weakness, Gait Disturbance.  Denies: Confusion


Psychiatric: Reports: No Symptoms





- Patient Data


Vitals - Most Recent: 


                                Last Vital Signs











Temp  98.1 F   04/28/21 05:43


 


Pulse  73   04/28/21 05:43


 


Resp  14   04/28/21 05:43


 


BP  115/31 L  04/28/21 05:43


 


Pulse Ox  95   04/28/21 06:30











Weight - Most Recent: 144 lb 6.4 oz


I&O - Last 24 Hours: 


                                 Intake & Output











 04/27/21 04/28/21 04/28/21





 22:59 06:59 14:59


 


Intake Total 500 400 


 


Output Total 400 500 


 


Balance 100 -100 











Lab Results Last 24 Hours: 


                         Laboratory Results - last 24 hr











  04/26/21 04/27/21 04/27/21 Range/Units





  09:08 09:22 11:00 


 


WBC     (3.98-10.04)  K/mm3


 


RBC     (3.98-5.22)  M/mm3


 


Hgb   8.8 L   (11.2-15.7)  gm/dl


 


Hct   28.9 L   (34.1-44.9)  %


 


MCV     (79.4-94.8)  fl


 


MCH     (25.6-32.2)  pg


 


MCHC     (32.2-35.5)  g/dl


 


RDW Std Deviation     (36.4-46.3)  fL


 


Plt Count     (182-369)  K/mm3


 


MPV     (9.4-12.3)  fl


 


Neut % (Auto)     (34.0-71.1)  %


 


Lymph % (Auto)     (19.3-51.7)  %


 


Mono % (Auto)     (4.7-12.5)  %


 


Eos % (Auto)     (0.7-5.8)  


 


Baso % (Auto)     (0.1-1.2)  %


 


Neut # (Auto)     (1.56-6.13)  K/mm3


 


Lymph # (Auto)     (1.18-3.74)  K/mm3


 


Mono # (Auto)     (0.24-0.36)  K/mm3


 


Eos # (Auto)     (0.04-0.36)  K/mm3


 


Baso # (Auto)     (0.01-0.08)  K/mm3


 


Manual Slide Review     


 


Sodium     (136-145)  mEq/L


 


Potassium     (3.5-5.1)  mEq/L


 


Chloride     ()  mEq/L


 


Carbon Dioxide     (21-32)  mEq/L


 


Anion Gap     (5-15)  


 


BUN     (7-18)  mg/dL


 


Creatinine     (0.55-1.02)  mg/dL


 


Est Cr Clr Drug Dosing     mL/min


 


Estimated GFR (MDRD)     (>60)  mL/min


 


BUN/Creatinine Ratio     (14-18)  


 


Glucose     ()  mg/dL


 


POC Glucose    164 H  (70-99)  mg/dL


 


Calcium     (8.5-10.1)  mg/dL


 


Magnesium     (1.8-2.4)  mg/dl


 


Procalcitonin  0.09    ng/mL














  04/27/21 04/27/21 04/27/21 Range/Units





  15:03 16:57 21:18 


 


WBC     (3.98-10.04)  K/mm3


 


RBC     (3.98-5.22)  M/mm3


 


Hgb  8.3 L   7.9 L  (11.2-15.7)  gm/dl


 


Hct  27.6 L   25.9 L  (34.1-44.9)  %


 


MCV     (79.4-94.8)  fl


 


MCH     (25.6-32.2)  pg


 


MCHC     (32.2-35.5)  g/dl


 


RDW Std Deviation     (36.4-46.3)  fL


 


Plt Count     (182-369)  K/mm3


 


MPV     (9.4-12.3)  fl


 


Neut % (Auto)     (34.0-71.1)  %


 


Lymph % (Auto)     (19.3-51.7)  %


 


Mono % (Auto)     (4.7-12.5)  %


 


Eos % (Auto)     (0.7-5.8)  


 


Baso % (Auto)     (0.1-1.2)  %


 


Neut # (Auto)     (1.56-6.13)  K/mm3


 


Lymph # (Auto)     (1.18-3.74)  K/mm3


 


Mono # (Auto)     (0.24-0.36)  K/mm3


 


Eos # (Auto)     (0.04-0.36)  K/mm3


 


Baso # (Auto)     (0.01-0.08)  K/mm3


 


Manual Slide Review     


 


Sodium     (136-145)  mEq/L


 


Potassium     (3.5-5.1)  mEq/L


 


Chloride     ()  mEq/L


 


Carbon Dioxide     (21-32)  mEq/L


 


Anion Gap     (5-15)  


 


BUN     (7-18)  mg/dL


 


Creatinine     (0.55-1.02)  mg/dL


 


Est Cr Clr Drug Dosing     mL/min


 


Estimated GFR (MDRD)     (>60)  mL/min


 


BUN/Creatinine Ratio     (14-18)  


 


Glucose     ()  mg/dL


 


POC Glucose   176 H   (70-99)  mg/dL


 


Calcium     (8.5-10.1)  mg/dL


 


Magnesium     (1.8-2.4)  mg/dl


 


Procalcitonin     ng/mL














  04/27/21 04/28/21 04/28/21 Range/Units





  23:54 03:54 03:54 


 


WBC   7.65   (3.98-10.04)  K/mm3


 


RBC   2.38 L   (3.98-5.22)  M/mm3


 


Hgb   7.3 L*   (11.2-15.7)  gm/dl


 


Hct   23.6 L   (34.1-44.9)  %


 


MCV   99.2 H   (79.4-94.8)  fl


 


MCH   30.7   (25.6-32.2)  pg


 


MCHC   30.9 L   (32.2-35.5)  g/dl


 


RDW Std Deviation   49.9 H   (36.4-46.3)  fL


 


Plt Count   186   (182-369)  K/mm3


 


MPV   9.8   (9.4-12.3)  fl


 


Neut % (Auto)   63.9   (34.0-71.1)  %


 


Lymph % (Auto)   27.7   (19.3-51.7)  %


 


Mono % (Auto)   5.6   (4.7-12.5)  %


 


Eos % (Auto)   2.2   (0.7-5.8)  


 


Baso % (Auto)   0.3   (0.1-1.2)  %


 


Neut # (Auto)   4.89   (1.56-6.13)  K/mm3


 


Lymph # (Auto)   2.12   (1.18-3.74)  K/mm3


 


Mono # (Auto)   0.43 H   (0.24-0.36)  K/mm3


 


Eos # (Auto)   0.17   (0.04-0.36)  K/mm3


 


Baso # (Auto)   0.02   (0.01-0.08)  K/mm3


 


Manual Slide Review   Abnormal smear   


 


Sodium    142  (136-145)  mEq/L


 


Potassium    4.3  (3.5-5.1)  mEq/L


 


Chloride    108 H  ()  mEq/L


 


Carbon Dioxide    27  (21-32)  mEq/L


 


Anion Gap    11.3  (5-15)  


 


BUN    34 H  (7-18)  mg/dL


 


Creatinine    1.5 H  (0.55-1.02)  mg/dL


 


Est Cr Clr Drug Dosing    18.26  mL/min


 


Estimated GFR (MDRD)    33  (>60)  mL/min


 


BUN/Creatinine Ratio    22.7 H  (14-18)  


 


Glucose    144 H  ()  mg/dL


 


POC Glucose  151 H    (70-99)  mg/dL


 


Calcium    8.2 L  (8.5-10.1)  mg/dL


 


Magnesium    2.1  (1.8-2.4)  mg/dl


 


Procalcitonin     ng/mL














  04/28/21 Range/Units





  05:43 


 


WBC   (3.98-10.04)  K/mm3


 


RBC   (3.98-5.22)  M/mm3


 


Hgb   (11.2-15.7)  gm/dl


 


Hct   (34.1-44.9)  %


 


MCV   (79.4-94.8)  fl


 


MCH   (25.6-32.2)  pg


 


MCHC   (32.2-35.5)  g/dl


 


RDW Std Deviation   (36.4-46.3)  fL


 


Plt Count   (182-369)  K/mm3


 


MPV   (9.4-12.3)  fl


 


Neut % (Auto)   (34.0-71.1)  %


 


Lymph % (Auto)   (19.3-51.7)  %


 


Mono % (Auto)   (4.7-12.5)  %


 


Eos % (Auto)   (0.7-5.8)  


 


Baso % (Auto)   (0.1-1.2)  %


 


Neut # (Auto)   (1.56-6.13)  K/mm3


 


Lymph # (Auto)   (1.18-3.74)  K/mm3


 


Mono # (Auto)   (0.24-0.36)  K/mm3


 


Eos # (Auto)   (0.04-0.36)  K/mm3


 


Baso # (Auto)   (0.01-0.08)  K/mm3


 


Manual Slide Review   


 


Sodium   (136-145)  mEq/L


 


Potassium   (3.5-5.1)  mEq/L


 


Chloride   ()  mEq/L


 


Carbon Dioxide   (21-32)  mEq/L


 


Anion Gap   (5-15)  


 


BUN   (7-18)  mg/dL


 


Creatinine   (0.55-1.02)  mg/dL


 


Est Cr Clr Drug Dosing   mL/min


 


Estimated GFR (MDRD)   (>60)  mL/min


 


BUN/Creatinine Ratio   (14-18)  


 


Glucose   ()  mg/dL


 


POC Glucose  140 H  (70-99)  mg/dL


 


Calcium   (8.5-10.1)  mg/dL


 


Magnesium   (1.8-2.4)  mg/dl


 


Procalcitonin   ng/mL











[facility] Results Last 24 Hours: 


                                  Microbiology











 04/26/21 14:12 Aerobic Blood Culture - Preliminary





 Blood - Venous - Lab Draw    NO GROWTH AFTER 1 DAY





 Anaerobic Blood Culture - Preliminary





    NO GROWTH AFTER 1 DAY


 


 04/26/21 14:05 Aerobic Blood Culture - Preliminary





 Blood - Venous    NO GROWTH AFTER 1 DAY





 Anaerobic Blood Culture - Preliminary





    NO GROWTH AFTER 1 DAY


 


 04/26/21 09:24 Urine Culture - Preliminary





 Urine, Clean Catch    Gram Negative Rods











Med Orders - Current: 


                               Current Medications





Acetaminophen (Acetaminophen 325 Mg Tab)  650 mg PO Q4H PRN


   PRN Reason: Pain (Mild 1-3)/fever


Albuterol/Ipratropium (Albuterol/Ipratropium 3.0-0.5 Mg/3 Ml Neb Soln)  3 ml NEB

QIDRT RYLEY


   Last Admin: 04/28/21 06:31 Dose:  Not Given


   Documented by: 


Furosemide (Furosemide 20 Mg Tab)  20 mg PO DAILY [facility]


Gabapentin (Gabapentin 600 Mg Tab)  600 mg PO BID [facility]


   Last Admin: 04/27/21 20:31 Dose:  600 mg


   Documented by: 


Ceftriaxone Sodium 1 gm/ (Sodium Chloride)  100 mls @ 200 mls/hr IV Q24H [facility]


   Last Admin: 04/27/21 13:45 Dose:  200 mls/hr


   Documented by: 


Insulin Human Lispro (Insulin Lispro 100 Unit/Ml)  0 unit SUBCUT QIDACANDBED 

[facility]; Protocol


   Last Admin: 04/28/21 00:19 Dose:  Not Given


   Documented by: 


Metoprolol Succinate (Metoprolol Succinate 50 Mg Tab.Er)  50 mg PO DAILY [facility]


   Last Admin: 04/27/21 08:26 Dose:  Not Given


   Documented by: 


Ondansetron HCl (Ondansetron 4 Mg/2 Ml Sdv)  4 mg IV Q6H PRN


   PRN Reason: Nausea/Vomiting


Pantoprazole Sodium (Pantoprazole 40 Mg Vial)  40 mg IV Q12HR [facility]


   Last Admin: 04/27/21 20:30 Dose:  40 mg


   Documented by: 


Polyethylene Glycol (Polyethylene Glycol 3350 Powder 17 Gm Packet)  17 gm PO 

ASDIRECTED PRN


   PRN Reason: Constipation





Discontinued Medications





Albuterol (Albuterol 0.083% 2.5 Mg/3 Ml Neb Soln)  2.5 mg NEB ONETIME ONE


   Stop: 04/26/21 12:42


   Last Admin: 04/26/21 15:06 Dose:  Not Given


   Documented by: 


Docusate Sodium (Docusate Sodium 100 Mg Cap)  100 mg PO BID [facility]


   Last Admin: 04/27/21 08:27 Dose:  Not Given


   Documented by: 


Furosemide (Furosemide 40 Mg Tab)  20 mg PO DAILY [facility]


   Last Admin: 04/27/21 08:26 Dose:  Not Given


   Documented by: 


Gabapentin (Gabapentin 300 Mg Cap)  600 mg PO BID [facility]


   Last Admin: 04/27/21 08:26 Dose:  Not Given


   Documented by: 


Dextrose/Lactated Ringer's (Dextrose 5%-Lactated Ringers)  1,000 mls @ 75 mls/hr

IV ASDIRECTED [facility]


   Stop: 04/27/21 01:49


Dextrose/Sodium Chloride (Dextrose 5%-Normal Saline)  1,000 mls @ 75 mls/hr IV 

ASDIRECTED [facility]


   Stop: 04/27/21 04:04


   Last Admin: 04/26/21 16:08 Dose:  75 mls/hr


   Documented by: 


Insulin Human Regular (Insulin Regular, Human 100 Units/Ml 3 Ml Vial)  5 unit 

SUBCUT ONETIME ONE


   Stop: 04/26/21 13:01


   Last Admin: 04/26/21 14:09 Dose:  5 unit


   Documented by: 


Ondansetron HCl (Ondansetron 4 Mg/2 Ml Sdv)  4 mg IVPUSH ONETIME ONE


   Stop: 04/26/21 10:57


   Last Admin: 04/26/21 11:03 Dose:  4 mg


   Documented by: 


Pantoprazole Sodium (Pantoprazole 40 Mg Vial)  40 mg IVPUSH ONETIME ONE


   Stop: 04/26/21 10:57


   Last Admin: 04/26/21 11:03 Dose:  40 mg


   Documented by: 











- Exam


Quality Assessment: Supplemental Oxygen (2L), DVT Prophylaxis


General: Alert, Oriented, Cooperative, No Acute Distress, Other (Looks pale )


HEENT: Pupils Equal, Mucous Membr. Moist/Pink


Neck: Supple, Trachea Midline


Lungs: Clear to Auscultation, Normal Respiratory Effort


Cardiovascular: Regular Rate, Irregular Rhythm


GI/Abdominal Exam: Normal Bowel Sounds, Soft, Non-Tender, No Distention


 (Female) Exam: Deferred


Back Exam: Normal Inspection, Full Range of Motion


Extremities: Normal Inspection, Normal Range of Motion, Non-Tender, No Pedal 

Edema, Normal Capillary Refill


Peripheral Pulses: 2+: Radial (L), Radial (R), Dorsalis Pedis (L), Dorsalis 

Pedis (R)


Skin: Warm, Dry, Intact


Neurological: No New Focal Deficit


Psy/Mental Status: Alert, Anxious





- Patient Data


Lab Results Last 24 hrs: 


                         Laboratory Results - last 24 hr











  04/26/21 04/27/21 04/27/21 Range/Units





  09:08 09:22 11:00 


 


WBC     (3.98-10.04)  K/mm3


 


RBC     (3.98-5.22)  M/mm3


 


Hgb   8.8 L   (11.2-15.7)  gm/dl


 


Hct   28.9 L   (34.1-44.9)  %


 


MCV     (79.4-94.8)  fl


 


MCH     (25.6-32.2)  pg


 


MCHC     (32.2-35.5)  g/dl


 


RDW Std Deviation     (36.4-46.3)  fL


 


Plt Count     (182-369)  K/mm3


 


MPV     (9.4-12.3)  fl


 


Neut % (Auto)     (34.0-71.1)  %


 


Lymph % (Auto)     (19.3-51.7)  %


 


Mono % (Auto)     (4.7-12.5)  %


 


Eos % (Auto)     (0.7-5.8)  


 


Baso % (Auto)     (0.1-1.2)  %


 


Neut # (Auto)     (1.56-6.13)  K/mm3


 


Lymph # (Auto)     (1.18-3.74)  K/mm3


 


Mono # (Auto)     (0.24-0.36)  K/mm3


 


Eos # (Auto)     (0.04-0.36)  K/mm3


 


Baso # (Auto)     (0.01-0.08)  K/mm3


 


Manual Slide Review     


 


Sodium     (136-145)  mEq/L


 


Potassium     (3.5-5.1)  mEq/L


 


Chloride     ()  mEq/L


 


Carbon Dioxide     (21-32)  mEq/L


 


Anion Gap     (5-15)  


 


BUN     (7-18)  mg/dL


 


Creatinine     (0.55-1.02)  mg/dL


 


Est Cr Clr Drug Dosing     mL/min


 


Estimated GFR (MDRD)     (>60)  mL/min


 


BUN/Creatinine Ratio     (14-18)  


 


Glucose     ()  mg/dL


 


POC Glucose    164 H  (70-99)  mg/dL


 


Calcium     (8.5-10.1)  mg/dL


 


Magnesium     (1.8-2.4)  mg/dl


 


Procalcitonin  0.09    ng/mL














  04/27/21 04/27/21 04/27/21 Range/Units





  15:03 16:57 21:18 


 


WBC     (3.98-10.04)  K/mm3


 


RBC     (3.98-5.22)  M/mm3


 


Hgb  8.3 L   7.9 L  (11.2-15.7)  gm/dl


 


Hct  27.6 L   25.9 L  (34.1-44.9)  %


 


MCV     (79.4-94.8)  fl


 


MCH     (25.6-32.2)  pg


 


MCHC     (32.2-35.5)  g/dl


 


RDW Std Deviation     (36.4-46.3)  fL


 


Plt Count     (182-369)  K/mm3


 


MPV     (9.4-12.3)  fl


 


Neut % (Auto)     (34.0-71.1)  %


 


Lymph % (Auto)     (19.3-51.7)  %


 


Mono % (Auto)     (4.7-12.5)  %


 


Eos % (Auto)     (0.7-5.8)  


 


Baso % (Auto)     (0.1-1.2)  %


 


Neut # (Auto)     (1.56-6.13)  K/mm3


 


Lymph # (Auto)     (1.18-3.74)  K/mm3


 


Mono # (Auto)     (0.24-0.36)  K/mm3


 


Eos # (Auto)     (0.04-0.36)  K/mm3


 


Baso # (Auto)     (0.01-0.08)  K/mm3


 


Manual Slide Review     


 


Sodium     (136-145)  mEq/L


 


Potassium     (3.5-5.1)  mEq/L


 


Chloride     ()  mEq/L


 


Carbon Dioxide     (21-32)  mEq/L


 


Anion Gap     (5-15)  


 


BUN     (7-18)  mg/dL


 


Creatinine     (0.55-1.02)  mg/dL


 


Est Cr Clr Drug Dosing     mL/min


 


Estimated GFR (MDRD)     (>60)  mL/min


 


BUN/Creatinine Ratio     (14-18)  


 


Glucose     ()  mg/dL


 


POC Glucose   176 H   (70-99)  mg/dL


 


Calcium     (8.5-10.1)  mg/dL


 


Magnesium     (1.8-2.4)  mg/dl


 


Procalcitonin     ng/mL














  04/27/21 04/28/21 04/28/21 Range/Units





  23:54 03:54 03:54 


 


WBC   7.65   (3.98-10.04)  K/mm3


 


RBC   2.38 L   (3.98-5.22)  M/mm3


 


Hgb   7.3 L*   (11.2-15.7)  gm/dl


 


Hct   23.6 L   (34.1-44.9)  %


 


MCV   99.2 H   (79.4-94.8)  fl


 


MCH   30.7   (25.6-32.2)  pg


 


MCHC   30.9 L   (32.2-35.5)  g/dl


 


RDW Std Deviation   49.9 H   (36.4-46.3)  fL


 


Plt Count   186   (182-369)  K/mm3


 


MPV   9.8   (9.4-12.3)  fl


 


Neut % (Auto)   63.9   (34.0-71.1)  %


 


Lymph % (Auto)   27.7   (19.3-51.7)  %


 


Mono % (Auto)   5.6   (4.7-12.5)  %


 


Eos % (Auto)   2.2   (0.7-5.8)  


 


Baso % (Auto)   0.3   (0.1-1.2)  %


 


Neut # (Auto)   4.89   (1.56-6.13)  K/mm3


 


Lymph # (Auto)   2.12   (1.18-3.74)  K/mm3


 


Mono # (Auto)   0.43 H   (0.24-0.36)  K/mm3


 


Eos # (Auto)   0.17   (0.04-0.36)  K/mm3


 


Baso # (Auto)   0.02   (0.01-0.08)  K/mm3


 


Manual Slide Review   Abnormal smear   


 


Sodium    142  (136-145)  mEq/L


 


Potassium    4.3  (3.5-5.1)  mEq/L


 


Chloride    108 H  ()  mEq/L


 


Carbon Dioxide    27  (21-32)  mEq/L


 


Anion Gap    11.3  (5-15)  


 


BUN    34 H  (7-18)  mg/dL


 


Creatinine    1.5 H  (0.55-1.02)  mg/dL


 


Est Cr Clr Drug Dosing    18.26  mL/min


 


Estimated GFR (MDRD)    33  (>60)  mL/min


 


BUN/Creatinine Ratio    22.7 H  (14-18)  


 


Glucose    144 H  ()  mg/dL


 


POC Glucose  151 H    (70-99)  mg/dL


 


Calcium    8.2 L  (8.5-10.1)  mg/dL


 


Magnesium    2.1  (1.8-2.4)  mg/dl


 


Procalcitonin     ng/mL














  04/28/21 Range/Units





  05:43 


 


WBC   (3.98-10.04)  K/mm3


 


RBC   (3.98-5.22)  M/mm3


 


Hgb   (11.2-15.7)  gm/dl


 


Hct   (34.1-44.9)  %


 


MCV   (79.4-94.8)  fl


 


MCH   (25.6-32.2)  pg


 


MCHC   (32.2-35.5)  g/dl


 


RDW Std Deviation   (36.4-46.3)  fL


 


Plt Count   (182-369)  K/mm3


 


MPV   (9.4-12.3)  fl


 


Neut % (Auto)   (34.0-71.1)  %


 


Lymph % (Auto)   (19.3-51.7)  %


 


Mono % (Auto)   (4.7-12.5)  %


 


Eos % (Auto)   (0.7-5.8)  


 


Baso % (Auto)   (0.1-1.2)  %


 


Neut # (Auto)   (1.56-6.13)  K/mm3


 


Lymph # (Auto)   (1.18-3.74)  K/mm3


 


Mono # (Auto)   (0.24-0.36)  K/mm3


 


Eos # (Auto)   (0.04-0.36)  K/mm3


 


Baso # (Auto)   (0.01-0.08)  K/mm3


 


Manual Slide Review   


 


Sodium   (136-145)  mEq/L


 


Potassium   (3.5-5.1)  mEq/L


 


Chloride   ()  mEq/L


 


Carbon Dioxide   (21-32)  mEq/L


 


Anion Gap   (5-15)  


 


BUN   (7-18)  mg/dL


 


Creatinine   (0.55-1.02)  mg/dL


 


Est Cr Clr Drug Dosing   mL/min


 


Estimated GFR (MDRD)   (>60)  mL/min


 


BUN/Creatinine Ratio   (14-18)  


 


Glucose   ()  mg/dL


 


POC Glucose  140 H  (70-99)  mg/dL


 


Calcium   (8.5-10.1)  mg/dL


 


Magnesium   (1.8-2.4)  mg/dl


 


Procalcitonin   ng/mL











Result Diagrams: 


                                 04/28/21 03:54





                                 04/28/21 03:54


[place] Results Last 24 hrs: 


                                  Microbiology











 04/26/21 14:12 Aerobic Blood Culture - Preliminary





 Blood - Venous - Lab Draw    NO GROWTH AFTER 1 DAY





 Anaerobic Blood Culture - Preliminary





    NO GROWTH AFTER 1 DAY


 


 04/26/21 14:05 Aerobic Blood Culture - Preliminary





 Blood - Venous    NO GROWTH AFTER 1 DAY





 Anaerobic Blood Culture - Preliminary





    NO GROWTH AFTER 1 DAY


 


 04/26/21 09:24 Urine Culture - Preliminary





 Urine, Clean Catch    Gram Negative Rods














Sepsis Event Note





- Evaluation


Sepsis Screening Result: No Definite Risk





- Focused Exam


Vital Signs: 


                                   Vital Signs











  Temp Pulse Resp BP Pulse Ox Pulse Ox


 


 04/28/21 06:30       95


 


 04/28/21 05:43  98.1 F  73  14  115/31 L  95 


 


 04/28/21 00:12  98.2 F  76  16  107/57 L  94 L 


 


 04/27/21 20:51       100


 


 04/27/21 20:26  98.1 F  78  16  120/95 H  98 














- Problem List & Annotations


(1) GI bleed


SNOMED Code(s): 79913845


   Code(s): K92.2 - GASTROINTESTINAL HEMORRHAGE, UNSPECIFIED   Status: Acute   

Priority: High   Current Visit: Yes   


Qualifiers: 


   GI bleed type/associated pathology: melena   Qualified Code(s): K92.1 - 

Melena   





(2) CHF (congestive heart failure), NYHA class II


SNOMED Code(s): 546728424, 518898990


   Code(s): I50.9 - HEART FAILURE, UNSPECIFIED   Status: Chronic   Priority: 

Medium   Current Visit: No   


Qualifiers: 


   Congestive heart failure type: combined   Congestive heart failure 

chronicity: acute on chronic   Qualified Code(s): I50.43 - Acute on chronic c

ombined systolic (congestive) and diastolic (congestive) heart failure   





(3) Leukocytosis


SNOMED Code(s): 797169705, 036685627


   Code(s): D72.829 - ELEVATED WHITE BLOOD CELL COUNT, UNSPECIFIED   Status: 

Acute   Priority: High   Current Visit: Yes   


Qualifiers: 


   Leukocytosis type: unspecified   Qualified Code(s): D72.829 - Elevated white 

blood cell count, unspecified   





(4) Afib


SNOMED Code(s): 19715207


   Code(s): I48.91 - UNSPECIFIED ATRIAL FIBRILLATION   Status: Chronic   

Priority: Medium   Current Visit: No   


Qualifiers: 


   Atrial fibrillation type: paroxysmal   Qualified Code(s): I48.0 - Paroxysmal 

atrial fibrillation   





(5) Anemia


SNOMED Code(s): 297773900


   Code(s): D64.9 - ANEMIA, UNSPECIFIED   Status: Acute   Priority: Medium   

Current Visit: Yes   


Qualifiers: 


   Anemia type: due to chronic kidney disease   Chronic kidney disease stage: 

stage 4 (severe)   Qualified Code(s): N18.4 - Chronic kidney disease, stage 4 

(severe); D63.1 - Anemia in chronic kidney disease   





(6) Chronic back pain


SNOMED Code(s): 155307727


   Code(s): M54.9 - DORSALGIA, UNSPECIFIED; G89.29 - OTHER CHRONIC PAIN   

Status: Chronic   Priority: Low   Current Visit: No   


Qualifiers: 


   Back pain location: back pain in unspecified location   Back pain laterality:

unspecified   Qualified Code(s): M54.9 - Dorsalgia, unspecified; G89.29 - Other 

chronic pain   





(7) Chronic constipation


SNOMED Code(s): 111101636


   Code(s): K59.09 - OTHER CONSTIPATION   Status: Chronic   Priority: Low   

Current Visit: No   





(8) DM2 (diabetes mellitus, type 2)


SNOMED Code(s): 83997637


   Code(s): E11.9 - TYPE 2 DIABETES MELLITUS WITHOUT COMPLICATIONS   Status: 

Chronic   Priority: Medium   Current Visit: Yes   


Qualifiers: 


   Diabetes mellitus long term insulin use: unspecified long term insulin use 

status   Diabetes mellitus complication status: with other specified co

mplication   Qualified Code(s): E11.69 - Type 2 diabetes mellitus with other 

specified complication   





(9) Depression


SNOMED Code(s): 17345257


   Code(s): F32.9 - MAJOR DEPRESSIVE DISORDER, SINGLE EPISODE, UNSPECIFIED   

Status: Chronic   Priority: Low   Current Visit: No   


Qualifiers: 


   Depression Type: other depression   Qualified Code(s): F32.89 - Other 

specified depressive episodes   





(10) HTN (hypertension)


SNOMED Code(s): 67322782


   Code(s): I10 - ESSENTIAL (PRIMARY) HYPERTENSION   Status: Chronic   Priority:

Medium   Current Visit: No   


Qualifiers: 


   Hypertension type: unspecified   Qualified Code(s): I10 - Essential (primary)

hypertension   





(11) History of TIA (transient ischemic attack)


SNOMED Code(s): 075022503


   Code(s): Z86.73 - PRSNL HX OF TIA (TIA), AND CEREB INFRC W/O RESID DEFICITS  

Status: Chronic   Priority: Low   Current Visit: No   





(12) Nursing home resident


SNOMED Code(s): 435530765


   Code(s): Z59.3 - PROBLEMS RELATED TO LIVING IN RESIDENTIAL INSTITUTION   

Status: Chronic   Priority: Low   Current Visit: No   





(13) Generalized weakness


SNOMED Code(s): 14986196


   Code(s): R53.1 - WEAKNESS   Status: Acute   Priority: High   Current Visit: 

Yes   





(14) Hyperkalemia


SNOMED Code(s): 13331424


   Code(s): E87.5 - HYPERKALEMIA   Status: Acute   Priority: High   Current 

Visit: Yes   





(15) CKD (chronic kidney disease) stage 4, GFR 15-29 ml/min


SNOMED Code(s): 230836048


   Code(s): N18.4 - CHRONIC KIDNEY DISEASE, STAGE 4 (SEVERE)   Status: Chronic  

Priority: Medium   Current Visit: Yes   





(16) Dysphagia


SNOMED Code(s): 14528405, 256021740


   Code(s): R13.10 - DYSPHAGIA, UNSPECIFIED   Status: Chronic   Priority: Low   

Current Visit: No   


Qualifiers: 


   Dysphagia type: unspecified   Qualified Code(s): R13.10 - Dysphagia, 

unspecified   





(17) Recurrent pneumonia


SNOMED Code(s): 721183542


   Code(s): J18.9 - PNEUMONIA, UNSPECIFIED ORGANISM   Status: Chronic   

Priority: Low   Current Visit: No   





(18) Chronic respiratory failure with hypoxia, on home oxygen therapy


SNOMED Code(s): 410428763


   Code(s): J96.11 - CHRONIC RESPIRATORY FAILURE WITH HYPOXIA; Z99.81 - 

DEPENDENCE ON SUPPLEMENTAL OXYGEN   Status: Chronic   Priority: Medium   Current

Visit: Yes   





(19) History of CVA (cerebrovascular accident)


SNOMED Code(s): 814986297


   Code(s): Z86.73 - PRSNL HX OF TIA (TIA), AND CEREB INFRC W/O RESID DEFICITS  

Status: Chronic   Priority: Medium   Current Visit: No   





(20) Vascular dementia


SNOMED Code(s): 691460432


   Code(s): F01.50 - VASCULAR DEMENTIA WITHOUT BEHAVIORAL DISTURBANCE   Status: 

Chronic   Priority: Medium   Current Visit: No   


Qualifiers: 


   Dementia behavioral disturbance: without behavioral disturbance   Qualified 

Code(s): F01.50 - Vascular dementia without behavioral disturbance   





(21) Urinary retention


SNOMED Code(s): 739804676


   Code(s): R33.9 - RETENTION OF URINE, UNSPECIFIED   Status: Chronic   

Priority: Low   Current Visit: No   





(22) UTI (urinary tract infection)


SNOMED Code(s): 14369666


   Code(s): N39.0 - URINARY TRACT INFECTION, SITE NOT SPECIFIED   Status: Acute 

 Priority: High   Current Visit: Yes   


Qualifiers: 


   Urinary tract infection type: acute cystitis   Hematuria presence: with 

hematuria   Qualified Code(s): N30.01 - Acute cystitis with hematuria   





(23) Hyperglycemia due to type 2 diabetes mellitus


SNOMED Code(s): 542996023302736, 460955784955190


   Code(s): E11.65 - TYPE 2 DIABETES MELLITUS WITH HYPERGLYCEMIA   Status: Acute

  Priority: High   Current Visit: Yes   


Qualifiers: 


   Diabetes mellitus long term insulin use: with long term use   Qualified Cod

e(s): E11.65 - Type 2 diabetes mellitus with hyperglycemia; Z79.4 - Long term 

(current) use of insulin   





- Problem List Review


Problem List Initiated/Reviewed/Updated: Yes





- My Orders


Last 24 Hours: 


My Active Orders





04/27/21 09:00


Metoprolol Succinate [Toprol XL]   50 mg PO DAILY 





04/27/21 Lunch


Clear Liquid Diet [DIET] 





04/27/21 21:00


Gabapentin [Neurontin]   600 mg PO BID 





04/28/21 09:00


Furosemide [Lasix]   20 mg PO DAILY 





04/29/21 05:11


BASIC METABOLIC PANEL,BMP [CHEM] AM 


CBC WITH AUTO DIFF [HEME] AM 


MAGNESIUM [CHEM] AM 





04/30/21 05:11


BASIC METABOLIC PANEL,BMP [CHEM] AM 


CBC WITH AUTO DIFF [HEME] AM 


MAGNESIUM [CHEM] AM 














- Assessment


Assessment:: 


Assessment - day of admission 4/26/2021


* 89-year-old female presents via [facility] ambulance to ED for GI bleed


* Patient resides at [facility].


* Noted to have melena and coffee-ground emesis around 7 AM this morning.


* Patient is on Plavix and aspirin for prior CVA, TIA, and A. fib.


* Also carries history of: heart failure, HTN, anemia, recurrent pneumonia, 

  hypoxemia requiring home O2, chronic constipation, dysphagia, CKD stage IV, 

  urinary retention, chronic back pain, neuralgia, vascular dementia, 

  depression, type II DM.


* Twelve-lead EKG shows sinus rhythm with Q waves in III and aVF.  Low voltage 

  in precordial leads.  No acute change from 2/2021


* Labs obtained in ED:


   * WBC 14.03


   * Hemoglobin 9.7, hematocrit 31.4


   * Platelet 246,000


   * Neutrophils elevated at 85.4%


   * INR 1.02


   * APTT 21.0


   * Sodium 137


   * Potassium 5.6


   * Carbon dioxide 25


   * Anion gap 13.6


   * BUN 57, creatinine 1.7, GFR 28


   * Glucose 306


   * Calcium 8.9


   * Total bilirubin 0.6


   * AST 11, ALT 20, alkaline phosphatase 91


   * Troponin less than 0.017


   * Albumin 3.2


   * SARS Covid 2 RNA negative


   * Blood type O negative; gel antibody screen negative


* Given 4 mg IV push of Zofran and 40 mg IV push Protonix in ED


* Noted to have episode of coffee-ground emesis with blood streaks, 

  approximately 10 mL in ED.


* Admitted to medical surgical floor for management of GI bleed and 

  hyperkalemia.


* Labs on floor:


   * CRP less than 0.2


   * Magnesium 2.2


   * UA: Slightly cloudy with 2+ glucose, 1+ ketones, 3+ occult blood, positive 

     nitrate, 1+ leukocyte esterase, 10-20 RBC, 20-30 WBC, 5-10 squamous 

     epithelial cells, few amorphous sediment, many bacteria


   * Blood cultures ordered


* Started on 1 g Rocephin.





4/28/2021


* Hgb trend since admission: 9.7-->9.3-->8.6-->7.4-->8.8


* Patient remains pale


* 1 unit PRBC ordered overnight but patient refused transfusion


* Per overnight nurse patient reports she is ready to die and does not want 

  procedure 


* Potassium down to 4.3; BUN down from 57 to 45


* Labs otherwise stable


* Awaiting blood and urine cultures - >100CFUs of gram negative rods thus far


* Procalcitonin pending


* Occult stool positive


* Urine sodium 78, Urine creatinine 59.7.


* Iron panel: Iron 60, TIBC 255, percent saturation 24, transferrin 204


* Dr. Chapin, general surgeon, following 


* Increase sliding scale insulin to medium


* Clear liquid diet for now


* Continue Rocephin


* Continue current treatment plan





4/29/2021:


* Hgb trend since admission: 9.7-->9.3-->8.6-->7.4-->8.8-->8.3-->7.9-->7.3


* Patient agreed to blood transfusion today 


* Patient reports she feels ok. Is scared for blood transfusion


* One unit PRBCs ordered


* Procalcitonin 0.09


* Gram negative rods on urine cultures so far


* Blood cultures negative


* BUN down to 34, Creatinine 1.54, GFR 33


* Blood sugars low to mid 100's


* Re-check H/H tonight


* Continue current treatment plan





- Plan


Plan:: 


GI bleed


Anemia


Generalized weakness


Nursing home resident


* IVP Protonix BID


* General surgery consultation - Dr. Chapin contacted in ED


* Trend Hgb


* Transfuse 1 unit now


* Telemetry


* Hold ASA and Plavix for now


* PT/OT consultation


* CM/SW consultation


* Advance diet


* Recheck H/H tonight





UTI (urinary tract infection)


Urinary retention


Leukocytosis


* Urine culture - gram negative rods 


* Continue Rocephin 1gm daily


* Blood cultures X2 - negative so far 


* Daily labs including CRP


* Monitor for fever





Hyperkalemia, Resolved 


CKD (chronic kidney disease) stage 4, GFR 15-29 ml/min


* Telemetry


* Hold spironolactone


* Urine sodium/creatine WNL





CHF (congestive heart failure), NYHA class II


Afib


HTN (hypertension)


* No acute concerns


* Hold ASA and Plavix as above


* Telemetry


* No acute concerns


* Continue home HTN meds 





Chronic back pain


* Heating pad PRN


* Pain medications PRN


* No acute concerns





Chronic constipation


* Continue home meds


* Monitor BMs


* No acute concerns





DM2 (diabetes mellitus, type 2)


Hyperglycemia due to type 2 diabetes mellitus


* Blood glucose checks QID AC and Bedtime


* Medium dose SS insulin


* A1C 7.0 on 2/9/2021


* Consistent carb diet 





Chronic respiratory failure with hypoxia, on home oxygen therapy


Recurrent pneumonia


Dysphagia


* No acute concerns


* O2 with goal saturations >92%


* RT/RN to adjust as needed


* Home scheduled Duonebs


* Monitor swallowing - Per daughter patient was on thickened liquids awhile ago 

  but has been regular/thin most recently. 





History of TIA (transient ischemic attack)


History of CVA (cerebrovascular accident)


Vascular dementia


* No acute concerns


* Let me sleep protocol





Depression


* No acute concerns





Code Status: DNR/DNI (confirmed with patient and daughter on 4/26/2021)


PCP: Dr. Vaughan


VTE prophylaxis: SCDs and BETH hose. Pharmacological prophylaxis contraindicated 

due to GI bleed.


Social: Patient resides at [facility]


Disposition: Patient admitted to medical surgical floor for management of her 

acute GI bleed and hyperkalemia.  Found to have UTI as well.  Likely length of 

stay 3 to 4 days.

## 2021-04-29 NOTE — PCM.PN
- General Info


Date of Service: 04/29/21


Admission Dx/Problem (Free Text): 


GI Bleed 





Functional Status: Reports: Pain Controlled, Tolerating Diet, Ambulating 

(minimal at baseline), Urinating.  Denies: New Symptoms





- Review of Systems


General: Reports: Weakness, Fatigue, Malaise.  Denies: Fever, Chills


HEENT: Reports: No Symptoms.  Denies: Headaches, Sore Throat


Pulmonary: Reports: No Symptoms.  Denies: Shortness of Breath, Cough, Sputum, 

Hemoptysis


Cardiovascular: Reports: No Symptoms.  Denies: Chest Pain, Palpitations, Dyspnea

on Exertion, Edema


Gastrointestinal: Reports: No Symptoms.  Denies: Abdominal Pain, Constipation, 

Diarrhea, Nausea, Vomiting


Genitourinary: Reports: No Symptoms.  Denies: Pain


Musculoskeletal: Reports: No Symptoms


Skin: Reports: No Symptoms.  Denies: Cyanosis


Neurological: Reports: No Symptoms, Pre-Existing Deficit, Difficulty Walking, 

Gait Disturbance.  Denies: Confusion


Psychiatric: Reports: No Symptoms





- Patient Data


Vitals - Most Recent: 


                                Last Vital Signs











Temp  98.4 F   04/29/21 06:08


 


Pulse  80   04/29/21 06:08


 


Resp  14   04/29/21 06:08


 


BP  97/35 L  04/29/21 06:08


 


Pulse Ox  96   04/29/21 06:19











Weight - Most Recent: 157 lb 11.2 oz


I&O - Last 24 Hours: 


                                 Intake & Output











 04/28/21 04/29/21 04/29/21





 22:59 06:59 14:59


 


Intake Total 500 240 


 


Output Total 250 300 


 


Balance 250 -60 











Lab Results Last 24 Hours: 


                         Laboratory Results - last 24 hr











  04/26/21 04/28/21 04/28/21 Range/Units





  09:08 10:39 17:25 


 


WBC     (3.98-10.04)  K/mm3


 


RBC     (3.98-5.22)  M/mm3


 


Hgb     (11.2-15.7)  gm/dl


 


Hct     (34.1-44.9)  %


 


MCV     (79.4-94.8)  fl


 


MCH     (25.6-32.2)  pg


 


MCHC     (32.2-35.5)  g/dl


 


RDW Std Deviation     (36.4-46.3)  fL


 


Plt Count     (182-369)  K/mm3


 


MPV     (9.4-12.3)  fl


 


Neut % (Auto)     (34.0-71.1)  %


 


Lymph % (Auto)     (19.3-51.7)  %


 


Mono % (Auto)     (4.7-12.5)  %


 


Eos % (Auto)     (0.7-5.8)  


 


Baso % (Auto)     (0.1-1.2)  %


 


Neut # (Auto)     (1.56-6.13)  K/mm3


 


Lymph # (Auto)     (1.18-3.74)  K/mm3


 


Mono # (Auto)     (0.24-0.36)  K/mm3


 


Eos # (Auto)     (0.04-0.36)  K/mm3


 


Baso # (Auto)     (0.01-0.08)  K/mm3


 


Manual Slide Review     


 


Sodium     (136-145)  mEq/L


 


Potassium     (3.5-5.1)  mEq/L


 


Chloride     ()  mEq/L


 


Carbon Dioxide     (21-32)  mEq/L


 


Anion Gap     (5-15)  


 


BUN     (7-18)  mg/dL


 


Creatinine     (0.55-1.02)  mg/dL


 


Est Cr Clr Drug Dosing     mL/min


 


Estimated GFR (MDRD)     (>60)  mL/min


 


BUN/Creatinine Ratio     (14-18)  


 


Glucose     ()  mg/dL


 


POC Glucose   195 H  138 H  (70-99)  mg/dL


 


Calcium     (8.5-10.1)  mg/dL


 


Magnesium     (1.8-2.4)  mg/dl


 


Blood Type  O NEGATIVE    


 


Gel Antibody Screen  Negative    


 


Crossmatch  See Detail    














  04/28/21 04/29/21 04/29/21 Range/Units





  20:04 05:56 05:56 


 


WBC   7.69   (3.98-10.04)  K/mm3


 


RBC   2.91 L   (3.98-5.22)  M/mm3


 


Hgb  10.1 L D  8.9 L   (11.2-15.7)  gm/dl


 


Hct  32.7 L  28.1 L   (34.1-44.9)  %


 


MCV   96.6 H   (79.4-94.8)  fl


 


MCH   30.6   (25.6-32.2)  pg


 


MCHC   31.7 L   (32.2-35.5)  g/dl


 


RDW Std Deviation   50.3 H   (36.4-46.3)  fL


 


Plt Count   193   (182-369)  K/mm3


 


MPV   10.2   (9.4-12.3)  fl


 


Neut % (Auto)   68.4   (34.0-71.1)  %


 


Lymph % (Auto)   20.4   (19.3-51.7)  %


 


Mono % (Auto)   6.8   (4.7-12.5)  %


 


Eos % (Auto)   3.6   (0.7-5.8)  


 


Baso % (Auto)   0.3   (0.1-1.2)  %


 


Neut # (Auto)   5.26   (1.56-6.13)  K/mm3


 


Lymph # (Auto)   1.57   (1.18-3.74)  K/mm3


 


Mono # (Auto)   0.52 H   (0.24-0.36)  K/mm3


 


Eos # (Auto)   0.28   (0.04-0.36)  K/mm3


 


Baso # (Auto)   0.02   (0.01-0.08)  K/mm3


 


Manual Slide Review   Not Reportable   


 


Sodium    142  (136-145)  mEq/L


 


Potassium    4.1  (3.5-5.1)  mEq/L


 


Chloride    108 H  ()  mEq/L


 


Carbon Dioxide    25  (21-32)  mEq/L


 


Anion Gap    13.1  (5-15)  


 


BUN    29 H  (7-18)  mg/dL


 


Creatinine    1.4 H  (0.55-1.02)  mg/dL


 


Est Cr Clr Drug Dosing    19.57  mL/min


 


Estimated GFR (MDRD)    35  (>60)  mL/min


 


BUN/Creatinine Ratio    20.7 H  (14-18)  


 


Glucose    194 H  ()  mg/dL


 


POC Glucose     (70-99)  mg/dL


 


Calcium    8.3 L  (8.5-10.1)  mg/dL


 


Magnesium    2.1  (1.8-2.4)  mg/dl


 


Blood Type     


 


Gel Antibody Screen     


 


Crossmatch     











[place] Results Last 24 Hours: 


                                  Microbiology











 04/26/21 14:12 Aerobic Blood Culture - Preliminary





 Blood - Venous - Lab Draw    NO GROWTH AFTER 2 DAYS





 Anaerobic Blood Culture - Preliminary





    NO GROWTH AFTER 2 DAYS


 


 04/26/21 14:05 Aerobic Blood Culture - Preliminary





 Blood - Venous    NO GROWTH AFTER 2 DAYS





 Anaerobic Blood Culture - Preliminary





    NO GROWTH AFTER 2 DAYS


 


 04/26/21 09:24 Urine Culture - Final





 Urine, Clean Catch    Enterobacter Aerogenes











Med Orders - Current: 


                               Current Medications





Acetaminophen (Acetaminophen 325 Mg Tab)  650 mg PO Q4H PRN


   PRN Reason: Pain (Mild 1-3)/fever


Albuterol/Ipratropium (Albuterol/Ipratropium 3.0-0.5 Mg/3 Ml Neb Soln)  3 ml NEB

QID PRN


   PRN Reason: FOR SOB


Furosemide (Furosemide 20 Mg Tab)  20 mg PO DAILY [facility]ly [facility]


   Last Admin: 04/28/21 08:29 Dose:  20 mg


   Documented by: 


Gabapentin (Gabapentin 600 Mg Tab)  600 mg PO BID [facility]


   Last Admin: 04/28/21 20:02 Dose:  600 mg


   Documented by: 


Ceftriaxone Sodium 1 gm/ (Sodium Chloride)  100 mls @ 200 mls/hr IV Q24H [facility]


   Last Admin: 04/28/21 13:05 Dose:  200 mls/hr


   Documented by: 


Insulin Human Lispro (Insulin Lispro 100 Unit/Ml)  0 unit SUBCUT QIDACANDBED 

[facility]; Protocol


   Last Admin: 04/29/21 06:56 Dose:  2 unit


   Documented by: 


Metoprolol Succinate (Metoprolol Succinate 50 Mg Tab.Er)  50 mg PO DAILY [facility]


   Last Admin: 04/28/21 08:29 Dose:  Not Given


   Documented by: 


Ondansetron HCl (Ondansetron 4 Mg/2 Ml Sdv)  4 mg IV Q6H PRN


   PRN Reason: Nausea/Vomiting


Pantoprazole Sodium (Pantoprazole 40 Mg Vial)  40 mg IV Q12HR [facility]


   Last Admin: 04/28/21 20:02 Dose:  40 mg


   Documented by: 


Polyethylene Glycol (Polyethylene Glycol 3350 Powder 17 Gm Packet)  17 gm PO 

ASDIRECTED PRN


   PRN Reason: Constipation





Discontinued Medications





Albuterol (Albuterol 0.083% 2.5 Mg/3 Ml Neb Soln)  2.5 mg NEB ONETIME ONE


   Stop: 04/26/21 12:42


   Last Admin: 04/26/21 15:06 Dose:  Not Given


   Documented by: 


Albuterol/Ipratropium (Albuterol/Ipratropium 3.0-0.5 Mg/3 Ml Neb Soln)  3 ml NEB

QIDRT [facility]


   Last Admin: 04/28/21 06:31 Dose:  Not Given


   Documented by: 


Docusate Sodium (Docusate Sodium 100 Mg Cap)  100 mg PO BID [facility]


   Last Admin: 04/27/21 08:27 Dose:  Not Given


   Documented by: 


Furosemide (Furosemide 40 Mg Tab)  20 mg PO DAILY [facility]


   Last Admin: 04/27/21 08:26 Dose:  Not Given


   Documented by: 


Gabapentin (Gabapentin 300 Mg Cap)  600 mg PO BID [facility]


   Last Admin: 04/27/21 08:26 Dose:  Not Given


   Documented by: 


Dextrose/Lactated Ringer's (Dextrose 5%-Lactated Ringers)  1,000 mls @ 75 mls/hr

IV ASDIRECTED [facility]


   Stop: 04/27/21 01:49


Dextrose/Sodium Chloride (Dextrose 5%-Normal Saline)  1,000 mls @ 75 mls/hr IV 

ASDIRECTED [facility]


   Stop: 04/27/21 04:04


   Last Admin: 04/26/21 16:08 Dose:  75 mls/hr


   Documented by: 


Sodium Chloride (Normal Saline)  250 mls @ 50 mls/hr IV ASDIRECTED [facility]


   Last Admin: 04/28/21 09:15 Dose:  50 mls/hr


   Documented by: 


Insulin Human Regular (Insulin Regular, Human 100 Units/Ml 3 Ml Vial)  5 unit 

SUBCUT ONETIME ONE


   Stop: 04/26/21 13:01


   Last Admin: 04/26/21 14:09 Dose:  5 unit


   Documented by: 


Ondansetron HCl (Ondansetron 4 Mg/2 Ml Sdv)  4 mg IVPUSH ONETIME ONE


   Stop: 04/26/21 10:57


   Last Admin: 04/26/21 11:03 Dose:  4 mg


   Documented by: 


Pantoprazole Sodium (Pantoprazole 40 Mg Vial)  40 mg IVPUSH ONETIME ONE


   Stop: 04/26/21 10:57


   Last Admin: 04/26/21 11:03 Dose:  40 mg


   Documented by: 











- Exam


Quality Assessment: Supplemental Oxygen (2L), DVT Prophylaxis.  No: Urine 

Catheter


General: Alert, Oriented, Cooperative, No Acute Distress


HEENT: Pupils Equal, Pupils Reactive, Mucous Membr. Moist/Pink


Neck: Supple, Trachea Midline


Lungs: Clear to Auscultation, Normal Respiratory Effort


Cardiovascular: Regular Rate, Irregular Rhythm


GI/Abdominal Exam: Normal Bowel Sounds, Soft, Non-Tender, No Distention


 (Female) Exam: Deferred


Back Exam: Normal Inspection, Full Range of Motion


Extremities: Normal Inspection, Normal Range of Motion, Non-Tender, No Pedal 

Edema, Normal Capillary Refill


Skin: Warm, Dry, Intact


Neurological: No New Focal Deficit


Psy/Mental Status: Alert, Normal Affect, Normal Mood





- Patient Data


Lab Results Last 24 hrs: 


                         Laboratory Results - last 24 hr











  04/26/21 04/28/21 04/28/21 Range/Units





  09:08 10:39 17:25 


 


WBC     (3.98-10.04)  K/mm3


 


RBC     (3.98-5.22)  M/mm3


 


Hgb     (11.2-15.7)  gm/dl


 


Hct     (34.1-44.9)  %


 


MCV     (79.4-94.8)  fl


 


MCH     (25.6-32.2)  pg


 


MCHC     (32.2-35.5)  g/dl


 


RDW Std Deviation     (36.4-46.3)  fL


 


Plt Count     (182-369)  K/mm3


 


MPV     (9.4-12.3)  fl


 


Neut % (Auto)     (34.0-71.1)  %


 


Lymph % (Auto)     (19.3-51.7)  %


 


Mono % (Auto)     (4.7-12.5)  %


 


Eos % (Auto)     (0.7-5.8)  


 


Baso % (Auto)     (0.1-1.2)  %


 


Neut # (Auto)     (1.56-6.13)  K/mm3


 


Lymph # (Auto)     (1.18-3.74)  K/mm3


 


Mono # (Auto)     (0.24-0.36)  K/mm3


 


Eos # (Auto)     (0.04-0.36)  K/mm3


 


Baso # (Auto)     (0.01-0.08)  K/mm3


 


Manual Slide Review     


 


Sodium     (136-145)  mEq/L


 


Potassium     (3.5-5.1)  mEq/L


 


Chloride     ()  mEq/L


 


Carbon Dioxide     (21-32)  mEq/L


 


Anion Gap     (5-15)  


 


BUN     (7-18)  mg/dL


 


Creatinine     (0.55-1.02)  mg/dL


 


Est Cr Clr Drug Dosing     mL/min


 


Estimated GFR (MDRD)     (>60)  mL/min


 


BUN/Creatinine Ratio     (14-18)  


 


Glucose     ()  mg/dL


 


POC Glucose   195 H  138 H  (70-99)  mg/dL


 


Calcium     (8.5-10.1)  mg/dL


 


Magnesium     (1.8-2.4)  mg/dl


 


Blood Type  O NEGATIVE    


 


Gel Antibody Screen  Negative    


 


Crossmatch  See Detail    














  04/28/21 04/29/21 04/29/21 Range/Units





  20:04 05:56 05:56 


 


WBC   7.69   (3.98-10.04)  K/mm3


 


RBC   2.91 L   (3.98-5.22)  M/mm3


 


Hgb  10.1 L D  8.9 L   (11.2-15.7)  gm/dl


 


Hct  32.7 L  28.1 L   (34.1-44.9)  %


 


MCV   96.6 H   (79.4-94.8)  fl


 


MCH   30.6   (25.6-32.2)  pg


 


MCHC   31.7 L   (32.2-35.5)  g/dl


 


RDW Std Deviation   50.3 H   (36.4-46.3)  fL


 


Plt Count   193   (182-369)  K/mm3


 


MPV   10.2   (9.4-12.3)  fl


 


Neut % (Auto)   68.4   (34.0-71.1)  %


 


Lymph % (Auto)   20.4   (19.3-51.7)  %


 


Mono % (Auto)   6.8   (4.7-12.5)  %


 


Eos % (Auto)   3.6   (0.7-5.8)  


 


Baso % (Auto)   0.3   (0.1-1.2)  %


 


Neut # (Auto)   5.26   (1.56-6.13)  K/mm3


 


Lymph # (Auto)   1.57   (1.18-3.74)  K/mm3


 


Mono # (Auto)   0.52 H   (0.24-0.36)  K/mm3


 


Eos # (Auto)   0.28   (0.04-0.36)  K/mm3


 


Baso # (Auto)   0.02   (0.01-0.08)  K/mm3


 


Manual Slide Review   Not Reportable   


 


Sodium    142  (136-145)  mEq/L


 


Potassium    4.1  (3.5-5.1)  mEq/L


 


Chloride    108 H  ()  mEq/L


 


Carbon Dioxide    25  (21-32)  mEq/L


 


Anion Gap    13.1  (5-15)  


 


BUN    29 H  (7-18)  mg/dL


 


Creatinine    1.4 H  (0.55-1.02)  mg/dL


 


Est Cr Clr Drug Dosing    19.57  mL/min


 


Estimated GFR (MDRD)    35  (>60)  mL/min


 


BUN/Creatinine Ratio    20.7 H  (14-18)  


 


Glucose    194 H  ()  mg/dL


 


POC Glucose     (70-99)  mg/dL


 


Calcium    8.3 L  (8.5-10.1)  mg/dL


 


Magnesium    2.1  (1.8-2.4)  mg/dl


 


Blood Type     


 


Gel Antibody Screen     


 


Crossmatch     











Result Diagrams: 


                                 04/29/21 05:56





                                 04/29/21 05:56


[place] Results Last 24 hrs: 


                                  Microbiology











 04/26/21 14:12 Aerobic Blood Culture - Preliminary





 Blood - Venous - Lab Draw    NO GROWTH AFTER 2 DAYS





 Anaerobic Blood Culture - Preliminary





    NO GROWTH AFTER 2 DAYS


 


 04/26/21 14:05 Aerobic Blood Culture - Preliminary





 Blood - Venous    NO GROWTH AFTER 2 DAYS





 Anaerobic Blood Culture - Preliminary





    NO GROWTH AFTER 2 DAYS


 


 04/26/21 09:24 Urine Culture - Final





 Urine, Clean Catch    Enterobacter Aerogenes














Sepsis Event Note





- Evaluation


Sepsis Screening Result: No Definite Risk





- Focused Exam


Vital Signs: 


                                   Vital Signs











  Temp Pulse Resp BP Pulse Ox Pulse Ox


 


 04/29/21 06:19       96


 


 04/29/21 06:08  98.4 F  80  14  97/35 L  92 L 


 


 04/29/21 01:00       95


 


 04/29/21 00:47  97.9 F  72  14  127/96 H  97 














- Problem List & Annotations


(1) GI bleed


SNOMED Code(s): 85422940


   Code(s): K92.2 - GASTROINTESTINAL HEMORRHAGE, UNSPECIFIED   Status: Acute   

Priority: High   Current Visit: Yes   


Qualifiers: 


   GI bleed type/associated pathology: melena   Qualified Code(s): K92.1 - 

Melena   





(2) CHF (congestive heart failure), NYHA class II


SNOMED Code(s): 499652501, 081013000


   Code(s): I50.9 - HEART FAILURE, UNSPECIFIED   Status: Chronic   Priority: 

Medium   Current Visit: No   


Qualifiers: 


   Congestive heart failure type: combined   Congestive heart failure 

chronicity: acute on chronic   Qualified Code(s): I50.43 - Acute on chronic 

combined systolic (congestive) and diastolic (congestive) heart failure   





(3) Leukocytosis


SNOMED Code(s): 921012304, 554427096


   Code(s): D72.829 - ELEVATED WHITE BLOOD CELL COUNT, UNSPECIFIED   Status: 

Acute   Priority: High   Current Visit: Yes   


Qualifiers: 


   Leukocytosis type: unspecified   Qualified Code(s): D72.829 - Elevated white 

blood cell count, unspecified   





(4) Afib


SNOMED Code(s): 15580856


   Code(s): I48.91 - UNSPECIFIED ATRIAL FIBRILLATION   Status: Chronic   

Priority: Medium   Current Visit: No   


Qualifiers: 


   Atrial fibrillation type: paroxysmal   Qualified Code(s): I48.0 - Paroxysmal 

atrial fibrillation   





(5) Anemia


SNOMED Code(s): 891463382


   Code(s): D64.9 - ANEMIA, UNSPECIFIED   Status: Acute   Priority: Medium   

Current Visit: Yes   


Qualifiers: 


   Anemia type: due to chronic kidney disease   Chronic kidney disease stage: s

tage 4 (severe)   Qualified Code(s): N18.4 - Chronic kidney disease, stage 4 

(severe); D63.1 - Anemia in chronic kidney disease   





(6) Chronic back pain


SNOMED Code(s): 269942405


   Code(s): M54.9 - DORSALGIA, UNSPECIFIED; G89.29 - OTHER CHRONIC PAIN   

Status: Chronic   Priority: Low   Current Visit: No   


Qualifiers: 


   Back pain location: back pain in unspecified location   Back pain laterality:

unspecified   Qualified Code(s): M54.9 - Dorsalgia, unspecified; G89.29 - Other 

chronic pain   





(7) Chronic constipation


SNOMED Code(s): 260344921


   Code(s): K59.09 - OTHER CONSTIPATION   Status: Chronic   Priority: Low   

Current Visit: No   





(8) DM2 (diabetes mellitus, type 2)


SNOMED Code(s): 75325394


   Code(s): E11.9 - TYPE 2 DIABETES MELLITUS WITHOUT COMPLICATIONS   Status: 

Chronic   Priority: Medium   Current Visit: Yes   


Qualifiers: 


   Diabetes mellitus long term insulin use: unspecified long term insulin use 

status   Diabetes mellitus complication status: with other specified 

complication   Qualified Code(s): E11.69 - Type 2 diabetes mellitus with other 

specified complication   





(9) Depression


SNOMED Code(s): 93969745


   Code(s): F32.9 - MAJOR DEPRESSIVE DISORDER, SINGLE EPISODE, UNSPECIFIED   

Status: Chronic   Priority: Low   Current Visit: No   


Qualifiers: 


   Depression Type: other depression   Qualified Code(s): F32.89 - Other 

specified depressive episodes   





(10) HTN (hypertension)


SNOMED Code(s): 16405162


   Code(s): I10 - ESSENTIAL (PRIMARY) HYPERTENSION   Status: Chronic   Priority:

Medium   Current Visit: No   


Qualifiers: 


   Hypertension type: unspecified   Qualified Code(s): I10 - Essential (primary)

hypertension   





(11) History of TIA (transient ischemic attack)


SNOMED Code(s): 245871801


   Code(s): Z86.73 - PRSNL HX OF TIA (TIA), AND CEREB INFRC W/O RESID DEFICITS  

Status: Chronic   Priority: Low   Current Visit: No   





(12) Nursing home resident


SNOMED Code(s): 164313780


   Code(s): Z59.3 - PROBLEMS RELATED TO LIVING IN RESIDENTIAL INSTITUTION   

Status: Chronic   Priority: Low   Current Visit: No   





(13) Generalized weakness


SNOMED Code(s): 04185512


   Code(s): R53.1 - WEAKNESS   Status: Acute   Priority: High   Current Visit: 

Yes   





(14) Hyperkalemia


SNOMED Code(s): 99626387


   Code(s): E87.5 - HYPERKALEMIA   Status: Acute   Priority: High   Current 

Visit: Yes   





(15) CKD (chronic kidney disease) stage 4, GFR 15-29 ml/min


SNOMED Code(s): 727391012


   Code(s): N18.4 - CHRONIC KIDNEY DISEASE, STAGE 4 (SEVERE)   Status: Chronic  

Priority: Medium   Current Visit: Yes   





(16) Dysphagia


SNOMED Code(s): 50621630, 525811290


   Code(s): R13.10 - DYSPHAGIA, UNSPECIFIED   Status: Chronic   Priority: Low   

Current Visit: No   


Qualifiers: 


   Dysphagia type: unspecified   Qualified Code(s): R13.10 - Dysphagia, 

unspecified   





(17) Recurrent pneumonia


SNOMED Code(s): 575367169


   Code(s): J18.9 - PNEUMONIA, UNSPECIFIED ORGANISM   Status: Chronic   

Priority: Low   Current Visit: No   





(18) Chronic respiratory failure with hypoxia, on home oxygen therapy


SNOMED Code(s): 172723071


   Code(s): J96.11 - CHRONIC RESPIRATORY FAILURE WITH HYPOXIA; Z99.81 - 

DEPENDENCE ON SUPPLEMENTAL OXYGEN   Status: Chronic   Priority: Medium   Current

Visit: Yes   





(19) History of CVA (cerebrovascular accident)


SNOMED Code(s): 752521858


   Code(s): Z86.73 - PRSNL HX OF TIA (TIA), AND CEREB INFRC W/O RESID DEFICITS  

Status: Chronic   Priority: Medium   Current Visit: No   





(20) Vascular dementia


SNOMED Code(s): 686443878


   Code(s): F01.50 - VASCULAR DEMENTIA WITHOUT BEHAVIORAL DISTURBANCE   Status: 

Chronic   Priority: Medium   Current Visit: No   


Qualifiers: 


   Dementia behavioral disturbance: without behavioral disturbance   Qualified 

Code(s): F01.50 - Vascular dementia without behavioral disturbance   





(21) Urinary retention


SNOMED Code(s): 256154926


   Code(s): R33.9 - RETENTION OF URINE, UNSPECIFIED   Status: Chronic   

Priority: Low   Current Visit: No   





(22) UTI (urinary tract infection)


SNOMED Code(s): 66091085


   Code(s): N39.0 - URINARY TRACT INFECTION, SITE NOT SPECIFIED   Status: Acute 

 Priority: High   Current Visit: Yes   


Qualifiers: 


   Urinary tract infection type: acute cystitis   Hematuria presence: with 

hematuria   Qualified Code(s): N30.01 - Acute cystitis with hematuria   





(23) Hyperglycemia due to type 2 diabetes mellitus


SNOMED Code(s): 440793481162969, 663842493673520


   Code(s): E11.65 - TYPE 2 DIABETES MELLITUS WITH HYPERGLYCEMIA   Status: Acute

  Priority: High   Current Visit: Yes   


Qualifiers: 


   Diabetes mellitus long term insulin use: with long term use   Qualified 

Code(s): E11.65 - Type 2 diabetes mellitus with hyperglycemia; Z79.4 - Long term

(current) use of insulin   





- Problem List Review


Problem List Initiated/Reviewed/Updated: Yes





- My Orders


Last 24 Hours: 


My Active Orders





04/28/21 07:28


Transfuse Red Blood Cells [COMM] Routine 





04/28/21 09:00


Furosemide [Lasix]   20 mg PO DAILY 





04/28/21 09:41


Albuterol/Ipratropium [DuoNeb 3.0-0.5 MG/3 ML]   3 ml NEB QID PRN 





04/29/21 12:00


HEMOGLOBIN/HEMATOCRIT,HH [HEME] Routine 





04/30/21 05:11


BASIC METABOLIC PANEL,BMP [CHEM] AM 


CBC WITH AUTO DIFF [HEME] AM 


MAGNESIUM [CHEM] AM 














- Assessment


Assessment:: 


Assessment - day of admission 4/26/2021


* 89-year-old female presents via [facility] ambulance to ED for GI bleed


* Patient resides at [facility].


* Noted to have melena and coffee-ground emesis around 7 AM this morning.


* Patient is on Plavix and aspirin for prior CVA, TIA, and A. fib.


* Also carries history of: heart failure, HTN, anemia, recurrent pneumonia, 

  hypoxemia requiring home O2, chronic constipation, dysphagia, CKD stage IV, 

  urinary retention, chronic back pain, neuralgia, vascular dementia, 

  depression, type II DM.


* Twelve-lead EKG shows sinus rhythm with Q waves in III and aVF.  Low voltage 

  in precordial leads.  No acute change from 2/2021


* Labs obtained in ED:


   * WBC 14.03


   * Hemoglobin 9.7, hematocrit 31.4


   * Platelet 246,000


   * Neutrophils elevated at 85.4%


   * INR 1.02


   * APTT 21.0


   * Sodium 137


   * Potassium 5.6


   * Carbon dioxide 25


   * Anion gap 13.6


   * BUN 57, creatinine 1.7, GFR 28


   * Glucose 306


   * Calcium 8.9


   * Total bilirubin 0.6


   * AST 11, ALT 20, alkaline phosphatase 91


   * Troponin less than 0.017


   * Albumin 3.2


   * SARS Covid 2 RNA negative


   * Blood type O negative; gel antibody screen negative


* Given 4 mg IV push of Zofran and 40 mg IV push Protonix in ED


* Noted to have episode of coffee-ground emesis with blood streaks, 

  approximately 10 mL in ED.


* Admitted to medical surgical floor for management of GI bleed and 

  hyperkalemia.


* Labs on floor:


   * CRP less than 0.2


   * Magnesium 2.2


   * UA: Slightly cloudy with 2+ glucose, 1+ ketones, 3+ occult blood, positive 

     nitrate, 1+ leukocyte esterase, 10-20 RBC, 20-30 WBC, 5-10 squamous 

     epithelial cells, few amorphous sediment, many bacteria


   * Blood cultures ordered


* Started on 1 g Rocephin.





4/27/2021


* Hgb trend since admission: 9.7-->9.3-->8.6-->7.4-->8.8


* Patient remains pale


* 1 unit PRBC ordered overnight but patient refused transfusion


* Per overnight nurse patient reports she is ready to die and does not want 

  procedure 


* Potassium down to 4.3; BUN down from 57 to 45


* Labs otherwise stable


* Awaiting blood and urine cultures - >100CFUs of gram negative rods thus far


* Procalcitonin pending


* Occult stool positive


* Urine sodium 78, Urine creatinine 59.7.


* Iron panel: Iron 60, TIBC 255, percent saturation 24, transferrin 204


* Dr. Chapin, general surgeon, following 


* Increase sliding scale insulin to medium


* Clear liquid diet for now


* Continue Rocephin


* Continue current treatment plan





4/28/2021:


* Hgb trend since admission: 9.7-->9.3-->8.6-->7.4-->8.8-->8.3-->7.9-->7.3


* Patient agreed to blood transfusion today 


* Patient reports she feels ok. Is scared for blood transfusion


* One unit PRBCs ordered


* Procalcitonin 0.09


* Gram negative rods on urine cultures so far


* Blood cultures negative


* BUN down to 34, Creatinine 1.54, GFR 33


* Blood sugars low to mid 100's


* Re-check H/H tonight


* Continue current treatment plan





4/29/2021


* Hgb trend since admission: 

  9.7-->9.3-->8.6-->7.4-->8.8-->8.3-->7.9-->7.3-->10.1(s/p 1 unit)-->8.9


* Higher than expected response to 1 unit PRBC. 8.9gm seems more reasonable.


* Re-check H/H today at noon


* Enterobacter Aerogenes growing in urine.


* Labs grossly stable


* BUN 29, creatinine 1.4, GFR 35


* Glucose 138-195


* Continue current treatment plan


* Likely discharge tomorrow pending continued stability.





- Plan


Plan:: 


GI bleed


Anemia


Generalized weakness


Nursing home resident


* IVP Protonix BID


* General surgery consultation - Dr. Chapin contacted in ED


* Trend Hgb


* Transfuse 1 unit now


* Telemetry


* Hold ASA and Plavix for now


* PT/OT consultation


* CM/SW consultation


* Advance diet


* Recheck H/H tonight





UTI (urinary tract infection)


Urinary retention


Leukocytosis


* Urine culture - gram negative rods 


* Continue Rocephin 1gm daily


* Blood cultures X2 - negative so far 


* Daily labs including CRP


* Monitor for fever





Hyperkalemia, Resolved 


CKD (chronic kidney disease) stage 4, GFR 15-29 ml/min


* Telemetry


* Hold spironolactone


* Urine sodium/creatine WNL





CHF (congestive heart failure), NYHA class II


Afib


HTN (hypertension)


* No acute concerns


* Hold ASA and Plavix as above


* Telemetry


* No acute concerns


* Continue home HTN meds 





Chronic back pain


* Heating pad PRN


* Pain medications PRN


* No acute concerns





Chronic constipation


* Continue home meds


* Monitor BMs


* No acute concerns





DM2 (diabetes mellitus, type 2)


Hyperglycemia due to type 2 diabetes mellitus


* Blood glucose checks QID AC and Bedtime


* Medium dose SS insulin


* A1C 7.0 on 2/9/2021


* Consistent carb diet 





Chronic respiratory failure with hypoxia, on home oxygen therapy


Recurrent pneumonia


Dysphagia


* No acute concerns


* O2 with goal saturations >92%


* RT/RN to adjust as needed


* Home scheduled Duonebs


* Monitor swallowing - Per daughter patient was on thickened liquids awhile ago 

  but has been regular/thin most recently. 





History of TIA (transient ischemic attack)


History of CVA (cerebrovascular accident)


Vascular dementia


* No acute concerns


* Let me sleep protocol





Depression


* No acute concerns





Code Status: DNR/DNI (confirmed with patient and daughter on 4/26/2021)


PCP: Dr. Vaughan


VTE prophylaxis: SCDs and BETH hose. Pharmacological prophylaxis contraindicated 

due to GI bleed.


Social: Patient resides at [facility]


Disposition: Patient admitted to medical surgical floor for management of her 

acute GI bleed and hyperkalemia.  Found to have UTI as well.  





LOS >96hrs due to need for blood transfusion and follow-up hgb trend.

## 2021-04-30 NOTE — PCM.DCSUM1
**Discharge Summary





- Hospital Course


HPI Initial Comments: 


This is an 89-year-old female who presents to our ED on 4/26/2021 with concern 

over GI bleed.  Patient resides at [facility] and reportedly had 

melena and coffee-ground emesis starting at around 7 AM today.  Patient 

presented via [facility].  Patient does have a history of CVA, TIA, and

A. fib and is on Plavix and a baby aspirin for this.  No history of any prior GI

bleeds.  Patient has baseline hypoxemia requiring oxygen and is on 2 L in the 

ED.





In the emergency room temp was 36.8 Celsius.  Pulse 98.  Respirations 16.  Blood

pressure 141/62.  Pulse ox 98%.  Twelve-lead EKG is obtained showing a sinus 

rhythm with Q waves in III and aVF.  There is low voltage noted in the 

precordial leads.  Compared with prior twelve-lead EKG from February of this 

year with no acute changes noted.  Labs are obtained showing an elevated WBC at 

14.03.  Hemoglobin is 9.7.  Platelet 246,000.  Neutrophils are elevated 85.4%.  

INR is 1.02.  aPTT is 21.0.  Sodium is 137.  Potassium is high at 5.6.  Chloride

104.  Carbon oxide 25.  Anion gap 13.6.  BUN is quite high at 57.  Creatinine 

1.7.  GFR 28.  Glucose is 306.  Calcium 8.9.  Total bilirubin 0.6.  AST is 11, 

ALT 20, alkaline phosphatase 91.  Troponin less than 0.017.  Protein 6.7.  

Albumin is low at 3.2.  SARS Covid 2 RNA is negative.  Blood type is O- and 

antibody screen is negative.  While in the ED she is noted to have an episode of

emesis with approximately 10 mL of coffee-ground emesis with streaks of blood.  

She is given Protonix and Zofran via IV.





She carries a history of A. fib, heart failure, hypertension, anemia, recurrent 

pneumonia, hypoxemia requiring O2, chronic constipation, dysphagia, chronic 

renal insufficiency, urinary retention, chronic back pain, TIA, cerebral 

infarct, neuralgia, vascular dementia, depression, type II DM.  She is not a 

smoker.  Her PCP is Dr. Kauffman.  She is subsequently admitted to the medical 

floor for management of her GI bleed.  She is a DNR/DNI.  Her daughter is 

present at bedside.





Diagnosis: Stroke: No





- Discharge Data


Discharge Date: 04/30/21 (Admit date: 4/26/2021)


Discharge Disposition: DC/Tfer to SNF 03


Condition: Good





- Referral to Home Health


Primary Care Physician: 


Ru Vaughan MD








- Discharge Diagnosis/Problem(s)


(1) GI bleed


SNOMED Code(s): 65618222


   ICD Code: K92.2 - GASTROINTESTINAL HEMORRHAGE, UNSPECIFIED   Status: Acute   

Priority: High   Current Visit: Yes   


Qualifiers: 


   GI bleed type/associated pathology: melena   Qualified Code(s): K92.1 - 

Melena   





(2) CHF (congestive heart failure), NYHA class II


SNOMED Code(s): 882646678, 730251941


   ICD Code: I50.9 - HEART FAILURE, UNSPECIFIED   Status: Chronic   Priority: 

Medium   Current Visit: No   


Qualifiers: 


   Congestive heart failure type: combined   Congestive heart failure 

chronicity: acute on chronic   Qualified Code(s): I50.43 - Acute on chronic 

combined systolic (congestive) and diastolic (congestive) heart failure   





(3) Leukocytosis


SNOMED Code(s): 745193133, 936674688


   ICD Code: D72.829 - ELEVATED WHITE BLOOD CELL COUNT, UNSPECIFIED   Status: 

Acute   Priority: High   Current Visit: Yes   


Qualifiers: 


   Leukocytosis type: unspecified   Qualified Code(s): D72.829 - Elevated white 

blood cell count, unspecified   





(4) Afib


SNOMED Code(s): 99953352


   ICD Code: I48.91 - UNSPECIFIED ATRIAL FIBRILLATION   Status: Chronic   

Priority: Medium   Current Visit: No   


Qualifiers: 


   Atrial fibrillation type: paroxysmal   Qualified Code(s): I48.0 - Paroxysmal 

atrial fibrillation   





(5) Anemia


SNOMED Code(s): 300024054


   ICD Code: D64.9 - ANEMIA, UNSPECIFIED   Status: Acute   Priority: Medium   

Current Visit: Yes   


Qualifiers: 


   Anemia type: due to chronic kidney disease   Chronic kidney disease stage: 

stage 4 (severe)   Qualified Code(s): N18.4 - Chronic kidney disease, stage 4 

(severe); D63.1 - Anemia in chronic kidney disease   





(6) Chronic back pain


SNOMED Code(s): 088987389


   ICD Code: M54.9 - DORSALGIA, UNSPECIFIED; G89.29 - OTHER CHRONIC PAIN   

Status: Chronic   Priority: Low   Current Visit: No   


Qualifiers: 


   Back pain location: back pain in unspecified location   Back pain laterality:

unspecified   Qualified Code(s): M54.9 - Dorsalgia, unspecified; G89.29 - Other 

chronic pain   





(7) Chronic constipation


SNOMED Code(s): 015793386


   ICD Code: K59.09 - OTHER CONSTIPATION   Status: Chronic   Priority: Low   

Current Visit: No   





(8) DM2 (diabetes mellitus, type 2)


SNOMED Code(s): 86699768


   ICD Code: E11.9 - TYPE 2 DIABETES MELLITUS WITHOUT COMPLICATIONS   Status: 

Chronic   Priority: Medium   Current Visit: Yes   


Qualifiers: 


   Diabetes mellitus long term insulin use: unspecified long term insulin use 

status   Diabetes mellitus complication status: with other specified complica

tion   Qualified Code(s): E11.69 - Type 2 diabetes mellitus with other specified

complication   





(9) Depression


SNOMED Code(s): 01295509


   ICD Code: F32.9 - MAJOR DEPRESSIVE DISORDER, SINGLE EPISODE, UNSPECIFIED   

Status: Chronic   Priority: Low   Current Visit: No   


Qualifiers: 


   Depression Type: other depression   Qualified Code(s): F32.89 - Other 

specified depressive episodes   





(10) HTN (hypertension)


SNOMED Code(s): 69577039


   ICD Code: I10 - ESSENTIAL (PRIMARY) HYPERTENSION   Status: Chronic   

Priority: Medium   Current Visit: No   


Qualifiers: 


   Hypertension type: unspecified   Qualified Code(s): I10 - Essential (primary)

hypertension   





(11) History of TIA (transient ischemic attack)


SNOMED Code(s): 687515744


   ICD Code: Z86.73 - PRSNL HX OF TIA (TIA), AND CEREB INFRC W/O RESID DEFICITS 

 Status: Chronic   Priority: Low   Current Visit: No   





(12) Nursing home resident


SNOMED Code(s): 322393257


   ICD Code: Z59.3 - PROBLEMS RELATED TO LIVING IN RESIDENTIAL INSTITUTION   

Status: Chronic   Priority: Low   Current Visit: No   





(13) Generalized weakness


SNOMED Code(s): 23916893


   ICD Code: R53.1 - WEAKNESS   Status: Acute   Priority: High   Current Visit: 

Yes   





(14) Hyperkalemia


SNOMED Code(s): 33820507


   ICD Code: E87.5 - HYPERKALEMIA   Status: Acute   Priority: High   Current 

Visit: Yes   





(15) CKD (chronic kidney disease) stage 4, GFR 15-29 ml/min


SNOMED Code(s): 028309584


   ICD Code: N18.4 - CHRONIC KIDNEY DISEASE, STAGE 4 (SEVERE)   Status: Chronic 

 Priority: Medium   Current Visit: Yes   





(16) Dysphagia


SNOMED Code(s): 17200756, 157641330


   ICD Code: R13.10 - DYSPHAGIA, UNSPECIFIED   Status: Chronic   Priority: Low  

Current Visit: No   


Qualifiers: 


   Dysphagia type: unspecified   Qualified Code(s): R13.10 - Dysphagia, 

unspecified   





(17) Recurrent pneumonia


SNOMED Code(s): 790147010


   ICD Code: J18.9 - PNEUMONIA, UNSPECIFIED ORGANISM   Status: Chronic   

Priority: Low   Current Visit: No   





(18) Chronic respiratory failure with hypoxia, on home oxygen therapy


SNOMED Code(s): 913290225


   ICD Code: J96.11 - CHRONIC RESPIRATORY FAILURE WITH HYPOXIA; Z99.81 - 

DEPENDENCE ON SUPPLEMENTAL OXYGEN   Status: Chronic   Priority: Medium   Current

Visit: Yes   





(19) History of CVA (cerebrovascular accident)


SNOMED Code(s): 967621065


   ICD Code: Z86.73 - PRSNL HX OF TIA (TIA), AND CEREB INFRC W/O RESID DEFICITS 

 Status: Chronic   Priority: Medium   Current Visit: No   





(20) Vascular dementia


SNOMED Code(s): 532177001


   ICD Code: F01.50 - VASCULAR DEMENTIA WITHOUT BEHAVIORAL DISTURBANCE   Status:

Chronic   Priority: Medium   Current Visit: No   


Qualifiers: 


   Dementia behavioral disturbance: without behavioral disturbance   Qualified 

Code(s): F01.50 - Vascular dementia without behavioral disturbance   





(21) Urinary retention


SNOMED Code(s): 503179672


   ICD Code: R33.9 - RETENTION OF URINE, UNSPECIFIED   Status: Chronic   

Priority: Low   Current Visit: No   





(22) UTI (urinary tract infection)


SNOMED Code(s): 70543024


   ICD Code: N39.0 - URINARY TRACT INFECTION, SITE NOT SPECIFIED   Status: Acute

  Priority: High   Current Visit: Yes   


Qualifiers: 


   Urinary tract infection type: acute cystitis   Hematuria presence: with 

hematuria   Qualified Code(s): N30.01 - Acute cystitis with hematuria   





(23) Hyperglycemia due to type 2 diabetes mellitus


SNOMED Code(s): 687438752078217, 960165611523395


   ICD Code: E11.65 - TYPE 2 DIABETES MELLITUS WITH HYPERGLYCEMIA   Status: 

Acute   Priority: High   Current Visit: Yes   


Qualifiers: 


   Diabetes mellitus long term insulin use: with long term use   Qualified 

Code(s): E11.65 - Type 2 diabetes mellitus with hyperglycemia; Z79.4 - Long term

(current) use of insulin   





- Patient Summary/Data


Consults: 


                                  Consultations





04/26/21 12:08


Consult to Case Management/ [CONS] Routine 


Consult to Physician [CONS] Routine 


Consult to Spiritual Care [CONS] Routine 


OT Evaluation and Treatment [CONS] Routine 


PT Evaluation and Treatment [CONS] Routine 


Respiratory Care Assess and Treatment [CONS] Routine 











Labs Pending at D/C: 


None 





Recommended Follow-up Testing/Procedures: 


Follow-up with PCP next week


   -Repeat CBC ordered for 5/4/2021


   -Recommend repeat CBC, CMP, and magnesium at follow-up.


   -Patients Plavix and aspirin were held due to GI bleed.  PCP to determine 

if/when patient resumes meds.





Consider follow-up with general surgery if symptoms do not resolve


   -Patient saw Dr. Chapin, general surgeon while here.  





Hospital Course: 


This is a 89-year-old female who presents to ED on 4/26/2021 via [facility] 

[facility] from [facility] for GI bleed.  Per nursing home notes the noted 

melena and coffee-ground emesis which began earlier in the day.  She is on 

Plavix and aspirin for prior CVA, TIA, and A. fib. Also carries history of: 

heart failure, HTN, anemia, recurrent pneumonia, hypoxemia requiring home O2, 

chronic constipation, dysphagia, CKD stage IV, urinary retention, chronic back 

pain, neuralgia, vascular dementia, depression, type II DM.  In the ED she was 

noted to have a hemoglobin of 9.7 hematocrit of 31.4.  White count was elevated 

at 14.03.  She did have an approximately 10 mL emesis in the ED which was noted 

to have coffee grounds with streaks of blood.  UA was obtained which was 

suggestive of a UTI and 1 g Rocephin was started.  Hemoglobin was trended and 

Dr. Chapin, general surgeon was brought on board.  Initially patient refused 

any types of transfusion or thoughts of endoscopy.  After much discussion she 

did agree to 1 unit.  Was given after her hemoglobin was noted to be 7.3.  After

 this hemoglobin increased to 10.1 which was likely falsely elevated.  This did 

respond more appropriately to 8.9 on following recheck.  Occult stool was 

positive and iron panel was noted to be within normal limits. Hgb trend since 

admission: 9.7-->9.3-->8.6-->7.4-->8.8-->8.3-->7.9-->7.3-->10.1(s/p 1 

unit)-->8.9-->10.1-->9.2. She was hyperkalemic on admission with a potassium of 

5.6.  There were no EKG changes noted.  She was given insulin, as her glucose 

was high already and an albuterol nebulizer with good response.  Potassium did 

come down and stay down after that.  Procalcitonin on admission was 0.09.  Urine

 culture grew out greater than 100 colony-forming units of Enterobacter 

aerogenes susceptible to Rocephin.  She completed IV antibiotics while here.  As

 her hemoglobin has remained stable and patient is refusing endoscopy it is felt

 she can be safely discharged.  Repeat CBC is ordered on 5/4/2021 with results 

to her primary care provider.  She should follow-up with her primary care 

provider next week.  Recommend repeat CBC, CMP, and magnesium at that visit.  

Her aspirin and Plavix have been held.  We did discuss risk of stroke due to 

this.  Primary care provider can determine when to reintroduce these meds.  

Recommend outpatient general surgery consultation should bleeding continue.  

Anticipate mild continued melanotic stools as patient is only now starting to 

reintroduce food.  She was receiving 40 mg IV push Protonix twice daily and this

 will be continued as 40 mg daily p.o. Protonix at discharge.  Home medications 

were otherwise continued with the exception of Plavix and aspirin as mentioned 

prior.  She discharged back to [facility] today.  Is on her baseline 2 L of 

oxygen.





- Patient Instructions


Diet: Diabetic Diet


Activity: As Tolerated


Driving: Do Not Drive


Showering/Bathing: May Shower


Notify Provider of: Fever, Increased Pain, Nausea and/or Vomiting


Other/Special Instructions: Follow-up with primary care provider next week, 

sooner if needed.  Recheck CBC (blood draw) on 5/4/2021 with results to Dr. Vaughan.  Consider follow-up with outpatient surgery. You saw Dr. Chapin, 

our surgeon here at [facility] while here.  Resume home medications as 

directed.  Continue to hold your aspirin and plavix. You can discuss with your 

primary care provider when to restart this.  Should symptoms return or worsen, 

contact primary care provider or return to the Emergency Department.





- Discharge Plan


*PRESCRIPTION DRUG MONITORING PROGRAM REVIEWED*: No


*COPY OF PRESCRIPTION DRUG MONITORING REPORT IN PATIENT FLAVIO: No


Prescriptions/Med Rec: 


Pantoprazole Sodium [Protonix] 40 mg PO DAILY #30 tablet.


Home Medications: 


                                    Home Meds





Gabapentin [Neurontin] 600 mg PO BID 11/25/15 [History]


Acetaminophen 500 mg PO BEDTIME 08/17/16 [History]


Vitamin E 400 unit PO DAILY 08/17/16 [History]


Furosemide [Lasix] 20 mg PO DAILY 06/04/19 [History]


Metoprolol Succinate 50 mg PO DAILY 06/04/19 [History]


polyethylene glycoL 3350 [MiraLAX] 17 gm PO ASDIRECTED PRN 06/04/19 [History]


Albuterol/Ipratropium [DuoNeb 3.0-0.5 MG/3 ML] 3 ml NEB QIDRT #120 neb 06/11/19 

[Rx]


Spironolactone [Aldactone] 50 mg PO DAILY #30 tablet 06/11/19 [Rx]


Insulin Aspart [NovoLOG] 1 dose SUBCUT TID 10/06/20 [History]


Acetaminophen [Tylenol] 650 mg PO Q4H PRN #30 tablet 02/17/21 [Rx]


Docusate Sodium [Colace] 100 mg PO BID #20 cap 02/17/21 [Rx]


Calcium Carbonate [Tums] 200 mg PO Q4HR PRN 04/26/21 [History]


Insulin Degludec [Tresiba] 20 unit SQ DAILY 04/26/21 [History]


Phenyleph/Mineral Oil/Petrolat [Preparation H Ointment] 0.25 RECTAL ASDIRECTED 

PRN 04/26/21 [History]


Pantoprazole Sodium [Protonix] 40 mg PO DAILY #30 tablet. 04/30/21 [Rx]








Oxygen Therapy Mode: Nasal Cannula


Oxygen Flow Rate (L/min): 2


Patient Handouts:  Heart Failure Action Plan, Sepsis, Self Care, Adult


Forms:  ED Department Discharge


Referrals: 


Ru Vaughan MD [Primary Care Provider] - 05/07/21 2:30 pm (check in 

time 2:15 p.m.)





- Discharge Summary/Plan Comment


DC Time >30 min.: Yes (45 mins )





- General Info


Date of Service: 04/30/21


Admission Dx/Problem (Free Text: 


GI Bleed 





Functional Status: Reports: Pain Controlled, Tolerating Diet, Ambulating, 

Urinating.  Denies: New Symptoms





- Review of Systems


General: Reports: Weakness (improved ), Fatigue (improved ).  Denies: Fever, 

Malaise, Chills


HEENT: Reports: No Symptoms.  Denies: Headaches, Visual Changes


Pulmonary: Reports: No Symptoms.  Denies: Shortness of Breath, Cough, Sputum, 

Wheezing


Cardiovascular: Reports: No Symptoms.  Denies: Chest Pain, Palpitations, Dyspnea

on Exertion, Edema


Gastrointestinal: Reports: Melena.  Denies: Abdominal Pain, Constipation, 

Diarrhea, Nausea, Vomiting


Genitourinary: Reports: No Symptoms.  Denies: Pain


Musculoskeletal: Reports: No Symptoms


Skin: Reports: No Symptoms.  Denies: Cyanosis


Neurological: Reports: Difficulty Walking, Weakness, Gait Disturbance.  Denies: 

Confusion


Psychiatric: Reports: No Symptoms





- Patient Data


Vitals - Most Recent: 


                                Last Vital Signs











Temp  98.1 F   04/30/21 07:48


 


Pulse  68   04/30/21 08:36


 


Resp  16   04/30/21 07:48


 


BP  120/90   04/30/21 08:36


 


Pulse Ox  92 L  04/30/21 07:48











Weight - Most Recent: 151 lb 11.2 oz


I&O - Last 24 hours: 


                                 Intake & Output











 04/29/21 04/30/21 04/30/21





 22:59 06:59 14:59


 


Intake Total 1160 400 


 


Output Total 850 550 


 


Balance 310 -150 











Lab Results - Last 24 hrs: 


                         Laboratory Results - last 24 hr











  04/29/21 04/29/21 04/29/21 Range/Units





  06:29 11:04 12:03 


 


WBC     (3.98-10.04)  K/mm3


 


RBC     (3.98-5.22)  M/mm3


 


Hgb    10.1 L  (11.2-15.7)  gm/dl


 


Hct    32.3 L  (34.1-44.9)  %


 


MCV     (79.4-94.8)  fl


 


MCH     (25.6-32.2)  pg


 


MCHC     (32.2-35.5)  g/dl


 


RDW Std Deviation     (36.4-46.3)  fL


 


Plt Count     (182-369)  K/mm3


 


MPV     (9.4-12.3)  fl


 


Neut % (Auto)     (34.0-71.1)  %


 


Lymph % (Auto)     (19.3-51.7)  %


 


Mono % (Auto)     (4.7-12.5)  %


 


Eos % (Auto)     (0.7-5.8)  


 


Baso % (Auto)     (0.1-1.2)  %


 


Neut # (Auto)     (1.56-6.13)  K/mm3


 


Lymph # (Auto)     (1.18-3.74)  K/mm3


 


Mono # (Auto)     (0.24-0.36)  K/mm3


 


Eos # (Auto)     (0.04-0.36)  K/mm3


 


Baso # (Auto)     (0.01-0.08)  K/mm3


 


Sodium     (136-145)  mEq/L


 


Potassium     (3.5-5.1)  mEq/L


 


Chloride     ()  mEq/L


 


Carbon Dioxide     (21-32)  mEq/L


 


Anion Gap     (5-15)  


 


BUN     (7-18)  mg/dL


 


Creatinine     (0.55-1.02)  mg/dL


 


Est Cr Clr Drug Dosing     mL/min


 


Estimated GFR (MDRD)     (>60)  mL/min


 


BUN/Creatinine Ratio     (14-18)  


 


Glucose     ()  mg/dL


 


POC Glucose  184 H  275 H   (70-99)  mg/dL


 


Calcium     (8.5-10.1)  mg/dL


 


Magnesium     (1.8-2.4)  mg/dl














  04/29/21 04/29/21 04/30/21 Range/Units





  16:22 22:05 04:53 


 


WBC     (3.98-10.04)  K/mm3


 


RBC     (3.98-5.22)  M/mm3


 


Hgb     (11.2-15.7)  gm/dl


 


Hct     (34.1-44.9)  %


 


MCV     (79.4-94.8)  fl


 


MCH     (25.6-32.2)  pg


 


MCHC     (32.2-35.5)  g/dl


 


RDW Std Deviation     (36.4-46.3)  fL


 


Plt Count     (182-369)  K/mm3


 


MPV     (9.4-12.3)  fl


 


Neut % (Auto)     (34.0-71.1)  %


 


Lymph % (Auto)     (19.3-51.7)  %


 


Mono % (Auto)     (4.7-12.5)  %


 


Eos % (Auto)     (0.7-5.8)  


 


Baso % (Auto)     (0.1-1.2)  %


 


Neut # (Auto)     (1.56-6.13)  K/mm3


 


Lymph # (Auto)     (1.18-3.74)  K/mm3


 


Mono # (Auto)     (0.24-0.36)  K/mm3


 


Eos # (Auto)     (0.04-0.36)  K/mm3


 


Baso # (Auto)     (0.01-0.08)  K/mm3


 


Sodium     (136-145)  mEq/L


 


Potassium     (3.5-5.1)  mEq/L


 


Chloride     ()  mEq/L


 


Carbon Dioxide     (21-32)  mEq/L


 


Anion Gap     (5-15)  


 


BUN     (7-18)  mg/dL


 


Creatinine     (0.55-1.02)  mg/dL


 


Est Cr Clr Drug Dosing     mL/min


 


Estimated GFR (MDRD)     (>60)  mL/min


 


BUN/Creatinine Ratio     (14-18)  


 


Glucose     ()  mg/dL


 


POC Glucose  258 H  152 H  170 H  (70-99)  mg/dL


 


Calcium     (8.5-10.1)  mg/dL


 


Magnesium     (1.8-2.4)  mg/dl














  04/30/21 04/30/21 Range/Units





  04:54 04:54 


 


WBC  7.90   (3.98-10.04)  K/mm3


 


RBC  3.01 L   (3.98-5.22)  M/mm3


 


Hgb  9.2 L   (11.2-15.7)  gm/dl


 


Hct  28.9 L   (34.1-44.9)  %


 


MCV  96.0 H   (79.4-94.8)  fl


 


MCH  30.6   (25.6-32.2)  pg


 


MCHC  31.8 L   (32.2-35.5)  g/dl


 


RDW Std Deviation  50.3 H   (36.4-46.3)  fL


 


Plt Count  191   (182-369)  K/mm3


 


MPV  10.2   (9.4-12.3)  fl


 


Neut % (Auto)  63.2   (34.0-71.1)  %


 


Lymph % (Auto)  25.8   (19.3-51.7)  %


 


Mono % (Auto)  6.3   (4.7-12.5)  %


 


Eos % (Auto)  3.5   (0.7-5.8)  


 


Baso % (Auto)  0.3   (0.1-1.2)  %


 


Neut # (Auto)  4.99   (1.56-6.13)  K/mm3


 


Lymph # (Auto)  2.04   (1.18-3.74)  K/mm3


 


Mono # (Auto)  0.50 H   (0.24-0.36)  K/mm3


 


Eos # (Auto)  0.28   (0.04-0.36)  K/mm3


 


Baso # (Auto)  0.02   (0.01-0.08)  K/mm3


 


Sodium   141  (136-145)  mEq/L


 


Potassium   3.9  (3.5-5.1)  mEq/L


 


Chloride   104  ()  mEq/L


 


Carbon Dioxide   26  (21-32)  mEq/L


 


Anion Gap   14.9  (5-15)  


 


BUN   28 H  (7-18)  mg/dL


 


Creatinine   1.5 H  (0.55-1.02)  mg/dL


 


Est Cr Clr Drug Dosing   18.26  mL/min


 


Estimated GFR (MDRD)   33  (>60)  mL/min


 


BUN/Creatinine Ratio   18.7 H  (14-18)  


 


Glucose   173 H  ()  mg/dL


 


POC Glucose    (70-99)  mg/dL


 


Calcium   8.4 L  (8.5-10.1)  mg/dL


 


Magnesium   2.1  (1.8-2.4)  mg/dl











TYRA Results - Last 24 hrs: 


                                  Microbiology











 04/26/21 14:05 Aerobic Blood Culture - Preliminary





 Blood - Venous    NO GROWTH AFTER 3 DAYS





 Anaerobic Blood Culture - Preliminary





    NO GROWTH AFTER 3 DAYS


 


 04/26/21 14:12 Aerobic Blood Culture - Preliminary





 Blood - Venous - Lab Draw    NO GROWTH AFTER 3 DAYS





 Anaerobic Blood Culture - Preliminary





    NO GROWTH AFTER 3 DAYS











Med Orders - Current: 


                               Current Medications





Acetaminophen (Acetaminophen 325 Mg Tab)  650 mg PO Q4H PRN


   PRN Reason: Pain (Mild 1-3)/fever


Albuterol/Ipratropium (Albuterol/Ipratropium 3.0-0.5 Mg/3 Ml Neb Soln)  3 ml NEB

QID PRN


   PRN Reason: FOR SOB


Furosemide (Furosemide 20 Mg Tab)  20 mg PO DAILY [facility]


   Last Admin: 04/30/21 08:37 Dose:  20 mg


   Documented by: 


Gabapentin (Gabapentin 600 Mg Tab)  600 mg PO BID [facility]


   Last Admin: 04/30/21 08:37 Dose:  600 mg


   Documented by: 


Insulin Human Lispro (Insulin Lispro 100 Unit/Ml)  0 unit SUBCUT QIDACANDBED 

[facility]; Protocol


   Last Admin: 04/30/21 08:37 Dose:  2 unit


   Documented by: 


Metoprolol Succinate (Metoprolol Succinate 50 Mg Tab.Er)  50 mg PO DAILY [facility]


   Last Admin: 04/30/21 08:36 Dose:  50 mg


   Documented by: 


Ondansetron HCl (Ondansetron 4 Mg/2 Ml Sdv)  4 mg IV Q6H PRN


   PRN Reason: Nausea/Vomiting


Pantoprazole Sodium (Pantoprazole 40 Mg Vial)  40 mg IV Q12HR [facility]


   Last Admin: 04/30/21 08:37 Dose:  40 mg


   Documented by: 


Polyethylene Glycol (Polyethylene Glycol 3350 Powder 17 Gm Packet)  17 gm PO 

ASDIRECTED PRN


   PRN Reason: Constipation





Discontinued Medications





Albuterol (Albuterol 0.083% 2.5 Mg/3 Ml Neb Soln)  2.5 mg NEB ONETIME ONE


   Stop: 04/26/21 12:42


   Last Admin: 04/26/21 15:06 Dose:  Not Given


   Documented by: 


Albuterol/Ipratropium (Albuterol/Ipratropium 3.0-0.5 Mg/3 Ml Neb Soln)  3 ml NEB

QIDRT [facility]


   Last Admin: 04/28/21 06:31 Dose:  Not Given


   Documented by: 


Docusate Sodium (Docusate Sodium 100 Mg Cap)  100 mg PO BID [facility]


   Last Admin: 04/27/21 08:27 Dose:  Not Given


   Documented by: 


Furosemide (Furosemide 40 Mg Tab)  20 mg PO DAILY [facility]


   Last Admin: 04/27/21 08:26 Dose:  Not Given


   Documented by: 


Gabapentin (Gabapentin 300 Mg Cap)  600 mg PO BID [facility]


   Last Admin: 04/27/21 08:26 Dose:  Not Given


   Documented by: 


Dextrose/Lactated Ringer's (Dextrose 5%-Lactated Ringers)  1,000 mls @ 75 mls/hr

IV ASDIRECTED [facility]


   Stop: 04/27/21 01:49


Ceftriaxone Sodium 1 gm/ (Sodium Chloride)  100 mls @ 200 mls/hr IV Q24H [facility]


   Stop: 04/29/21 16:00


   Last Admin: 04/29/21 15:19 Dose:  200 mls/hr


   Documented by: 


Dextrose/Sodium Chloride (Dextrose 5%-Normal Saline)  1,000 mls @ 75 mls/hr IV 

ASDIRECTED [facility]


   Stop: 04/27/21 04:04


   Last Admin: 04/26/21 16:08 Dose:  75 mls/hr


   Documented by: 


Sodium Chloride (Normal Saline)  250 mls @ 50 mls/hr IV ASDIRECTED [facility]


   Last Admin: 04/28/21 09:15 Dose:  50 mls/hr


   Documented by: 


Insulin Human Regular (Insulin Regular, Human 100 Units/Ml 3 Ml Vial)  5 unit 

SUBCUT ONETIME ONE


   Stop: 04/26/21 13:01


   Last Admin: 04/26/21 14:09 Dose:  5 unit


   Documented by: 


Ondansetron HCl (Ondansetron 4 Mg/2 Ml Sdv)  4 mg IVPUSH ONETIME ONE


   Stop: 04/26/21 10:57


   Last Admin: 04/26/21 11:03 Dose:  4 mg


   Documented by: 


Pantoprazole Sodium (Pantoprazole 40 Mg Vial)  40 mg IVPUSH ONETIME ONE


   Stop: 04/26/21 10:57


   Last Admin: 04/26/21 11:03 Dose:  40 mg


   Documented by: 











- Exam


Quality Assessment: Reports: Supplemental Oxygen (2L - chronic ), DVT 

Prophylaxis.  Denies: Urine Catheter


General: Reports: Alert, Oriented, Cooperative, No Acute Distress


HEENT: Reports: Pupils Equal, Pupils Reactive, Mucous Membr. Moist/Pink


Neck: Reports: Supple, Trachea Midline


Lungs: Reports: Clear to Auscultation, Normal Respiratory Effort


Cardiovascular: Reports: Regular Rate, Irregular Rhythm


GI/Abdominal Exam: Normal Bowel Sounds, Soft, Non-Tender, No Distention


 (Female) Exam: Deferred


Rectal (Female) Exam: Deferred


Back Exam: Reports: Normal Inspection, Full Range of Motion


Extremities: Normal Inspection, Normal Range of Motion, Non-Tender, No Pedal 

Edema, Normal Capillary Refill


Skin: Reports: Warm, Dry, Intact


Neurological: Reports: No New Focal Deficit


Psy/Mental Status: Reports: Alert, Normal Affect, Normal Mood





*Q Meaningful Use (DIS)





- VTE *Q


VTE Pharmacological Contraindications *Q: Active Hemorrhage

## 2021-10-20 NOTE — US
Right lower extremity deep venous ultrasound: Duplex and color Doppler

 evaluation was obtained of the right common femoral, proximal greater

 saphenous, superficial femoral, popliteal, posterior tibial and 

peroneal veins.  Left common femoral vein was also evaluated.

 

Comparison: Prior bilateral venous ultrasound of 08/27/12.

 

Findings: Major portion of the peroneal vein was not able to be 

evaluated because it was not seen.  Proximal peroneal vein was seen 

and evaluated.  Normal phasic flow, augmentation and compression are 

seen within the visualized veins.

 

Impression:

1.  No findings of deep venous thrombosis within the right lower 

extremity or within the left common femoral vein.

 

Diagnostic code #1

## 2021-10-20 NOTE — PCM.HP.2
H&P History of Present Illness





- General


Date of Service: 10/20/21


Admit Problem/Dx: 


                           Admission Diagnosis/Problem





Admission Diagnosis/Problem      Sepsis








Source of Information: Patient, Family





- History of Present Illness


Initial Comments - Free Text/Narative: 





Patient is an 89-year-old female with a history of CHF, hypertension, diabetes, 

and atrial fibrillation who was brought to the ER from a nursing home due to 

generalized weakness, confusion and fever.  As per daughter, she was fine on 

Sunday (3 days ago).  Today she was found to be very weak so that she is unable 

to stand up and walk.  Right leg and both feet were found to be red.  Patient 

had both great toe nails removed last week.  In the ER, she was found to have 

low fever, leukocytosis, oxygen desaturation 88%, and elevation of creatinine.  

When I saw this patient in the ER, other than the symptoms mentioned above, she 

denies headache, dizziness, nausea, vomiting, chest pain, abdominal pain, 

dysuria, or change in vision.





- Related Data


Allergies/Adverse Reactions: 


                                    Allergies











Allergy/AdvReac Type Severity Reaction Status Date / Time


 


levofloxacin [From Levaquin] Allergy Severe Itching Verified 10/20/21 15:55











Home Medications: 


                                    Home Meds





Gabapentin [Neurontin] 600 mg PO BID 11/25/15 [History]


Furosemide [Lasix] 20 mg PO DAILY 06/04/19 [History]


Metoprolol Succinate 50 mg PO DAILY 06/04/19 [History]


polyethylene glycoL 3350 [MiraLAX] 17 gm PO DAILY PRN 06/04/19 [History]


Spironolactone [Aldactone] 50 mg PO DAILY #30 tablet 06/11/19 [Rx]


Insulin Aspart [NovoLOG] 13 unit SUBCUT TID 10/06/20 [History]


Calcium Carbonate [Tums] 200 mg PO Q4HR PRN 04/26/21 [History]


Insulin Degludec [Tresiba] 20 unit SQ DAILY 04/26/21 [History]


Phenyleph/Mineral Oil/Petrolat [Preparation H Ointment] 1 appful RECTAL BID PRN 

04/26/21 [History]


Pantoprazole Sodium [Protonix] 40 mg PO DAILY #30 tablet. 04/30/21 [Rx]


Acetaminophen [Tylenol] 650 mg PO BEDTIME 10/20/21 [History]


Clopidogrel [Plavix] 75 mg PO DAILY 10/20/21 [History]


bisacodyL [Bisacodyl] 10 mg PO DAILY PRN 10/20/21 [History]











Past Medical History


HEENT History: Reports: Impaired Vision


Other HEENT History: dysphagia


Cardiovascular History: Reports: Afib, Arrhythmia, Heart Failure, Hypertension


Other Cardiovascular History: Afib, anemia


Respiratory History: Reports: Pneumonia, Recurrent, Other (See Below)


Other Respiratory History: hypoxemia requiring home O2


Gastrointestinal History: Reports: Chronic Constipation, GERD


Other Gastrointestinal History: dysphagia


Genitourinary History: Reports: Chronic Renal Insuffiency, Retention, Urinary


OB/GYN History: Reports: Pregnancy


Musculoskeletal History: Reports: Back Pain, Chronic, Other (See Below)


Other Musculoskeletal History: muscle weakness; left femur fracture, right knee 

pain


Neurological History: Reports: CVA, TIA, Other (See Below)


Other Neuro History: vascular dementia; cerebral infarct; neuralgia


Psychiatric History: Reports: Dementia, Depression


Other Psychiatric History: vascular dementia


Endocrine/Metabolic History: Reports: Diabetes, Type II


Hematologic History: Reports: Anemia


Oncologic (Cancer) History: Reports: Other (See Below)


Other Oncologic History: unknown skin cancer


Dermatologic History: Reports: Other (See Below)


Other Dermatologic History: previous supspected skin cancer





- Infectious Disease History


Infectious Disease History: Reports: Chicken Pox, Influenza, Measles, Mumps





- Past Surgical History


HEENT Surgical History: Reports: None


Cardiovascular Surgical History: Reports: None


GI Surgical History: Reports: Cholecystectomy


Female  Surgical History: Reports: None


Musculoskeletal Surgical History: Reports: Other (See Below)


Other Musculoskeletal Surgeries/Procedures:: toenail removal


Oncologic Surgical History: Reports: None





Social & Family History





- Family History


Family Medical History: No Pertinent Family History (Denies genetic diseases in 

family)





- Tobacco Use


Tobacco Use Status *Q: Unknown Ever Used Tobacco





- Caffeine Use


Caffeine Use: Reports: None





H&P Review of Systems





- Review of Systems:


Review Of Systems: See Below


General: Reports: Fever, Weakness


HEENT: Reports: No Symptoms


Pulmonary: Reports: No Symptoms


Cardiovascular: Reports: No Symptoms


Gastrointestinal: Reports: No Symptoms


Genitourinary: Reports: No Symptoms


Musculoskeletal: Reports: Leg Pain


Skin: Reports: No Symptoms


Psychiatric: Reports: No Symptoms


Neurological: Reports: Confusion


Hematologic/Lymphatic: Reports: No Symptoms


Immunologic: Reports: No Symptoms





Exam





- Exam


Exam: See Below





- Vital Signs


Vital Signs: 


                                Last Vital Signs











Temp  38.2 C H  10/20/21 16:50


 


Pulse  86   10/20/21 15:51


 


Resp  18   10/20/21 18:46


 


BP  95/47 L  10/20/21 18:46


 


Pulse Ox  98   10/20/21 18:46











Weight: 71.849 kg





- Exam


General: Cooperative (confusion, disorientation)


HEENT: Conjunctiva Clear, EACs Clear, EOMI, Pupils Equal, Pupils Reactive


Neck: Supple, Trachea Midline, Full Range of Motion.  No: JVD


Lungs: Clear to Auscultation, Normal Respiratory Effort


Cardiovascular: Irregular Rhythm


GI/Abdominal Exam: Normal Bowel Sounds, Soft, Non-Tender, No Organomegaly, 

Distended


Extremities: Other (right low leg and both proximal feet erythema, warm, and 

tenderness. Both great toe nails missed. )


Skin: Warm, Dry, Intact


Neurological: Reflexes Equal Bilateral, Strength Equal Bilateral, Sensation 

Intact


Neuro Extensive - Mental Status: Alert (A+O x 1 (place))


Psychiatric: Normal Affect, Normal Mood





- Patient Data


Lab Results Last 24 hrs: 


                         Laboratory Results - last 24 hr











  10/20/21 10/20/21 10/20/21 Range/Units





  16:15 16:16 16:25 


 


WBC    16.48 H  (3.98-10.04)  K/mm3


 


RBC    4.61  (3.98-5.22)  M/mm3


 


Hgb    13.6  D  (11.2-15.7)  gm/dl


 


Hct    42.7  (34.1-44.9)  %


 


MCV    92.6  D  (79.4-94.8)  fl


 


MCH    29.5  (25.6-32.2)  pg


 


MCHC    31.9 L  (32.2-35.5)  g/dl


 


RDW Std Deviation    55.5 H  (36.4-46.3)  fL


 


Plt Count    260  (182-369)  K/mm3


 


MPV    10.7  (9.4-12.3)  fl


 


Neut % (Auto)    88.9 H  (34.0-71.1)  %


 


Lymph % (Auto)    5.8 L  (19.3-51.7)  %


 


Mono % (Auto)    4.2 L  (4.7-12.5)  %


 


Eos % (Auto)    0.2 L  (0.7-5.8)  


 


Baso % (Auto)    0.4  (0.1-1.2)  %


 


Neut # (Auto)    14.66 H  (1.56-6.13)  K/mm3


 


Lymph # (Auto)    0.95 L  (1.18-3.74)  K/mm3


 


Mono # (Auto)    0.69 H  (0.24-0.36)  K/mm3


 


Eos # (Auto)    0.03 L  (0.04-0.36)  K/mm3


 


Baso # (Auto)    0.06  (0.01-0.08)  K/mm3


 


PT     (9.7-12.0)  SECONDS


 


INR     


 


APTT     (21.7-31.4)  SECONDS


 


Sodium     (136-145)  mEq/L


 


Potassium     (3.5-5.1)  mEq/L


 


Chloride     ()  mEq/L


 


Carbon Dioxide     (21-32)  mEq/L


 


Anion Gap     (5-15)  


 


BUN     (7-18)  mg/dL


 


Creatinine     (0.55-1.02)  mg/dL


 


Est Cr Clr Drug Dosing     mL/min


 


Estimated GFR (MDRD)     (>60)  mL/min


 


BUN/Creatinine Ratio     (14-18)  


 


Glucose     (70-99)  mg/dL


 


Lactic Acid     (0.4-2.0)  mmol/L


 


Calcium     (8.5-10.1)  mg/dL


 


Total Bilirubin     (0.2-1.0)  mg/dL


 


AST     (15-37)  U/L


 


ALT     (14-59)  U/L


 


Alkaline Phosphatase     ()  U/L


 


C-Reactive Protein     (<1.0)  mg/dL


 


Total Protein     (6.4-8.2)  g/dl


 


Albumin     (3.4-5.0)  g/dl


 


Globulin     gm/dL


 


Albumin/Globulin Ratio     (1-2)  


 


Urine Color   Yellow   (Yellow)  


 


Urine Appearance   Clear   (Clear)  


 


Urine pH   7.0   (5.0-8.0)  


 


Ur Specific Gravity   1.020   (1.005-1.030)  


 


Urine Protein   Negative   (Negative)  


 


Urine Glucose (UA)   Negative   (Negative)  


 


Urine Ketones   Negative   (Negative)  


 


Urine Occult Blood   Negative   (Negative)  


 


Urine Nitrite   Negative   (Negative)  


 


Urine Bilirubin   Negative   (Negative)  


 


Urine Urobilinogen   0.2   (0.2-1.0)  


 


Ur Leukocyte Esterase   Negative   (Negative)  


 


Urine RBC   0-5   (0-5)  /hpf


 


Urine WBC   0-5   (0-5)  /hpf


 


Ur Squamous Epith Cells   0-5   (0-5)  /hpf


 


Urine Bacteria   Few   (FEW)  /hpf


 


Urine Mucus   Not seen   (FEW)  /hpf


 


SARS-CoV-2 RNA (MENA)  Negative    (NEGATIVE)  














  10/20/21 10/20/21 10/20/21 Range/Units





  16:25 16:25 16:25 


 


WBC     (3.98-10.04)  K/mm3


 


RBC     (3.98-5.22)  M/mm3


 


Hgb     (11.2-15.7)  gm/dl


 


Hct     (34.1-44.9)  %


 


MCV     (79.4-94.8)  fl


 


MCH     (25.6-32.2)  pg


 


MCHC     (32.2-35.5)  g/dl


 


RDW Std Deviation     (36.4-46.3)  fL


 


Plt Count     (182-369)  K/mm3


 


MPV     (9.4-12.3)  fl


 


Neut % (Auto)     (34.0-71.1)  %


 


Lymph % (Auto)     (19.3-51.7)  %


 


Mono % (Auto)     (4.7-12.5)  %


 


Eos % (Auto)     (0.7-5.8)  


 


Baso % (Auto)     (0.1-1.2)  %


 


Neut # (Auto)     (1.56-6.13)  K/mm3


 


Lymph # (Auto)     (1.18-3.74)  K/mm3


 


Mono # (Auto)     (0.24-0.36)  K/mm3


 


Eos # (Auto)     (0.04-0.36)  K/mm3


 


Baso # (Auto)     (0.01-0.08)  K/mm3


 


PT  10.1    (9.7-12.0)  SECONDS


 


INR  < 0.93    


 


APTT  22.7    (21.7-31.4)  SECONDS


 


Sodium   135 L   (136-145)  mEq/L


 


Potassium   4.9   (3.5-5.1)  mEq/L


 


Chloride   100   ()  mEq/L


 


Carbon Dioxide   27   (21-32)  mEq/L


 


Anion Gap   12.9   (5-15)  


 


BUN   34 H   (7-18)  mg/dL


 


Creatinine   2.6 H   (0.55-1.02)  mg/dL


 


Est Cr Clr Drug Dosing   10.54   mL/min


 


Estimated GFR (MDRD)   17   (>60)  mL/min


 


BUN/Creatinine Ratio   13.1 L   (14-18)  


 


Glucose   85   (70-99)  mg/dL


 


Lactic Acid    1.3  (0.4-2.0)  mmol/L


 


Calcium   9.6   (8.5-10.1)  mg/dL


 


Total Bilirubin   0.6   (0.2-1.0)  mg/dL


 


AST   21   (15-37)  U/L


 


ALT   18   (14-59)  U/L


 


Alkaline Phosphatase   107   ()  U/L


 


C-Reactive Protein   2.8 H*   (<1.0)  mg/dL


 


Total Protein   8.2   (6.4-8.2)  g/dl


 


Albumin   4.1   (3.4-5.0)  g/dl


 


Globulin   4.1   gm/dL


 


Albumin/Globulin Ratio   1.0   (1-2)  


 


Urine Color     (Yellow)  


 


Urine Appearance     (Clear)  


 


Urine pH     (5.0-8.0)  


 


Ur Specific Gravity     (1.005-1.030)  


 


Urine Protein     (Negative)  


 


Urine Glucose (UA)     (Negative)  


 


Urine Ketones     (Negative)  


 


Urine Occult Blood     (Negative)  


 


Urine Nitrite     (Negative)  


 


Urine Bilirubin     (Negative)  


 


Urine Urobilinogen     (0.2-1.0)  


 


Ur Leukocyte Esterase     (Negative)  


 


Urine RBC     (0-5)  /hpf


 


Urine WBC     (0-5)  /hpf


 


Ur Squamous Epith Cells     (0-5)  /hpf


 


Urine Bacteria     (FEW)  /hpf


 


Urine Mucus     (FEW)  /hpf


 


SARS-CoV-2 RNA (MENA)     (NEGATIVE)  











Result Diagrams: 


                                 10/20/21 16:25





                                 10/20/21 16:25


[facility] Results Last 24 hrs: 


                                  Microbiology











 10/20/21 16:15 Influenza Type A Antigen Screen - Final





 Nasopharyngeal Swab - Nare, Unspecified    NEGATIVE INFLUENZA A VIRUS AG





    REFERENCE RANGE: NEGATIVE





 Influenza Type B Antigen Screen - Final





    NEGATIVE INFLUENZA B VIRUS AG





    REFERENCE RANGE: NEGATIVE














Sepsis Event Note





- Evaluation


Sepsis Screening Result: No Definite Risk





- Focused Exam


Vital Signs: 


                                   Vital Signs











  Temp Temp Pulse Resp BP Pulse Ox Pulse Ox


 


 10/20/21 18:46     18  95/47 L  98 


 


 10/20/21 16:50  38.2 C H      


 


 10/20/21 16:15        97


 


 10/20/21 15:51   38.2 C H  86  27 H  99/69  88 L 











Problem List Initiated/Reviewed/Updated: Yes


Orders Last 24hrs: 


                               Active Orders 24 hr











 Category Date Time Status


 


 Patient Status [ADT] Routine ADT  10/20/21 18:11 Active


 


 Bedrest Bedside Commode [RC] ASDIRECTED Care  10/20/21 19:03 Ordered


 


 Cardiac Monitoring [RC] .AS DIRECTED Care  10/20/21 16:11 Active


 


 Cardiac Monitoring [RC] CONTINUOUS Care  10/20/21 19:04 Ordered


 


 Intake and Output [RC] QSHIFT Care  10/20/21 19:04 Ordered


 


 Oxygen Therapy [RC] PRN Care  10/20/21 16:11 Active


 


 Oxygen Therapy [RC] PRN Care  10/20/21 19:03 Ordered


 


 Peripheral IV Care [RC] .AS DIRECTED Care  10/20/21 16:12 Active


 


 Pulse Oximetry [RC] CONTINUOUS Care  10/20/21 19:04 Ordered


 


 RT Aerosol Therapy [RC] ASDIRECTED Care  10/20/21 19:07 Ordered


 


 VTE/DVT Education [RC] PER UNIT ROUTINE Care  10/20/21 19:03 Ordered


 


 Vital Signs [RC] Q4H Care  10/20/21 19:03 Ordered


 


 OT Evaluation and Treatment [CONS] Routine Cons  10/20/21 19:03 Ordered


 


 PT Evaluation and Treatment [CONS] Routine Cons  10/20/21 19:03 Ordered


 


 NPO Now [Nothing per Oral Now Diet] [DIET] Diet  10/21/21 Breakfast Ordered


 


 VL Duplex Lwr Ext Veins Ltd Lt [US] Routine Exams  10/20/21 19:19 Ordered


 


 BLOOD CULTURE [MREF] Stat Lab  10/20/21 16:25 Received


 


 BLOOD CULTURE [MREF] Stat Lab  10/20/21 16:40 Received


 


 CBC WITH AUTO DIFF [HEME] DAILY Lab  10/21/21 05:00 Ordered


 


 CBC WITH AUTO DIFF [HEME] DAILY Lab  10/22/21 05:00 Ordered


 


 CBC WITH AUTO DIFF [HEME] DAILY Lab  10/23/21 05:00 Ordered


 


 CBC WITH AUTO DIFF [HEME] DAILY Lab  10/24/21 05:00 Ordered


 


 CBC WITH AUTO DIFF [HEME] DAILY Lab  10/25/21 05:00 Ordered


 


 COMPREHENSIVE METABOLIC PN,CMP [CHEM] DAILY Lab  10/21/21 05:00 Ordered


 


 COMPREHENSIVE METABOLIC PN,CMP [CHEM] DAILY Lab  10/22/21 05:00 Ordered


 


 COMPREHENSIVE METABOLIC PN,CMP [CHEM] DAILY Lab  10/23/21 05:00 Ordered


 


 COMPREHENSIVE METABOLIC PN,CMP [CHEM] DAILY Lab  10/24/21 05:00 Ordered


 


 COMPREHENSIVE METABOLIC PN,CMP [CHEM] DAILY Lab  10/25/21 05:00 Ordered


 


 MAGNESIUM [CHEM] DAILY Lab  10/21/21 05:00 Ordered


 


 PRO B-TYPE NATRIUR PEPT,BNPPRO [CHEM] DAILY Lab  10/21/21 05:00 Ordered


 


 Acetaminophen [Tylenol] Med  10/20/21 19:03 Ordered





 650 mg RECTAL Q6H PRN   


 


 Albuterol/Ipratropium [DuoNeb 3.0-0.5 MG/3 ML] Med  10/20/21 19:03 Ordered





 3 ml NEB Q4H PRN   


 


 Calcium Carbonate [Tums] Med  10/20/21 19:10 Ordered





 200 mg PO Q4HR PRN   


 


 Clopidogrel [Plavix] Med  10/21/21 09:00 Ordered





 75 mg PO DAILY   


 


 Gabapentin [Neurontin] Med  10/20/21 21:00 Ordered





 600 mg PO BID   


 


 Heparin Sodium Med  10/20/21 19:15 Ordered





 5,000 units SUBCUT Q8H   


 


 Insulin Glargine,Hum.Rec.Anlog [Semglee Pen] Med  10/20/21 19:30 Ordered





 10 unit SUBCUT DAILY   


 


 Insulin Lispro [HumaLOG] Med  10/20/21 22:00 Ordered





 See Protocol  SUBCUT QIDACANDBED   


 


 Lactated Ringers [Ringers, Lactated] 1,000 ml Med  10/20/21 19:15 Ordered





 IV ASDIRECTED   


 


 Morphine Med  10/20/21 19:03 Ordered





 1 mg IVPUSH Q4H PRN   


 


 Pantoprazole Sodium Med  10/21/21 09:00 Ordered





 40 mg PO DAILY   


 


 Promethazine [Phenergan] 6.25 mg Med  10/20/21 19:03 Ordered





 Sodium Chloride 0.9% [Normal Saline] 50 ml   





 IV Q6H   


 


 Sodium Chloride 0.9% [Saline Flush] Med  10/20/21 16:11 Active





 10 ml FLUSH ASDIRECTED PRN   


 


 bisacodyL [Dulcolax] Med  10/20/21 19:10 Ordered





 10 mg PO DAILY PRN   


 


 cefTRIAXone [Rocephin] 1 gm Med  10/20/21 19:15 Ordered





 Sodium Chloride 0.9% [Normal Saline] 100 ml   





 IV Q24H   


 


 oxyCODONE Med  10/20/21 19:03 Ordered





 5 mg PO Q6H PRN   


 


 polyethylene glycoL 3350 [MiraLAX] Med  10/20/21 19:10 Ordered





 17 gm PO DAILY PRN   


 


 Blood Culture x2 Reflex Set [OM.PC] Stat Oth  10/20/21 16:12 Ordered


 


 Peripheral IV Insertion Adult [OM.PC] Stat Oth  10/20/21 16:11 Ordered


 


 Resuscitation Status Routine Resus Stat  10/20/21 19:03 Ordered








                                Medication Orders





Acetaminophen (Acetaminophen 650 Mg Supp)  650 mg RECTAL Q6H PRN


   PRN Reason: Pain (mild 1-3)


Albuterol/Ipratropium (Albuterol/Ipratropium 3.0-0.5 Mg/3 Ml Neb Soln)  3 ml NEB

 Q4H PRN


   PRN Reason: Shortness Of Breath/wheezing


Bisacodyl (Bisacodyl 5 Mg Tab)  10 mg PO DAILY PRN


   PRN Reason: Constipation


Calcium Carbonate/Glycine (Calcium Carbonate 500 Mg Tab.Chew)  250 mg PO Q4H PRN


   PRN Reason: Heartburn


Clopidogrel Bisulfate (Clopidogrel 75 Mg Tab)  75 mg PO DAILY RYLEY


Gabapentin (Gabapentin 300 Mg Cap)  600 mg PO BID RYLEY


Heparin Sodium (Porcine) (Heparin Sodium 5,000 Units/Ml Vial)  5,000 units 

SUBCUT Q8H RYLEY


Promethazine HCl 6.25 mg/ (Sodium Chloride)  50.25 mls @ 100 mls/hr IV Q6H PRN


   PRN Reason: Nausea/Vomiting


Lactated Ringer's (Ringers, Lactated)  1,000 mls @ 50 mls/hr IV ASDIRECTED RYLEY


Ceftriaxone Sodium 1 gm/ (Sodium Chloride)  100 mls @ 200 mls/hr IV Q24H RYLEY


Morphine Sulfate (Morphine 2 Mg/Ml Syringe)  1 mg IVPUSH Q4H PRN


   PRN Reason: Pain (severe 7-10)


   Stop: 10/21/21 19:06


Oxycodone HCl (Oxycodone 5 Mg Tab)  5 mg PO Q6H PRN


   PRN Reason: Pain (moderate 4-6)


Pantoprazole Sodium (Pantoprazole 40 Mg Tab.Cr)  40 mg PO ACBREAKFAST [facility]


Polyethylene Glycol (Polyethylene Glycol 3350 Powder 17 Gm Packet)  17 gm PO 

DAILY PRN


   PRN Reason: Constipation


Sodium Chloride (Sodium Chloride 0.9% 10 Ml Syringe)  10 ml FLUSH ASDIRECTED PRN


   PRN Reason: Keep Vein Open


   Last Admin: 10/20/21 16:50  Dose: 10 ml


   Documented by: SEVERINO








Assessment/Plan Comment:: 





Patient is an 89-year-old female with a history of CHF, hypertension, diabetes, 

and atrial fibrillation who was brought to the ER from a nursing home due to 

generalized weakness, confusion and fever.





Assessment and plan





AMS


Etiologies include sepsis/infection, dehydration, metabolic events


No focal neurological deficits


Closely monitor





Early sepsis 2nd to cellulitis of right leg


LA 1.3, CRP 2.8


Blood culture


Gentle IV fluid


Antibiotics





Cellulitis, right leg and foot


Had great toe nails removed lsat week


US doppler to rule out a DVT


Ceftriaxone 1 g IV daily





Acute hypoxic respiratory failure


Oxygen saturation 88% in the ER


Etiology unknown


Pulse ox


Oxygen therapy to keep oxygen saturation greater than 94%





KATIE on CKD, creatinine 1.0 on 1/11/2019, creatinine has been around 2.0 over the

 past 2-3 years. 


Avoid nephrotoxic meds


Repeat renal function in the morning





DM type 2


on insulin degludec 20units daily


N.p.o. due to history of dysphagia


I will order Lantus 10 units daily


Insulin sliding scales





HTN


Metoprolol succinate 50mg daily is on hold due to soft blood pressure


Hydralazine as needed





CHF, NYHA class II


lasix 20mg daily and spironolactone 50mg daily are on hold


Intake and output


Repeat electrolytes in the morning





Atrial fibrillation


Heart rate is controlled


Metoprolol is on hold due to soft blood pressure


History of GI bleeding.  Not on anticoagulation





Hx of GI bleeding


Continue home medication Plavix


Repeat a CBC in morning





hx of dysphagia


N.p.o.


Speech pathology consult





Generalized weakness


PT OT





DVT prophylaxis: Heparin





CODE STATUS: DNR/DNI.  Discussed with the patient and her daughter who declined 

CPR and intubation





Disposition: Greater than 2 midnights








- Mortality Measure


Prognosis:: Poor

## 2021-10-20 NOTE — CR
Chest: Frontal view of the chest was obtained.

 

Comparison: Prior chest x-ray of 02/11/21.

 

Heart size is slightly enlarged.  Upper mediastinum is within normal 

limits.  Lung markings are slightly increased which appear stable.  No

 definite acute parenchymal change is seen.  Scoliosis is noted within

 the spine.  Osteopenia is present.

 

Impression:

1.  Findings as noted above.

2.  Nothing acute is definitely seen on frontal chest x-ray.

 

Diagnostic code #2

## 2021-10-20 NOTE — EDM.PDOC
ED HPI GENERAL MEDICAL PROBLEM





- General


Chief Complaint: Possible Sepsis


Stated Complaint: DINA AMB


Time Seen by Provider: 10/20/21 15:49


Source of Information: Reports: EMS, Nursing Home Records, Provider


History Limitations: Reports: Altered Mental Status





- History of Present Illness


INITIAL COMMENTS - FREE TEXT/NARRATIVE: 





The patient presents from the nursing home for generalized weakness and fever.  

She was tested for COVID today and it was negative.  She had her toe nails 

removed on each big toe last week.  They have been doing dressings and 

antibiotic ointment.  There is swelling and redness to the right foot and lower 

leg.  Her oxygen saturations were low at 88%.  She normally does not need 

oxygen.  She normally will get up on her own.  She needed the Ken lift to get 

her up today.  She has no cough, vomiting or diarrhea.


Onset: Gradual


Duration: Day(s):


Location: Reports: Lower Extremity, Right


Severity: Moderate


Improves with: Reports: None


Worsens with: Reports: None


Associated Symptoms: Reports: Fever/Chills, Shortness of Breath.  Denies: Cough,

Nausea/Vomiting





- Related Data


                                    Allergies











Allergy/AdvReac Type Severity Reaction Status Date / Time


 


levofloxacin [From Levaquin] Allergy Severe Itching Verified 10/20/21 15:55











Home Meds: 


                                    Home Meds





Gabapentin [Neurontin] 600 mg PO BID 11/25/15 [History]


Furosemide [Lasix] 20 mg PO DAILY 06/04/19 [History]


Metoprolol Succinate 50 mg PO DAILY 06/04/19 [History]


polyethylene glycoL 3350 [MiraLAX] 17 gm PO DAILY PRN 06/04/19 [History]


Spironolactone [Aldactone] 50 mg PO DAILY #30 tablet 06/11/19 [Rx]


Insulin Aspart [NovoLOG] 13 unit SUBCUT TID 10/06/20 [History]


Calcium Carbonate [Tums] 200 mg PO Q4HR PRN 04/26/21 [History]


Insulin Degludec [Tresiba] 20 unit SQ DAILY 04/26/21 [History]


Phenyleph/Mineral Oil/Petrolat [Preparation H Ointment] 1 appful RECTAL BID PRN 

04/26/21 [History]


Pantoprazole Sodium [Protonix] 40 mg PO DAILY #30 tablet. 04/30/21 [Rx]


Acetaminophen [Tylenol] 650 mg PO BEDTIME 10/20/21 [History]


Clopidogrel [Plavix] 75 mg PO DAILY 10/20/21 [History]


bisacodyL [Bisacodyl] 10 mg PO DAILY PRN 10/20/21 [History]











Past Medical History


HEENT History: Reports: Impaired Vision


Other HEENT History: dysphagia


Cardiovascular History: Reports: Afib, Arrhythmia, Heart Failure, Hypertension


Other Cardiovascular History: Afib, anemia


Respiratory History: Reports: Pneumonia, Recurrent, Other (See Below)


Other Respiratory History: hypoxemia requiring home O2


Gastrointestinal History: Reports: Chronic Constipation, GERD


Other Gastrointestinal History: dysphagia


Genitourinary History: Reports: Chronic Renal Insuffiency, Retention, Urinary


OB/GYN History: Reports: Pregnancy


Musculoskeletal History: Reports: Back Pain, Chronic, Other (See Below)


Other Musculoskeletal History: muscle weakness; left femur fracture, right knee 

pain


Neurological History: Reports: CVA, TIA, Other (See Below)


Other Neuro History: vascular dementia; cerebral infarct; neuralgia


Psychiatric History: Reports: Dementia, Depression


Other Psychiatric History: vascular dementia


Endocrine/Metabolic History: Reports: Diabetes, Type II


Hematologic History: Reports: Anemia


Oncologic (Cancer) History: Reports: Other (See Below)


Other Oncologic History: unknown skin cancer


Dermatologic History: Reports: Other (See Below)


Other Dermatologic History: previous supspected skin cancer





- Infectious Disease History


Infectious Disease History: Reports: Chicken Pox, Influenza, Measles, Mumps





- Past Surgical History


HEENT Surgical History: Reports: None


Cardiovascular Surgical History: Reports: None


GI Surgical History: Reports: Cholecystectomy


Female  Surgical History: Reports: None


Musculoskeletal Surgical History: Reports: Other (See Below)


Other Musculoskeletal Surgeries/Procedures:: toenail removal


Oncologic Surgical History: Reports: None





Social & Family History





- Family History


Family Medical History: No Pertinent Family History





- Tobacco Use


Tobacco Use Status *Q: Unknown Ever Used Tobacco





- Caffeine Use


Caffeine Use: Reports: None





ED ROS GENERAL





- Review of Systems


Review Of Systems: See Below


Constitutional: Reports: Fever


HEENT: Reports: No Symptoms


Respiratory: Reports: No Symptoms


Cardiovascular: Reports: No Symptoms


Endocrine: Reports: No Symptoms


GI/Abdominal: Reports: No Symptoms


: Reports: No Symptoms





ED EXAM, SEPSIS





- Physical Exam


Exam: See Below


Exam Limited By: Altered Mental Status


General Appearance: Alert, No Apparent Distress


Ears: Normal External Exam


Nose: Normal Inspection


Head: Atraumatic, Normocephalic


Neck: Normal Inspection


Respiratory/Chest: No Respiratory Distress, Lungs Clear, Normal Breath Sounds


Cardiovascular: Regular Rate, Rhythm, No Edema, No Murmur


GI/Abdominal Exam: Soft, Non-Tender, No Organomegaly, No Mass


Back: Normal Inspection


Extremities: Other (Bilateral missing toe nails to the great toe with mild 

erythema.  There is erythema to the right anterior foot, ankle and lower leg.)





Course





- Vital Signs


Last Recorded V/S: 


                                Last Vital Signs











Temp  100.8 F H  10/20/21 16:50


 


Pulse  86   10/20/21 15:51


 


Resp  27 H  10/20/21 15:51


 


BP  99/69   10/20/21 15:51


 


Pulse Ox  97   10/20/21 16:15














- Orders/Labs/Meds


Orders: 


                               Active Orders 24 hr











 Category Date Time Status


 


 Cardiac Monitoring [RC] .AS DIRECTED Care  10/20/21 16:11 Active


 


 Oxygen Therapy [RC] PRN Care  10/20/21 16:11 Active


 


 Peripheral IV Care [RC] .AS DIRECTED Care  10/20/21 16:12 Active


 


 BLOOD CULTURE [MREF] Stat Lab  10/20/21 16:25 Received


 


 BLOOD CULTURE [MREF] Stat Lab  10/20/21 16:40 Received


 


 Sodium Chloride 0.9% [Normal Saline] 1,000 ml Med  10/20/21 16:15 Active





 IV ASDIRECTED   


 


 Sodium Chloride 0.9% [Saline Flush] Med  10/20/21 16:11 Active





 10 ml FLUSH ASDIRECTED PRN   


 


 Blood Culture x2 Reflex Set [OM.PC] Stat Oth  10/20/21 16:12 Ordered


 


 Peripheral IV Insertion Adult [OM.PC] Stat Oth  10/20/21 16:11 Ordered








                                Medication Orders





Sodium Chloride (Normal Saline)  1,000 mls @ 125 mls/hr IV ASDIRECTED RYLEY


   Last Admin: 10/20/21 16:49  Dose: 125 mls/hr


   Documented by: SEVERINO


Sodium Chloride (Sodium Chloride 0.9% 10 Ml Syringe)  10 ml FLUSH ASDIRECTED PRN


   PRN Reason: Keep Vein Open


   Last Admin: 10/20/21 16:50  Dose: 10 ml


   Documented by: SEVERINO








Labs: 


                                Laboratory Tests











  10/20/21 10/20/21 10/20/21 Range/Units





  16:15 16:16 16:25 


 


WBC    16.48 H  (3.98-10.04)  K/mm3


 


RBC    4.61  (3.98-5.22)  M/mm3


 


Hgb    13.6  D  (11.2-15.7)  gm/dl


 


Hct    42.7  (34.1-44.9)  %


 


MCV    92.6  D  (79.4-94.8)  fl


 


MCH    29.5  (25.6-32.2)  pg


 


MCHC    31.9 L  (32.2-35.5)  g/dl


 


RDW Std Deviation    55.5 H  (36.4-46.3)  fL


 


Plt Count    260  (182-369)  K/mm3


 


MPV    10.7  (9.4-12.3)  fl


 


Neut % (Auto)    88.9 H  (34.0-71.1)  %


 


Lymph % (Auto)    5.8 L  (19.3-51.7)  %


 


Mono % (Auto)    4.2 L  (4.7-12.5)  %


 


Eos % (Auto)    0.2 L  (0.7-5.8)  


 


Baso % (Auto)    0.4  (0.1-1.2)  %


 


Neut # (Auto)    14.66 H  (1.56-6.13)  K/mm3


 


Lymph # (Auto)    0.95 L  (1.18-3.74)  K/mm3


 


Mono # (Auto)    0.69 H  (0.24-0.36)  K/mm3


 


Eos # (Auto)    0.03 L  (0.04-0.36)  K/mm3


 


Baso # (Auto)    0.06  (0.01-0.08)  K/mm3


 


PT     (9.7-12.0)  SECONDS


 


INR     


 


APTT     (21.7-31.4)  SECONDS


 


Sodium     (136-145)  mEq/L


 


Potassium     (3.5-5.1)  mEq/L


 


Chloride     ()  mEq/L


 


Carbon Dioxide     (21-32)  mEq/L


 


Anion Gap     (5-15)  


 


BUN     (7-18)  mg/dL


 


Creatinine     (0.55-1.02)  mg/dL


 


Est Cr Clr Drug Dosing     mL/min


 


Estimated GFR (MDRD)     (>60)  mL/min


 


BUN/Creatinine Ratio     (14-18)  


 


Glucose     (70-99)  mg/dL


 


Lactic Acid     (0.4-2.0)  mmol/L


 


Calcium     (8.5-10.1)  mg/dL


 


Total Bilirubin     (0.2-1.0)  mg/dL


 


AST     (15-37)  U/L


 


ALT     (14-59)  U/L


 


Alkaline Phosphatase     ()  U/L


 


C-Reactive Protein     (<1.0)  mg/dL


 


Total Protein     (6.4-8.2)  g/dl


 


Albumin     (3.4-5.0)  g/dl


 


Globulin     gm/dL


 


Albumin/Globulin Ratio     (1-2)  


 


Urine Color   Yellow   (Yellow)  


 


Urine Appearance   Clear   (Clear)  


 


Urine pH   7.0   (5.0-8.0)  


 


Ur Specific Gravity   1.020   (1.005-1.030)  


 


Urine Protein   Negative   (Negative)  


 


Urine Glucose (UA)   Negative   (Negative)  


 


Urine Ketones   Negative   (Negative)  


 


Urine Occult Blood   Negative   (Negative)  


 


Urine Nitrite   Negative   (Negative)  


 


Urine Bilirubin   Negative   (Negative)  


 


Urine Urobilinogen   0.2   (0.2-1.0)  


 


Ur Leukocyte Esterase   Negative   (Negative)  


 


Urine RBC   0-5   (0-5)  /hpf


 


Urine WBC   0-5   (0-5)  /hpf


 


Ur Squamous Epith Cells   0-5   (0-5)  /hpf


 


Urine Bacteria   Few   (FEW)  /hpf


 


Urine Mucus   Not seen   (FEW)  /hpf


 


SARS-CoV-2 RNA (MENA)  Negative    (NEGATIVE)  














  10/20/21 10/20/21 10/20/21 Range/Units





  16:25 16:25 16:25 


 


WBC     (3.98-10.04)  K/mm3


 


RBC     (3.98-5.22)  M/mm3


 


Hgb     (11.2-15.7)  gm/dl


 


Hct     (34.1-44.9)  %


 


MCV     (79.4-94.8)  fl


 


MCH     (25.6-32.2)  pg


 


MCHC     (32.2-35.5)  g/dl


 


RDW Std Deviation     (36.4-46.3)  fL


 


Plt Count     (182-369)  K/mm3


 


MPV     (9.4-12.3)  fl


 


Neut % (Auto)     (34.0-71.1)  %


 


Lymph % (Auto)     (19.3-51.7)  %


 


Mono % (Auto)     (4.7-12.5)  %


 


Eos % (Auto)     (0.7-5.8)  


 


Baso % (Auto)     (0.1-1.2)  %


 


Neut # (Auto)     (1.56-6.13)  K/mm3


 


Lymph # (Auto)     (1.18-3.74)  K/mm3


 


Mono # (Auto)     (0.24-0.36)  K/mm3


 


Eos # (Auto)     (0.04-0.36)  K/mm3


 


Baso # (Auto)     (0.01-0.08)  K/mm3


 


PT  10.1    (9.7-12.0)  SECONDS


 


INR  < 0.93    


 


APTT  22.7    (21.7-31.4)  SECONDS


 


Sodium   135 L   (136-145)  mEq/L


 


Potassium   4.9   (3.5-5.1)  mEq/L


 


Chloride   100   ()  mEq/L


 


Carbon Dioxide   27   (21-32)  mEq/L


 


Anion Gap   12.9   (5-15)  


 


BUN   34 H   (7-18)  mg/dL


 


Creatinine   2.6 H   (0.55-1.02)  mg/dL


 


Est Cr Clr Drug Dosing   10.54   mL/min


 


Estimated GFR (MDRD)   17   (>60)  mL/min


 


BUN/Creatinine Ratio   13.1 L   (14-18)  


 


Glucose   85   (70-99)  mg/dL


 


Lactic Acid    1.3  (0.4-2.0)  mmol/L


 


Calcium   9.6   (8.5-10.1)  mg/dL


 


Total Bilirubin   0.6   (0.2-1.0)  mg/dL


 


AST   21   (15-37)  U/L


 


ALT   18   (14-59)  U/L


 


Alkaline Phosphatase   107   ()  U/L


 


C-Reactive Protein   2.8 H*   (<1.0)  mg/dL


 


Total Protein   8.2   (6.4-8.2)  g/dl


 


Albumin   4.1   (3.4-5.0)  g/dl


 


Globulin   4.1   gm/dL


 


Albumin/Globulin Ratio   1.0   (1-2)  


 


Urine Color     (Yellow)  


 


Urine Appearance     (Clear)  


 


Urine pH     (5.0-8.0)  


 


Ur Specific Gravity     (1.005-1.030)  


 


Urine Protein     (Negative)  


 


Urine Glucose (UA)     (Negative)  


 


Urine Ketones     (Negative)  


 


Urine Occult Blood     (Negative)  


 


Urine Nitrite     (Negative)  


 


Urine Bilirubin     (Negative)  


 


Urine Urobilinogen     (0.2-1.0)  


 


Ur Leukocyte Esterase     (Negative)  


 


Urine RBC     (0-5)  /hpf


 


Urine WBC     (0-5)  /hpf


 


Ur Squamous Epith Cells     (0-5)  /hpf


 


Urine Bacteria     (FEW)  /hpf


 


Urine Mucus     (FEW)  /hpf


 


SARS-CoV-2 RNA (MENA)     (NEGATIVE)  











Meds: 


Medications











Generic Name Dose Route Start Last Admin





  Trade Name Freq  PRN Reason Stop Dose Admin


 


Sodium Chloride  1,000 mls @ 125 mls/hr  10/20/21 16:15  10/20/21 16:49





  Normal Saline  IV   125 mls/hr





  ASDIRECTED RYLEY   Administration


 


Sodium Chloride  10 ml  10/20/21 16:11  10/20/21 16:50





  Sodium Chloride 0.9% 10 Ml Syringe  FLUSH   10 ml





  ASDIRECTED PRN   Administration





  Keep Vein Open  














Discontinued Medications














Generic Name Dose Route Start Last Admin





  Trade Name Freq  PRN Reason Stop Dose Admin


 


Acetaminophen  975 mg  10/20/21 16:12  10/20/21 16:50





  Acetaminophen 325 Mg Tab  PO  10/20/21 16:13  975 mg





  NOW ONE   Administration


 


Ceftriaxone Sodium 2 gm/  100 mls @ 200 mls/hr  10/20/21 16:13  10/20/21 16:50





  Sodium Chloride  IV  10/20/21 16:42  200 mls/hr





  ONETIME ONE   Administration














- Re-Assessments/Exams


Free Text/Narrative Re-Assessment/Exam: 





10/20/21 16:38


I ordered oxygen, IV NS at 125mL/hr, labs, CXR, blood cultures, UA, lactic acid 

and COVID 19 test.


10/20/21 16:39





10/20/21 18:06


Her CXR shows nothing acute.  


10/20/21 18:09


Her WBC is elevated at 16.48.  Her PT and PTT are normal.  Her creatinine is 

elevated at 2.6.  Her CRP is 2.8.  Her UA shows no UTI.  Her COVID is negative. 

 She has a right leg cellulitis with sepsis.  I have ordered blood cultures and 

rocpehin 2 grams IV.  I feel she needs to be admitted.  I called Dr De Dios and he 

agreed to the admission.





Departure





- Departure


Time of Disposition: 18:15


Disposition: Admitted As Inpatient 66


Condition: Fair


Clinical Impression: 


 Cellulitis of right leg, Renal insufficiency, Hypoxia





Sepsis


Qualifiers:


 Sepsis type: sepsis due to unspecified organism Sepsis acute organ dysfunction 

status: unspecified Qualified Code(s): A41.9 - Sepsis, unspecified organism








- Discharge Information


Forms:  ED Department Discharge





Sepsis Event Note (ED)





- Evaluation


Sepsis Screening Result: No Definite Risk





- Focused Exam


Vital Signs: 


                                   Vital Signs











  Temp Temp Pulse Resp BP Pulse Ox Pulse Ox


 


 10/20/21 16:50  100.8 F H      


 


 10/20/21 16:15        97


 


 10/20/21 15:51   100.8 F H  86  27 H  99/69  88 L 














- My Orders


Last 24 Hours: 


My Active Orders





10/20/21 16:11


Cardiac Monitoring [RC] .AS DIRECTED 


Oxygen Therapy [RC] PRN 


Sodium Chloride 0.9% [Saline Flush]   10 ml FLUSH ASDIRECTED PRN 


Peripheral IV Insertion Adult [OM.PC] Stat 





10/20/21 16:12


Peripheral IV Care [RC] .AS DIRECTED 


Blood Culture x2 Reflex Set [OM.PC] Stat 





10/20/21 16:15


Sodium Chloride 0.9% [Normal Saline] 1,000 ml IV ASDIRECTED 





10/20/21 16:25


BLOOD CULTURE [MREF] Stat 





10/20/21 16:40


BLOOD CULTURE [MREF] Stat 














- Assessment/Plan


Last 24 Hours: 


My Active Orders





10/20/21 16:11


Cardiac Monitoring [RC] .AS DIRECTED 


Oxygen Therapy [RC] PRN 


Sodium Chloride 0.9% [Saline Flush]   10 ml FLUSH ASDIRECTED PRN 


Peripheral IV Insertion Adult [OM.PC] Stat 





10/20/21 16:12


Peripheral IV Care [RC] .AS DIRECTED 


Blood Culture x2 Reflex Set [OM.PC] Stat 





10/20/21 16:15


Sodium Chloride 0.9% [Normal Saline] 1,000 ml IV ASDIRECTED 





10/20/21 16:25


BLOOD CULTURE [MREF] Stat 





10/20/21 16:40


BLOOD CULTURE [MREF] Stat

## 2021-10-21 NOTE — PCM.PN
- General Info


Date of Service: 10/21/21


Admission Dx/Problem (Free Text): 


                           Admission Diagnosis/Problem





Admission Diagnosis/Problem      Sepsis








Subjective Update: 





Patient is an 89-year-old female with a history of CHF, hypertension, diabetes, 

and atrial fibrillation who was brought to the ER from a nursing home due to 

generalized weakness, confusion and fever.


Patient feels well today.  She states that she thinks she could have gone home 

with a just gave her an antibiotic by pill.  She is awake and oriented.


Functional Status: Reports: Pain Controlled





- Review of Systems


General: Reports: No Symptoms


HEENT: Reports: No Symptoms


Pulmonary: Reports: No Symptoms


Cardiovascular: Reports: No Symptoms


Gastrointestinal: Reports: No Symptoms


Musculoskeletal: Reports: No Symptoms


Skin: Reports: No Symptoms


Neurological: Reports: No Symptoms


Psychiatric: Reports: No Symptoms





- Patient Data


Vitals - Most Recent: 


                                Last Vital Signs











Temp  98.6 F   10/21/21 15:06


 


Pulse  77   10/21/21 15:06


 


Resp  20   10/21/21 15:06


 


BP  128/47 L  10/21/21 15:06


 


Pulse Ox  93 L  10/21/21 15:06











Weight - Most Recent: 158 lb 14.4 oz


I&O - Last 24 Hours: 


                                 Intake & Output











 10/21/21 10/21/21 10/21/21





 06:59 14:59 22:59


 


Intake Total 396  


 


Output Total   200


 


Balance 396  -200











Lab Results Last 24 Hours: 


                         Laboratory Results - last 24 hr











  10/20/21 10/20/21 10/20/21 Range/Units





  16:15 16:25 20:45 


 


WBC     (3.98-10.04)  K/mm3


 


RBC     (3.98-5.22)  M/mm3


 


Hgb     (11.2-15.7)  gm/dl


 


Hct     (34.1-44.9)  %


 


MCV     (79.4-94.8)  fl


 


MCH     (25.6-32.2)  pg


 


MCHC     (32.2-35.5)  g/dl


 


RDW Std Deviation     (36.4-46.3)  fL


 


Plt Count     (182-369)  K/mm3


 


MPV     (9.4-12.3)  fl


 


Neut % (Auto)     (34.0-71.1)  %


 


Lymph % (Auto)     (19.3-51.7)  %


 


Mono % (Auto)     (4.7-12.5)  %


 


Eos % (Auto)     (0.7-5.8)  


 


Baso % (Auto)     (0.1-1.2)  %


 


Neut # (Auto)     (1.56-6.13)  K/mm3


 


Lymph # (Auto)     (1.18-3.74)  K/mm3


 


Mono # (Auto)     (0.24-0.36)  K/mm3


 


Eos # (Auto)     (0.04-0.36)  K/mm3


 


Baso # (Auto)     (0.01-0.08)  K/mm3


 


Sodium     (136-145)  mEq/L


 


Potassium     (3.5-5.1)  mEq/L


 


Chloride     ()  mEq/L


 


Carbon Dioxide     (21-32)  mEq/L


 


Anion Gap     (5-15)  


 


BUN     (7-18)  mg/dL


 


Creatinine     (0.55-1.02)  mg/dL


 


Est Cr Clr Drug Dosing     mL/min


 


Estimated GFR (MDRD)     (>60)  mL/min


 


BUN/Creatinine Ratio     (14-18)  


 


Glucose     (70-99)  mg/dL


 


POC Glucose    77  (70-99)  mg/dL


 


Lactic Acid   1.3   (0.4-2.0)  mmol/L


 


Calcium     (8.5-10.1)  mg/dL


 


Magnesium     (1.8-2.4)  mg/dL


 


Total Bilirubin     (0.2-1.0)  mg/dL


 


AST     (15-37)  U/L


 


ALT     (14-59)  U/L


 


Alkaline Phosphatase     ()  U/L


 


NT-Pro-B Natriuret Pep     (0-450)  pg/mL


 


Total Protein     (6.4-8.2)  g/dl


 


Albumin     (3.4-5.0)  g/dl


 


Globulin     gm/dL


 


Albumin/Globulin Ratio     (1-2)  


 


SARS-CoV-2 RNA (MENA)  Negative    (NEGATIVE)  


 


MRSA (PCR)     














  10/20/21 10/20/21 10/21/21 Range/Units





  22:06 22:25 06:15 


 


WBC    11.06 H  (3.98-10.04)  K/mm3


 


RBC    3.86 L  (3.98-5.22)  M/mm3


 


Hgb    11.2  D  (11.2-15.7)  gm/dl


 


Hct    36.3  (34.1-44.9)  %


 


MCV    94.0  (79.4-94.8)  fl


 


MCH    29.0  (25.6-32.2)  pg


 


MCHC    30.9 L  (32.2-35.5)  g/dl


 


RDW Std Deviation    56.0 H  (36.4-46.3)  fL


 


Plt Count    206  (182-369)  K/mm3


 


MPV    10.3  (9.4-12.3)  fl


 


Neut % (Auto)    77.0 H  (34.0-71.1)  %


 


Lymph % (Auto)    12.7 L  (19.3-51.7)  %


 


Mono % (Auto)    8.3  (4.7-12.5)  %


 


Eos % (Auto)    1.2  (0.7-5.8)  


 


Baso % (Auto)    0.4  (0.1-1.2)  %


 


Neut # (Auto)    8.53 H  (1.56-6.13)  K/mm3


 


Lymph # (Auto)    1.40  (1.18-3.74)  K/mm3


 


Mono # (Auto)    0.92 H  (0.24-0.36)  K/mm3


 


Eos # (Auto)    0.13  (0.04-0.36)  K/mm3


 


Baso # (Auto)    0.04  (0.01-0.08)  K/mm3


 


Sodium     (136-145)  mEq/L


 


Potassium     (3.5-5.1)  mEq/L


 


Chloride     ()  mEq/L


 


Carbon Dioxide     (21-32)  mEq/L


 


Anion Gap     (5-15)  


 


BUN     (7-18)  mg/dL


 


Creatinine     (0.55-1.02)  mg/dL


 


Est Cr Clr Drug Dosing     mL/min


 


Estimated GFR (MDRD)     (>60)  mL/min


 


BUN/Creatinine Ratio     (14-18)  


 


Glucose     (70-99)  mg/dL


 


POC Glucose  182 H    (70-99)  mg/dL


 


Lactic Acid     (0.4-2.0)  mmol/L


 


Calcium     (8.5-10.1)  mg/dL


 


Magnesium     (1.8-2.4)  mg/dL


 


Total Bilirubin     (0.2-1.0)  mg/dL


 


AST     (15-37)  U/L


 


ALT     (14-59)  U/L


 


Alkaline Phosphatase     ()  U/L


 


NT-Pro-B Natriuret Pep     (0-450)  pg/mL


 


Total Protein     (6.4-8.2)  g/dl


 


Albumin     (3.4-5.0)  g/dl


 


Globulin     gm/dL


 


Albumin/Globulin Ratio     (1-2)  


 


SARS-CoV-2 RNA (MENA)     (NEGATIVE)  


 


MRSA (PCR)   Negative   














  10/21/21 10/21/21 10/21/21 Range/Units





  06:15 06:15 06:37 


 


WBC     (3.98-10.04)  K/mm3


 


RBC     (3.98-5.22)  M/mm3


 


Hgb     (11.2-15.7)  gm/dl


 


Hct     (34.1-44.9)  %


 


MCV     (79.4-94.8)  fl


 


MCH     (25.6-32.2)  pg


 


MCHC     (32.2-35.5)  g/dl


 


RDW Std Deviation     (36.4-46.3)  fL


 


Plt Count     (182-369)  K/mm3


 


MPV     (9.4-12.3)  fl


 


Neut % (Auto)     (34.0-71.1)  %


 


Lymph % (Auto)     (19.3-51.7)  %


 


Mono % (Auto)     (4.7-12.5)  %


 


Eos % (Auto)     (0.7-5.8)  


 


Baso % (Auto)     (0.1-1.2)  %


 


Neut # (Auto)     (1.56-6.13)  K/mm3


 


Lymph # (Auto)     (1.18-3.74)  K/mm3


 


Mono # (Auto)     (0.24-0.36)  K/mm3


 


Eos # (Auto)     (0.04-0.36)  K/mm3


 


Baso # (Auto)     (0.01-0.08)  K/mm3


 


Sodium  140    (136-145)  mEq/L


 


Potassium  4.1    (3.5-5.1)  mEq/L


 


Chloride  104    ()  mEq/L


 


Carbon Dioxide  28    (21-32)  mEq/L


 


Anion Gap  12.1    (5-15)  


 


BUN  33 H    (7-18)  mg/dL


 


Creatinine  2.6 H    (0.55-1.02)  mg/dL


 


Est Cr Clr Drug Dosing  11.07    mL/min


 


Estimated GFR (MDRD)  17    (>60)  mL/min


 


BUN/Creatinine Ratio  12.7 L    (14-18)  


 


Glucose  118 H    (70-99)  mg/dL


 


POC Glucose    141 H  (70-99)  mg/dL


 


Lactic Acid     (0.4-2.0)  mmol/L


 


Calcium  8.6    (8.5-10.1)  mg/dL


 


Magnesium  2.2    (1.8-2.4)  mg/dL


 


Total Bilirubin  0.4    (0.2-1.0)  mg/dL


 


AST  13 L    (15-37)  U/L


 


ALT  12 L    (14-59)  U/L


 


Alkaline Phosphatase  84    ()  U/L


 


NT-Pro-B Natriuret Pep   252   (0-450)  pg/mL


 


Total Protein  6.6    (6.4-8.2)  g/dl


 


Albumin  3.1 L    (3.4-5.0)  g/dl


 


Globulin  3.5    gm/dL


 


Albumin/Globulin Ratio  0.9 L    (1-2)  


 


SARS-CoV-2 RNA (MENA)     (NEGATIVE)  


 


MRSA (PCR)     














  10/21/21 Range/Units





  11:06 


 


WBC   (3.98-10.04)  K/mm3


 


RBC   (3.98-5.22)  M/mm3


 


Hgb   (11.2-15.7)  gm/dl


 


Hct   (34.1-44.9)  %


 


MCV   (79.4-94.8)  fl


 


MCH   (25.6-32.2)  pg


 


MCHC   (32.2-35.5)  g/dl


 


RDW Std Deviation   (36.4-46.3)  fL


 


Plt Count   (182-369)  K/mm3


 


MPV   (9.4-12.3)  fl


 


Neut % (Auto)   (34.0-71.1)  %


 


Lymph % (Auto)   (19.3-51.7)  %


 


Mono % (Auto)   (4.7-12.5)  %


 


Eos % (Auto)   (0.7-5.8)  


 


Baso % (Auto)   (0.1-1.2)  %


 


Neut # (Auto)   (1.56-6.13)  K/mm3


 


Lymph # (Auto)   (1.18-3.74)  K/mm3


 


Mono # (Auto)   (0.24-0.36)  K/mm3


 


Eos # (Auto)   (0.04-0.36)  K/mm3


 


Baso # (Auto)   (0.01-0.08)  K/mm3


 


Sodium   (136-145)  mEq/L


 


Potassium   (3.5-5.1)  mEq/L


 


Chloride   ()  mEq/L


 


Carbon Dioxide   (21-32)  mEq/L


 


Anion Gap   (5-15)  


 


BUN   (7-18)  mg/dL


 


Creatinine   (0.55-1.02)  mg/dL


 


Est Cr Clr Drug Dosing   mL/min


 


Estimated GFR (MDRD)   (>60)  mL/min


 


BUN/Creatinine Ratio   (14-18)  


 


Glucose   (70-99)  mg/dL


 


POC Glucose  98  (70-99)  mg/dL


 


Lactic Acid   (0.4-2.0)  mmol/L


 


Calcium   (8.5-10.1)  mg/dL


 


Magnesium   (1.8-2.4)  mg/dL


 


Total Bilirubin   (0.2-1.0)  mg/dL


 


AST   (15-37)  U/L


 


ALT   (14-59)  U/L


 


Alkaline Phosphatase   ()  U/L


 


NT-Pro-B Natriuret Pep   (0-450)  pg/mL


 


Total Protein   (6.4-8.2)  g/dl


 


Albumin   (3.4-5.0)  g/dl


 


Globulin   gm/dL


 


Albumin/Globulin Ratio   (1-2)  


 


SARS-CoV-2 RNA (MENA)   (NEGATIVE)  


 


MRSA (PCR)   











[place] Results Last 24 Hours: 


                                  Microbiology











 10/20/21 16:15 Influenza Type A Antigen Screen - Final





 Nasopharyngeal Swab - Nare, Unspecified    NEGATIVE INFLUENZA A VIRUS AG





    REFERENCE RANGE: NEGATIVE





 Influenza Type B Antigen Screen - Final





    NEGATIVE INFLUENZA B VIRUS AG





    REFERENCE RANGE: NEGATIVE











Med Orders - Current: 


                               Current Medications





Acetaminophen (Acetaminophen 650 Mg Supp)  650 mg RECTAL Q6H PRN


   PRN Reason: Pain (mild 1-3)


Acetaminophen (Acetaminophen 325 Mg Tab)  650 mg PO Q4H PRN


   PRN Reason: Pain


   Last Admin: 10/20/21 21:00 Dose:  650 mg


   Documented by: 


Albuterol/Ipratropium (Albuterol/Ipratropium 3.0-0.5 Mg/3 Ml Neb Soln)  3 ml NEB

Q4H PRN


   PRN Reason: Shortness Of Breath/wheezing


Bisacodyl (Bisacodyl 5 Mg Tab)  10 mg PO DAILY PRN


   PRN Reason: Constipation


   Last Admin: 10/20/21 20:26 Dose:  5 mg


   Documented by: 


Calcium Carbonate/Glycine (Calcium Carbonate 500 Mg Tab.Chew)  250 mg PO Q4H PRN


   PRN Reason: Heartburn


Clopidogrel Bisulfate (Clopidogrel 75 Mg Tab)  75 mg PO DAILY [facility]


   Last Admin: 10/21/21 09:00 Dose:  75 mg


   Documented by: 


Gabapentin (Gabapentin 300 Mg Cap)  300 mg PO BEDTIME [facility]


Heparin Sodium (Porcine) (Heparin Sodium 5,000 Units/Ml Vial)  5,000 units 

SUBCUT Q8H [facility]


   Last Admin: 10/21/21 12:14 Dose:  5,000 units


   Documented by: 


Promethazine HCl 6.25 mg/ (Sodium Chloride)  50.25 mls @ 100 mls/hr IV Q6H PRN


   PRN Reason: Nausea/Vomiting


Ceftriaxone Sodium 1 gm/ (Sodium Chloride)  100 mls @ 200 mls/hr IV Q12H [facility]


   Last Admin: 10/21/21 09:00 Dose:  200 mls/hr


   Documented by: 


Insulin Glargine (Insulin Glargine,Hum.Rec.Anlog 100 Unit/Ml 3 Ml Pen)  10 unit 

SUBCUT BEDTIME [facility]


Insulin Human Lispro (Insulin Lispro 100 Unit/Ml 3 Ml Kwikpen)  0 unit SUBCUT 

QIDACANDBED [facility]; Protocol


   Last Admin: 10/21/21 11:34 Dose:  Not Given


   Documented by: 


Morphine Sulfate (Morphine 2 Mg/Ml Syringe)  1 mg IVPUSH Q4H PRN


   PRN Reason: Pain (severe 7-10)


   Stop: 10/21/21 19:06


Oxycodone HCl (Oxycodone 5 Mg Tab)  5 mg PO Q6H PRN


   PRN Reason: Pain (moderate 4-6)


Pantoprazole Sodium (Pantoprazole 40 Mg Tab.Cr)  40 mg PO ACBREAKFAST [facility]


   Last Admin: 10/21/21 05:00 Dose:  40 mg


   Documented by: 


Polyethylene Glycol (Polyethylene Glycol 3350 Powder 17 Gm Packet)  17 gm PO 

DAILY PRN


   PRN Reason: Constipation


Sodium Chloride (Sodium Chloride 0.9% 10 Ml Syringe)  10 ml FLUSH ASDIRECTED PRN


   PRN Reason: Keep Vein Open


   Last Admin: 10/20/21 16:50 Dose:  10 ml


   Documented by: 





Discontinued Medications





Acetaminophen (Acetaminophen 325 Mg Tab)  975 mg PO NOW ONE


   Stop: 10/20/21 16:13


   Last Admin: 10/20/21 16:50 Dose:  975 mg


   Documented by: 


Gabapentin (Gabapentin 300 Mg Cap)  600 mg PO ONETIME ONE


   Stop: 10/20/21 19:46


   Last Admin: 10/20/21 20:22 Dose:  600 mg


   Documented by: 


Sodium Chloride (Normal Saline)  1,000 mls @ 125 mls/hr IV ASDIRECTED [facility]


   Last Admin: 10/20/21 16:49 Dose:  125 mls/hr


   Documented by: 


Ceftriaxone Sodium 2 gm/ (Sodium Chloride)  100 mls @ 200 mls/hr IV ONETIME ONE


   Stop: 10/20/21 16:42


   Last Admin: 10/20/21 16:50 Dose:  200 mls/hr


   Documented by: 


Lactated Ringer's (Ringers, Lactated)  1,000 mls @ 100 mls/hr IV ASDIRECTED [facility]


Lactated Ringer's (Ringers, Lactated)  1,000 mls @ 50 mls/hr IV ASDIRECTED [facility]


   Last Admin: 10/20/21 20:47 Dose:  50 mls/hr


   Documented by: 


Ceftriaxone Sodium 1 gm/ (Sodium Chloride)  100 mls @ 200 mls/hr IV Q24H [facility]


Insulin Glargine (Insulin Glargine,Hum.Rec.Anlog 100 Unit/Ml 3 Ml Pen)  10 unit 

SUBCUT DAILY [facility]


   Last Admin: 10/21/21 10:28 Dose:  Not Given


   Documented by: 











- Exam


Quality Assessment: No: Supplemental Oxygen


General: Alert, Oriented


HEENT: Pupils Equal, Mucous Membr. Moist/Pink


Neck: Supple


Lungs: Clear to Auscultation, Normal Respiratory Effort


Cardiovascular: Regular Rate, Regular Rhythm


GI/Abdominal Exam: Normal Bowel Sounds, Soft, Non-Tender, No Distention


Extremities: Pedal Edema (1+)


Skin: Other (Mild Erythema Bilateral lower extremities right worse than left )


Neurological: No New Focal Deficit


Psy/Mental Status: Alert, Normal Affect, Normal Mood





- Patient Data


Lab Results Last 24 hrs: 


                         Laboratory Results - last 24 hr











  10/20/21 10/20/21 10/20/21 Range/Units





  16:15 16:25 20:45 


 


WBC     (3.98-10.04)  K/mm3


 


RBC     (3.98-5.22)  M/mm3


 


Hgb     (11.2-15.7)  gm/dl


 


Hct     (34.1-44.9)  %


 


MCV     (79.4-94.8)  fl


 


MCH     (25.6-32.2)  pg


 


MCHC     (32.2-35.5)  g/dl


 


RDW Std Deviation     (36.4-46.3)  fL


 


Plt Count     (182-369)  K/mm3


 


MPV     (9.4-12.3)  fl


 


Neut % (Auto)     (34.0-71.1)  %


 


Lymph % (Auto)     (19.3-51.7)  %


 


Mono % (Auto)     (4.7-12.5)  %


 


Eos % (Auto)     (0.7-5.8)  


 


Baso % (Auto)     (0.1-1.2)  %


 


Neut # (Auto)     (1.56-6.13)  K/mm3


 


Lymph # (Auto)     (1.18-3.74)  K/mm3


 


Mono # (Auto)     (0.24-0.36)  K/mm3


 


Eos # (Auto)     (0.04-0.36)  K/mm3


 


Baso # (Auto)     (0.01-0.08)  K/mm3


 


Sodium     (136-145)  mEq/L


 


Potassium     (3.5-5.1)  mEq/L


 


Chloride     ()  mEq/L


 


Carbon Dioxide     (21-32)  mEq/L


 


Anion Gap     (5-15)  


 


BUN     (7-18)  mg/dL


 


Creatinine     (0.55-1.02)  mg/dL


 


Est Cr Clr Drug Dosing     mL/min


 


Estimated GFR (MDRD)     (>60)  mL/min


 


BUN/Creatinine Ratio     (14-18)  


 


Glucose     (70-99)  mg/dL


 


POC Glucose    77  (70-99)  mg/dL


 


Lactic Acid   1.3   (0.4-2.0)  mmol/L


 


Calcium     (8.5-10.1)  mg/dL


 


Magnesium     (1.8-2.4)  mg/dL


 


Total Bilirubin     (0.2-1.0)  mg/dL


 


AST     (15-37)  U/L


 


ALT     (14-59)  U/L


 


Alkaline Phosphatase     ()  U/L


 


NT-Pro-B Natriuret Pep     (0-450)  pg/mL


 


Total Protein     (6.4-8.2)  g/dl


 


Albumin     (3.4-5.0)  g/dl


 


Globulin     gm/dL


 


Albumin/Globulin Ratio     (1-2)  


 


SARS-CoV-2 RNA (MENA)  Negative    (NEGATIVE)  


 


MRSA (PCR)     














  10/20/21 10/20/21 10/21/21 Range/Units





  22:06 22:25 06:15 


 


WBC    11.06 H  (3.98-10.04)  K/mm3


 


RBC    3.86 L  (3.98-5.22)  M/mm3


 


Hgb    11.2  D  (11.2-15.7)  gm/dl


 


Hct    36.3  (34.1-44.9)  %


 


MCV    94.0  (79.4-94.8)  fl


 


MCH    29.0  (25.6-32.2)  pg


 


MCHC    30.9 L  (32.2-35.5)  g/dl


 


RDW Std Deviation    56.0 H  (36.4-46.3)  fL


 


Plt Count    206  (182-369)  K/mm3


 


MPV    10.3  (9.4-12.3)  fl


 


Neut % (Auto)    77.0 H  (34.0-71.1)  %


 


Lymph % (Auto)    12.7 L  (19.3-51.7)  %


 


Mono % (Auto)    8.3  (4.7-12.5)  %


 


Eos % (Auto)    1.2  (0.7-5.8)  


 


Baso % (Auto)    0.4  (0.1-1.2)  %


 


Neut # (Auto)    8.53 H  (1.56-6.13)  K/mm3


 


Lymph # (Auto)    1.40  (1.18-3.74)  K/mm3


 


Mono # (Auto)    0.92 H  (0.24-0.36)  K/mm3


 


Eos # (Auto)    0.13  (0.04-0.36)  K/mm3


 


Baso # (Auto)    0.04  (0.01-0.08)  K/mm3


 


Sodium     (136-145)  mEq/L


 


Potassium     (3.5-5.1)  mEq/L


 


Chloride     ()  mEq/L


 


Carbon Dioxide     (21-32)  mEq/L


 


Anion Gap     (5-15)  


 


BUN     (7-18)  mg/dL


 


Creatinine     (0.55-1.02)  mg/dL


 


Est Cr Clr Drug Dosing     mL/min


 


Estimated GFR (MDRD)     (>60)  mL/min


 


BUN/Creatinine Ratio     (14-18)  


 


Glucose     (70-99)  mg/dL


 


POC Glucose  182 H    (70-99)  mg/dL


 


Lactic Acid     (0.4-2.0)  mmol/L


 


Calcium     (8.5-10.1)  mg/dL


 


Magnesium     (1.8-2.4)  mg/dL


 


Total Bilirubin     (0.2-1.0)  mg/dL


 


AST     (15-37)  U/L


 


ALT     (14-59)  U/L


 


Alkaline Phosphatase     ()  U/L


 


NT-Pro-B Natriuret Pep     (0-450)  pg/mL


 


Total Protein     (6.4-8.2)  g/dl


 


Albumin     (3.4-5.0)  g/dl


 


Globulin     gm/dL


 


Albumin/Globulin Ratio     (1-2)  


 


SARS-CoV-2 RNA (MENA)     (NEGATIVE)  


 


MRSA (PCR)   Negative   














  10/21/21 10/21/21 10/21/21 Range/Units





  06:15 06:15 06:37 


 


WBC     (3.98-10.04)  K/mm3


 


RBC     (3.98-5.22)  M/mm3


 


Hgb     (11.2-15.7)  gm/dl


 


Hct     (34.1-44.9)  %


 


MCV     (79.4-94.8)  fl


 


MCH     (25.6-32.2)  pg


 


MCHC     (32.2-35.5)  g/dl


 


RDW Std Deviation     (36.4-46.3)  fL


 


Plt Count     (182-369)  K/mm3


 


MPV     (9.4-12.3)  fl


 


Neut % (Auto)     (34.0-71.1)  %


 


Lymph % (Auto)     (19.3-51.7)  %


 


Mono % (Auto)     (4.7-12.5)  %


 


Eos % (Auto)     (0.7-5.8)  


 


Baso % (Auto)     (0.1-1.2)  %


 


Neut # (Auto)     (1.56-6.13)  K/mm3


 


Lymph # (Auto)     (1.18-3.74)  K/mm3


 


Mono # (Auto)     (0.24-0.36)  K/mm3


 


Eos # (Auto)     (0.04-0.36)  K/mm3


 


Baso # (Auto)     (0.01-0.08)  K/mm3


 


Sodium  140    (136-145)  mEq/L


 


Potassium  4.1    (3.5-5.1)  mEq/L


 


Chloride  104    ()  mEq/L


 


Carbon Dioxide  28    (21-32)  mEq/L


 


Anion Gap  12.1    (5-15)  


 


BUN  33 H    (7-18)  mg/dL


 


Creatinine  2.6 H    (0.55-1.02)  mg/dL


 


Est Cr Clr Drug Dosing  11.07    mL/min


 


Estimated GFR (MDRD)  17    (>60)  mL/min


 


BUN/Creatinine Ratio  12.7 L    (14-18)  


 


Glucose  118 H    (70-99)  mg/dL


 


POC Glucose    141 H  (70-99)  mg/dL


 


Lactic Acid     (0.4-2.0)  mmol/L


 


Calcium  8.6    (8.5-10.1)  mg/dL


 


Magnesium  2.2    (1.8-2.4)  mg/dL


 


Total Bilirubin  0.4    (0.2-1.0)  mg/dL


 


AST  13 L    (15-37)  U/L


 


ALT  12 L    (14-59)  U/L


 


Alkaline Phosphatase  84    ()  U/L


 


NT-Pro-B Natriuret Pep   252   (0-450)  pg/mL


 


Total Protein  6.6    (6.4-8.2)  g/dl


 


Albumin  3.1 L    (3.4-5.0)  g/dl


 


Globulin  3.5    gm/dL


 


Albumin/Globulin Ratio  0.9 L    (1-2)  


 


SARS-CoV-2 RNA (MENA)     (NEGATIVE)  


 


MRSA (PCR)     














  10/21/21 Range/Units





  11:06 


 


WBC   (3.98-10.04)  K/mm3


 


RBC   (3.98-5.22)  M/mm3


 


Hgb   (11.2-15.7)  gm/dl


 


Hct   (34.1-44.9)  %


 


MCV   (79.4-94.8)  fl


 


MCH   (25.6-32.2)  pg


 


MCHC   (32.2-35.5)  g/dl


 


RDW Std Deviation   (36.4-46.3)  fL


 


Plt Count   (182-369)  K/mm3


 


MPV   (9.4-12.3)  fl


 


Neut % (Auto)   (34.0-71.1)  %


 


Lymph % (Auto)   (19.3-51.7)  %


 


Mono % (Auto)   (4.7-12.5)  %


 


Eos % (Auto)   (0.7-5.8)  


 


Baso % (Auto)   (0.1-1.2)  %


 


Neut # (Auto)   (1.56-6.13)  K/mm3


 


Lymph # (Auto)   (1.18-3.74)  K/mm3


 


Mono # (Auto)   (0.24-0.36)  K/mm3


 


Eos # (Auto)   (0.04-0.36)  K/mm3


 


Baso # (Auto)   (0.01-0.08)  K/mm3


 


Sodium   (136-145)  mEq/L


 


Potassium   (3.5-5.1)  mEq/L


 


Chloride   ()  mEq/L


 


Carbon Dioxide   (21-32)  mEq/L


 


Anion Gap   (5-15)  


 


BUN   (7-18)  mg/dL


 


Creatinine   (0.55-1.02)  mg/dL


 


Est Cr Clr Drug Dosing   mL/min


 


Estimated GFR (MDRD)   (>60)  mL/min


 


BUN/Creatinine Ratio   (14-18)  


 


Glucose   (70-99)  mg/dL


 


POC Glucose  98  (70-99)  mg/dL


 


Lactic Acid   (0.4-2.0)  mmol/L


 


Calcium   (8.5-10.1)  mg/dL


 


Magnesium   (1.8-2.4)  mg/dL


 


Total Bilirubin   (0.2-1.0)  mg/dL


 


AST   (15-37)  U/L


 


ALT   (14-59)  U/L


 


Alkaline Phosphatase   ()  U/L


 


NT-Pro-B Natriuret Pep   (0-450)  pg/mL


 


Total Protein   (6.4-8.2)  g/dl


 


Albumin   (3.4-5.0)  g/dl


 


Globulin   gm/dL


 


Albumin/Globulin Ratio   (1-2)  


 


SARS-CoV-2 RNA (MENA)   (NEGATIVE)  


 


MRSA (PCR)   











Result Diagrams: 


                                 10/21/21 06:15





                                 10/21/21 06:15


[facility] Results Last 24 hrs: 


                                  Microbiology











 10/20/21 16:15 Influenza Type A Antigen Screen - Final





 Nasopharyngeal Swab - Nare, Unspecified    NEGATIVE INFLUENZA A VIRUS AG





    REFERENCE RANGE: NEGATIVE





 Influenza Type B Antigen Screen - Final





    NEGATIVE INFLUENZA B VIRUS AG





    REFERENCE RANGE: NEGATIVE














Sepsis Event Note





- Evaluation


Sepsis Screening Result: No Definite Risk





- Focused Exam


Vital Signs: 


                                   Vital Signs











  Temp Pulse Resp BP Pulse Ox Pulse Ox Pulse Ox


 


 10/21/21 15:06  98.6 F  77  20  128/47 L  93 L  


 


 10/21/21 12:13  98.1 F      


 


 10/21/21 11:17  99.0 F  65  20  117/64  98  


 


 10/21/21 08:55        92 L


 


 10/21/21 07:47   65    93 L  


 


 10/21/21 07:46  98.2 F  65  16  102/39 L  90 L  


 


 10/21/21 06:29       98 














- Problem List & Annotations


(1) Cellulitis of right leg


SNOMED Code(s): 201812411


   Code(s): L03.115 - CELLULITIS OF RIGHT LOWER LIMB   Status: Acute   Current 

Visit: Yes   





(2) Sepsis


SNOMED Code(s): 30003748


   Code(s): A41.9 - SEPSIS, UNSPECIFIED ORGANISM   Status: Acute   Current 

Visit: Yes   


Qualifiers: 


   Sepsis type: sepsis due to unspecified organism   Sepsis acute organ 

dysfunction status: unspecified   Qualified Code(s): A41.9 - Sepsis, unspecified

organism   





(3) Renal insufficiency


SNOMED Code(s): 932017563, 116563512


   Code(s): N28.9 - DISORDER OF KIDNEY AND URETER, UNSPECIFIED   Status: Chronic

  Priority: High   Current Visit: Yes   





(4) Acute on chronic renal failure


SNOMED Code(s): 286383409


   Code(s): N17.9 - ACUTE KIDNEY FAILURE, UNSPECIFIED; N18.9 - CHRONIC KIDNEY 

DISEASE, UNSPECIFIED   Status: Acute   Priority: High   Current Visit: No   


Qualifiers: 


   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 

disease stage: stage 4 (severe)   Qualified Code(s): N17.2 - Acute kidney 

failure with medullary necrosis; N18.4 - Chronic kidney disease, stage 4 

(severe)   





- Problem List Review


Problem List Initiated/Reviewed/Updated: Yes





- My Orders


Last 24 Hours: 


My Active Orders





10/21/21 08:00


cefTRIAXone [Rocephin] 1 gm   Sodium Chloride 0.9% [Normal Saline] 100 ml IV 

Q12H 





10/21/21 15:52


Up to Chair [RC] BID 





10/21/21 Dinner


Mechanical Soft Diet [DIET] 














- Plan


Plan:: 





Patient is an 89-year-old female with a history of CHF, hypertension, diabetes, 

and atrial fibrillation who was brought to the ER from a nursing home due to 

generalized weakness, confusion and fever.





Assessment and plan





AMSresolved


Etiologies include sepsis/infection, dehydration, metabolic events


No focal neurological deficits


Closely monitor


Speech therapy today cleared her for mechanical soft diet.





Early sepsis 2nd to cellulitis of right leg


LA 1.3, CRP 2.8


Blood culture


DC IV fluid


Antibiotics





Cellulitis, right leg and foot


Had great toe nails removed lsat week


US doppler to rule out a DVT


Ceftriaxone 1 g IV twice daily


Blood cultures pending





Acute hypoxic respiratory failureresolved


Oxygen saturation 88% in the ER


Etiology unknown


Pulse ox


Oxygen therapy to keep oxygen saturation greater than 94%on room air





KATIE on CKD, creatinine 1.0 on 1/11/2019, creatinine has been around 2.0 over the

 past 2-3 years. 


Creatinine 2.6


Avoid nephrotoxic meds


Repeat renal function in the morning





DM type 2


on insulin degludec 20units daily


N.p.o. due to history of dysphagia


I will order Lantus 10 units daily


Insulin sliding scales





HTN


Metoprolol succinate 50mg daily is on hold due to soft blood pressure


Hydralazine as needed





CHF, NYHA class II


lasix 20mg daily and spironolactone 50mg daily are on hold


Intake and output


Repeat electrolytes in the morning





Atrial fibrillation


Heart rate is controlled


Metoprolol is on hold due to soft blood pressure


History of GI bleeding.  Not on anticoagulation





Hx of GI bleeding


Continue home medication Plavix


Repeat a CBC in morning





hx of dysphagia


N.p.o.


Speech pathology consult





Generalized weakness


PT OT





DVT prophylaxis: Heparin





CODE STATUS: DNR/DNI.  Discussed with the patient and her daughter who declined 

CPR and intubation





Disposition: Greater than 2 midnights

## 2021-10-22 NOTE — PCM.PN
- General Info


Date of Service: 10/22/21


Admission Dx/Problem (Free Text): 


                           Admission Diagnosis/Problem





Admission Diagnosis/Problem      Sepsis








Subjective Update: 





Patient is an 89-year-old female with a history of CHF, hypertension, diabetes, 

and atrial fibrillation who was brought to the ER from a nursing home due to 

generalized weakness, confusion and fever.


Liliana is feeling much stronger and would like to be able to stand up without the

Ken lift.  PT is working with her.


Functional Status: Reports: Pain Controlled





- Review of Systems


General: Reports: No Symptoms


HEENT: Reports: No Symptoms


Pulmonary: Reports: No Symptoms


Cardiovascular: Reports: No Symptoms


Gastrointestinal: Reports: No Symptoms


Musculoskeletal: Reports: No Symptoms


Psychiatric: Reports: No Symptoms





- Patient Data


Vitals - Most Recent: 


                                Last Vital Signs











Temp  98.8 F   10/22/21 11:28


 


Pulse  91   10/22/21 11:28


 


Resp  17   10/22/21 11:28


 


BP  141/40 H  10/22/21 11:28


 


Pulse Ox  92 L  10/22/21 11:28











Weight - Most Recent: 155 lb 6.4 oz


I&O - Last 24 Hours: 


                                 Intake & Output











 10/22/21 10/22/21 10/22/21





 06:59 14:59 22:59


 


Intake Total 700 120 


 


Balance 700 120 











Lab Results Last 24 Hours: 


                         Laboratory Results - last 24 hr











  10/21/21 10/21/21 10/22/21 Range/Units





  17:21 19:58 06:28 


 


WBC     (3.98-10.04)  K/mm3


 


RBC     (3.98-5.22)  M/mm3


 


Hgb     (11.2-15.7)  gm/dl


 


Hct     (34.1-44.9)  %


 


MCV     (79.4-94.8)  fl


 


MCH     (25.6-32.2)  pg


 


MCHC     (32.2-35.5)  g/dl


 


RDW Std Deviation     (36.4-46.3)  fL


 


Plt Count     (182-369)  K/mm3


 


MPV     (9.4-12.3)  fl


 


Neut % (Auto)     (34.0-71.1)  %


 


Lymph % (Auto)     (19.3-51.7)  %


 


Mono % (Auto)     (4.7-12.5)  %


 


Eos % (Auto)     (0.7-5.8)  


 


Baso % (Auto)     (0.1-1.2)  %


 


Neut # (Auto)     (1.56-6.13)  K/mm3


 


Lymph # (Auto)     (1.18-3.74)  K/mm3


 


Mono # (Auto)     (0.24-0.36)  K/mm3


 


Eos # (Auto)     (0.04-0.36)  K/mm3


 


Baso # (Auto)     (0.01-0.08)  K/mm3


 


Sodium     (136-145)  mEq/L


 


Potassium     (3.5-5.1)  mEq/L


 


Chloride     ()  mEq/L


 


Carbon Dioxide     (21-32)  mEq/L


 


Anion Gap     (5-15)  


 


BUN     (7-18)  mg/dL


 


Creatinine     (0.55-1.02)  mg/dL


 


Est Cr Clr Drug Dosing     mL/min


 


Estimated GFR (MDRD)     (>60)  mL/min


 


BUN/Creatinine Ratio     (14-18)  


 


Glucose     (70-99)  mg/dL


 


POC Glucose  235 H  207 H  125 H  (70-99)  mg/dL


 


Calcium     (8.5-10.1)  mg/dL


 


Magnesium     (1.8-2.4)  mg/dL


 


Total Bilirubin     (0.2-1.0)  mg/dL


 


AST     (15-37)  U/L


 


ALT     (14-59)  U/L


 


Alkaline Phosphatase     ()  U/L


 


Total Protein     (6.4-8.2)  g/dl


 


Albumin     (3.4-5.0)  g/dl


 


Globulin     gm/dL


 


Albumin/Globulin Ratio     (1-2)  














  10/22/21 10/22/21 10/22/21 Range/Units





  06:40 06:40 06:40 


 


WBC  14.49 H    (3.98-10.04)  K/mm3


 


RBC  4.07    (3.98-5.22)  M/mm3


 


Hgb  12.0    (11.2-15.7)  gm/dl


 


Hct  37.7    (34.1-44.9)  %


 


MCV  92.6    (79.4-94.8)  fl


 


MCH  29.5    (25.6-32.2)  pg


 


MCHC  31.8 L    (32.2-35.5)  g/dl


 


RDW Std Deviation  55.0 H    (36.4-46.3)  fL


 


Plt Count  219    (182-369)  K/mm3


 


MPV  10.0    (9.4-12.3)  fl


 


Neut % (Auto)  78.4 H    (34.0-71.1)  %


 


Lymph % (Auto)  13.0 L    (19.3-51.7)  %


 


Mono % (Auto)  6.6    (4.7-12.5)  %


 


Eos % (Auto)  1.5    (0.7-5.8)  


 


Baso % (Auto)  0.2    (0.1-1.2)  %


 


Neut # (Auto)  11.36 H    (1.56-6.13)  K/mm3


 


Lymph # (Auto)  1.88    (1.18-3.74)  K/mm3


 


Mono # (Auto)  0.95 H    (0.24-0.36)  K/mm3


 


Eos # (Auto)  0.22    (0.04-0.36)  K/mm3


 


Baso # (Auto)  0.03    (0.01-0.08)  K/mm3


 


Sodium   138   (136-145)  mEq/L


 


Potassium   4.0   (3.5-5.1)  mEq/L


 


Chloride   105   ()  mEq/L


 


Carbon Dioxide   25   (21-32)  mEq/L


 


Anion Gap   12.0   (5-15)  


 


BUN   28 H   (7-18)  mg/dL


 


Creatinine   2.1 H   (0.55-1.02)  mg/dL


 


Est Cr Clr Drug Dosing   13.70   mL/min


 


Estimated GFR (MDRD)   22   (>60)  mL/min


 


BUN/Creatinine Ratio   13.3 L   (14-18)  


 


Glucose   135 H   (70-99)  mg/dL


 


POC Glucose     (70-99)  mg/dL


 


Calcium   8.9   (8.5-10.1)  mg/dL


 


Magnesium    2.2  (1.8-2.4)  mg/dL


 


Total Bilirubin   0.3   (0.2-1.0)  mg/dL


 


AST   17   (15-37)  U/L


 


ALT   14   (14-59)  U/L


 


Alkaline Phosphatase   84   ()  U/L


 


Total Protein   6.9   (6.4-8.2)  g/dl


 


Albumin   3.2 L   (3.4-5.0)  g/dl


 


Globulin   3.7   gm/dL


 


Albumin/Globulin Ratio   0.9 L   (1-2)  














  10/22/21 Range/Units





  11:21 


 


WBC   (3.98-10.04)  K/mm3


 


RBC   (3.98-5.22)  M/mm3


 


Hgb   (11.2-15.7)  gm/dl


 


Hct   (34.1-44.9)  %


 


MCV   (79.4-94.8)  fl


 


MCH   (25.6-32.2)  pg


 


MCHC   (32.2-35.5)  g/dl


 


RDW Std Deviation   (36.4-46.3)  fL


 


Plt Count   (182-369)  K/mm3


 


MPV   (9.4-12.3)  fl


 


Neut % (Auto)   (34.0-71.1)  %


 


Lymph % (Auto)   (19.3-51.7)  %


 


Mono % (Auto)   (4.7-12.5)  %


 


Eos % (Auto)   (0.7-5.8)  


 


Baso % (Auto)   (0.1-1.2)  %


 


Neut # (Auto)   (1.56-6.13)  K/mm3


 


Lymph # (Auto)   (1.18-3.74)  K/mm3


 


Mono # (Auto)   (0.24-0.36)  K/mm3


 


Eos # (Auto)   (0.04-0.36)  K/mm3


 


Baso # (Auto)   (0.01-0.08)  K/mm3


 


Sodium   (136-145)  mEq/L


 


Potassium   (3.5-5.1)  mEq/L


 


Chloride   ()  mEq/L


 


Carbon Dioxide   (21-32)  mEq/L


 


Anion Gap   (5-15)  


 


BUN   (7-18)  mg/dL


 


Creatinine   (0.55-1.02)  mg/dL


 


Est Cr Clr Drug Dosing   mL/min


 


Estimated GFR (MDRD)   (>60)  mL/min


 


BUN/Creatinine Ratio   (14-18)  


 


Glucose   (70-99)  mg/dL


 


POC Glucose  209 H  (70-99)  mg/dL


 


Calcium   (8.5-10.1)  mg/dL


 


Magnesium   (1.8-2.4)  mg/dL


 


Total Bilirubin   (0.2-1.0)  mg/dL


 


AST   (15-37)  U/L


 


ALT   (14-59)  U/L


 


Alkaline Phosphatase   ()  U/L


 


Total Protein   (6.4-8.2)  g/dl


 


Albumin   (3.4-5.0)  g/dl


 


Globulin   gm/dL


 


Albumin/Globulin Ratio   (1-2)  











[place] Results Last 24 Hours: 


                                  Microbiology











 10/20/21 16:40 Blood Culture - Preliminary





 Blood - Venous - Lab Draw    Gram Positive Cocci In Clustrs


 


 10/20/21 16:25 Blood Culture - Preliminary





 Blood - Venous - Lab Draw 











Med Orders - Current: 


                               Current Medications





Acetaminophen (Acetaminophen 650 Mg Supp)  650 mg RECTAL Q6H PRN


   PRN Reason: Pain (mild 1-3)


Acetaminophen (Acetaminophen 325 Mg Tab)  650 mg PO Q4H PRN


   PRN Reason: Pain


   Last Admin: 10/20/21 21:00 Dose:  650 mg


   Documented by: 


Albuterol/Ipratropium (Albuterol/Ipratropium 3.0-0.5 Mg/3 Ml Neb Soln)  3 ml NEB

Q4H PRN


   PRN Reason: Shortness Of Breath/wheezing


Bisacodyl (Bisacodyl 5 Mg Tab)  10 mg PO DAILY PRN


   PRN Reason: Constipation


   Last Admin: 10/20/21 20:26 Dose:  5 mg


   Documented by: 


Calcium Carbonate/Glycine (Calcium Carbonate 500 Mg Tab.Chew)  250 mg PO Q4H PRN


   PRN Reason: Heartburn


Clopidogrel Bisulfate (Clopidogrel 75 Mg Tab)  75 mg PO DAILY [facility]


   Last Admin: 10/22/21 08:29 Dose:  75 mg


   Documented by: 


Gabapentin (Gabapentin 300 Mg Cap)  300 mg PO BEDTIME [facility]


   Last Admin: 10/21/21 20:25 Dose:  Not Given


   Documented by: 


Heparin Sodium (Porcine) (Heparin Sodium 5,000 Units/Ml Vial)  5,000 units 

SUBCUT Q8H [facility]


   Last Admin: 10/22/21 11:51 Dose:  5,000 units


   Documented by: 


Promethazine HCl 6.25 mg/ (Sodium Chloride)  50.25 mls @ 100 mls/hr IV Q6H PRN


   PRN Reason: Nausea/Vomiting


Ceftriaxone Sodium 1 gm/ (Sodium Chloride)  100 mls @ 200 mls/hr IV Q12H [facility]


   Last Admin: 10/22/21 08:29 Dose:  200 mls/hr


   Documented by: 


Insulin Glargine (Insulin Glargine,Hum.Rec.Anlog 100 Unit/Ml 3 Ml Pen)  10 unit 

SUBCUT BEDTIME [facility]


   Last Admin: 10/21/21 19:59 Dose:  10 units


   Documented by: 


Insulin Human Lispro (Insulin Lispro 100 Unit/Ml 3 Ml Kwikpen)  0 unit SUBCUT 

QIDACANDBED [facility]; Protocol


   Last Admin: 10/22/21 11:50 Dose:  4 units


   Documented by: 


Oxycodone HCl (Oxycodone 5 Mg Tab)  5 mg PO Q6H PRN


   PRN Reason: Pain (moderate 4-6)


Pantoprazole Sodium (Pantoprazole 40 Mg Tab.Cr)  40 mg PO ACBREAKFAST [facility]


   Last Admin: 10/22/21 06:24 Dose:  40 mg


   Documented by: 


Polyethylene Glycol (Polyethylene Glycol 3350 Powder 17 Gm Packet)  17 gm PO 

DAILY PRN


   PRN Reason: Constipation


Sodium Chloride (Sodium Chloride 0.9% 10 Ml Syringe)  10 ml FLUSH ASDIRECTED PRN


   PRN Reason: Keep Vein Open


   Last Admin: 10/20/21 16:50 Dose:  10 ml


   Documented by: 





Discontinued Medications





Acetaminophen (Acetaminophen 325 Mg Tab)  975 mg PO NOW ONE


   Stop: 10/20/21 16:13


   Last Admin: 10/20/21 16:50 Dose:  975 mg


   Documented by: 


Gabapentin (Gabapentin 300 Mg Cap)  600 mg PO ONETIME ONE


   Stop: 10/20/21 19:46


   Last Admin: 10/20/21 20:22 Dose:  600 mg


   Documented by: 


Sodium Chloride (Normal Saline)  1,000 mls @ 125 mls/hr IV ASDIRECTED [facility]


   Last Admin: 10/20/21 16:49 Dose:  125 mls/hr


   Documented by: 


Ceftriaxone Sodium 2 gm/ (Sodium Chloride)  100 mls @ 200 mls/hr IV ONETIME ONE


   Stop: 10/20/21 16:42


   Last Admin: 10/20/21 16:50 Dose:  200 mls/hr


   Documented by: 


Lactated Ringer's (Ringers, Lactated)  1,000 mls @ 100 mls/hr IV ASDIRECTED [facility]


Lactated Ringer's (Ringers, Lactated)  1,000 mls @ 50 mls/hr IV ASDIRECTED [facility]


   Last Admin: 10/20/21 20:47 Dose:  50 mls/hr


   Documented by: 


Ceftriaxone Sodium 1 gm/ (Sodium Chloride)  100 mls @ 200 mls/hr IV Q24H [facility]


Insulin Glargine (Insulin Glargine,Hum.Rec.Anlog 100 Unit/Ml 3 Ml Pen)  10 unit 

SUBCUT DAILY [facility]


   Last Admin: 10/21/21 10:28 Dose:  Not Given


   Documented by: 


Morphine Sulfate (Morphine 2 Mg/Ml Syringe)  1 mg IVPUSH Q4H PRN


   PRN Reason: Pain (severe 7-10)


   Stop: 10/21/21 19:06











- Exam


Quality Assessment: No: Supplemental Oxygen


General: Alert, Oriented


HEENT: Pupils Equal, Mucous Membr. Moist/Pink


Neck: Supple


Lungs: Clear to Auscultation, Normal Respiratory Effort


Cardiovascular: Irregular Rhythm


GI/Abdominal Exam: Normal Bowel Sounds, Soft, Non-Tender, No Distention


Extremities: Pedal Edema (1+)


Skin: Other (Improved lower extremity erythema)


Wound/Incisions: Healing Well


Psy/Mental Status: Alert, Normal Affect, Normal Mood





- Patient Data


Lab Results Last 24 hrs: 


                         Laboratory Results - last 24 hr











  10/21/21 10/21/21 10/22/21 Range/Units





  17:21 19:58 06:28 


 


WBC     (3.98-10.04)  K/mm3


 


RBC     (3.98-5.22)  M/mm3


 


Hgb     (11.2-15.7)  gm/dl


 


Hct     (34.1-44.9)  %


 


MCV     (79.4-94.8)  fl


 


MCH     (25.6-32.2)  pg


 


MCHC     (32.2-35.5)  g/dl


 


RDW Std Deviation     (36.4-46.3)  fL


 


Plt Count     (182-369)  K/mm3


 


MPV     (9.4-12.3)  fl


 


Neut % (Auto)     (34.0-71.1)  %


 


Lymph % (Auto)     (19.3-51.7)  %


 


Mono % (Auto)     (4.7-12.5)  %


 


Eos % (Auto)     (0.7-5.8)  


 


Baso % (Auto)     (0.1-1.2)  %


 


Neut # (Auto)     (1.56-6.13)  K/mm3


 


Lymph # (Auto)     (1.18-3.74)  K/mm3


 


Mono # (Auto)     (0.24-0.36)  K/mm3


 


Eos # (Auto)     (0.04-0.36)  K/mm3


 


Baso # (Auto)     (0.01-0.08)  K/mm3


 


Sodium     (136-145)  mEq/L


 


Potassium     (3.5-5.1)  mEq/L


 


Chloride     ()  mEq/L


 


Carbon Dioxide     (21-32)  mEq/L


 


Anion Gap     (5-15)  


 


BUN     (7-18)  mg/dL


 


Creatinine     (0.55-1.02)  mg/dL


 


Est Cr Clr Drug Dosing     mL/min


 


Estimated GFR (MDRD)     (>60)  mL/min


 


BUN/Creatinine Ratio     (14-18)  


 


Glucose     (70-99)  mg/dL


 


POC Glucose  235 H  207 H  125 H  (70-99)  mg/dL


 


Calcium     (8.5-10.1)  mg/dL


 


Magnesium     (1.8-2.4)  mg/dL


 


Total Bilirubin     (0.2-1.0)  mg/dL


 


AST     (15-37)  U/L


 


ALT     (14-59)  U/L


 


Alkaline Phosphatase     ()  U/L


 


Total Protein     (6.4-8.2)  g/dl


 


Albumin     (3.4-5.0)  g/dl


 


Globulin     gm/dL


 


Albumin/Globulin Ratio     (1-2)  














  10/22/21 10/22/21 10/22/21 Range/Units





  06:40 06:40 06:40 


 


WBC  14.49 H    (3.98-10.04)  K/mm3


 


RBC  4.07    (3.98-5.22)  M/mm3


 


Hgb  12.0    (11.2-15.7)  gm/dl


 


Hct  37.7    (34.1-44.9)  %


 


MCV  92.6    (79.4-94.8)  fl


 


MCH  29.5    (25.6-32.2)  pg


 


MCHC  31.8 L    (32.2-35.5)  g/dl


 


RDW Std Deviation  55.0 H    (36.4-46.3)  fL


 


Plt Count  219    (182-369)  K/mm3


 


MPV  10.0    (9.4-12.3)  fl


 


Neut % (Auto)  78.4 H    (34.0-71.1)  %


 


Lymph % (Auto)  13.0 L    (19.3-51.7)  %


 


Mono % (Auto)  6.6    (4.7-12.5)  %


 


Eos % (Auto)  1.5    (0.7-5.8)  


 


Baso % (Auto)  0.2    (0.1-1.2)  %


 


Neut # (Auto)  11.36 H    (1.56-6.13)  K/mm3


 


Lymph # (Auto)  1.88    (1.18-3.74)  K/mm3


 


Mono # (Auto)  0.95 H    (0.24-0.36)  K/mm3


 


Eos # (Auto)  0.22    (0.04-0.36)  K/mm3


 


Baso # (Auto)  0.03    (0.01-0.08)  K/mm3


 


Sodium   138   (136-145)  mEq/L


 


Potassium   4.0   (3.5-5.1)  mEq/L


 


Chloride   105   ()  mEq/L


 


Carbon Dioxide   25   (21-32)  mEq/L


 


Anion Gap   12.0   (5-15)  


 


BUN   28 H   (7-18)  mg/dL


 


Creatinine   2.1 H   (0.55-1.02)  mg/dL


 


Est Cr Clr Drug Dosing   13.70   mL/min


 


Estimated GFR (MDRD)   22   (>60)  mL/min


 


BUN/Creatinine Ratio   13.3 L   (14-18)  


 


Glucose   135 H   (70-99)  mg/dL


 


POC Glucose     (70-99)  mg/dL


 


Calcium   8.9   (8.5-10.1)  mg/dL


 


Magnesium    2.2  (1.8-2.4)  mg/dL


 


Total Bilirubin   0.3   (0.2-1.0)  mg/dL


 


AST   17   (15-37)  U/L


 


ALT   14   (14-59)  U/L


 


Alkaline Phosphatase   84   ()  U/L


 


Total Protein   6.9   (6.4-8.2)  g/dl


 


Albumin   3.2 L   (3.4-5.0)  g/dl


 


Globulin   3.7   gm/dL


 


Albumin/Globulin Ratio   0.9 L   (1-2)  














  10/22/21 Range/Units





  11:21 


 


WBC   (3.98-10.04)  K/mm3


 


RBC   (3.98-5.22)  M/mm3


 


Hgb   (11.2-15.7)  gm/dl


 


Hct   (34.1-44.9)  %


 


MCV   (79.4-94.8)  fl


 


MCH   (25.6-32.2)  pg


 


MCHC   (32.2-35.5)  g/dl


 


RDW Std Deviation   (36.4-46.3)  fL


 


Plt Count   (182-369)  K/mm3


 


MPV   (9.4-12.3)  fl


 


Neut % (Auto)   (34.0-71.1)  %


 


Lymph % (Auto)   (19.3-51.7)  %


 


Mono % (Auto)   (4.7-12.5)  %


 


Eos % (Auto)   (0.7-5.8)  


 


Baso % (Auto)   (0.1-1.2)  %


 


Neut # (Auto)   (1.56-6.13)  K/mm3


 


Lymph # (Auto)   (1.18-3.74)  K/mm3


 


Mono # (Auto)   (0.24-0.36)  K/mm3


 


Eos # (Auto)   (0.04-0.36)  K/mm3


 


Baso # (Auto)   (0.01-0.08)  K/mm3


 


Sodium   (136-145)  mEq/L


 


Potassium   (3.5-5.1)  mEq/L


 


Chloride   ()  mEq/L


 


Carbon Dioxide   (21-32)  mEq/L


 


Anion Gap   (5-15)  


 


BUN   (7-18)  mg/dL


 


Creatinine   (0.55-1.02)  mg/dL


 


Est Cr Clr Drug Dosing   mL/min


 


Estimated GFR (MDRD)   (>60)  mL/min


 


BUN/Creatinine Ratio   (14-18)  


 


Glucose   (70-99)  mg/dL


 


POC Glucose  209 H  (70-99)  mg/dL


 


Calcium   (8.5-10.1)  mg/dL


 


Magnesium   (1.8-2.4)  mg/dL


 


Total Bilirubin   (0.2-1.0)  mg/dL


 


AST   (15-37)  U/L


 


ALT   (14-59)  U/L


 


Alkaline Phosphatase   ()  U/L


 


Total Protein   (6.4-8.2)  g/dl


 


Albumin   (3.4-5.0)  g/dl


 


Globulin   gm/dL


 


Albumin/Globulin Ratio   (1-2)  











Result Diagrams: 


                                 10/22/21 06:40





                                 10/22/21 06:40


[place] Results Last 24 hrs: 


                                  Microbiology











 10/20/21 16:40 Blood Culture - Preliminary





 Blood - Venous - Lab Draw    Gram Positive Cocci In Clustrs


 


 10/20/21 16:25 Blood Culture - Preliminary





 Blood - Venous - Lab Draw 














Sepsis Event Note





- Evaluation


Sepsis Screening Result: Severe Sepsis Risk





- Focused Exam


Vital Signs: 


                                   Vital Signs











  Temp Pulse Resp BP Pulse Ox


 


 10/22/21 11:28  98.8 F  91  17  141/40 H  92 L


 


 10/22/21 09:08  98.1 F  84  17  129/76  94 L














- Problem List & Annotations


(1) Cellulitis of right leg


SNOMED Code(s): 047030660


   Code(s): L03.115 - CELLULITIS OF RIGHT LOWER LIMB   Status: Acute   Current 

Visit: Yes   





(2) Sepsis


SNOMED Code(s): 45807828


   Code(s): A41.9 - SEPSIS, UNSPECIFIED ORGANISM   Status: Acute   Current 

Visit: Yes   


Qualifiers: 


   Sepsis type: sepsis due to unspecified organism   Sepsis acute organ dysfunc

tion status: unspecified   Qualified Code(s): A41.9 - Sepsis, unspecified 

organism   





(3) Renal insufficiency


SNOMED Code(s): 718036609, 090109992


   Code(s): N28.9 - DISORDER OF KIDNEY AND URETER, UNSPECIFIED   Status: Chronic

  Priority: High   Current Visit: Yes   





(4) Acute on chronic renal failure


SNOMED Code(s): 562616919


   Code(s): N17.9 - ACUTE KIDNEY FAILURE, UNSPECIFIED; N18.9 - CHRONIC KIDNEY 

DISEASE, UNSPECIFIED   Status: Acute   Priority: High   Current Visit: No   


Qualifiers: 


   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 

disease stage: stage 4 (severe)   Qualified Code(s): N17.2 - Acute kidney 

failure with medullary necrosis; N18.4 - Chronic kidney disease, stage 4 

(severe)   





- Problem List Review


Problem List Initiated/Reviewed/Updated: Yes





- My Orders


Last 24 Hours: 


My Active Orders





10/21/21 15:52


Up to Chair [RC] BID 





10/21/21 Dinner


Mechanical Soft Diet [DIET] 





10/22/21 06:40


PROCALCITONIN [REF] Routine 





10/22/21 Lunch


Consistent Carbohydrate Diet [DIET] 














- Plan


Plan:: 





Patient is an 89-year-old female with a history of CHF, hypertension, diabetes, 

and atrial fibrillation who was brought to the ER from a nursing home due to 

generalized weakness, confusion and fever.





Assessment and plan





AMSresolved


Etiologies include sepsis/infection, dehydration, metabolic events


No focal neurological deficits


Closely monitor


Speech therapy today cleared her for mechanical soft diet.





Early sepsis 2nd to cellulitis of right leg


Bacteremia: Called by nurse that there was a positive blood culture.  Reviewed 

blood culture shows growth of 1 out of 2 sets positive of Staphylococcus 

lugdunensis.  I started her on nafcillin 2 g every 4 hours IV and stop 

ceftriaxone.  Ordered echocardiogram for Monday morning and will repeat blood 

cultures tomorrow.


CRP 2.8


Blood culture positive of Staphylococcus lugdunensis





Cellulitis, right leg and foot


Had great toe nails removed lsat week


US doppler to rule out a DVT


DC ceftriaxone 1 g IV twice daily and switch to nafcillin


Blood cultures as above





Acute hypoxic respiratory failureresolved


Oxygen saturation 88% in the ER


Etiology unknown


Pulse ox


Oxygen therapy to keep oxygen saturation greater than 94%on room air





KATIE on CKD, creatinine 1.0 on 1/11/2019, creatinine has been around 2.0 over the

 past 2-3 years. 


Creatinine 2.6-->2.1


Avoid nephrotoxic meds


Repeat renal function tomorrow





DM type 2


on insulin degludec 20units daily


N.p.o. due to history of dysphagia


I will order Lantus 10 units daily


Insulin sliding scales


Blood sugars between low 100s 200s





HTN


Restart on Saturday metoprolol succinate 50mg daily that has been on hold


Hydralazine as needed





CHF, NYHA class II


Restarted on Saturday lasix 20mg daily and spironolactone 50mg daily 


Intake and output


Repeat electrolytes in the morning





Atrial fibrillation


Heart rate is controlled


Restart on Saturday metoprolol has been on hold


History of GI bleeding.  Not on anticoagulation





Hx of GI bleeding


Continue home medication Plavix


Repeat a CBC in morning





hx of dysphagia


N.p.o.


Speech pathology consult





Generalized weakness


PT OT





DVT prophylaxis: Heparin





CODE STATUS: DNR/DNI.  Discussed with the patient and her daughter who declined 

CPR and intubation





Disposition: Length of stay greater than 96 hours secondary to positive blood 

cultures and need for IV antibiotics.

## 2021-10-23 NOTE — PCM.PN
- General Info


Date of Service: 10/23/21


Admission Dx/Problem (Free Text): 


                           Admission Diagnosis/Problem





Admission Diagnosis/Problem      Sepsis








Subjective Update: 





The patient is an 89-year-old lady who lives in a skilled nursing facility was 

admitted secondary to likely sepsis through the emergency department.  She was 

admitted to hospitalization on October 20, 2021.  The patient today says that 

she feels good.  She has been tolerating her diabetic diet.  She is currently 

awaiting placement with return to her skilled nursing facility.


Functional Status: Reports: Pain Controlled, Tolerating Diet.  Denies: New 

Symptoms





- Review of Systems


General: Reports: No Symptoms


HEENT: Reports: No Symptoms


Pulmonary: Reports: No Symptoms


Cardiovascular: Reports: No Symptoms


Gastrointestinal: Reports: No Symptoms


Genitourinary: Reports: No Symptoms


Musculoskeletal: Reports: No Symptoms


Skin: Reports: No Symptoms


Neurological: Reports: No Symptoms


Psychiatric: Reports: No Symptoms





- Patient Data


Vitals - Most Recent: 


                                Last Vital Signs











Temp  37.1 C   10/23/21 04:59


 


Pulse  92   10/23/21 04:59


 


Resp  20   10/23/21 04:59


 


BP  109/56 L  10/23/21 04:59


 


Pulse Ox  91 L  10/23/21 04:59











Weight - Most Recent: 70.851 kg


I&O - Last 24 Hours: 


                                 Intake & Output











 10/22/21 10/23/21 10/23/21





 22:59 06:59 14:59


 


Intake Total 300 100 


 


Output Total 500  


 


Balance -200 100 











Lab Results Last 24 Hours: 


                         Laboratory Results - last 24 hr











  10/22/21 10/22/21 10/22/21 Range/Units





  06:40 11:21 15:54 


 


WBC     (3.98-10.04)  K/mm3


 


RBC     (3.98-5.22)  M/mm3


 


Hgb     (11.2-15.7)  gm/dl


 


Hct     (34.1-44.9)  %


 


MCV     (79.4-94.8)  fl


 


MCH     (25.6-32.2)  pg


 


MCHC     (32.2-35.5)  g/dl


 


RDW Std Deviation     (36.4-46.3)  fL


 


Plt Count     (182-369)  K/mm3


 


MPV     (9.4-12.3)  fl


 


Neut % (Auto)     (34.0-71.1)  %


 


Lymph % (Auto)     (19.3-51.7)  %


 


Mono % (Auto)     (4.7-12.5)  %


 


Eos % (Auto)     (0.7-5.8)  


 


Baso % (Auto)     (0.1-1.2)  %


 


Neut # (Auto)     (1.56-6.13)  K/mm3


 


Lymph # (Auto)     (1.18-3.74)  K/mm3


 


Mono # (Auto)     (0.24-0.36)  K/mm3


 


Eos # (Auto)     (0.04-0.36)  K/mm3


 


Baso # (Auto)     (0.01-0.08)  K/mm3


 


Sodium     (136-145)  mEq/L


 


Potassium     (3.5-5.1)  mEq/L


 


Chloride     ()  mEq/L


 


Carbon Dioxide     (21-32)  mEq/L


 


Anion Gap     (5-15)  


 


BUN     (7-18)  mg/dL


 


Creatinine     (0.55-1.02)  mg/dL


 


Est Cr Clr Drug Dosing     mL/min


 


Estimated GFR (MDRD)     (>60)  mL/min


 


BUN/Creatinine Ratio     (14-18)  


 


Glucose     (70-99)  mg/dL


 


POC Glucose   209 H  234 H  (70-99)  mg/dL


 


Calcium     (8.5-10.1)  mg/dL


 


Total Bilirubin     (0.2-1.0)  mg/dL


 


AST     (15-37)  U/L


 


ALT     (14-59)  U/L


 


Alkaline Phosphatase     ()  U/L


 


Total Protein     (6.4-8.2)  g/dl


 


Albumin     (3.4-5.0)  g/dl


 


Globulin     gm/dL


 


Albumin/Globulin Ratio     (1-2)  


 


Procalcitonin  0.24 H    ng/mL














  10/23/21 10/23/21 10/23/21 Range/Units





  05:13 05:13 06:18 


 


WBC  10.42 H    (3.98-10.04)  K/mm3


 


RBC  4.13    (3.98-5.22)  M/mm3


 


Hgb  12.1    (11.2-15.7)  gm/dl


 


Hct  38.1    (34.1-44.9)  %


 


MCV  92.3    (79.4-94.8)  fl


 


MCH  29.3    (25.6-32.2)  pg


 


MCHC  31.8 L    (32.2-35.5)  g/dl


 


RDW Std Deviation  54.5 H    (36.4-46.3)  fL


 


Plt Count  229    (182-369)  K/mm3


 


MPV  10.2    (9.4-12.3)  fl


 


Neut % (Auto)  67.5    (34.0-71.1)  %


 


Lymph % (Auto)  20.6    (19.3-51.7)  %


 


Mono % (Auto)  8.3    (4.7-12.5)  %


 


Eos % (Auto)  2.8    (0.7-5.8)  


 


Baso % (Auto)  0.3    (0.1-1.2)  %


 


Neut # (Auto)  7.04 H    (1.56-6.13)  K/mm3


 


Lymph # (Auto)  2.15    (1.18-3.74)  K/mm3


 


Mono # (Auto)  0.86 H    (0.24-0.36)  K/mm3


 


Eos # (Auto)  0.29    (0.04-0.36)  K/mm3


 


Baso # (Auto)  0.03    (0.01-0.08)  K/mm3


 


Sodium   142   (136-145)  mEq/L


 


Potassium   4.2   (3.5-5.1)  mEq/L


 


Chloride   107   ()  mEq/L


 


Carbon Dioxide   23   (21-32)  mEq/L


 


Anion Gap   16.2 H   (5-15)  


 


BUN   19 H   (7-18)  mg/dL


 


Creatinine   1.9 H   (0.55-1.02)  mg/dL


 


Est Cr Clr Drug Dosing   15.15   mL/min


 


Estimated GFR (MDRD)   25   (>60)  mL/min


 


BUN/Creatinine Ratio   10.0 L   (14-18)  


 


Glucose   175 H   (70-99)  mg/dL


 


POC Glucose    169 H  (70-99)  mg/dL


 


Calcium   8.5   (8.5-10.1)  mg/dL


 


Total Bilirubin   0.4   (0.2-1.0)  mg/dL


 


AST   12 L   (15-37)  U/L


 


ALT   14   (14-59)  U/L


 


Alkaline Phosphatase   86   ()  U/L


 


Total Protein   6.5   (6.4-8.2)  g/dl


 


Albumin   3.3 L   (3.4-5.0)  g/dl


 


Globulin   3.2   gm/dL


 


Albumin/Globulin Ratio   1.0   (1-2)  


 


Procalcitonin     ng/mL











[facility] Results Last 24 Hours: 


                                  Microbiology











 10/20/21 16:40 Bacteria Detection (PCR) - Final





 Blood - Venous - Lab Draw 


 


 10/20/21 16:40 Blood Culture - Preliminary





 Blood - Venous - Lab Draw    Staphylococcus Lugdunensis


 


 10/20/21 16:25 Blood Culture - Preliminary





 Blood - Venous - Lab Draw 











Med Orders - Current: 


                               Current Medications





Acetaminophen (Acetaminophen 650 Mg Supp)  650 mg RECTAL Q6H PRN


   PRN Reason: Pain (mild 1-3)


Acetaminophen (Acetaminophen 325 Mg Tab)  650 mg PO Q4H PRN


   PRN Reason: Pain


   Last Admin: 10/22/21 21:58 Dose:  650 mg


   Documented by: 


Albuterol/Ipratropium (Albuterol/Ipratropium 3.0-0.5 Mg/3 Ml Neb Soln)  3 ml NEB

Q4H PRN


   PRN Reason: Shortness Of Breath/wheezing


Bisacodyl (Bisacodyl 5 Mg Tab)  10 mg PO DAILY PRN


   PRN Reason: Constipation


   Last Admin: 10/20/21 20:26 Dose:  5 mg


   Documented by: 


Calcium Carbonate/Glycine (Calcium Carbonate 500 Mg Tab.Chew)  250 mg PO Q4H PRN


   PRN Reason: Heartburn


Clopidogrel Bisulfate (Clopidogrel 75 Mg Tab)  75 mg PO DAILY [facility]


   Last Admin: 10/22/21 08:29 Dose:  75 mg


   Documented by: 


Furosemide (Furosemide 40 Mg Tab)  20 mg PO DAILY [facility]


Gabapentin (Gabapentin 300 Mg Cap)  300 mg PO BEDTIME [facility]


   Last Admin: 10/22/21 21:55 Dose:  300 mg


   Documented by: 


Heparin Sodium (Porcine) (Heparin Sodium 5,000 Units/Ml Vial)  5,000 units 

SUBCUT Q8H [facility]


   Last Admin: 10/23/21 04:24 Dose:  Not Given


   Documented by: 


Promethazine HCl 6.25 mg/ (Sodium Chloride)  50.25 mls @ 100 mls/hr IV Q6H PRN


   PRN Reason: Nausea/Vomiting


Oxacillin Sodium 2 gm/ Sodium (Chloride)  100 mls @ 100 mls/hr IV Q4H [facility]


   Last Admin: 10/23/21 05:11 Dose:  100 mls/hr


   Documented by: 


Insulin Glargine (Insulin Glargine,Hum.Rec.Anlog 100 Unit/Ml 3 Ml Pen)  10 unit 

SUBCUT BEDTIME [facility]


   Last Admin: 10/22/21 21:44 Dose:  Not Given


   Documented by: 


Insulin Human Lispro (Insulin Lispro 100 Unit/Ml 3 Ml Kwikpen)  0 unit SUBCUT 

QIDACANDBED [facility]; Protocol


   Last Admin: 10/22/21 21:44 Dose:  Not Given


   Documented by: 


Metoprolol Succinate (Metoprolol Succinate 50 Mg Tab.Er)  50 mg PO DAILY [facility]


Oxycodone HCl (Oxycodone 5 Mg Tab)  5 mg PO Q6H PRN


   PRN Reason: Pain (moderate 4-6)


Pantoprazole Sodium (Pantoprazole 40 Mg Tab.Cr)  40 mg PO ACBREAKFAST [facility]


   Last Admin: 10/23/21 05:01 Dose:  40 mg


   Documented by: 


Polyethylene Glycol (Polyethylene Glycol 3350 Powder 17 Gm Packet)  17 gm PO 

DAILY PRN


   PRN Reason: Constipation


Sodium Chloride (Sodium Chloride 0.9% 10 Ml Syringe)  10 ml FLUSH ASDIRECTED PRN


   PRN Reason: Keep Vein Open


   Last Admin: 10/20/21 16:50 Dose:  10 ml


   Documented by: 


Spironolactone (Spironolactone 25 Mg Tab)  50 mg PO DAILY [facility]





Discontinued Medications





Acetaminophen (Acetaminophen 325 Mg Tab)  975 mg PO NOW ONE


   Stop: 10/20/21 16:13


   Last Admin: 10/20/21 16:50 Dose:  975 mg


   Documented by: 


Gabapentin (Gabapentin 300 Mg Cap)  600 mg PO ONETIME ONE


   Stop: 10/20/21 19:46


   Last Admin: 10/20/21 20:22 Dose:  600 mg


   Documented by: 


Sodium Chloride (Normal Saline)  1,000 mls @ 125 mls/hr IV ASDIRECTED [facility]


   Last Admin: 10/20/21 16:49 Dose:  125 mls/hr


   Documented by: 


Ceftriaxone Sodium 2 gm/ (Sodium Chloride)  100 mls @ 200 mls/hr IV ONETIME ONE


   Stop: 10/20/21 16:42


   Last Admin: 10/20/21 16:50 Dose:  200 mls/hr


   Documented by: 


Lactated Ringer's (Ringers, Lactated)  1,000 mls @ 100 mls/hr IV ASDIRECTED [facility]


Lactated Ringer's (Ringers, Lactated)  1,000 mls @ 50 mls/hr IV ASDIRECTED [facility]


   Last Admin: 10/20/21 20:47 Dose:  50 mls/hr


   Documented by: 


Ceftriaxone Sodium 1 gm/ (Sodium Chloride)  100 mls @ 200 mls/hr IV Q24H [facility]


Ceftriaxone Sodium 1 gm/ (Sodium Chloride)  100 mls @ 200 mls/hr IV Q12H [facility]


   Last Admin: 10/22/21 21:39 Dose:  Not Given


   Documented by: 


Insulin Glargine (Insulin Glargine,Hum.Rec.Anlog 100 Unit/Ml 3 Ml Pen)  10 unit 

SUBCUT DAILY [facility]


   Last Admin: 10/21/21 10:28 Dose:  Not Given


   Documented by: 


Morphine Sulfate (Morphine 2 Mg/Ml Syringe)  1 mg IVPUSH Q4H PRN


   PRN Reason: Pain (severe 7-10)


   Stop: 10/21/21 19:06











- Exam


Quality Assessment: Supplemental Oxygen, DVT Prophylaxis


General: Alert, Oriented, Cooperative, No Acute Distress


HEENT: Pupils Equal, Pupils Reactive, EOMI, Mucous Membr. Moist/Pink


Neck: Supple, Trachea Midline


Lungs: Clear to Auscultation, Normal Respiratory Effort


Cardiovascular: Regular Rate, Regular Rhythm


GI/Abdominal Exam: Normal Bowel Sounds, Soft, No Distention


Back Exam: Normal Inspection, Full Range of Motion


Extremities: Normal Inspection, No Pedal Edema


Skin: Warm, Dry, Intact


Wound/Incisions: Dressing Dry and Intact


Neurological: No New Focal Deficit


Psy/Mental Status: Alert, Normal Affect, Normal Mood





- Patient Data


Lab Results Last 24 hrs: 


                         Laboratory Results - last 24 hr











  10/22/21 10/22/21 10/22/21 Range/Units





  06:40 11:21 15:54 


 


WBC     (3.98-10.04)  K/mm3


 


RBC     (3.98-5.22)  M/mm3


 


Hgb     (11.2-15.7)  gm/dl


 


Hct     (34.1-44.9)  %


 


MCV     (79.4-94.8)  fl


 


MCH     (25.6-32.2)  pg


 


MCHC     (32.2-35.5)  g/dl


 


RDW Std Deviation     (36.4-46.3)  fL


 


Plt Count     (182-369)  K/mm3


 


MPV     (9.4-12.3)  fl


 


Neut % (Auto)     (34.0-71.1)  %


 


Lymph % (Auto)     (19.3-51.7)  %


 


Mono % (Auto)     (4.7-12.5)  %


 


Eos % (Auto)     (0.7-5.8)  


 


Baso % (Auto)     (0.1-1.2)  %


 


Neut # (Auto)     (1.56-6.13)  K/mm3


 


Lymph # (Auto)     (1.18-3.74)  K/mm3


 


Mono # (Auto)     (0.24-0.36)  K/mm3


 


Eos # (Auto)     (0.04-0.36)  K/mm3


 


Baso # (Auto)     (0.01-0.08)  K/mm3


 


Sodium     (136-145)  mEq/L


 


Potassium     (3.5-5.1)  mEq/L


 


Chloride     ()  mEq/L


 


Carbon Dioxide     (21-32)  mEq/L


 


Anion Gap     (5-15)  


 


BUN     (7-18)  mg/dL


 


Creatinine     (0.55-1.02)  mg/dL


 


Est Cr Clr Drug Dosing     mL/min


 


Estimated GFR (MDRD)     (>60)  mL/min


 


BUN/Creatinine Ratio     (14-18)  


 


Glucose     (70-99)  mg/dL


 


POC Glucose   209 H  234 H  (70-99)  mg/dL


 


Calcium     (8.5-10.1)  mg/dL


 


Total Bilirubin     (0.2-1.0)  mg/dL


 


AST     (15-37)  U/L


 


ALT     (14-59)  U/L


 


Alkaline Phosphatase     ()  U/L


 


Total Protein     (6.4-8.2)  g/dl


 


Albumin     (3.4-5.0)  g/dl


 


Globulin     gm/dL


 


Albumin/Globulin Ratio     (1-2)  


 


Procalcitonin  0.24 H    ng/mL














  10/23/21 10/23/21 10/23/21 Range/Units





  05:13 05:13 06:18 


 


WBC  10.42 H    (3.98-10.04)  K/mm3


 


RBC  4.13    (3.98-5.22)  M/mm3


 


Hgb  12.1    (11.2-15.7)  gm/dl


 


Hct  38.1    (34.1-44.9)  %


 


MCV  92.3    (79.4-94.8)  fl


 


MCH  29.3    (25.6-32.2)  pg


 


MCHC  31.8 L    (32.2-35.5)  g/dl


 


RDW Std Deviation  54.5 H    (36.4-46.3)  fL


 


Plt Count  229    (182-369)  K/mm3


 


MPV  10.2    (9.4-12.3)  fl


 


Neut % (Auto)  67.5    (34.0-71.1)  %


 


Lymph % (Auto)  20.6    (19.3-51.7)  %


 


Mono % (Auto)  8.3    (4.7-12.5)  %


 


Eos % (Auto)  2.8    (0.7-5.8)  


 


Baso % (Auto)  0.3    (0.1-1.2)  %


 


Neut # (Auto)  7.04 H    (1.56-6.13)  K/mm3


 


Lymph # (Auto)  2.15    (1.18-3.74)  K/mm3


 


Mono # (Auto)  0.86 H    (0.24-0.36)  K/mm3


 


Eos # (Auto)  0.29    (0.04-0.36)  K/mm3


 


Baso # (Auto)  0.03    (0.01-0.08)  K/mm3


 


Sodium   142   (136-145)  mEq/L


 


Potassium   4.2   (3.5-5.1)  mEq/L


 


Chloride   107   ()  mEq/L


 


Carbon Dioxide   23   (21-32)  mEq/L


 


Anion Gap   16.2 H   (5-15)  


 


BUN   19 H   (7-18)  mg/dL


 


Creatinine   1.9 H   (0.55-1.02)  mg/dL


 


Est Cr Clr Drug Dosing   15.15   mL/min


 


Estimated GFR (MDRD)   25   (>60)  mL/min


 


BUN/Creatinine Ratio   10.0 L   (14-18)  


 


Glucose   175 H   (70-99)  mg/dL


 


POC Glucose    169 H  (70-99)  mg/dL


 


Calcium   8.5   (8.5-10.1)  mg/dL


 


Total Bilirubin   0.4   (0.2-1.0)  mg/dL


 


AST   12 L   (15-37)  U/L


 


ALT   14   (14-59)  U/L


 


Alkaline Phosphatase   86   ()  U/L


 


Total Protein   6.5   (6.4-8.2)  g/dl


 


Albumin   3.3 L   (3.4-5.0)  g/dl


 


Globulin   3.2   gm/dL


 


Albumin/Globulin Ratio   1.0   (1-2)  


 


Procalcitonin     ng/mL











Result Diagrams: 


                                 10/23/21 05:13





                                 10/23/21 05:13


[place] Results Last 24 hrs: 


                                  Microbiology











 10/20/21 16:40 Bacteria Detection (PCR) - Final





 Blood - Venous - Lab Draw 


 


 10/20/21 16:40 Blood Culture - Preliminary





 Blood - Venous - Lab Draw    Staphylococcus Lugdunensis


 


 10/20/21 16:25 Blood Culture - Preliminary





 Blood - Venous - Lab Draw 














Sepsis Event Note





- Evaluation


Sepsis Screening Result: No Definite Risk





- Focused Exam


Vital Signs: 


                                   Vital Signs











  Temp Pulse Resp BP Pulse Ox


 


 10/23/21 04:59  37.1 C  92  20  109/56 L  91 L


 


 10/22/21 23:27  36.8 C  74  16  131/38 L  93 L


 


 10/22/21 21:55  36.9 C  87  20   97














- Problem List & Annotations


(1) Cellulitis of right leg


SNOMED Code(s): 964422574


   Code(s): L03.115 - CELLULITIS OF RIGHT LOWER LIMB   Status: Acute   Current 

Visit: Yes   





(2) Sepsis


SNOMED Code(s): 61300260


   Code(s): A41.9 - SEPSIS, UNSPECIFIED ORGANISM   Status: Resolved   Priority: 

High   Current Visit: Yes   


Qualifiers: 


   Sepsis type: sepsis due to unspecified organism   Sepsis acute organ 

dysfunction status: unspecified   Qualified Code(s): A41.9 - Sepsis, unspecified

organism   





(3) Hyperglycemia due to type 2 diabetes mellitus


SNOMED Code(s): 934850776788414, 917531101501656


   Code(s): E11.65 - TYPE 2 DIABETES MELLITUS WITH HYPERGLYCEMIA   Status: Chron

ic   Priority: High   Current Visit: Yes   


Qualifiers: 


   Diabetes mellitus long term insulin use: with long term use   Qualified 

Code(s): E11.65 - Type 2 diabetes mellitus with hyperglycemia; Z79.4 - Long term

(current) use of insulin   





(4) CKD (chronic kidney disease) stage 4, GFR 15-29 ml/min


SNOMED Code(s): 197727326


   Code(s): N18.4 - CHRONIC KIDNEY DISEASE, STAGE 4 (SEVERE)   Status: Chronic  

Priority: Medium   Current Visit: Yes   





- Problem List Review


Problem List Initiated/Reviewed/Updated: Yes





- Plan


Plan:: 





Patient is an 89-year-old female with a history of CHF, hypertension, diabetes, 

and atrial fibrillation who was brought to the ER from a nursing home due to 

generalized weakness, confusion and fever.





Assessment and plan





AMSresolved


Etiologies include sepsis/infection, dehydration, metabolic events


No focal neurological deficits


Closely monitor


Speech therapy today cleared her for mechanical soft diet.





Early sepsis 2nd to cellulitis of right leg


Bacteremia: Called by nurse that there was a positive blood culture.  Reviewed 

blood culture shows growth of 1 out of 2 sets positive of Staphylococcus 

lugdunensis.  I started her on nafcillin 2 g every 4 hours IV and stop 

ceftriaxone.  Ordered echocardiogram for Monday morning and will repeat blood 

cultures tomorrow.


CRP 2.8


Blood culture positive of Staphylococcus lugdunensis





Cellulitis, right leg and foot


Had great toe nails removed lsat week


US doppler to rule out a DVT


DC ceftriaxone 1 g IV twice daily and switch to nafcillin


Blood cultures as above





Acute hypoxic respiratory failureresolved


Oxygen saturation 88% in the ER


Etiology unknown


Pulse ox


Oxygen therapy to keep oxygen saturation greater than 94%on room air





KATIE on CKD, creatinine 1.0 on 1/11/2019, creatinine has been around 2.0 over the

past 2-3 years. 


Creatinine 2.6-->2.1


Avoid nephrotoxic meds


Repeat renal function tomorrow





DM type 2


on insulin degludec 20units daily


N.p.o. due to history of dysphagia


I will order Lantus 10 units daily


Insulin sliding scales


Blood sugars between low 100s 200s





HTN


Restart on Saturday metoprolol succinate 50mg daily that has been on hold


Hydralazine as needed





CHF, NYHA class II


Restarted on Saturday lasix 20mg daily and spironolactone 50mg daily 


Intake and output


Repeat electrolytes in the morning





Atrial fibrillation


Heart rate is controlled


Restart on Saturday metoprolol has been on hold


History of GI bleeding.  Not on anticoagulation





Hx of GI bleeding


Continue home medication Plavix


Repeat a CBC in morning





hx of dysphagia


N.p.o.


Speech pathology consult





Generalized weakness


PT OT





DVT prophylaxis: Heparin





CODE STATUS: DNR/DNI.  Discussed with the patient and her daughter who declined 

CPR and intubation





Disposition: Length of stay greater than 96 hours secondary to positive blood 

cultures and need for IV antibiotics.





10/23/2021





The patient is an 89-year-old lady who is doing better today.  She is awaiting 

nursing home placement.  She had positive blood cultures which were growing 

Staphylococcus lugdunensis and is currently pending a 2D echocardiogram to rule 

out vegetations.  For now the patient will be retained in hospitalization.  She 

has diabetes mellitus type 2 and as a result of this she will be alone current 

insulin sliding scale may have to adjust her long-acting as needed.  Diabetic 

diet as tolerated.  She had been in the past refusing testing although she did 

have repeat laboratory cultures completed.  The patient's vital signs will be 

monitored and her antihypertensive medications will also be adjusted as 

necessary.  The patient has had some resolution of her respiratory failure and 

her oxygen saturations to be kept around 92%.  Repeat laboratory studies have 

been ordered for the morning.  The patient has been encouraged to ambulate.  The

patient is also anticoagulated with the use of heparin and Plavix.  We will 

monitor CBC for bleeding.

## 2021-10-24 NOTE — PCM.PN
- General Info


Date of Service: 10/24/21


Admission Dx/Problem (Free Text): 


                           Admission Diagnosis/Problem





Admission Diagnosis/Problem      Sepsis








Subjective Update: 





The patient is an 89-year-old lady who was admitted on October 20, 2021 due to 

possible sepsis from a lower extremity infection.  The patient has been somewhat

angry at times and has been refusing IV antibiotics.  Today the patient says 

that she wants to go home.  She lives in a skilled nursing facility.  The 

patient has been tolerating her diet.  She is somewhat tearful today.


Functional Status: Reports: Pain Controlled.  Denies: New Symptoms





- Review of Systems


General: Reports: Weakness, Fatigue


HEENT: Reports: No Symptoms


Pulmonary: Reports: No Symptoms


Cardiovascular: Reports: No Symptoms


Gastrointestinal: Reports: No Symptoms


Genitourinary: Reports: No Symptoms


Musculoskeletal: Reports: No Symptoms


Skin: Reports: No Symptoms


Neurological: Reports: No Symptoms


Psychiatric: Reports: No Symptoms





- Patient Data


Vitals - Most Recent: 


                                Last Vital Signs











Temp  36.4 C   10/24/21 03:41


 


Pulse  82   10/24/21 08:47


 


Resp  19   10/24/21 03:41


 


BP  124/82   10/24/21 08:47


 


Pulse Ox  94 L  10/24/21 08:33











Weight - Most Recent: 71.985 kg


I&O - Last 24 Hours: 


                                 Intake & Output











 10/23/21 10/24/21 10/24/21





 22:59 06:59 14:59


 


Intake Total 1350 450 


 


Output Total 970 400 


 


Balance 380 50 











Lab Results Last 24 Hours: 


                         Laboratory Results - last 24 hr











  10/23/21 10/23/21 10/23/21 Range/Units





  11:28 17:14 21:05 


 


POC Glucose  294 H  225 H  203 H  (70-99)  mg/dL














  10/24/21 Range/Units





  08:03 


 


POC Glucose  244 H  (70-99)  mg/dL











[place] Results Last 24 Hours: 


                                  Microbiology











 10/20/21 16:40 Blood Culture - Preliminary





 Blood - Venous - Lab Draw    Staphylococcus Lugdunensis











Med Orders - Current: 


                               Current Medications





Acetaminophen (Acetaminophen 650 Mg Supp)  650 mg RECTAL Q6H PRN


   PRN Reason: Pain (mild 1-3)


Acetaminophen (Acetaminophen 325 Mg Tab)  650 mg PO Q4H PRN


   PRN Reason: Pain


   Last Admin: 10/22/21 21:58 Dose:  650 mg


   Documented by: 


Albuterol/Ipratropium (Albuterol/Ipratropium 3.0-0.5 Mg/3 Ml Neb Soln)  3 ml NEB

Q4H PRN


   PRN Reason: Shortness Of Breath/wheezing


Bisacodyl (Bisacodyl 5 Mg Tab)  10 mg PO DAILY PRN


   PRN Reason: Constipation


   Last Admin: 10/20/21 20:26 Dose:  5 mg


   Documented by: 


Calcium Carbonate/Glycine (Calcium Carbonate 500 Mg Tab.Chew)  250 mg PO Q4H PRN


   PRN Reason: Heartburn


Clopidogrel Bisulfate (Clopidogrel 75 Mg Tab)  75 mg PO DAILY [facility]


   Last Admin: 10/24/21 08:47 Dose:  75 mg


   Documented by: 


Furosemide (Furosemide 40 Mg Tab)  20 mg PO DAILY [facility]


   Last Admin: 10/23/21 09:27 Dose:  20 mg


   Documented by: 


Gabapentin (Gabapentin 300 Mg Cap)  300 mg PO BEDTIME [facility]


   Last Admin: 10/23/21 21:06 Dose:  300 mg


   Documented by: 


Heparin Sodium (Porcine) (Heparin Sodium 5,000 Units/Ml Vial)  5,000 units 

SUBCUT Q8H [facility]


   Last Admin: 10/24/21 04:45 Dose:  Not Given


   Documented by: 


Promethazine HCl 6.25 mg/ (Sodium Chloride)  50.25 mls @ 100 mls/hr IV Q6H PRN


   PRN Reason: Nausea/Vomiting


Oxacillin Sodium 2 gm/ Sodium (Chloride)  100 mls @ 100 mls/hr IV Q4H [facility]


   Last Admin: 10/24/21 08:23 Dose:  Not Given


   Documented by: 


Insulin Glargine (Insulin Glargine,Hum.Rec.Anlog 100 Unit/Ml 3 Ml Pen)  10 unit 

SUBCUT BEDTIME [facility]


   Last Admin: 10/23/21 21:07 Dose:  10 units


   Documented by: 


Insulin Human Lispro (Insulin Lispro 100 Unit/Ml 3 Ml Kwikpen)  0 unit SUBCUT 

QIDACANDBED [facility]; Protocol


   Last Admin: 10/23/21 21:18 Dose:  4 units


   Documented by: 


Metoprolol Succinate (Metoprolol Succinate 50 Mg Tab.Er)  50 mg PO DAILY [facility]


   Last Admin: 10/24/21 08:47 Dose:  50 mg


   Documented by: 


Oxycodone HCl (Oxycodone 5 Mg Tab)  5 mg PO Q6H PRN


   PRN Reason: Pain (moderate 4-6)


Pantoprazole Sodium (Pantoprazole 40 Mg Tab.Cr)  40 mg PO ACBREAKFAST [facility]


   Last Admin: 10/24/21 06:11 Dose:  40 mg


   Documented by: 


Polyethylene Glycol (Polyethylene Glycol 3350 Powder 17 Gm Packet)  17 gm PO 

DAILY PRN


   PRN Reason: Constipation


Sodium Chloride (Sodium Chloride 0.9% 10 Ml Syringe)  10 ml FLUSH ASDIRECTED PRN


   PRN Reason: Keep Vein Open


   Last Admin: 10/20/21 16:50 Dose:  10 ml


   Documented by: 


Spironolactone (Spironolactone 25 Mg Tab)  50 mg PO DAILY [facility]


   Last Admin: 10/23/21 09:27 Dose:  50 mg


   Documented by: 





Discontinued Medications





Acetaminophen (Acetaminophen 325 Mg Tab)  975 mg PO NOW ONE


   Stop: 10/20/21 16:13


   Last Admin: 10/20/21 16:50 Dose:  975 mg


   Documented by: 


Gabapentin (Gabapentin 300 Mg Cap)  600 mg PO ONETIME ONE


   Stop: 10/20/21 19:46


   Last Admin: 10/20/21 20:22 Dose:  600 mg


   Documented by: 


Sodium Chloride (Normal Saline)  1,000 mls @ 125 mls/hr IV ASDIRECTED [facility]


   Last Admin: 10/20/21 16:49 Dose:  125 mls/hr


   Documented by: 


Ceftriaxone Sodium 2 gm/ (Sodium Chloride)  100 mls @ 200 mls/hr IV ONETIME ONE


   Stop: 10/20/21 16:42


   Last Admin: 10/20/21 16:50 Dose:  200 mls/hr


   Documented by: 


Lactated Ringer's (Ringers, Lactated)  1,000 mls @ 100 mls/hr IV ASDIRECTED [facility]


Lactated Ringer's (Ringers, Lactated)  1,000 mls @ 50 mls/hr IV ASDIRECTED [facility]


   Last Admin: 10/20/21 20:47 Dose:  50 mls/hr


   Documented by: 


Ceftriaxone Sodium 1 gm/ (Sodium Chloride)  100 mls @ 200 mls/hr IV Q24H [facility]


Ceftriaxone Sodium 1 gm/ (Sodium Chloride)  100 mls @ 200 mls/hr IV Q12H [facility]


   Last Admin: 10/22/21 21:39 Dose:  Not Given


   Documented by: 


Insulin Glargine (Insulin Glargine,Hum.Rec.Anlog 100 Unit/Ml 3 Ml Pen)  10 unit 

SUBCUT DAILY [facility]


   Last Admin: 10/21/21 10:28 Dose:  Not Given


   Documented by: 


Morphine Sulfate (Morphine 2 Mg/Ml Syringe)  1 mg IVPUSH Q4H PRN


   PRN Reason: Pain (severe 7-10)


   Stop: 10/21/21 19:06











- Exam


Quality Assessment: DVT Prophylaxis.  No: Supplemental Oxygen


General: Alert, Oriented, Cooperative, No Acute Distress


HEENT: Pupils Equal, Pupils Reactive, EOMI


Neck: Supple, Trachea Midline


Lungs: Clear to Auscultation, Normal Respiratory Effort


Cardiovascular: Regular Rate, Regular Rhythm


GI/Abdominal Exam: Normal Bowel Sounds, Soft, No Distention


 (Female) Exam: Deferred


Back Exam: Normal Inspection, Full Range of Motion


Extremities: Normal Inspection, No Pedal Edema


Skin: Warm, Dry, Intact


Neurological: No New Focal Deficit


Psy/Mental Status: Alert, Depressed





- Patient Data


Lab Results Last 24 hrs: 


                         Laboratory Results - last 24 hr











  10/23/21 10/23/21 10/23/21 Range/Units





  11:28 17:14 21:05 


 


POC Glucose  294 H  225 H  203 H  (70-99)  mg/dL














  10/24/21 Range/Units





  08:03 


 


POC Glucose  244 H  (70-99)  mg/dL











Result Diagrams: 


                                 10/23/21 05:13





                                 10/23/21 05:13


[place] Results Last 24 hrs: 


                                  Microbiology











 10/20/21 16:40 Blood Culture - Preliminary





 Blood - Venous - Lab Draw    Staphylococcus Lugdunensis














Sepsis Event Note





- Evaluation


Sepsis Screening Result: No Definite Risk





- Focused Exam


Vital Signs: 


                                   Vital Signs











  Temp Pulse Resp BP Pulse Ox Pulse Ox


 


 10/24/21 08:47   82   124/82  


 


 10/24/21 08:33       94 L


 


 10/24/21 03:41  36.4 C  65  19  129/64  90 L 


 


 10/24/21 00:00   69    91 L 


 


 10/23/21 23:56  36.5 C  71  20  133/99 H  83 L 


 


 10/23/21 22:25  37.0 C  63  20  95/67  93 L 














- Problem List & Annotations


(1) Cellulitis of right leg


SNOMED Code(s): 407821712


   Code(s): L03.115 - CELLULITIS OF RIGHT LOWER LIMB   Status: Acute   Current 

Visit: Yes   





(2) Sepsis


SNOMED Code(s): 16029392


   Code(s): A41.9 - SEPSIS, UNSPECIFIED ORGANISM   Status: Resolved   Priority: 

High   Current Visit: Yes   


Qualifiers: 


   Sepsis type: sepsis due to unspecified organism   Sepsis acute organ 

dysfunction status: unspecified   Qualified Code(s): A41.9 - Sepsis, unspecified

organism   





(3) Hyperglycemia due to type 2 diabetes mellitus


SNOMED Code(s): 385309987999394, 493588588066935


   Code(s): E11.65 - TYPE 2 DIABETES MELLITUS WITH HYPERGLYCEMIA   Status: 

Chronic   Priority: High   Current Visit: Yes   


Qualifiers: 


   Diabetes mellitus long term insulin use: with long term use   Qualified 

Code(s): E11.65 - Type 2 diabetes mellitus with hyperglycemia; Z79.4 - Long term

(current) use of insulin   





(4) CKD (chronic kidney disease) stage 4, GFR 15-29 ml/min


SNOMED Code(s): 628610397


   Code(s): N18.4 - CHRONIC KIDNEY DISEASE, STAGE 4 (SEVERE)   Status: Chronic  

Priority: Medium   Current Visit: Yes   





- Problem List Review


Problem List Initiated/Reviewed/Updated: Yes





- My Orders


Last 24 Hours: 


My Active Orders





10/24/21 05:09


EKG 12 Lead [EK] Stat 














- Plan


Plan:: 





Patient is an 89-year-old female with a history of CHF, hypertension, diabetes, 

and atrial fibrillation who was brought to the ER from a nursing home due to 

generalized weakness, confusion and fever.





Assessment and plan





AMSresolved


Etiologies include sepsis/infection, dehydration, metabolic events


No focal neurological deficits


Closely monitor


Speech therapy today cleared her for mechanical soft diet.





Early sepsis 2nd to cellulitis of right leg


Bacteremia: Called by nurse that there was a positive blood culture.  Reviewed 

blood culture shows growth of 1 out of 2 sets positive of Staphylococcus 

lugdunensis.  I started her on nafcillin 2 g every 4 hours IV and stop 

ceftriaxone.  Ordered echocardiogram for Monday morning and will repeat blood 

cultures tomorrow.


CRP 2.8


Blood culture positive of Staphylococcus lugdunensis





Cellulitis, right leg and foot


Had great toe nails removed lsat week


US doppler to rule out a DVT


DC ceftriaxone 1 g IV twice daily and switch to nafcillin


Blood cultures as above





Acute hypoxic respiratory failureresolved


Oxygen saturation 88% in the ER


Etiology unknown


Pulse ox


Oxygen therapy to keep oxygen saturation greater than 94%on room air





KATIE on CKD, creatinine 1.0 on 1/11/2019, creatinine has been around 2.0 over the

 past 2-3 years. 


Creatinine 2.6-->2.1


Avoid nephrotoxic meds


Repeat renal function tomorrow





DM type 2


on insulin degludec 20units daily


N.p.o. due to history of dysphagia


I will order Lantus 10 units daily


Insulin sliding scales


Blood sugars between low 100s 200s





HTN


Restart on Saturday metoprolol succinate 50mg daily that has been on hold


Hydralazine as needed





CHF, NYHA class II


Restarted on Saturday lasix 20mg daily and spironolactone 50mg daily 


Intake and output


Repeat electrolytes in the morning





Atrial fibrillation


Heart rate is controlled


Restart on Saturday metoprolol has been on hold


History of GI bleeding.  Not on anticoagulation





Hx of GI bleeding


Continue home medication Plavix


Repeat a CBC in morning





hx of dysphagia


N.p.o.


Speech pathology consult





Generalized weakness


PT OT





DVT prophylaxis: Heparin





CODE STATUS: DNR/DNI.  Discussed with the patient and her daughter who declined 

CPR and intubation





Disposition: Length of stay greater than 96 hours secondary to positive blood 

cultures and need for IV antibiotics.





10/23/2021





The patient is an 89-year-old lady who is doing better today.  She is awaiting 

nursing home placement.  She had positive blood cultures which were growing 

Staphylococcus lugdunensis and is currently pending a 2D echocardiogram to rule 

out vegetations.  For now the patient will be retained in hospitalization.  She 

has diabetes mellitus type 2 and as a result of this she will be alone current 

insulin sliding scale may have to adjust her long-acting as needed.  Diabetic 

diet as tolerated.  She had been in the past refusing testing although she did 

have repeat laboratory cultures completed.  The patient's vital signs will be 

monitored and her antihypertensive medications will also be adjusted as 

necessary.  The patient has had some resolution of her respiratory failure and 

her oxygen saturations to be kept around 92%.  Repeat laboratory studies have 

been ordered for the morning.  The patient has been encouraged to ambulate.  The

 patient is also anticoagulated with the use of heparin and Plavix.  We will 

monitor CBC for bleeding.





10/24/2021





The patient is an 89-year-old lady who has been refusing IV restarts.  She says 

she is tired of being poked.  The patient says that she is tired and just wants 

to go home.  The patient is currently pending a 2D echocardiogram.  The patient 

will be retained in hospitalization.  The patient's diabetic diet will continue 

for now.  Repeat blood cultures are currently pending.  Patient have repeat 

laboratory studies in the morning.  The patient so far is on room air.  We will 

continue to monitor her saturations.  She is also anticoagulated with the use of

 heparin and Plavix.  The patient should be appropriate for discharge back to 

skilled nursing facility after completion of testing.  Because of the patient's 

refusal to have IV started the patient will be started on oral equivalent 

consisting of Pen-Vee K 500 mg p.o. 4 times daily.  This may change if 2D 

echocardiogram testing reveals endocarditis.

## 2021-10-25 NOTE — PCM.PN
<PrinceRadha M - Last Filed: 10/25/21 13:35>





- General Info


Date of Service: 10/25/21


Admission Dx/Problem (Free Text): 


                           Admission Diagnosis/Problem





Admission Diagnosis/Problem      Sepsis








Subjective Update: 





Liliana was in good spirits today on rounds.  She is wanting to return to [facility] however we are waiting echocardiogram results.


Functional Status: Reports: Pain Controlled, Tolerating Diet, Ambulating (With 

PT and OT)





- Review of Systems


General: Reports: No Symptoms


HEENT: Reports: No Symptoms


Pulmonary: Reports: No Symptoms


Cardiovascular: Reports: No Symptoms


Gastrointestinal: Reports: No Symptoms


Genitourinary: Reports: No Symptoms


Musculoskeletal: Reports: No Symptoms


Skin: Reports: No Symptoms


Neurological: Reports: Confusion (Patient does have a history of dementia)


Psychiatric: Reports: No Symptoms





- Patient Data


Vitals - Most Recent: 


                                Last Vital Signs











Temp  97.9 F   10/25/21 11:37


 


Pulse  61   10/25/21 11:37


 


Resp  20   10/25/21 11:37


 


BP  140/78   10/25/21 11:37


 


Pulse Ox  95   10/25/21 11:37











Weight - Most Recent: 71.327 kg


I&O - Last 24 Hours: 


                                 Intake & Output











 10/24/21 10/25/21 10/25/21





 22:59 06:59 14:59


 


Intake Total 1035 100 120


 


Output Total 300 0 


 


Balance 735 100 120











Lab Results Last 24 Hours: 


                         Laboratory Results - last 24 hr











  10/24/21 10/24/21 10/25/21 Range/Units





  17:09 21:33 05:19 


 


WBC    7.89  (3.98-10.04)  K/mm3


 


RBC    3.74 L  (3.98-5.22)  M/mm3


 


Hgb    11.0 L  (11.2-15.7)  gm/dl


 


Hct    34.4  (34.1-44.9)  %


 


MCV    92.0  (79.4-94.8)  fl


 


MCH    29.4  (25.6-32.2)  pg


 


MCHC    32.0 L  (32.2-35.5)  g/dl


 


RDW Std Deviation    52.6 H  (36.4-46.3)  fL


 


Plt Count    212  (182-369)  K/mm3


 


MPV    10.2  (9.4-12.3)  fl


 


Neut % (Auto)    60.0  (34.0-71.1)  %


 


Lymph % (Auto)    26.5  (19.3-51.7)  %


 


Mono % (Auto)    8.5  (4.7-12.5)  %


 


Eos % (Auto)    3.8  (0.7-5.8)  


 


Baso % (Auto)    0.4  (0.1-1.2)  %


 


Neut # (Auto)    4.74  (1.56-6.13)  K/mm3


 


Lymph # (Auto)    2.09  (1.18-3.74)  K/mm3


 


Mono # (Auto)    0.67 H  (0.24-0.36)  K/mm3


 


Eos # (Auto)    0.30  (0.04-0.36)  K/mm3


 


Baso # (Auto)    0.03  (0.01-0.08)  K/mm3


 


Sodium     (136-145)  mEq/L


 


Potassium     (3.5-5.1)  mEq/L


 


Chloride     ()  mEq/L


 


Carbon Dioxide     (21-32)  mEq/L


 


Anion Gap     (5-15)  


 


BUN     (7-18)  mg/dL


 


Creatinine     (0.55-1.02)  mg/dL


 


Est Cr Clr Drug Dosing     mL/min


 


Estimated GFR (MDRD)     (>60)  mL/min


 


BUN/Creatinine Ratio     (14-18)  


 


Glucose     (70-99)  mg/dL


 


POC Glucose  168 H  310 H   (70-99)  mg/dL


 


Calcium     (8.5-10.1)  mg/dL


 


Total Bilirubin     (0.2-1.0)  mg/dL


 


AST     (15-37)  U/L


 


ALT     (14-59)  U/L


 


Alkaline Phosphatase     ()  U/L


 


Total Protein     (6.4-8.2)  g/dl


 


Albumin     (3.4-5.0)  g/dl


 


Globulin     gm/dL


 


Albumin/Globulin Ratio     (1-2)  














  10/25/21 10/25/21 10/25/21 Range/Units





  05:19 07:05 11:52 


 


WBC     (3.98-10.04)  K/mm3


 


RBC     (3.98-5.22)  M/mm3


 


Hgb     (11.2-15.7)  gm/dl


 


Hct     (34.1-44.9)  %


 


MCV     (79.4-94.8)  fl


 


MCH     (25.6-32.2)  pg


 


MCHC     (32.2-35.5)  g/dl


 


RDW Std Deviation     (36.4-46.3)  fL


 


Plt Count     (182-369)  K/mm3


 


MPV     (9.4-12.3)  fl


 


Neut % (Auto)     (34.0-71.1)  %


 


Lymph % (Auto)     (19.3-51.7)  %


 


Mono % (Auto)     (4.7-12.5)  %


 


Eos % (Auto)     (0.7-5.8)  


 


Baso % (Auto)     (0.1-1.2)  %


 


Neut # (Auto)     (1.56-6.13)  K/mm3


 


Lymph # (Auto)     (1.18-3.74)  K/mm3


 


Mono # (Auto)     (0.24-0.36)  K/mm3


 


Eos # (Auto)     (0.04-0.36)  K/mm3


 


Baso # (Auto)     (0.01-0.08)  K/mm3


 


Sodium  141    (136-145)  mEq/L


 


Potassium  3.3 L    (3.5-5.1)  mEq/L


 


Chloride  107    ()  mEq/L


 


Carbon Dioxide  24    (21-32)  mEq/L


 


Anion Gap  13.3    (5-15)  


 


BUN  20 H    (7-18)  mg/dL


 


Creatinine  1.8 H    (0.55-1.02)  mg/dL


 


Est Cr Clr Drug Dosing  15.99    mL/min


 


Estimated GFR (MDRD)  26    (>60)  mL/min


 


BUN/Creatinine Ratio  11.1 L    (14-18)  


 


Glucose  174 H    (70-99)  mg/dL


 


POC Glucose   147 H  239 H  (70-99)  mg/dL


 


Calcium  8.7    (8.5-10.1)  mg/dL


 


Total Bilirubin  0.3    (0.2-1.0)  mg/dL


 


AST  16    (15-37)  U/L


 


ALT  18    (14-59)  U/L


 


Alkaline Phosphatase  79    ()  U/L


 


Total Protein  6.2 L    (6.4-8.2)  g/dl


 


Albumin  2.8 L    (3.4-5.0)  g/dl


 


Globulin  3.4    gm/dL


 


Albumin/Globulin Ratio  0.8 L    (1-2)  











[place] Results Last 24 Hours: 


                                  Microbiology











 10/23/21 11:15 Blood Culture - Preliminary





 Blood - Venous - Lab Draw 


 


 10/23/21 11:00 Blood Culture - Preliminary





 Blood - Venous 











Med Orders - Current: 


                               Current Medications





Acetaminophen (Acetaminophen 650 Mg Supp)  650 mg RECTAL Q6H PRN


   PRN Reason: Pain (mild 1-3)


Acetaminophen (Acetaminophen 325 Mg Tab)  650 mg PO Q4H PRN


   PRN Reason: Pain


   Last Admin: 10/22/21 21:58 Dose:  650 mg


   Documented by: 


Albuterol/Ipratropium (Albuterol/Ipratropium 3.0-0.5 Mg/3 Ml Neb Soln)  3 ml NEB

Q4H PRN


   PRN Reason: Shortness Of Breath/wheezing


Bisacodyl (Bisacodyl 5 Mg Tab)  10 mg PO DAILY PRN


   PRN Reason: Constipation


   Last Admin: 10/20/21 20:26 Dose:  5 mg


   Documented by: 


Calcium Carbonate/Glycine (Calcium Carbonate 500 Mg Tab.Chew)  250 mg PO Q4H PRN


   PRN Reason: Heartburn


Clopidogrel Bisulfate (Clopidogrel 75 Mg Tab)  75 mg PO DAILY [facility]


   Last Admin: 10/25/21 09:37 Dose:  75 mg


   Documented by: 


Furosemide (Furosemide 20 Mg Tab)  20 mg PO DAILY [facility]


Gabapentin (Gabapentin 300 Mg Cap)  300 mg PO BEDTIME Former[facility]


   Last Admin: 10/24/21 21:29 Dose:  300 mg


   Documented by: 


Heparin Sodium (Porcine) (Heparin Sodium 5,000 Units/Ml Vial)  5,000 units 

SUBCUT Q8H [facility]


   Last Admin: 10/25/21 12:45 Dose:  5,000 units


   Documented by: 


Promethazine HCl 6.25 mg/ (Sodium Chloride)  50.25 mls @ 100 mls/hr IV Q6H PRN


   PRN Reason: Nausea/Vomiting


Insulin Glargine (Insulin Glargine,Hum.Rec.Anlog 100 Unit/Ml 3 Ml Pen)  25 unit 

SUBCUT BEDTIME [facility]


   Last Admin: 10/24/21 22:12 Dose:  25 unit


   Documented by: 


Insulin Human Lispro (Insulin Lispro 100 Unit/Ml 3 Ml Kwikpen)  0 unit SUBCUT 

QIDACANDBED [facility]; Protocol


   Last Admin: 10/25/21 12:47 Dose:  4 units


   Documented by: 


Metoprolol Succinate (Metoprolol Succinate 50 Mg Tab.Er)  50 mg PO DAILY [facility]


   Last Admin: 10/25/21 09:37 Dose:  50 mg


   Documented by: 


Oxycodone HCl (Oxycodone 5 Mg Tab)  5 mg PO Q6H PRN


   PRN Reason: Pain (moderate 4-6)


Pantoprazole Sodium (Pantoprazole 40 Mg Tab.Cr)  40 mg PO ACBREAKFAST [facility]


   Last Admin: 10/25/21 05:58 Dose:  40 mg


   Documented by: 


Penicillin V Potassium (Penicillin V Potassium 500 Mg Tab)  500 mg PO Q6H [facility]


   Last Admin: 10/25/21 12:50 Dose:  500 mg


   Documented by: 


Polyethylene Glycol (Polyethylene Glycol 3350 Powder 17 Gm Packet)  17 gm PO 

DAILY PRN


   PRN Reason: Constipation


Sodium Chloride (Sodium Chloride 0.9% 10 Ml Syringe)  10 ml FLUSH ASDIRECTED PRN


   PRN Reason: Keep Vein Open


   Last Admin: 10/20/21 16:50 Dose:  10 ml


   Documented by: 


Spironolactone (Spironolactone 25 Mg Tab)  50 mg PO DAILY@1200 RYLEY


   Last Admin: 10/25/21 12:48 Dose:  50 mg


   Documented by: 





Discontinued Medications





Acetaminophen (Acetaminophen 325 Mg Tab)  975 mg PO NOW ONE


   Stop: 10/20/21 16:13


   Last Admin: 10/20/21 16:50 Dose:  975 mg


   Documented by: 


Furosemide (Furosemide 40 Mg Tab)  20 mg PO DAILY [facility]


   Last Admin: 10/25/21 09:37 Dose:  20 mg


   Documented by: 


Gabapentin (Gabapentin 300 Mg Cap)  600 mg PO ONETIME ONE


   Stop: 10/20/21 19:46


   Last Admin: 10/20/21 20:22 Dose:  600 mg


   Documented by: 


Sodium Chloride (Normal Saline)  1,000 mls @ 125 mls/hr IV ASDIRECTED [facility]


   Last Admin: 10/20/21 16:49 Dose:  125 mls/hr


   Documented by: 


Ceftriaxone Sodium 2 gm/ (Sodium Chloride)  100 mls @ 200 mls/hr IV ONETIME ONE


   Stop: 10/20/21 16:42


   Last Admin: 10/20/21 16:50 Dose:  200 mls/hr


   Documented by: 


Lactated Ringer's (Ringers, Lactated)  1,000 mls @ 100 mls/hr IV ASDIRECTED RYLEY


Lactated Ringer's (Ringers, Lactated)  1,000 mls @ 50 mls/hr IV ASDIRECTED [facility]


   Last Admin: 10/20/21 20:47 Dose:  50 mls/hr


   Documented by: 


Ceftriaxone Sodium 1 gm/ (Sodium Chloride)  100 mls @ 200 mls/hr IV Q24H [facility]


Ceftriaxone Sodium 1 gm/ (Sodium Chloride)  100 mls @ 200 mls/hr IV Q12H [facility]


   Last Admin: 10/22/21 21:39 Dose:  Not Given


   Documented by: 


Oxacillin Sodium 2 gm/ Sodium (Chloride)  100 mls @ 100 mls/hr IV Q4H [facility]


   Last Admin: 10/24/21 11:20 Dose:  Not Given


   Documented by: 


Insulin Glargine (Insulin Glargine,Hum.Rec.Anlog 100 Unit/Ml 3 Ml Pen)  10 unit 

SUBCUT DAILY [facility]


   Last Admin: 10/21/21 10:28 Dose:  Not Given


   Documented by: 


Insulin Glargine (Insulin Glargine,Hum.Rec.Anlog 100 Unit/Ml 3 Ml Pen)  10 unit 

SUBCUT BEDTIME [facility]


   Last Admin: 10/23/21 21:07 Dose:  10 units


   Documented by: 


Morphine Sulfate (Morphine 2 Mg/Ml Syringe)  1 mg IVPUSH Q4H PRN


   PRN Reason: Pain (severe 7-10)


   Stop: 10/21/21 19:06


Potassium Chloride (Potassium Chloride 20 Meq Tab.Er)  40 meq PO ONETIME ONE


   Stop: 10/25/21 09:24


   Last Admin: 10/25/21 09:37 Dose:  40 meq


   Documented by: 


Spironolactone (Spironolactone 25 Mg Tab)  50 mg PO DAILY [facility]


   Last Admin: 10/24/21 16:09 Dose:  Not Given


   Documented by: 











- Exam


Quality Assessment: DVT Prophylaxis (Heparin).  No: Supplemental Oxygen


General: Alert.  No: Oriented (Oriented to person and place)


HEENT: Pupils Equal, Mucous Membr. Moist/Pink


Neck: Supple, Trachea Midline


Lungs: Clear to Auscultation, Normal Respiratory Effort


Cardiovascular: Regular Rate, Regular Rhythm, No Murmurs


GI/Abdominal Exam: Normal Bowel Sounds, Soft, Non-Tender, No Distention


 (Female) Exam: Deferred


Back Exam: Normal Inspection


Extremities: Normal Inspection, Other (Patient does have dressings noted to her 

bilateral great toes.)


Peripheral Pulses: 2+: Radial (L), Radial (R)


Skin: Warm, Dry, Other (Dressings noted to bilateral great toes due to removal 

of toenails)


Wound/Incisions: Dressing Dry and Intact


Neurological: No New Focal Deficit


Psy/Mental Status: Alert, Normal Affect, Normal Mood





- Patient Data


Lab Results Last 24 hrs: 


                         Laboratory Results - last 24 hr











  10/24/21 10/24/21 10/25/21 Range/Units





  17:09 21:33 05:19 


 


WBC    7.89  (3.98-10.04)  K/mm3


 


RBC    3.74 L  (3.98-5.22)  M/mm3


 


Hgb    11.0 L  (11.2-15.7)  gm/dl


 


Hct    34.4  (34.1-44.9)  %


 


MCV    92.0  (79.4-94.8)  fl


 


MCH    29.4  (25.6-32.2)  pg


 


MCHC    32.0 L  (32.2-35.5)  g/dl


 


RDW Std Deviation    52.6 H  (36.4-46.3)  fL


 


Plt Count    212  (182-369)  K/mm3


 


MPV    10.2  (9.4-12.3)  fl


 


Neut % (Auto)    60.0  (34.0-71.1)  %


 


Lymph % (Auto)    26.5  (19.3-51.7)  %


 


Mono % (Auto)    8.5  (4.7-12.5)  %


 


Eos % (Auto)    3.8  (0.7-5.8)  


 


Baso % (Auto)    0.4  (0.1-1.2)  %


 


Neut # (Auto)    4.74  (1.56-6.13)  K/mm3


 


Lymph # (Auto)    2.09  (1.18-3.74)  K/mm3


 


Mono # (Auto)    0.67 H  (0.24-0.36)  K/mm3


 


Eos # (Auto)    0.30  (0.04-0.36)  K/mm3


 


Baso # (Auto)    0.03  (0.01-0.08)  K/mm3


 


Sodium     (136-145)  mEq/L


 


Potassium     (3.5-5.1)  mEq/L


 


Chloride     ()  mEq/L


 


Carbon Dioxide     (21-32)  mEq/L


 


Anion Gap     (5-15)  


 


BUN     (7-18)  mg/dL


 


Creatinine     (0.55-1.02)  mg/dL


 


Est Cr Clr Drug Dosing     mL/min


 


Estimated GFR (MDRD)     (>60)  mL/min


 


BUN/Creatinine Ratio     (14-18)  


 


Glucose     (70-99)  mg/dL


 


POC Glucose  168 H  310 H   (70-99)  mg/dL


 


Calcium     (8.5-10.1)  mg/dL


 


Total Bilirubin     (0.2-1.0)  mg/dL


 


AST     (15-37)  U/L


 


ALT     (14-59)  U/L


 


Alkaline Phosphatase     ()  U/L


 


Total Protein     (6.4-8.2)  g/dl


 


Albumin     (3.4-5.0)  g/dl


 


Globulin     gm/dL


 


Albumin/Globulin Ratio     (1-2)  














  10/25/21 10/25/21 10/25/21 Range/Units





  05:19 07:05 11:52 


 


WBC     (3.98-10.04)  K/mm3


 


RBC     (3.98-5.22)  M/mm3


 


Hgb     (11.2-15.7)  gm/dl


 


Hct     (34.1-44.9)  %


 


MCV     (79.4-94.8)  fl


 


MCH     (25.6-32.2)  pg


 


MCHC     (32.2-35.5)  g/dl


 


RDW Std Deviation     (36.4-46.3)  fL


 


Plt Count     (182-369)  K/mm3


 


MPV     (9.4-12.3)  fl


 


Neut % (Auto)     (34.0-71.1)  %


 


Lymph % (Auto)     (19.3-51.7)  %


 


Mono % (Auto)     (4.7-12.5)  %


 


Eos % (Auto)     (0.7-5.8)  


 


Baso % (Auto)     (0.1-1.2)  %


 


Neut # (Auto)     (1.56-6.13)  K/mm3


 


Lymph # (Auto)     (1.18-3.74)  K/mm3


 


Mono # (Auto)     (0.24-0.36)  K/mm3


 


Eos # (Auto)     (0.04-0.36)  K/mm3


 


Baso # (Auto)     (0.01-0.08)  K/mm3


 


Sodium  141    (136-145)  mEq/L


 


Potassium  3.3 L    (3.5-5.1)  mEq/L


 


Chloride  107    ()  mEq/L


 


Carbon Dioxide  24    (21-32)  mEq/L


 


Anion Gap  13.3    (5-15)  


 


BUN  20 H    (7-18)  mg/dL


 


Creatinine  1.8 H    (0.55-1.02)  mg/dL


 


Est Cr Clr Drug Dosing  15.99    mL/min


 


Estimated GFR (MDRD)  26    (>60)  mL/min


 


BUN/Creatinine Ratio  11.1 L    (14-18)  


 


Glucose  174 H    (70-99)  mg/dL


 


POC Glucose   147 H  239 H  (70-99)  mg/dL


 


Calcium  8.7    (8.5-10.1)  mg/dL


 


Total Bilirubin  0.3    (0.2-1.0)  mg/dL


 


AST  16    (15-37)  U/L


 


ALT  18    (14-59)  U/L


 


Alkaline Phosphatase  79    ()  U/L


 


Total Protein  6.2 L    (6.4-8.2)  g/dl


 


Albumin  2.8 L    (3.4-5.0)  g/dl


 


Globulin  3.4    gm/dL


 


Albumin/Globulin Ratio  0.8 L    (1-2)  











Result Diagrams: 


                                 10/25/21 05:19





                                 10/25/21 05:19


[place] Results Last 24 hrs: 


                                  Microbiology











 10/23/21 11:15 Blood Culture - Preliminary





 Blood - Venous - Lab Draw 


 


 10/23/21 11:00 Blood Culture - Preliminary





 Blood - Venous 














Sepsis Event Note





- Evaluation


Sepsis Screening Result: No Definite Risk





- Focused Exam


Vital Signs: 


                                   Vital Signs











  Temp Temp Pulse Pulse Resp BP BP


 


 10/25/21 11:37  97.9 F   61   20  140/78 


 


 10/25/21 09:37    72    137/92 H 


 


 10/25/21 08:57  97.9 F   72   22 H  137/92 H 


 


 10/25/21 08:00   98.7 F   76  19   113/61


 


 10/25/21 04:01  98.4 F   65   16  95/54 L 














  Pulse Ox


 


 10/25/21 11:37  95


 


 10/25/21 09:37 


 


 10/25/21 08:57  90 L


 


 10/25/21 08:00  90 L


 


 10/25/21 04:01  91 L














- Problem List & Annotations


(1) Cellulitis of right leg


SNOMED Code(s): 217154231


   Code(s): L03.115 - CELLULITIS OF RIGHT LOWER LIMB   Status: Acute   Priority:

High   Current Visit: Yes   





(2) CKD (chronic kidney disease) stage 4, GFR 15-29 ml/min


SNOMED Code(s): 413199826


   Code(s): N18.4 - CHRONIC KIDNEY DISEASE, STAGE 4 (SEVERE)   Status: Chronic  

Priority: Medium   Current Visit: Yes   





(3) Hyperglycemia due to type 2 diabetes mellitus


SNOMED Code(s): 510567968818101, 091033363448972


   Code(s): E11.65 - TYPE 2 DIABETES MELLITUS WITH HYPERGLYCEMIA   Status: 

Chronic   Priority: High   Current Visit: Yes   


Qualifiers: 


   Diabetes mellitus long term insulin use: with long term use   Qualified 

Code(s): E11.65 - Type 2 diabetes mellitus with hyperglycemia; Z79.4 - Long term

(current) use of insulin   





(4) Sepsis


SNOMED Code(s): 37437406


   Code(s): A41.9 - SEPSIS, UNSPECIFIED ORGANISM   Status: Resolved   Priority: 

High   Current Visit: Yes   


Qualifiers: 


   Sepsis type: sepsis due to unspecified organism   Sepsis acute organ 

dysfunction status: unspecified   Qualified Code(s): A41.9 - Sepsis, unspecified

organism   





- Problem List Review


Problem List Initiated/Reviewed/Updated: Yes





- Plan


Plan:: 





Patient is an 89-year-old female with a history of CHF, hypertension, diabetes, 

and atrial fibrillation who was brought to the ER from a nursing home due to 

generalized weakness, confusion and fever.





Assessment and plan





AMSresolved


Etiologies include sepsis/infection, dehydration, metabolic events


No focal neurological deficits


Closely monitor


Speech therapy today cleared her for mechanical soft diet.





Early sepsis 2nd to cellulitis of right leg


Bacteremia: Called by nurse that there was a positive blood culture.  Reviewed 

blood culture shows growth of 1 out of 2 sets positive of Staphylococcus 

lugdunensis.  I started her on nafcillin 2 g every 4 hours IV and stop 

ceftriaxone.  Ordered echocardiogram for Monday morning and will repeat blood 

cultures tomorrow.


CRP 2.8


Blood culture positive of Staphylococcus lugdunensis





Cellulitis, right leg and foot


Had great toe nails removed lsat week


US doppler to rule out a DVT


DC ceftriaxone 1 g IV twice daily and switch to nafcillin


Blood cultures as above





Acute hypoxic respiratory failureresolved


Oxygen saturation 88% in the ER


Etiology unknown


Pulse ox


Oxygen therapy to keep oxygen saturation greater than 94%on room air





KATIE on CKD, creatinine 1.0 on 1/11/2019, creatinine has been around 2.0 over the

past 2-3 years. 


Creatinine 2.6-->2.1


Avoid nephrotoxic meds


Repeat renal function tomorrow





DM type 2


on insulin degludec 20units daily


N.p.o. due to history of dysphagia


I will order Lantus 10 units daily


Insulin sliding scales


Blood sugars between low 100s 200s





HTN


Restart on Saturday metoprolol succinate 50mg daily that has been on hold


Hydralazine as needed





CHF, NYHA class II


Restarted on Saturday lasix 20mg daily and spironolactone 50mg daily 


Intake and output


Repeat electrolytes in the morning





Atrial fibrillation


Heart rate is controlled


Restart on Saturday metoprolol has been on hold


History of GI bleeding.  Not on anticoagulation





Hx of GI bleeding


Continue home medication Plavix


Repeat a CBC in morning





hx of dysphagia


N.p.o.


Speech pathology consult





Generalized weakness


PT OT





DVT prophylaxis: Heparin





CODE STATUS: DNR/DNI.  Discussed with the patient and her daughter who declined 

CPR and intubation





Disposition: Length of stay greater than 96 hours secondary to positive blood 

cultures and need for IV antibiotics.





10/23/2021





The patient is an 89-year-old lady who is doing better today.  She is awaiting 

nursing home placement.  She had positive blood cultures which were growing 

Staphylococcus lugdunensis and is currently pending a 2D echocardiogram to rule 

out vegetations.  For now the patient will be retained in hospitalization.  She 

has diabetes mellitus type 2 and as a result of this she will be alone current 

insulin sliding scale may have to adjust her long-acting as needed.  Diabetic 

diet as tolerated.  She had been in the past refusing testing although she did 

have repeat laboratory cultures completed.  The patient's vital signs will be 

monitored and her antihypertensive medications will also be adjusted as 

necessary.  The patient has had some resolution of her respiratory failure and 

her oxygen saturations to be kept around 92%.  Repeat laboratory studies have 

been ordered for the morning.  The patient has been encouraged to ambulate.  The

patient is also anticoagulated with the use of heparin and Plavix.  We will 

monitor CBC for bleeding.





10/24/2021





The patient is an 89-year-old lady who has been refusing IV restarts.  She says 

she is tired of being poked.  The patient says that she is tired and just wants 

to go home.  The patient is currently pending a 2D echocardiogram.  The patient 

will be retained in hospitalization.  The patient's diabetic diet will continue 

for now.  Repeat blood cultures are currently pending.  Patient have repeat 

laboratory studies in the morning.  The patient so far is on room air.  We will 

continue to monitor her saturations.  She is also anticoagulated with the use of

heparin and Plavix.  The patient should be appropriate for discharge back to 

skilled nursing facility after completion of testing.  Because of the patient's 

refusal to have IV started the patient will be started on oral equivalent 

consisting of Pen-Vee K 500 mg p.o. 4 times daily.  This may change if 2D 

echocardiogram testing reveals endocarditis.





10/25/2021





89-year-old female admitted to the medical floor for cellulitis and subsequently

positive blood cultures.  She is requesting to return back to the nursing home 

as she states she is tired of being poked from the IVs and blood draws.  She was

switched from IV antibiotics to Pen-Vee K yesterday.  Appears to be tolerating 

this well.  Currently anticoagulated on heparin and Plavix.  Awaiting 2D echo 

report.  If there is no endocarditis suspected, patient will be discharged back 

to the nursing home tomorrow however if the echo does reveal endocarditis she 

will need to be treated with IV antibiotics. Lab studies reveal a white count of

11.06, hemoglobin 11.2, hematocrit 36.3, platelet count 206, sodium 140, 

potassium 4.1, carbon dioxide 28, anion gap 12.1, BUN 33, creatinine 2.6, GFR 

17, glucose range has been from .  Patient remains on a diabetic diet.  

Had a lengthy discussion with the patient's daughter Juanis today, as the 

patient does have a history of dementia. Juanis will be updated once we have the

report from the echocardiogram.  We will continue with PT/OT.





<Frankie Clements - Last Filed: 10/25/21 15:49>





- Patient Data


Vitals - Most Recent: 


                                Last Vital Signs











Temp  36.6 C   10/25/21 15:34


 


Pulse  71   10/25/21 15:35


 


Resp  16   10/25/21 15:34


 


BP  129/40 L  10/25/21 15:34


 


Pulse Ox  94 L  10/25/21 15:35











I&O - Last 24 Hours: 


                                 Intake & Output











 10/25/21 10/25/21 10/25/21





 06:59 14:59 22:59


 


Intake Total 100 120 600


 


Output Total 0  525


 


Balance 100 120 75











Lab Results Last 24 Hours: 


                         Laboratory Results - last 24 hr











  10/24/21 10/24/21 10/25/21 Range/Units





  17:09 21:33 05:19 


 


WBC    7.89  (3.98-10.04)  K/mm3


 


RBC    3.74 L  (3.98-5.22)  M/mm3


 


Hgb    11.0 L  (11.2-15.7)  gm/dl


 


Hct    34.4  (34.1-44.9)  %


 


MCV    92.0  (79.4-94.8)  fl


 


MCH    29.4  (25.6-32.2)  pg


 


MCHC    32.0 L  (32.2-35.5)  g/dl


 


RDW Std Deviation    52.6 H  (36.4-46.3)  fL


 


Plt Count    212  (182-369)  K/mm3


 


MPV    10.2  (9.4-12.3)  fl


 


Neut % (Auto)    60.0  (34.0-71.1)  %


 


Lymph % (Auto)    26.5  (19.3-51.7)  %


 


Mono % (Auto)    8.5  (4.7-12.5)  %


 


Eos % (Auto)    3.8  (0.7-5.8)  


 


Baso % (Auto)    0.4  (0.1-1.2)  %


 


Neut # (Auto)    4.74  (1.56-6.13)  K/mm3


 


Lymph # (Auto)    2.09  (1.18-3.74)  K/mm3


 


Mono # (Auto)    0.67 H  (0.24-0.36)  K/mm3


 


Eos # (Auto)    0.30  (0.04-0.36)  K/mm3


 


Baso # (Auto)    0.03  (0.01-0.08)  K/mm3


 


Sodium     (136-145)  mEq/L


 


Potassium     (3.5-5.1)  mEq/L


 


Chloride     ()  mEq/L


 


Carbon Dioxide     (21-32)  mEq/L


 


Anion Gap     (5-15)  


 


BUN     (7-18)  mg/dL


 


Creatinine     (0.55-1.02)  mg/dL


 


Est Cr Clr Drug Dosing     mL/min


 


Estimated GFR (MDRD)     (>60)  mL/min


 


BUN/Creatinine Ratio     (14-18)  


 


Glucose     (70-99)  mg/dL


 


POC Glucose  168 H  310 H   (70-99)  mg/dL


 


Calcium     (8.5-10.1)  mg/dL


 


Total Bilirubin     (0.2-1.0)  mg/dL


 


AST     (15-37)  U/L


 


ALT     (14-59)  U/L


 


Alkaline Phosphatase     ()  U/L


 


Total Protein     (6.4-8.2)  g/dl


 


Albumin     (3.4-5.0)  g/dl


 


Globulin     gm/dL


 


Albumin/Globulin Ratio     (1-2)  














  10/25/21 10/25/21 10/25/21 Range/Units





  05:19 07:05 11:52 


 


WBC     (3.98-10.04)  K/mm3


 


RBC     (3.98-5.22)  M/mm3


 


Hgb     (11.2-15.7)  gm/dl


 


Hct     (34.1-44.9)  %


 


MCV     (79.4-94.8)  fl


 


MCH     (25.6-32.2)  pg


 


MCHC     (32.2-35.5)  g/dl


 


RDW Std Deviation     (36.4-46.3)  fL


 


Plt Count     (182-369)  K/mm3


 


MPV     (9.4-12.3)  fl


 


Neut % (Auto)     (34.0-71.1)  %


 


Lymph % (Auto)     (19.3-51.7)  %


 


Mono % (Auto)     (4.7-12.5)  %


 


Eos % (Auto)     (0.7-5.8)  


 


Baso % (Auto)     (0.1-1.2)  %


 


Neut # (Auto)     (1.56-6.13)  K/mm3


 


Lymph # (Auto)     (1.18-3.74)  K/mm3


 


Mono # (Auto)     (0.24-0.36)  K/mm3


 


Eos # (Auto)     (0.04-0.36)  K/mm3


 


Baso # (Auto)     (0.01-0.08)  K/mm3


 


Sodium  141    (136-145)  mEq/L


 


Potassium  3.3 L    (3.5-5.1)  mEq/L


 


Chloride  107    ()  mEq/L


 


Carbon Dioxide  24    (21-32)  mEq/L


 


Anion Gap  13.3    (5-15)  


 


BUN  20 H    (7-18)  mg/dL


 


Creatinine  1.8 H    (0.55-1.02)  mg/dL


 


Est Cr Clr Drug Dosing  15.99    mL/min


 


Estimated GFR (MDRD)  26    (>60)  mL/min


 


BUN/Creatinine Ratio  11.1 L    (14-18)  


 


Glucose  174 H    (70-99)  mg/dL


 


POC Glucose   147 H  239 H  (70-99)  mg/dL


 


Calcium  8.7    (8.5-10.1)  mg/dL


 


Total Bilirubin  0.3    (0.2-1.0)  mg/dL


 


AST  16    (15-37)  U/L


 


ALT  18    (14-59)  U/L


 


Alkaline Phosphatase  79    ()  U/L


 


Total Protein  6.2 L    (6.4-8.2)  g/dl


 


Albumin  2.8 L    (3.4-5.0)  g/dl


 


Globulin  3.4    gm/dL


 


Albumin/Globulin Ratio  0.8 L    (1-2)  











[facility] Results Last 24 Hours: 


                                  Microbiology











 10/23/21 11:15 Blood Culture - Preliminary





 Blood - Venous - Lab Draw 


 


 10/23/21 11:00 Blood Culture - Preliminary





 Blood - Venous 











Med Orders - Current: 


                               Current Medications





Acetaminophen (Acetaminophen 650 Mg Supp)  650 mg RECTAL Q6H PRN


   PRN Reason: Pain (mild 1-3)


Acetaminophen (Acetaminophen 325 Mg Tab)  650 mg PO Q4H PRN


   PRN Reason: Pain


   Last Admin: 10/22/21 21:58 Dose:  650 mg


   Documented by: 


Albuterol/Ipratropium (Albuterol/Ipratropium 3.0-0.5 Mg/3 Ml Neb Soln)  3 ml NEB

Q4H PRN


   PRN Reason: Shortness Of Breath/wheezing


Bisacodyl (Bisacodyl 5 Mg Tab)  10 mg PO DAILY PRN


   PRN Reason: Constipation


   Last Admin: 10/20/21 20:26 Dose:  5 mg


   Documented by: 


Calcium Carbonate/Glycine (Calcium Carbonate 500 Mg Tab.Chew)  250 mg PO Q4H PRN


   PRN Reason: Heartburn


Clopidogrel Bisulfate (Clopidogrel 75 Mg Tab)  75 mg PO DAILY RYLEY


   Last Admin: 10/25/21 09:37 Dose:  75 mg


   Documented by: 


Furosemide (Furosemide 20 Mg Tab)  20 mg PO DAILY RYLEY


Gabapentin (Gabapentin 300 Mg Cap)  300 mg PO BEDTIME RYLEY


   Last Admin: 10/24/21 21:29 Dose:  300 mg


   Documented by: 


Heparin Sodium (Porcine) (Heparin Sodium 5,000 Units/Ml Vial)  5,000 units 

SUBCUT Q8H Formerly [facility]


   Last Admin: 10/25/21 12:45 Dose:  5,000 units


   Documented by: 


Promethazine HCl 6.25 mg/ (Sodium Chloride)  50.25 mls @ 100 mls/hr IV Q6H PRN


   PRN Reason: Nausea/Vomiting


Insulin Glargine (Insulin Glargine,Hum.Rec.Anlog 100 Unit/Ml 3 Ml Pen)  25 unit 

SUBCUT BEDTIME Formerly [facility]


   Last Admin: 10/24/21 22:12 Dose:  25 unit


   Documented by: 


Insulin Human Lispro (Insulin Lispro 100 Unit/Ml 3 Ml Kwikpen)  0 unit SUBCUT 

QIDACANDBED Formerly [facility]; Protocol


   Last Admin: 10/25/21 12:47 Dose:  4 units


   Documented by: 


Metoprolol Succinate (Metoprolol Succinate 50 Mg Tab.Er)  50 mg PO DAILY Formerly [facility]


   Last Admin: 10/25/21 09:37 Dose:  50 mg


   Documented by: 


Oxycodone HCl (Oxycodone 5 Mg Tab)  5 mg PO Q6H PRN


   PRN Reason: Pain (moderate 4-6)


Pantoprazole Sodium (Pantoprazole 40 Mg Tab.Cr)  40 mg PO ACBREAKFAST Formerly [facility]


   Last Admin: 10/25/21 05:58 Dose:  40 mg


   Documented by: 


Penicillin V Potassium (Penicillin V Potassium 500 Mg Tab)  500 mg PO Q6H Former[facility]


   Last Admin: 10/25/21 12:50 Dose:  500 mg


   Documented by: 


Polyethylene Glycol (Polyethylene Glycol 3350 Powder 17 Gm Packet)  17 gm PO 

DAILY PRN


   PRN Reason: Constipation


Sodium Chloride (Sodium Chloride 0.9% 10 Ml Syringe)  10 ml FLUSH ASDIRECTED PRN


   PRN Reason: Keep Vein Open


   Last Admin: 10/20/21 16:50 Dose:  10 ml


   Documented by: 


Spironolactone (Spironolactone 25 Mg Tab)  50 mg PO DAILY@1200 Formerly [facility]


   Last Admin: 10/25/21 12:48 Dose:  50 mg


   Documented by: 





Discontinued Medications





Acetaminophen (Acetaminophen 325 Mg Tab)  975 mg PO NOW ONE


   Stop: 10/20/21 16:13


   Last Admin: 10/20/21 16:50 Dose:  975 mg


   Documented by: 


Furosemide (Furosemide 40 Mg Tab)  20 mg PO DAILY Former[facility]


   Last Admin: 10/25/21 09:37 Dose:  20 mg


   Documented by: 


Gabapentin (Gabapentin 300 Mg Cap)  600 mg PO ONETIME ONE


   Stop: 10/20/21 19:46


   Last Admin: 10/20/21 20:22 Dose:  600 mg


   Documented by: 


Sodium Chloride (Normal Saline)  1,000 mls @ 125 mls/hr IV ASDIRECTED Former[facility]


   Last Admin: 10/20/21 16:49 Dose:  125 mls/hr


   Documented by: 


Ceftriaxone Sodium 2 gm/ (Sodium Chloride)  100 mls @ 200 mls/hr IV ONETIME ONE


   Stop: 10/20/21 16:42


   Last Admin: 10/20/21 16:50 Dose:  200 mls/hr


   Documented by: 


Lactated Ringer's (Ringers, Lactated)  1,000 mls @ 100 mls/hr IV ASDIRECTED RYLEY


Lactated Ringer's (Ringers, Lactated)  1,000 mls @ 50 mls/hr IV ASDIRECTED Former[facility]


   Last Admin: 10/20/21 20:47 Dose:  50 mls/hr


   Documented by: 


Ceftriaxone Sodium 1 gm/ (Sodium Chloride)  100 mls @ 200 mls/hr IV Q24H RYLEY


Ceftriaxone Sodium 1 gm/ (Sodium Chloride)  100 mls @ 200 mls/hr IV Q12H Former[facility]


   Last Admin: 10/22/21 21:39 Dose:  Not Given


   Documented by: 


Oxacillin Sodium 2 gm/ Sodium (Chloride)  100 mls @ 100 mls/hr IV Q4H Former[facility]


   Last Admin: 10/24/21 11:20 Dose:  Not Given


   Documented by: 


Insulin Glargine (Insulin Glargine,Hum.Rec.Anlog 100 Unit/Ml 3 Ml Pen)  10 unit 

SUBCUT DAILY Former[facility]


   Last Admin: 10/21/21 10:28 Dose:  Not Given


   Documented by: 


Insulin Glargine (Insulin Glargine,Hum.Rec.Anlog 100 Unit/Ml 3 Ml Pen)  10 unit 

SUBCUT BEDTIME Formerly [facility]


   Last Admin: 10/23/21 21:07 Dose:  10 units


   Documented by: 


Morphine Sulfate (Morphine 2 Mg/Ml Syringe)  1 mg IVPUSH Q4H PRN


   PRN Reason: Pain (severe 7-10)


   Stop: 10/21/21 19:06


Potassium Chloride (Potassium Chloride 20 Meq Tab.Er)  40 meq PO ONETIME ONE


   Stop: 10/25/21 09:24


   Last Admin: 10/25/21 09:37 Dose:  40 meq


   Documented by: 


Spironolactone (Spironolactone 25 Mg Tab)  50 mg PO DAILY RYLEY


   Last Admin: 10/24/21 16:09 Dose:  Not Given


   Documented by: 











- Patient Data


Lab Results Last 24 hrs: 


                         Laboratory Results - last 24 hr











  10/24/21 10/24/21 10/25/21 Range/Units





  17:09 21:33 05:19 


 


WBC    7.89  (3.98-10.04)  K/mm3


 


RBC    3.74 L  (3.98-5.22)  M/mm3


 


Hgb    11.0 L  (11.2-15.7)  gm/dl


 


Hct    34.4  (34.1-44.9)  %


 


MCV    92.0  (79.4-94.8)  fl


 


MCH    29.4  (25.6-32.2)  pg


 


MCHC    32.0 L  (32.2-35.5)  g/dl


 


RDW Std Deviation    52.6 H  (36.4-46.3)  fL


 


Plt Count    212  (182-369)  K/mm3


 


MPV    10.2  (9.4-12.3)  fl


 


Neut % (Auto)    60.0  (34.0-71.1)  %


 


Lymph % (Auto)    26.5  (19.3-51.7)  %


 


Mono % (Auto)    8.5  (4.7-12.5)  %


 


Eos % (Auto)    3.8  (0.7-5.8)  


 


Baso % (Auto)    0.4  (0.1-1.2)  %


 


Neut # (Auto)    4.74  (1.56-6.13)  K/mm3


 


Lymph # (Auto)    2.09  (1.18-3.74)  K/mm3


 


Mono # (Auto)    0.67 H  (0.24-0.36)  K/mm3


 


Eos # (Auto)    0.30  (0.04-0.36)  K/mm3


 


Baso # (Auto)    0.03  (0.01-0.08)  K/mm3


 


Sodium     (136-145)  mEq/L


 


Potassium     (3.5-5.1)  mEq/L


 


Chloride     ()  mEq/L


 


Carbon Dioxide     (21-32)  mEq/L


 


Anion Gap     (5-15)  


 


BUN     (7-18)  mg/dL


 


Creatinine     (0.55-1.02)  mg/dL


 


Est Cr Clr Drug Dosing     mL/min


 


Estimated GFR (MDRD)     (>60)  mL/min


 


BUN/Creatinine Ratio     (14-18)  


 


Glucose     (70-99)  mg/dL


 


POC Glucose  168 H  310 H   (70-99)  mg/dL


 


Calcium     (8.5-10.1)  mg/dL


 


Total Bilirubin     (0.2-1.0)  mg/dL


 


AST     (15-37)  U/L


 


ALT     (14-59)  U/L


 


Alkaline Phosphatase     ()  U/L


 


Total Protein     (6.4-8.2)  g/dl


 


Albumin     (3.4-5.0)  g/dl


 


Globulin     gm/dL


 


Albumin/Globulin Ratio     (1-2)  














  10/25/21 10/25/21 10/25/21 Range/Units





  05:19 07:05 11:52 


 


WBC     (3.98-10.04)  K/mm3


 


RBC     (3.98-5.22)  M/mm3


 


Hgb     (11.2-15.7)  gm/dl


 


Hct     (34.1-44.9)  %


 


MCV     (79.4-94.8)  fl


 


MCH     (25.6-32.2)  pg


 


MCHC     (32.2-35.5)  g/dl


 


RDW Std Deviation     (36.4-46.3)  fL


 


Plt Count     (182-369)  K/mm3


 


MPV     (9.4-12.3)  fl


 


Neut % (Auto)     (34.0-71.1)  %


 


Lymph % (Auto)     (19.3-51.7)  %


 


Mono % (Auto)     (4.7-12.5)  %


 


Eos % (Auto)     (0.7-5.8)  


 


Baso % (Auto)     (0.1-1.2)  %


 


Neut # (Auto)     (1.56-6.13)  K/mm3


 


Lymph # (Auto)     (1.18-3.74)  K/mm3


 


Mono # (Auto)     (0.24-0.36)  K/mm3


 


Eos # (Auto)     (0.04-0.36)  K/mm3


 


Baso # (Auto)     (0.01-0.08)  K/mm3


 


Sodium  141    (136-145)  mEq/L


 


Potassium  3.3 L    (3.5-5.1)  mEq/L


 


Chloride  107    ()  mEq/L


 


Carbon Dioxide  24    (21-32)  mEq/L


 


Anion Gap  13.3    (5-15)  


 


BUN  20 H    (7-18)  mg/dL


 


Creatinine  1.8 H    (0.55-1.02)  mg/dL


 


Est Cr Clr Drug Dosing  15.99    mL/min


 


Estimated GFR (MDRD)  26    (>60)  mL/min


 


BUN/Creatinine Ratio  11.1 L    (14-18)  


 


Glucose  174 H    (70-99)  mg/dL


 


POC Glucose   147 H  239 H  (70-99)  mg/dL


 


Calcium  8.7    (8.5-10.1)  mg/dL


 


Total Bilirubin  0.3    (0.2-1.0)  mg/dL


 


AST  16    (15-37)  U/L


 


ALT  18    (14-59)  U/L


 


Alkaline Phosphatase  79    ()  U/L


 


Total Protein  6.2 L    (6.4-8.2)  g/dl


 


Albumin  2.8 L    (3.4-5.0)  g/dl


 


Globulin  3.4    gm/dL


 


Albumin/Globulin Ratio  0.8 L    (1-2)  











Result Diagrams: 


                                 10/25/21 05:19





                                 10/25/21 05:19


[facility] Results Last 24 hrs: 


                                  Microbiology











 10/23/21 11:15 Blood Culture - Preliminary





 Blood - Venous - Lab Draw 


 


 10/23/21 11:00 Blood Culture - Preliminary





 Blood - Venous 














Sepsis Event Note





- Focused Exam


Vital Signs: 


                                   Vital Signs











  Temp Temp Pulse Pulse Resp BP BP


 


 10/25/21 15:35    71    


 


 10/25/21 15:34  36.6 C   77   16  129/40 L 


 


 10/25/21 11:37  36.6 C   61   20  140/78 


 


 10/25/21 09:37    72    137/92 H 


 


 10/25/21 08:57  36.6 C   72   22 H  137/92 H 


 


 10/25/21 08:00   37.1 C   76  19   113/61


 


 10/25/21 04:01  36.9 C   65   16  95/54 L 














  Pulse Ox


 


 10/25/21 15:35  94 L


 


 10/25/21 15:34  89 L


 


 10/25/21 11:37  95


 


 10/25/21 09:37 


 


 10/25/21 08:57  90 L


 


 10/25/21 08:00  90 L


 


 10/25/21 04:01  91 L














- Problem List & Annotations


(1) Cellulitis of right leg


SNOMED Code(s): 993719789


   Code(s): L03.115 - CELLULITIS OF RIGHT LOWER LIMB   Status: Acute   Priority:

High   Current Visit: Yes   





(2) Sepsis


SNOMED Code(s): 58589000


   Code(s): A41.9 - SEPSIS, UNSPECIFIED ORGANISM   Status: Resolved   Priority: 

High   Current Visit: Yes   


Qualifiers: 


   Sepsis type: sepsis due to unspecified organism   Sepsis acute organ dysfunct

ion status: unspecified   Qualified Code(s): A41.9 - Sepsis, unspecified 

organism   





(3) Hyperglycemia due to type 2 diabetes mellitus


SNOMED Code(s): 618139860133153, 479243052100738


   Code(s): E11.65 - TYPE 2 DIABETES MELLITUS WITH HYPERGLYCEMIA   Status: 

Chronic   Priority: High   Current Visit: Yes   


Qualifiers: 


   Diabetes mellitus long term insulin use: with long term use   Qualified 

Code(s): E11.65 - Type 2 diabetes mellitus with hyperglycemia; Z79.4 - Long term

(current) use of insulin   





(4) CKD (chronic kidney disease) stage 4, GFR 15-29 ml/min


SNOMED Code(s): 782037511


   Code(s): N18.4 - CHRONIC KIDNEY DISEASE, STAGE 4 (SEVERE)   Status: Chronic  

Priority: Medium   Current Visit: Yes   





- My Orders


Last 24 Hours: 


My Active Orders





10/24/21 21:00


Insulin Glargine,Hum.Rec.Anlog [Semglee Pen]   25 unit SUBCUT BEDTIME 














- Free Text/Narrative


Note: 





I have seen and examined the patient independently of CONNIE Love, and I

have discussed the case with her.  I have reviewed and agree with the plan of 

care as outlined by her.  Please see orders.

## 2021-10-26 NOTE — [id number].DCSUM1
<PrinceRadha M - Last Filed: 10/26/21 09:50>





**Discharge Summary





- Hospital Course


HPI Initial Comments: 


89-year-old female who presented to the emergency department from [facility] due to generalized weakness, increased confusion and fever.  

Patient does have a history of CHF, hypertension, diabetes and atrial 

fibrillation.  On the day the patient presented to the emergency department she 

was found to be very weak, she was not able to stand or walk and prior to this 

she had been able to.  Patient's right leg and both feet were found to be 

reddened and warm.  Patient did have both great toenails removed 1 week prior to

emergency room visit.  Upon ED eval, the patient was found to have a low-grade 

fever, leukocytosis, oxygen saturations at 88% and an elevated creatinine level.

 Patient was subsequently admitted to the medical floor and started on IV 

Rocephin.  Blood cultures did come back positive growing Staphylococcus 

Ludgunesis.  At that time, Rocephin was discontinued and the nafcillin was 

started.  However, the patient did not tolerate this well as she complained of 

not wanting to be poked anymore with blood draws and IVs.  She was subsequently 

changed to Pen-Vee K orally.  2D echo was completed which did show a left 

ventricular ejection fraction of 60 to 65%.  There was no evidence of valvular 

vegetation.  The patient remained afebrile and hemodynamically stable.  Hypoxia 

resolved as well.  Patient was subsequently discharged back to the nursing home.





Diagnosis: Stroke: No





- Discharge Data


Discharge Date: 10/26/21


Discharge Disposition: DC/Tfer to SNF 03


Condition: Good





- Referral to Home Health


Primary Care Physician: 


Ru Vaughan MD








- Discharge Diagnosis/Problem(s)


(1) Cellulitis of right leg


SNOMED Code(s): 624183473


   ICD Code: L03.115 - CELLULITIS OF RIGHT LOWER LIMB   Status: Acute   

Priority: High   





(2) CKD (chronic kidney disease) stage 4, GFR 15-29 ml/min


SNOMED Code(s): 678325058


   ICD Code: N18.4 - CHRONIC KIDNEY DISEASE, STAGE 4 (SEVERE)   Status: Chronic 

 Priority: Medium   





(3) Hyperglycemia due to type 2 diabetes mellitus


SNOMED Code(s): 934065659625235, 000975604000365


   ICD Code: E11.65 - TYPE 2 DIABETES MELLITUS WITH HYPERGLYCEMIA   Status: 

Chronic   Priority: High   


Qualifiers: 


   Diabetes mellitus long term insulin use: with long term use   Qualified 

Code(s): E11.65 - Type 2 diabetes mellitus with hyperglycemia; Z79.4 - Long term

(current) use of insulin   





(4) Sepsis


SNOMED Code(s): 34286944


   ICD Code: A41.9 - SEPSIS, UNSPECIFIED ORGANISM   Status: Resolved   Priority:

High   


Qualifiers: 


   Sepsis type: sepsis due to unspecified organism   Sepsis acute organ 

dysfunction status: unspecified   Qualified Code(s): A41.9 - Sepsis, unspecified

organism   





- Patient Summary/Data


Consults: 


                                  Consultations





10/20/21 19:03


OT Evaluation and Treatment [CONS] Routine 


PT Evaluation and Treatment [CONS] Routine 





10/20/21 19:43


Consult to Speech Language Pathology [SLP Evaluation and Treatment] [CONS] 

Routine 











Hospital Course: 





10/20/2021


Patient is an 89-year-old female with a history of CHF, hypertension, diabetes, 

and atrial fibrillation who was brought to the ER from a nursing home due to 

generalized weakness, confusion and fever.





Assessment and plan





AMS


Etiologies include sepsis/infection, dehydration, metabolic events


No focal neurological deficits


Closely monitor





Early sepsis 2nd to cellulitis of right leg


LA 1.3, CRP 2.8


Blood culture


Gentle IV fluid


Antibiotics





Cellulitis, right leg and foot


Had great toe nails removed lsat week


US doppler to rule out a DVT


Ceftriaxone 1 g IV daily





Acute hypoxic respiratory failure


Oxygen saturation 88% in the ER


Etiology unknown


Pulse ox


Oxygen therapy to keep oxygen saturation greater than 94%





KATIE on CKD, creatinine 1.0 on 1/11/2019, creatinine has been around 2.0 over the

 past 2-3 years. 


Avoid nephrotoxic meds


Repeat renal function in the morning





DM type 2


on insulin degludec 20units daily


N.p.o. due to history of dysphagia


I will order Lantus 10 units daily


Insulin sliding scales





HTN


Metoprolol succinate 50mg daily is on hold due to soft blood pressure


Hydralazine as needed





CHF, NYHA class II


lasix 20mg daily and spironolactone 50mg daily are on hold


Intake and output


Repeat electrolytes in the morning





Atrial fibrillation


Heart rate is controlled


Metoprolol is on hold due to soft blood pressure


History of GI bleeding.  Not on anticoagulation





Hx of GI bleeding


Continue home medication Plavix


Repeat a CBC in morning





hx of dysphagia


N.p.o.


Speech pathology consult





Generalized weakness


PT OT





DVT prophylaxis: Heparin





CODE STATUS: DNR/DNI.  Discussed with the patient and her daughter who declined 

CPR and intubation





Disposition: Greater than 2 midnights





10/21/2021


Assessment and plan





AMSresolved


Etiologies include sepsis/infection, dehydration, metabolic events


No focal neurological deficits


Closely monitor


Speech therapy today cleared her for mechanical soft diet.





Early sepsis 2nd to cellulitis of right leg


LA 1.3, CRP 2.8


Blood culture


DC IV fluid


Antibiotics





Cellulitis, right leg and foot


Had great toe nails removed lsat week


US doppler to rule out a DVT


Ceftriaxone 1 g IV twice daily


Blood cultures pending





Acute hypoxic respiratory failureresolved


Oxygen saturation 88% in the ER


Etiology unknown


Pulse ox


Oxygen therapy to keep oxygen saturation greater than 94%on room air





KATIE on CKD, creatinine 1.0 on 1/11/2019, creatinine has been around 2.0 over the

 past 2-3 years. 


Creatinine 2.6


Avoid nephrotoxic meds


Repeat renal function in the morning





DM type 2


on insulin degludec 20units daily


N.p.o. due to history of dysphagia


I will order Lantus 10 units daily


Insulin sliding scales





HTN


Metoprolol succinate 50mg daily is on hold due to soft blood pressure


Hydralazine as needed





CHF, NYHA class II


lasix 20mg daily and spironolactone 50mg daily are on hold


Intake and output


Repeat electrolytes in the morning





Atrial fibrillation


Heart rate is controlled


Metoprolol is on hold due to soft blood pressure


History of GI bleeding.  Not on anticoagulation





Hx of GI bleeding


Continue home medication Plavix


Repeat a CBC in morning





hx of dysphagia


N.p.o.


Speech pathology consult





Generalized weakness


PT OT





10/22/2021


Assessment and plan





AMSresolved


Etiologies include sepsis/infection, dehydration, metabolic events


No focal neurological deficits


Closely monitor


Speech therapy today cleared her for mechanical soft diet.





Early sepsis 2nd to cellulitis of right leg


Bacteremia: Called by nurse that there was a positive blood culture.  Reviewed 

blood culture shows growth of 1 out of 2 sets positive of Staphylococcus 

lugdunensis.  I started her on nafcillin 2 g every 4 hours IV and stop 

ceftriaxone.  Ordered echocardiogram for Monday morning and will repeat blood 

cultures tomorrow.


CRP 2.8


Blood culture positive of Staphylococcus lugdunensis





Cellulitis, right leg and foot


Had great toe nails removed lsat week


US doppler to rule out a DVT


DC ceftriaxone 1 g IV twice daily and switch to nafcillin


Blood cultures as above





Acute hypoxic respiratory failureresolved


Oxygen saturation 88% in the ER


Etiology unknown


Pulse ox


Oxygen therapy to keep oxygen saturation greater than 94%on room air





KATIE on CKD, creatinine 1.0 on 1/11/2019, creatinine has been around 2.0 over the

 past 2-3 years. 


Creatinine 2.6-->2.1


Avoid nephrotoxic meds


Repeat renal function tomorrow





DM type 2


on insulin degludec 20units daily


N.p.o. due to history of dysphagia


I will order Lantus 10 units daily


Insulin sliding scales


Blood sugars between low 100s 200s





HTN


Restart on Saturday metoprolol succinate 50mg daily that has been on hold


Hydralazine as needed





CHF, NYHA class II


Restarted on Saturday lasix 20mg daily and spironolactone 50mg daily 


Intake and output


Repeat electrolytes in the morning





Atrial fibrillation


Heart rate is controlled


Restart on Saturday metoprolol has been on hold


History of GI bleeding.  Not on anticoagulation





Hx of GI bleeding


Continue home medication Plavix


Repeat a CBC in morning





hx of dysphagia


N.p.o.


Speech pathology consult





Generalized weakness


PT OT





10/23/2021


The patient is an 89-year-old lady who is doing better today.  She is awaiting 

nursing home placement.  She had positive blood cultures which were growing 

Staphylococcus lugdunensis and is currently pending a 2D echocardiogram to rule 

out vegetations.  For now the patient will be retained in hospitalization.  She 

has diabetes mellitus type 2 and as a result of this she will be alone current 

insulin sliding scale may have to adjust her long-acting as needed.  Diabetic 

diet as tolerated.  She had been in the past refusing testing although she did 

have repeat laboratory cultures completed.  The patient's vital signs will be 

monitored and her antihypertensive medications will also be adjusted as 

necessary.  The patient has had some resolution of her respiratory failure and 

her oxygen saturations to be kept around 92%.  Repeat laboratory studies have 

been ordered for the morning.  The patient has been encouraged to ambulate.  The

 patient is also anticoagulated with the use of heparin and Plavix.  We will 

monitor CBC for bleeding.





10/24/21


The patient is an 89-year-old lady who has been refusing IV restarts.  She says 

she is tired of being poked.  The patient says that she is tired and just wants 

to go home.  The patient is currently pending a 2D echocardiogram.  The patient 

will be retained in hospitalization.  The patient's diabetic diet will continue 

for now.  Repeat blood cultures are currently pending.  Patient have repeat 

laboratory studies in the morning.  The patient so far is on room air.  We will 

continue to monitor her saturations.  She is also anticoagulated with the use of

 heparin and Plavix.  The patient should be appropriate for discharge back to 

skilled nursing facility after completion of testing.  Because of the patient's 

refusal to have IV started the patient will be started on oral equivalent 

consisting of Pen-Vee K 500 mg p.o. 4 times daily.  This may change if 2D 

echocardiogram testing reveals endocarditis.





10/25/2021


89-year-old female admitted to the medical floor for cellulitis and subsequently

 positive blood cultures.  She is requesting to return back to the nursing home 

as she states she is tired of being poked from the IVs and blood draws.  She was

 switched from IV antibiotics to Pen-Vee K yesterday.  Appears to be tolerating 

this well.  Currently anticoagulated on heparin and Plavix.  Awaiting 2D echo 

report.  If there is no endocarditis suspected, patient will be discharged back 

to the nursing home tomorrow however if the echo does reveal endocarditis she 

will need to be treated with IV antibiotics. Lab studies reveal a white count of

 11.06, hemoglobin 11.2, hematocrit 36.3, platelet count 206, sodium 140, 

potassium 4.1, carbon dioxide 28, anion gap 12.1, BUN 33, creatinine 2.6, GFR 

17, glucose range has been from .  Patient remains on a diabetic diet.  

Had a lengthy discussion with the patient's daughter Juanis today, as the 

patient does have a history of dementia. Juanis will be updated once we have the

 report from the echocardiogram.  We will continue with PT/OT.





10/26/2021


The patient is an 89-year-old female who is doing quite well today.  

Echocardiogram report summary: 1.  Left ventricular ejection fraction, by visual

 estimation, is 60 to 65%.


2.  Normal left ventricular systolic function.


3.  Moderate proximal septal hypertrophy.


4.  Trace of mitral valve regurgitation.


5.  Trace tricuspid valve regurgitation.


6.  There is no evidence of valvular vegetation, consider GEORGE if highly 

suspicious for endocarditis.





Patient has been afebrile, hypoxia has resolved and she is currently on room air

 satting around 92%.  Confusion seems to have resolved.  She is otherwise 

hemodynamically stable.  I have no reason to suspect endocarditis.  While 

patient was in the hospital she was receiving metoprolol ER 50 mg daily however 

it is noted that on her medication reconciliation form she was taking 12.5 mg 

daily.  However, blood pressures have maintained as well as heart rate on the 50

 mg dose so we will continue this at discharge.  All other home medications will

 be continued.  She will be discharged to the nursing home with prescription for

 Pen-Vee K for another 10 days to be sure that her infection has resolved.  She 

will need to follow-up with your primary care provider within a week.





- Discharge Plan


*PRESCRIPTION DRUG MONITORING PROGRAM REVIEWED*: Not Applicable


*COPY OF PRESCRIPTION DRUG MONITORING REPORT IN PATIENT FLAVIO: Not Applicable


Prescriptions/Med Rec: 


Metoprolol Succinate 50 mg PO DAILY #30 tab.er.24h


Penicillin V Potassium [Veetids] 500 mg PO QID #40 tablet


Home Medications: 


                                    Home Meds





Gabapentin [Neurontin] 600 mg PO BID 11/25/15 [History]


Furosemide [Lasix] 20 mg PO DAILY 06/04/19 [History]


polyethylene glycoL 3350 [MiraLAX] 17 gm PO DAILY PRN 06/04/19 [History]


Insulin Aspart [NovoLOG] 13 unit SUBCUT TID 10/06/20 [History]


Calcium Carbonate [Tums] 1 tab PO Q4HR PRN 04/26/21 [History]


Insulin Degludec [Tresiba] 20 unit SQ BEDTIME 04/26/21 [History]


Phenyleph/Mineral Oil/Petrolat [Preparation H Ointment] 1 appful RECTAL BID PRN 

04/26/21 [History]


Pantoprazole Sodium [Protonix] 40 mg PO DAILY #30 tablet. 04/30/21 [Rx]


Acetaminophen [Tylenol] 650 mg PO BEDTIME 10/20/21 [History]


Clopidogrel [Plavix] 75 mg PO DAILY 10/20/21 [History]


bisacodyL [Bisacodyl] 10 mg PO DAILY PRN 10/20/21 [History]


Pen Needle,Dual Safety,Diabetc [Autoshield Duo Pen Needle] 1 ea SQ TID 10/21/21 

[History]


Spironolactone 50 mg PO DAILY 10/21/21 [History]


Metoprolol Succinate 50 mg PO DAILY #30 tab.er.24h 10/26/21 [Rx]


Penicillin V Potassium [Veetids] 500 mg PO QID #40 tablet 10/26/21 [Rx]








Oxygen Therapy Mode: Room Air


Patient Handouts:  Heart Failure Action Plan, Sepsis, Self Care, Adult


Forms:  ED Department Discharge


Referrals: 


Ru Vaughan MD [Primary Care Provider] - 11/04/21 11:30 am (Please 

arrive at 11:15 for check in.)





- Discharge Summary/Plan Comment


DC Time >30 min.: No


Total # of Minutes for Discharge Time: 





25





- General Info


Date of Service: 10/26/21


Admission Dx/Problem (Free Text: 


                           Admission Diagnosis/Problem





Admission Diagnosis/Problem      Sepsis








Functional Status: Reports: Pain Controlled, Tolerating Diet, Ambulating, 

Urinating





- Review of Systems


General: Reports: No Symptoms


HEENT: Reports: No Symptoms


Pulmonary: Reports: No Symptoms


Cardiovascular: Reports: No Symptoms


Gastrointestinal: Reports: No Symptoms


Genitourinary: Reports: No Symptoms


Musculoskeletal: Reports: No Symptoms


Skin: Reports: No Symptoms


Neurological: Reports: No Symptoms


Psychiatric: Reports: No Symptoms





- Patient Data


Vitals - Most Recent: 


                                Last Vital Signs











Temp  98.8 F   10/26/21 07:53


 


Pulse  65   10/26/21 08:57


 


Resp  17   10/26/21 07:53


 


BP  139/59 L  10/26/21 08:57


 


Pulse Ox  93 L  10/26/21 07:54











Weight - Most Recent: 70.534 kg


I&O - Last 24 hours: 


                                 Intake & Output











 10/25/21 10/26/21 10/26/21





 22:59 06:59 14:59


 


Intake Total 775 375 


 


Output Total 525 500 


 


Balance 250 -125 











Lab Results - Last 24 hrs: 


                         Laboratory Results - last 24 hr











  10/25/21 10/25/21 10/25/21 Range/Units





  11:52 16:59 20:40 


 


POC Glucose  239 H  298 H  240 H  (70-99)  mg/dL


 


SARS-CoV-2 RNA (MENA)     (NEGATIVE)  














  10/26/21 10/26/21 Range/Units





  05:44 07:48 


 


POC Glucose  96   (70-99)  mg/dL


 


SARS-CoV-2 RNA (MENA)   Negative  (NEGATIVE)  











TYRA Results - Last 24 hrs: 


                                  Microbiology











 10/23/21 11:15 Blood Culture - Preliminary





 Blood - Venous - Lab Draw 


 


 10/23/21 11:00 Blood Culture - Preliminary





 Blood - Venous 











Med Orders - Current: 


                               Current Medications





Acetaminophen (Acetaminophen 650 Mg Supp)  650 mg RECTAL Q6H PRN


   PRN Reason: Pain (mild 1-3)


Acetaminophen (Acetaminophen 325 Mg Tab)  650 mg PO Q4H PRN


   PRN Reason: Pain


   Last Admin: 10/22/21 21:58 Dose:  650 mg


   Documented by: 


Albuterol/Ipratropium (Albuterol/Ipratropium 3.0-0.5 Mg/3 Ml Neb Soln)  3 ml NEB

Q4H PRN


   PRN Reason: Shortness Of Breath/wheezing


Bisacodyl (Bisacodyl 5 Mg Tab)  10 mg PO DAILY PRN


   PRN Reason: Constipation


   Last Admin: 10/20/21 20:26 Dose:  5 mg


   Documented by: 


Calcium Carbonate/Glycine (Calcium Carbonate 500 Mg Tab.Chew)  250 mg PO Q4H PRN


   PRN Reason: Heartburn


Clopidogrel Bisulfate (Clopidogrel 75 Mg Tab)  75 mg PO DAILY [facility]


   Last Admin: 10/26/21 08:57 Dose:  75 mg


   Documented by: 


Furosemide (Furosemide 20 Mg Tab)  20 mg PO DAILY [facility]


   Last Admin: 10/26/21 08:58 Dose:  20 mg


   Documented by: 


Gabapentin (Gabapentin 300 Mg Cap)  300 mg PO BEDTIME [facility]


   Last Admin: 10/25/21 20:43 Dose:  300 mg


   Documented by: 


Heparin Sodium (Porcine) (Heparin Sodium 5,000 Units/Ml Vial)  5,000 units 

SUBCUT Q8H [facility]


   Last Admin: 10/26/21 04:38 Dose:  5,000 units


   Documented by: 


Promethazine HCl 6.25 mg/ (Sodium Chloride)  50.25 mls @ 100 mls/hr IV Q6H PRN


   PRN Reason: Nausea/Vomiting


Insulin Glargine (Insulin Glargine,Hum.Rec.Anlog 100 Unit/Ml 3 Ml Pen)  25 unit 

SUBCUT BEDTIME [facility]


   Last Admin: 10/25/21 21:01 Dose:  25 unit


   Documented by: 


Insulin Human Lispro (Insulin Lispro 100 Unit/Ml 3 Ml Kwikpen)  0 unit SUBCUT 

QIDACANDBED [facility]; Protocol


   Last Admin: 10/26/21 08:58 Dose:  Not Given


   Documented by: 


Metoprolol Succinate (Metoprolol Succinate 50 Mg Tab.Er)  50 mg PO DAILY [facility]


   Last Admin: 10/26/21 08:57 Dose:  50 mg


   Documented by: 


Oxycodone HCl (Oxycodone 5 Mg Tab)  5 mg PO Q6H PRN


   PRN Reason: Pain (moderate 4-6)


Pantoprazole Sodium (Pantoprazole 40 Mg Tab.Cr)  40 mg PO ACBREAKFAST [facility]


   Last Admin: 10/26/21 06:59 Dose:  40 mg


   Documented by: 


Penicillin V Potassium (Penicillin V Potassium 500 Mg Tab)  500 mg PO Q6H [facility]


   Last Admin: 10/26/21 06:59 Dose:  500 mg


   Documented by: 


Polyethylene Glycol (Polyethylene Glycol 3350 Powder 17 Gm Packet)  17 gm PO 

DAILY PRN


   PRN Reason: Constipation


Sodium Chloride (Sodium Chloride 0.9% 10 Ml Syringe)  10 ml FLUSH ASDIRECTED PRN


   PRN Reason: Keep Vein Open


   Last Admin: 10/20/21 16:50 Dose:  10 ml


   Documented by: 


Spironolactone (Spironolactone 25 Mg Tab)  50 mg PO DAILY@1200 [facility]


   Last Admin: 10/25/21 12:48 Dose:  50 mg


   Documented by: 





Discontinued Medications





Acetaminophen (Acetaminophen 325 Mg Tab)  975 mg PO NOW ONE


   Stop: 10/20/21 16:13


   Last Admin: 10/20/21 16:50 Dose:  975 mg


   Documented by: 


Furosemide (Furosemide 40 Mg Tab)  20 mg PO DAILY [facility]


   Last Admin: 10/25/21 09:37 Dose:  20 mg


   Documented by: 


Gabapentin (Gabapentin 300 Mg Cap)  600 mg PO ONETIME ONE


   Stop: 10/20/21 19:46


   Last Admin: 10/20/21 20:22 Dose:  600 mg


   Documented by: 


Sodium Chloride (Normal Saline)  1,000 mls @ 125 mls/hr IV ASDIRECTED [facility]


   Last Admin: 10/20/21 16:49 Dose:  125 mls/hr


   Documented by: 


Ceftriaxone Sodium 2 gm/ (Sodium Chloride)  100 mls @ 200 mls/hr IV ONETIME ONE


   Stop: 10/20/21 16:42


   Last Admin: 10/20/21 16:50 Dose:  200 mls/hr


   Documented by: 


Lactated Ringer's (Ringers, Lactated)  1,000 mls @ 100 mls/hr IV ASDIRECTED [facility]


Lactated Ringer's (Ringers, Lactated)  1,000 mls @ 50 mls/hr IV ASDIRECTED [facility]


   Last Admin: 10/20/21 20:47 Dose:  50 mls/hr


   Documented by: 


Ceftriaxone Sodium 1 gm/ (Sodium Chloride)  100 mls @ 200 mls/hr IV Q24H [facility]


Ceftriaxone Sodium 1 gm/ (Sodium Chloride)  100 mls @ 200 mls/hr IV Q12H [facility]


   Last Admin: 10/22/21 21:39 Dose:  Not Given


   Documented by: 


Oxacillin Sodium 2 gm/ Sodium (Chloride)  100 mls @ 100 mls/hr IV Q4H [facility]


   Last Admin: 10/24/21 11:20 Dose:  Not Given


   Documented by: 


Insulin Glargine (Insulin Glargine,Hum.Rec.Anlog 100 Unit/Ml 3 Ml Pen)  10 unit 

SUBCUT DAILY [facility]


   Last Admin: 10/21/21 10:28 Dose:  Not Given


   Documented by: 


Insulin Glargine (Insulin Glargine,Hum.Rec.Anlog 100 Unit/Ml 3 Ml Pen)  10 unit 

SUBCUT BEDTIME [facility]


   Last Admin: 10/23/21 21:07 Dose:  10 units


   Documented by: 


Morphine Sulfate (Morphine 2 Mg/Ml Syringe)  1 mg IVPUSH Q4H PRN


   PRN Reason: Pain (severe 7-10)


   Stop: 10/21/21 19:06


Potassium Chloride (Potassium Chloride 20 Meq Tab.Er)  40 meq PO ONETIME ONE


   Stop: 10/25/21 09:24


   Last Admin: 10/25/21 09:37 Dose:  40 meq


   Documented by: 


Spironolactone (Spironolactone 25 Mg Tab)  50 mg PO DAILY [facility]


   Last Admin: 10/24/21 16:09 Dose:  Not Given


   Documented by: 











- Exam


Quality Assessment: Reports: DVT Prophylaxis (heparin)


General: Reports: Alert, Oriented, Cooperative, No Acute Distress


HEENT: Reports: Pupils Equal, Mucous Membr. Moist/Pink


Neck: Reports: Supple, Trachea Midline


Lungs: Reports: Clear to Auscultation, Normal Respiratory Effort


Cardiovascular: Reports: Regular Rate, Regular Rhythm


GI/Abdominal Exam: Normal Bowel Sounds, Soft, Non-Tender, No Distention


 (Female) Exam: Deferred


Rectal (Female) Exam: Deferred


Back Exam: Reports: Normal Inspection


Extremities: Normal Range of Motion, Non-Tender, No Pedal Edema, Normal 

Capillary Refill


Skin: Reports: Warm, Dry, Intact


Wound/Incisions: Reports: Healing Well, Dressing Dry and Intact


Neurological: Reports: No New Focal Deficit


Psy/Mental Status: Reports: Alert, Normal Affect, Normal Mood





<Frankie Clements - Last Filed: 10/26/21 14:31>





**Discharge Summary





- Referral to [facility]


Primary Care Physician: 


Ru Vaughan MD








- Discharge Diagnosis/Problem(s)


(1) Cellulitis of right leg


SNOMED Code(s): 715467334


   ICD Code: L03.115 - CELLULITIS OF RIGHT LOWER LIMB   Status: Acute   

Priority: High   





(2) Sepsis


SNOMED Code(s): 09180383


   ICD Code: A41.9 - SEPSIS, UNSPECIFIED ORGANISM   Status: Resolved   Priority:

High   


Qualifiers: 


   Sepsis type: sepsis due to unspecified organism   Sepsis acute organ 

dysfunction status: unspecified   Qualified Code(s): A41.9 - Sepsis, unspecified

organism   





(3) Hyperglycemia due to type 2 diabetes mellitus


SNOMED Code(s): 577050060987015, 733903363139702


   ICD Code: E11.65 - TYPE 2 DIABETES MELLITUS WITH HYPERGLYCEMIA   Status: 

Chronic   Priority: High   


Qualifiers: 


   Diabetes mellitus long term insulin use: with long term use   Qualified 

Code(s): E11.65 - Type 2 diabetes mellitus with hyperglycemia; Z79.4 - Long term

(current) use of insulin   





(4) CKD (chronic kidney disease) stage 4, GFR 15-29 ml/min


SNOMED Code(s): 220419757


   ICD Code: N18.4 - CHRONIC KIDNEY DISEASE, STAGE 4 (SEVERE)   Status: Chronic 

 Priority: Medium   





- Patient Summary/Data


Consults: 


                                  Consultations





10/20/21 19:03


OT Evaluation and Treatment [CONS] Routine 


PT Evaluation and Treatment [CONS] Routine 





10/20/21 19:43


Consult to Speech Language Pathology [SLP Evaluation and Treatment] [CONS] 

Routine 














- Patient Data


Vitals - Most Recent: 


                                Last Vital Signs











Temp  36.8 C   10/26/21 11:48


 


Pulse  64   10/26/21 11:48


 


Resp  18   10/26/21 11:48


 


BP  139/78   10/26/21 11:48


 


Pulse Ox  93 L  10/26/21 11:48











I&O - Last 24 hours: 


                                 Intake & Output











 10/25/21 10/26/21 10/26/21





 22:59 06:59 14:59


 


Intake Total 775 375 480


 


Output Total 525 500 


 


Balance 250 -125 480











Lab Results - Last 24 hrs: 


                         Laboratory Results - last 24 hr











  10/25/21 10/25/21 10/26/21 Range/Units





  16:59 20:40 05:44 


 


POC Glucose  298 H  240 H  96  (70-99)  mg/dL


 


SARS-CoV-2 RNA (MENA)     (NEGATIVE)  














  10/26/21 10/26/21 Range/Units





  07:48 11:21 


 


POC Glucose   245 H  (70-99)  mg/dL


 


SARS-CoV-2 RNA (MENA)  Negative   (NEGATIVE)  











TYRA Results - Last 24 hrs: 


                                  Microbiology











 10/20/21 16:25 Blood Culture - Final





 Blood - Venous - Lab Draw 


 


 10/23/21 11:15 Blood Culture - Preliminary





 Blood - Venous - Lab Draw 


 


 10/23/21 11:00 Blood Culture - Preliminary





 Blood - Venous 











Med Orders - Current: 


                               Current Medications





Acetaminophen (Acetaminophen 650 Mg Supp)  650 mg RECTAL Q6H PRN


   PRN Reason: Pain (mild 1-3)


Acetaminophen (Acetaminophen 325 Mg Tab)  650 mg PO Q4H PRN


   PRN Reason: Pain


   Last Admin: 10/22/21 21:58 Dose:  650 mg


   Documented by: 


Albuterol/Ipratropium (Albuterol/Ipratropium 3.0-0.5 Mg/3 Ml Neb Soln)  3 ml NEB

 Q4H PRN


   PRN Reason: Shortness Of Breath/wheezing


Bisacodyl (Bisacodyl 5 Mg Tab)  10 mg PO DAILY PRN


   PRN Reason: Constipation


   Last Admin: 10/20/21 20:26 Dose:  5 mg


   Documented by: 


Calcium Carbonate/Glycine (Calcium Carbonate 500 Mg Tab.Chew)  250 mg PO Q4H PRN


   PRN Reason: Heartburn


Clopidogrel Bisulfate (Clopidogrel 75 Mg Tab)  75 mg PO DAILY [facility]


   Last Admin: 10/26/21 08:57 Dose:  75 mg


   Documented by: 


Furosemide (Furosemide 20 Mg Tab)  20 mg PO DAILY [facility]


   Last Admin: 10/26/21 08:58 Dose:  20 mg


   Documented by: 


Gabapentin (Gabapentin 300 Mg Cap)  300 mg PO BEDTIME [facility]


   Last Admin: 10/25/21 20:43 Dose:  300 mg


   Documented by: 


Heparin Sodium (Porcine) (Heparin Sodium 5,000 Units/Ml Vial)  5,000 units 

SUBCUT Q8H [facility]


   Last Admin: 10/26/21 04:38 Dose:  5,000 units


   Documented by: 


Promethazine HCl 6.25 mg/ (Sodium Chloride)  50.25 mls @ 100 mls/hr IV Q6H PRN


   PRN Reason: Nausea/Vomiting


Insulin Glargine (Insulin Glargine,Hum.Rec.Anlog 100 Unit/Ml 3 Ml Pen)  25 unit 

SUBCUT BEDTIME [facility]


   Last Admin: 10/25/21 21:01 Dose:  25 unit


   Documented by: 


Insulin Human Lispro (Insulin Lispro 100 Unit/Ml 3 Ml Kwikpen)  0 unit SUBCUT 

QIDACANDBED [facility]; Protocol


   Last Admin: 10/26/21 11:51 Dose:  4 units


   Documented by: 


Metoprolol Succinate (Metoprolol Succinate 50 Mg Tab.Er)  50 mg PO DAILY [facility]


   Last Admin: 10/26/21 08:57 Dose:  50 mg


   Documented by: 


Oxycodone HCl (Oxycodone 5 Mg Tab)  5 mg PO Q6H PRN


   PRN Reason: Pain (moderate 4-6)


Pantoprazole Sodium (Pantoprazole 40 Mg Tab.Cr)  40 mg PO ACBREAKFAST [facility]


   Last Admin: 10/26/21 06:59 Dose:  40 mg


   Documented by: 


Penicillin V Potassium (Penicillin V Potassium 500 Mg Tab)  500 mg PO Q6H [facility]


   Last Admin: 10/26/21 11:45 Dose:  500 mg


   Documented by: 


Polyethylene Glycol (Polyethylene Glycol 3350 Powder 17 Gm Packet)  17 gm PO 

DAILY PRN


   PRN Reason: Constipation


Sodium Chloride (Sodium Chloride 0.9% 10 Ml Syringe)  10 ml FLUSH ASDIRECTED PRN


   PRN Reason: Keep Vein Open


   Last Admin: 10/20/21 16:50 Dose:  10 ml


   Documented by: 


Spironolactone (Spironolactone 25 Mg Tab)  50 mg PO DAILY@1200 [facility]


   Last Admin: 10/26/21 11:48 Dose:  50 mg


   Documented by: 





Discontinued Medications





Acetaminophen (Acetaminophen 325 Mg Tab)  975 mg PO NOW ONE


   Stop: 10/20/21 16:13


   Last Admin: 10/20/21 16:50 Dose:  975 mg


   Documented by: 


Furosemide (Furosemide 40 Mg Tab)  20 mg PO DAILY [facility]


   Last Admin: 10/25/21 09:37 Dose:  20 mg


   Documented by: 


Gabapentin (Gabapentin 300 Mg Cap)  600 mg PO ONETIME ONE


   Stop: 10/20/21 19:46


   Last Admin: 10/20/21 20:22 Dose:  600 mg


   Documented by: 


Sodium Chloride (Normal Saline)  1,000 mls @ 125 mls/hr IV ASDIRECTED [facility]


   Last Admin: 10/20/21 16:49 Dose:  125 mls/hr


   Documented by: 


Ceftriaxone Sodium 2 gm/ (Sodium Chloride)  100 mls @ 200 mls/hr IV ONETIME ONE


   Stop: 10/20/21 16:42


   Last Admin: 10/20/21 16:50 Dose:  200 mls/hr


   Documented by: 


Lactated Ringer's (Ringers, Lactated)  1,000 mls @ 100 mls/hr IV ASDIRECTED [facility]


Lactated Ringer's (Ringers, Lactated)  1,000 mls @ 50 mls/hr IV ASDIRECTED [facility]


   Last Admin: 10/20/21 20:47 Dose:  50 mls/hr


   Documented by: 


Ceftriaxone Sodium 1 gm/ (Sodium Chloride)  100 mls @ 200 mls/hr IV Q24H [facility]


Ceftriaxone Sodium 1 gm/ (Sodium Chloride)  100 mls @ 200 mls/hr IV Q12H [facility]


   Last Admin: 10/22/21 21:39 Dose:  Not Given


   Documented by: 


Oxacillin Sodium 2 gm/ Sodium (Chloride)  100 mls @ 100 mls/hr IV Q4H [facility]


   Last Admin: 10/24/21 11:20 Dose:  Not Given


   Documented by: 


Insulin Glargine (Insulin Glargine,Hum.Rec.Anlog 100 Unit/Ml 3 Ml Pen)  10 unit 

SUBCUT DAILY [facility]


   Last Admin: 10/21/21 10:28 Dose:  Not Given


   Documented by: 


Insulin Glargine (Insulin Glargine,Hum.Rec.Anlog 100 Unit/Ml 3 Ml Pen)  10 unit 

SUBCUT BEDTIME [facility]


   Last Admin: 10/23/21 21:07 Dose:  10 units


   Documented by: 


Morphine Sulfate (Morphine 2 Mg/Ml Syringe)  1 mg IVPUSH Q4H PRN


   PRN Reason: Pain (severe 7-10)


   Stop: 10/21/21 19:06


Potassium Chloride (Potassium Chloride 20 Meq Tab.Er)  40 meq PO ONETIME ONE


   Stop: 10/25/21 09:24


   Last Admin: 10/25/21 09:37 Dose:  40 meq


   Documented by: 


Spironolactone (Spironolactone 25 Mg Tab)  50 mg PO DAILY [facility]


   Last Admin: 10/24/21 16:09 Dose:  Not Given


   Documented by: 











- Free Text/Narrative


Note: 





I have seen and examined the patient independently of MATTHEW Love, and I

 have discussed the case with her.  I have reviewed and agree with the plan of 

care as outlined by her.  Please see orders.